# Patient Record
Sex: FEMALE | Race: WHITE | NOT HISPANIC OR LATINO | Employment: UNEMPLOYED | URBAN - METROPOLITAN AREA
[De-identification: names, ages, dates, MRNs, and addresses within clinical notes are randomized per-mention and may not be internally consistent; named-entity substitution may affect disease eponyms.]

---

## 2017-01-27 ENCOUNTER — GENERIC CONVERSION - ENCOUNTER (OUTPATIENT)
Dept: OTHER | Facility: OTHER | Age: 62
End: 2017-01-27

## 2017-02-14 ENCOUNTER — HOSPITAL ENCOUNTER (EMERGENCY)
Facility: HOSPITAL | Age: 62
Discharge: HOME | End: 2017-02-14
Attending: EMERGENCY MEDICINE
Payer: COMMERCIAL

## 2017-02-14 ENCOUNTER — APPOINTMENT (EMERGENCY)
Dept: RADIOLOGY | Facility: HOSPITAL | Age: 62
End: 2017-02-14
Payer: COMMERCIAL

## 2017-02-14 VITALS
HEIGHT: 60 IN | RESPIRATION RATE: 16 BRPM | BODY MASS INDEX: 37.3 KG/M2 | OXYGEN SATURATION: 92 % | SYSTOLIC BLOOD PRESSURE: 111 MMHG | TEMPERATURE: 98.6 F | WEIGHT: 190 LBS | HEART RATE: 88 BPM | DIASTOLIC BLOOD PRESSURE: 55 MMHG

## 2017-02-14 DIAGNOSIS — M54.9 BACK PAIN: Primary | ICD-10-CM

## 2017-02-14 DIAGNOSIS — R20.2 PARESTHESIA: ICD-10-CM

## 2017-02-14 LAB
ANION GAP SERPL CALCULATED.3IONS-SCNC: 9 MMOL/L (ref 4–13)
BASOPHILS # BLD AUTO: 0 THOUSANDS/ΜL (ref 0–0.1)
BASOPHILS NFR BLD AUTO: 0 % (ref 0–1)
BUN SERPL-MCNC: 15 MG/DL (ref 5–25)
CALCIUM SERPL-MCNC: 9 MG/DL (ref 8.3–10.1)
CHLORIDE SERPL-SCNC: 96 MMOL/L (ref 100–108)
CO2 SERPL-SCNC: 30 MMOL/L (ref 21–32)
CREAT SERPL-MCNC: 0.95 MG/DL (ref 0.6–1.3)
EOSINOPHIL # BLD AUTO: 0.3 THOUSAND/ΜL (ref 0–0.61)
EOSINOPHIL NFR BLD AUTO: 3 % (ref 0–6)
ERYTHROCYTE [DISTWIDTH] IN BLOOD BY AUTOMATED COUNT: 16 % (ref 11.6–15.1)
GFR SERPL CREATININE-BSD FRML MDRD: 59.8 ML/MIN/1.73SQ M
GLUCOSE SERPL-MCNC: 120 MG/DL (ref 65–140)
HCT VFR BLD AUTO: 44.7 % (ref 37–47)
HGB BLD-MCNC: 14.6 G/DL (ref 12–16)
LYMPHOCYTES # BLD AUTO: 2.8 THOUSANDS/ΜL (ref 0.6–4.47)
LYMPHOCYTES NFR BLD AUTO: 32 % (ref 14–44)
MCH RBC QN AUTO: 29.3 PG (ref 27–31)
MCHC RBC AUTO-ENTMCNC: 32.7 G/DL (ref 31.4–37.4)
MCV RBC AUTO: 90 FL (ref 82–98)
MONOCYTES # BLD AUTO: 0.6 THOUSAND/ΜL (ref 0.17–1.22)
MONOCYTES NFR BLD AUTO: 7 % (ref 4–12)
NEUTROPHILS # BLD AUTO: 5.2 THOUSANDS/ΜL (ref 1.85–7.62)
NEUTS SEG NFR BLD AUTO: 58 % (ref 43–75)
NRBC BLD AUTO-RTO: 0 /100 WBCS
PLATELET # BLD AUTO: 371 THOUSANDS/UL (ref 130–400)
PMV BLD AUTO: 6.9 FL (ref 8.9–12.7)
POTASSIUM SERPL-SCNC: 3.8 MMOL/L (ref 3.5–5.3)
RBC # BLD AUTO: 4.99 MILLION/UL (ref 4.2–5.4)
SODIUM SERPL-SCNC: 135 MMOL/L (ref 136–145)
WBC # BLD AUTO: 8.9 THOUSAND/UL (ref 4.8–10.8)

## 2017-02-14 PROCEDURE — 85025 COMPLETE CBC W/AUTO DIFF WBC: CPT | Performed by: EMERGENCY MEDICINE

## 2017-02-14 PROCEDURE — 36415 COLL VENOUS BLD VENIPUNCTURE: CPT | Performed by: EMERGENCY MEDICINE

## 2017-02-14 PROCEDURE — 99284 EMERGENCY DEPT VISIT MOD MDM: CPT

## 2017-02-14 PROCEDURE — 80048 BASIC METABOLIC PNL TOTAL CA: CPT | Performed by: EMERGENCY MEDICINE

## 2017-02-14 PROCEDURE — 72100 X-RAY EXAM L-S SPINE 2/3 VWS: CPT

## 2017-02-14 RX ORDER — GABAPENTIN 300 MG/1
300 CAPSULE ORAL 3 TIMES DAILY
COMMUNITY
End: 2018-05-07 | Stop reason: ALTCHOICE

## 2017-02-14 RX ORDER — VENLAFAXINE 75 MG/1
75 TABLET ORAL 2 TIMES DAILY
COMMUNITY
End: 2018-06-19 | Stop reason: ALTCHOICE

## 2017-02-14 RX ORDER — VALSARTAN 160 MG/1
180 TABLET ORAL DAILY
COMMUNITY
End: 2018-04-20 | Stop reason: CLARIF

## 2017-02-14 RX ORDER — TRAZODONE HYDROCHLORIDE 150 MG/1
150 TABLET ORAL
COMMUNITY
End: 2018-06-19 | Stop reason: SDUPTHER

## 2017-02-14 RX ORDER — ALBUTEROL SULFATE 2.5 MG/3ML
2.5 SOLUTION RESPIRATORY (INHALATION) EVERY 6 HOURS PRN
COMMUNITY
End: 2019-07-18

## 2017-02-14 RX ORDER — METHYLPREDNISOLONE 4 MG/1
TABLET ORAL
Qty: 21 TABLET | Refills: 0 | Status: SHIPPED | OUTPATIENT
Start: 2017-02-14 | End: 2018-05-07 | Stop reason: ALTCHOICE

## 2017-02-21 ENCOUNTER — ALLSCRIPTS OFFICE VISIT (OUTPATIENT)
Dept: OTHER | Facility: OTHER | Age: 62
End: 2017-02-21

## 2017-02-21 DIAGNOSIS — M48.061 SPINAL STENOSIS OF LUMBAR REGION: ICD-10-CM

## 2017-03-02 ENCOUNTER — GENERIC CONVERSION - ENCOUNTER (OUTPATIENT)
Dept: OTHER | Facility: OTHER | Age: 62
End: 2017-03-02

## 2017-03-02 ENCOUNTER — APPOINTMENT (OUTPATIENT)
Dept: PHYSICAL THERAPY | Facility: CLINIC | Age: 62
End: 2017-03-02
Payer: COMMERCIAL

## 2017-03-02 DIAGNOSIS — M48.061 SPINAL STENOSIS OF LUMBAR REGION: ICD-10-CM

## 2017-03-02 PROCEDURE — 97161 PT EVAL LOW COMPLEX 20 MIN: CPT

## 2017-03-02 PROCEDURE — 97110 THERAPEUTIC EXERCISES: CPT

## 2017-03-07 ENCOUNTER — APPOINTMENT (OUTPATIENT)
Dept: PHYSICAL THERAPY | Facility: CLINIC | Age: 62
End: 2017-03-07
Payer: COMMERCIAL

## 2017-03-07 PROCEDURE — 97110 THERAPEUTIC EXERCISES: CPT

## 2017-03-09 ENCOUNTER — APPOINTMENT (OUTPATIENT)
Dept: PHYSICAL THERAPY | Facility: CLINIC | Age: 62
End: 2017-03-09
Payer: COMMERCIAL

## 2017-03-14 ENCOUNTER — APPOINTMENT (OUTPATIENT)
Dept: PHYSICAL THERAPY | Facility: CLINIC | Age: 62
End: 2017-03-14
Payer: COMMERCIAL

## 2017-03-16 ENCOUNTER — APPOINTMENT (OUTPATIENT)
Dept: PHYSICAL THERAPY | Facility: CLINIC | Age: 62
End: 2017-03-16
Payer: COMMERCIAL

## 2017-03-21 ENCOUNTER — APPOINTMENT (OUTPATIENT)
Dept: PHYSICAL THERAPY | Facility: CLINIC | Age: 62
End: 2017-03-21
Payer: COMMERCIAL

## 2017-03-21 PROCEDURE — 97110 THERAPEUTIC EXERCISES: CPT

## 2017-03-27 ENCOUNTER — APPOINTMENT (OUTPATIENT)
Dept: PHYSICAL THERAPY | Facility: CLINIC | Age: 62
End: 2017-03-27
Payer: COMMERCIAL

## 2017-03-27 PROCEDURE — 97112 NEUROMUSCULAR REEDUCATION: CPT

## 2017-03-29 ENCOUNTER — ALLSCRIPTS OFFICE VISIT (OUTPATIENT)
Dept: OTHER | Facility: OTHER | Age: 62
End: 2017-03-29

## 2017-03-30 ENCOUNTER — APPOINTMENT (OUTPATIENT)
Dept: PHYSICAL THERAPY | Facility: CLINIC | Age: 62
End: 2017-03-30
Payer: COMMERCIAL

## 2017-04-03 ENCOUNTER — TRANSCRIBE ORDERS (OUTPATIENT)
Dept: ADMINISTRATIVE | Facility: HOSPITAL | Age: 62
End: 2017-04-03

## 2017-04-03 DIAGNOSIS — M54.40 ACUTE RIGHT-SIDED LOW BACK PAIN WITH SCIATICA, SCIATICA LATERALITY UNSPECIFIED: ICD-10-CM

## 2017-04-03 DIAGNOSIS — M54.16 LUMBAR RADICULOPATHY: Primary | ICD-10-CM

## 2017-04-03 DIAGNOSIS — G89.4 CHRONIC PAIN SYNDROME: ICD-10-CM

## 2017-04-10 ENCOUNTER — HOSPITAL ENCOUNTER (OUTPATIENT)
Dept: RADIOLOGY | Facility: HOSPITAL | Age: 62
Discharge: HOME | End: 2017-04-10
Attending: ANESTHESIOLOGY
Payer: COMMERCIAL

## 2017-04-10 DIAGNOSIS — G89.4 CHRONIC PAIN SYNDROME: ICD-10-CM

## 2017-04-10 DIAGNOSIS — M54.40 ACUTE RIGHT-SIDED LOW BACK PAIN WITH SCIATICA, SCIATICA LATERALITY UNSPECIFIED: ICD-10-CM

## 2017-04-10 DIAGNOSIS — M54.16 LUMBAR RADICULOPATHY: ICD-10-CM

## 2017-04-10 PROCEDURE — 72148 MRI LUMBAR SPINE W/O DYE: CPT

## 2017-04-18 ENCOUNTER — GENERIC CONVERSION - ENCOUNTER (OUTPATIENT)
Dept: OTHER | Facility: OTHER | Age: 62
End: 2017-04-18

## 2017-04-24 ENCOUNTER — ALLSCRIPTS OFFICE VISIT (OUTPATIENT)
Dept: OTHER | Facility: OTHER | Age: 62
End: 2017-04-24

## 2017-04-24 DIAGNOSIS — M54.2 CERVICALGIA: ICD-10-CM

## 2017-04-24 DIAGNOSIS — G89.4 CHRONIC PAIN SYNDROME: ICD-10-CM

## 2017-04-24 DIAGNOSIS — M47.26 OTHER SPONDYLOSIS WITH RADICULOPATHY, LUMBAR REGION: ICD-10-CM

## 2017-04-24 DIAGNOSIS — M54.50 LOW BACK PAIN: ICD-10-CM

## 2017-04-24 DIAGNOSIS — M48.061 SPINAL STENOSIS OF LUMBAR REGION: ICD-10-CM

## 2017-04-24 DIAGNOSIS — M54.12 RADICULOPATHY OF CERVICAL REGION: ICD-10-CM

## 2017-05-22 ENCOUNTER — ALLSCRIPTS OFFICE VISIT (OUTPATIENT)
Dept: OTHER | Facility: OTHER | Age: 62
End: 2017-05-22

## 2017-05-24 ENCOUNTER — HOSPITAL ENCOUNTER (OUTPATIENT)
Facility: AMBULARY SURGERY CENTER | Age: 62
Setting detail: OUTPATIENT SURGERY
Discharge: HOME | End: 2017-05-24
Attending: ANESTHESIOLOGY | Admitting: ANESTHESIOLOGY
Payer: COMMERCIAL

## 2017-05-24 ENCOUNTER — APPOINTMENT (OUTPATIENT)
Dept: RADIOLOGY | Facility: HOSPITAL | Age: 62
End: 2017-05-24
Payer: COMMERCIAL

## 2017-05-24 VITALS
SYSTOLIC BLOOD PRESSURE: 155 MMHG | HEART RATE: 88 BPM | DIASTOLIC BLOOD PRESSURE: 75 MMHG | RESPIRATION RATE: 18 BRPM | OXYGEN SATURATION: 98 % | TEMPERATURE: 98 F

## 2017-05-24 PROBLEM — M51.17 INTERVERTEBRAL DISC DISORDER WITH RADICULOPATHY OF LUMBOSACRAL REGION: Status: ACTIVE | Noted: 2017-05-24

## 2017-05-24 PROBLEM — M54.50 LOW BACK PAIN: Status: ACTIVE | Noted: 2017-05-24

## 2017-05-24 PROBLEM — G89.4 CHRONIC PAIN SYNDROME: Status: ACTIVE | Noted: 2017-05-24

## 2017-05-24 PROCEDURE — 72020 X-RAY EXAM OF SPINE 1 VIEW: CPT

## 2017-05-24 RX ORDER — METHYLPREDNISOLONE ACETATE 80 MG/ML
INJECTION, SUSPENSION INTRA-ARTICULAR; INTRALESIONAL; INTRAMUSCULAR; SOFT TISSUE AS NEEDED
Status: DISCONTINUED | OUTPATIENT
Start: 2017-05-24 | End: 2017-05-24 | Stop reason: HOSPADM

## 2017-05-24 RX ORDER — BUPIVACAINE HYDROCHLORIDE 2.5 MG/ML
INJECTION, SOLUTION EPIDURAL; INFILTRATION; INTRACAUDAL AS NEEDED
Status: DISCONTINUED | OUTPATIENT
Start: 2017-05-24 | End: 2017-05-24 | Stop reason: HOSPADM

## 2017-05-25 ENCOUNTER — GENERIC CONVERSION - ENCOUNTER (OUTPATIENT)
Dept: OTHER | Facility: OTHER | Age: 62
End: 2017-05-25

## 2017-06-07 ENCOUNTER — GENERIC CONVERSION - ENCOUNTER (OUTPATIENT)
Dept: OTHER | Facility: OTHER | Age: 62
End: 2017-06-07

## 2017-06-20 ENCOUNTER — GENERIC CONVERSION - ENCOUNTER (OUTPATIENT)
Dept: OTHER | Facility: OTHER | Age: 62
End: 2017-06-20

## 2017-07-10 ENCOUNTER — ALLSCRIPTS OFFICE VISIT (OUTPATIENT)
Dept: OTHER | Facility: OTHER | Age: 62
End: 2017-07-10

## 2017-07-14 ENCOUNTER — GENERIC CONVERSION - ENCOUNTER (OUTPATIENT)
Dept: OTHER | Facility: OTHER | Age: 62
End: 2017-07-14

## 2018-01-10 NOTE — RESULT NOTES
Verified Results  CT ABDOMEN W [address] [employer] [id number] 09:43AM Mayra Ferguson    Order Number: [id number]   Performing Comments: RUQ ultrasound done March 2016 showed questional hypoechoic lesion in left lobe of liver or more likely area of fatty sparing, recommended evaluate with CT of abdomen   - Patient Instructions: To schedule this appointment, please    contact Central Scheduling at [phone]  Test Name Result Flag Reference   CT ABDOMEN W WO CONTRAST (Report)     CT - ABDOMEN WITH AND WITHOUT CONTRAST  INDICATION: 79-year-old female with questionable lesion in left hepatic lobe  The patient has a smoking history  COMPARISON: Right upper quadrant ultrasound 3/18/2016  TECHNIQUE: CT examination of the abdomen was performed both prior to and after the administration of intravenous contrast  This examination, like all CT scans performed in the [facility], was performed utilizing techniques to minimize    radiation dose exposure, including the use of iterative reconstruction and automated exposure control  Axial, sagittal, and coronal reformatted images were submitted for interpretation  100 ml of Omnipaque 350 was injected intravenously  Enteric contrast was administered  FINDINGS:     ABDOMEN     LOWER CHEST: Scattered subsegmental atelectasis  5 mm noncalcified nodule at the left lung base  According to guidelines by the Fleischner society (Radiology 2013; 364:387-677) , for this patient with a smoking history, initial follow-up CT in 6-12    months followed by 18-24 months if no change  At the nodule is stable, no further surveillance required  LIVER/BILIARY TREE: Unremarkable  GALLBLADDER: No calcified gallstones  No pericholecystic inflammatory change  SPLEEN: Unremarkable  PANCREAS: Tiny lipoma in the uncinate process  Otherwise, unremarkable  ADRENAL GLANDS: Unremarkable  KIDNEYS/URETERS: One or more simple renal cyst(s) is noted  Otherwise unremarkable kidneys  No hydronephrosis  VISUALIZED STOMACH AND BOWEL: Unremarkable  VISUALIZED ABDOMINAL CAVITY: No pathologically enlarged periportal, mesenteric or upper retroperitoneal lymph nodes  No ascites or free intraperitoneal air  No fluid collection  VISUALIZED BODY WALL: No hernia or mass  VISUALIZED ABDOMINAL VESSELS: Calcified plaque throughout the normal caliber aorta  No aneurysm  OSSEOUS STRUCTURES: No acute fracture or destructive osseous lesion  IMPRESSION:       1  No discrete liver lesions  The sonographic finding likely represented focal fatty sparing  2  5 mm noncalcified left basilar pulmonary nodule  Follow-up in 6-12 months recommended         ##sigslh##sigslh       Workstation performed: [id number]     Signed by:   Bakari Mello MD   5/3/16

## 2018-01-11 NOTE — RESULT NOTES
Verified Results  (1) BASIC METABOLIC PROFILE [id number] 12:00AM Katia Lopez     Test Name Result Flag Reference   Glucose, Serum 126 mg/dL H 65-99   BUN 13 mg/dL  8-27   Creatinine, Serum 0 76 mg/dL  0 57-1 00   eGFR If NonAfricn Am 86 mL/min/1 73  >59   eGFR If Africn Am 99 mL/min/1 73  >59   BUN/Creatinine Ratio 17  11-26   Sodium, Serum 139 mmol/L  134-144   Potassium, Serum 4 5 mmol/L  3 5-5 2   Chloride, Serum 98 mmol/L     Carbon Dioxide, Total 24 mmol/L  18-29   Calcium, Serum 10 1 mg/dL  8 7-10 3

## 2018-01-12 NOTE — RESULT NOTES
Verified Results  * MAMMO SCREENING BILATERAL W CAD 21Apr2016 09:19AM John Hanks   TW Order Number: [id number]     Test Name Result Flag Reference   MAMMO SCREENING BILATERAL W CAD (Report)     Patient History:   Patient is postmenopausal    Family history of breast cancer in 2 maternal aunts at age 27 and   breast cancer in maternal aunt at age 21  Patient's BMI is 40 6  Reason for exam: screening (asymptomatic)  Mammo Screening Bilateral W CAD: April 21, 2016 - Check In #:    [de-identified]   Bilateral CC and MLO view(s) were taken  Technologist: MARK Edmondson   There are scattered fibroglandular densities  A basline digital    mammogram is performed  No dominant soft tissue mass,    architectural distortion or suspicious calcifications are noted  The skin and nipple structures are within normal limits  Benign   appearing calcifications are noted  No mammographic evidence of malignancy  ASSESSMENT: BiRad:2 - Benign     Recommendation:   Routine screening mammogram of both breasts in 1 year  Analyzed by CAD     8-10% of cancers will be missed on mammography  Management of a    palpable abnormality must be based on clinical grounds  Patients   will be notified of their results via letter from our facility  Accredited by [employer] of Radiology and FDA  Transcription Location: [facility] 98: [id number]     Risk Value(s):   Tyrer-Cuzick 10 Year: 2 193%, Tyrer-Cuzick Lifetime: 5 476%,    Myriad Table: 5 6%, BRIAN 5 Year: 1 1%, NCI Lifetime: 5 8%, MRS    : Based on personal and/or family history,    consideration of hereditary risk assessment may be warranted     Signed by:   Asif Reardon MD   4/21/16

## 2018-01-12 NOTE — MISCELLANEOUS
Message  Called patient today and discussed with her the results of mammogram patient was happy when she was informed was benign, informed her that she's we will need to follow-up annually with mammogram  Also discussed her lipid profile results and the need to increase her Lipitor dose  She says she is following up next week with Dr Radha Spivey         Signatures   Electronically signed by : TORO Galloway ; May  5 2016 12:05PM EST                       (Author)

## 2018-01-12 NOTE — MISCELLANEOUS
Message   Recorded as Task   Date: 07/11/2017 01:22 PM, Created By: Junito Hammond   Task Name: Miscellaneous   Assigned To: [address]   Regarding Patient: Sole Taylor, Status: Active   Comment:    Junito Hammond - 11 Jul 2017 1:22 PM     TASK CREATED  phone call from patient requesting an order for Dr Terrel Leventhal  patient called their office to schedule an appointment and they are requesting an order  if any questions can reach patient at [phone]  patient will call back with the fax number  Laurie Calderoni - 11 Jul 2017 1:32 PM     TASK EDITED  Do not see a referral in the computer  Please advise  Jayden Booth - 11 Jul 2017 1:34 PM     TASK EDITED  [address] Call Center- Patient called back &  fax# [phone]   Pritesh [facility] - 11 Jul 2017 1:46 PM     TASK EDITED  Please see below  [address] - 11 Jul 2017 2:01 PM     TASK REPLIED TO: Previously Assigned To [address]  Referral needs to come from PCP due to her having Medicaid ([address])     I just provided her with the number to call their office  Mariella Calderon - 11 Jul 2017 2:37 PM     TASK EDITED  S/W pt  Advised pt of the same  Pt stated she needs an order from [facility] as per Dr Mitchell so Dr Mitchell can write a referral   Please advise  [address] - 11 Jul 2017 2:44 PM     TASK REPLIED TO: Previously Assigned To [facility] placed in [address] - 11 Jul 2017 2:59 PM     TASK EDITED  LMOM on home/cell # for pt to C/B, C/B # provided  Macy Mesa - 13 Jul 2017 4:05 PM     TASK EDITED  S/w pt, advised of above  pt verbalized understanding and requested order be mailed to home address - confirmed per allscripts  ***No order for Dr Sunshine Gimenez or [facility] Surgeon in Allscripts     [address] - 13 Jul 2017 4:07 PM     TASK REPLIED TO: Previously Assigned To [facility] listed as "Orthopedic Surgery Referral - Other" under Order section from 7/11   [facility] - 13 Jul 2017 4:08 PM TASK EDITED   Isabella Sawyer - 14 Jul 2017 8:48 AM     TASK EDITED  Printed order and sent to pt via mail  Active Problems    1  Abdominal pain, RUQ (789 01) (R10 11)   2  Abnormal ultrasound of liver (793 3) (R93 2)   3  Bilateral low back pain with right-sided sciatica (724 3) (M54 41)   4  BMI 40 0-44 9, adult (V85 41) (Z68 41)   5  Breast cancer screening (V76 10) (Z12 39)   6  Cervical radiculopathy (723 4) (M54 12)   7  Chronic low back pain (724 2,338 29) (M54 5,G89 29)   8  Chronic pain syndrome (338 4) (G89 4)   9  COPD, mild (496) (J44 9)   10  Depression (311) (F32 9)   11  Encounter for screening colonoscopy (V76 51) (Z12 11)   12  Encounter for smoking cessation counseling (V65 42,305 1) (Z71 6,Z72 0)   13  Hypercholesteremia (272 0) (E78 00)   14  Hypertension (401 9) (I10)   15  Intervertebral disc disorder with radiculopathy of lumbar region (724 4) (M51 16)   16  Left-sided low back pain with left-sided sciatica (724 3) (M54 42)   17  Liver disease (573 9) (K76 9)   18  Lumbar spinal stenosis (724 02) (M48 06)   19  Lumbosacral spinal stenosis (724 02) (M48 07)   20  Lung nodule (793 11) (R91 1)   21  Mass of lung (786 6) (R91 8)   22  Morbid obesity (278 01) (E66 01)   23  Neck pain (723 1) (M54 2)   24  Osteoarthritis of spine with radiculopathy, lumbar region (721 3) (M47 26)   25  Right-sided low back pain with right-sided sciatica (724 3) (M54 41)   26  Smoking (305 1) (F17 200)    Current Meds   1  Albuterol AERS; Therapy: (Recorded:99Ctb7558) to Recorded   2  Atorvastatin Calcium 10 MG Oral Tablet; take 1 tablet by mouth once daily; Therapy: 57Tgu0672 to (Evaluate:48Ctr1385)  Requested for: 76Yyp1973; Last   Rx:70Dkb8894 Ordered   3  Baclofen 10 MG Oral Tablet; TAKE 1/2 - 1 TAB PO TID PRN MUSCLE SPASMS; Therapy: 24Apr2017 to (Evaluate:34Kxu2415)  Requested for: 20Jun2017; Last   Rx:20Jun2017 Ordered   4  Gabapentin 600 MG Oral Tablet; TAKE 1 TABLET 3 TIMES DAILY;    Therapy: 94OXV3472 to (Evaluate:24Jun2017)  Requested for: 64FVB4227; Last   Rx:45Nje8557 Ordered   5  TraZODone HCl TABS; Therapy: (Recorded:73Crr8708) to Recorded   6  Valsartan-Hydrochlorothiazide 160-25 MG Oral Tablet (Diovan HCT); take 1 tablet by   mouth once daily; Therapy: 81Raz7956 to (Evaluate:02Nov2017)  Requested for: 13FZO5292; Last   Rx:68Jld3984 Ordered   7  Venlafaxine HCl - 75 MG Oral Tablet; Therapy: (Recorded:39Cfc9389) to Recorded    Allergies    1  Asparaginase and Derivs   2  Aspirin TABS   3  Ibuprofen 200 TABS   4  NSAIDs   5  Robaxin TABS    6  No Known Environmental Allergies   7   No Known Food Allergies    Signatures   Electronically signed by : Rosy Barfield RN; Jul 14 2017  8:48AM EST                       (Author)

## 2018-01-12 NOTE — MISCELLANEOUS
Message   Recorded as Task   Date: 06/20/2017 11:57 AM, Created By: Julissa Monte   Task Name: Miscellaneous   Assigned To: [facility] [place] Clinical,Team   Regarding Patient: Sunshine Miles, Status: In Progress   Jeremiah Killian - 20 Jun 2017 11:57 AM     TASK CREATED  Pt was feeling really good but moved baker's rake, etc  and now would like to see if she can get a muscle relaxant  Please call [phone]  Mariella Calderon - 20 Jun 2017 1:01 PM     TASK EDITED  S/W pt  Pt stated she moved a metal bakers rack and has b/l middle to lower back pain  Pain is 4-5/10  Pt is taking Gabapentin 600mg, takes 1 tablet 3x/day and tylenol 500mg, takes 1 tablet q 6hrs prn-which does not really help  Pt stated she got rid of her muscle relaxer baclofen and that helped her before  Pt wondering if [facility] can prescribed a few pills of baclofen? Pharmacy on file  Please advise  Malika Zuleta - 20 Jun 2017 1:10 PM     TASK REPLIED TO: Previously Assigned To [address] to pharmacy  Ameena Brar - 20 Jun 2017 1:18 PM     TASK IN PROGRESS   Ameena Brar - 20 Jun 2017 1:20 PM     TASK EDITED  Lmom for pt to return call  Ameena Brar - 20 Jun 2017 4:20 PM     TASK EDITED  Pt aware  Active Problems    1  Abdominal pain, RUQ (789 01) (R10 11)   2  Abnormal ultrasound of liver (793 3) (R93 2)   3  Bilateral low back pain with right-sided sciatica (724 3) (M54 41)   4  BMI 40 0-44 9, adult (V85 41) (Z68 41)   5  Breast cancer screening (V76 10) (Z12 39)   6  Cervical radiculopathy (723 4) (M54 12)   7  Chronic low back pain (724 2,338 29) (M54 5,G89 29)   8  Chronic pain syndrome (338 4) (G89 4)   9  COPD, mild (496) (J44 9)   10  Depression (311) (F32 9)   11  Encounter for screening colonoscopy (V76 51) (Z12 11)   12  Encounter for smoking cessation counseling (V65 42,305 1) (Z71 6,Z72 0)   13  Hypercholesteremia (272 0) (E78 00)   14  Hypertension (401 9) (I10)   15   Left-sided low back pain with left-sided sciatica (724 3) (M54 42)   16  Liver disease (573 9) (K76 9)   17  Lumbar spinal stenosis (724 02) (M48 06)   18  Lumbosacral spinal stenosis (724 02) (M48 07)   19  Lung nodule (793 11) (R91 1)   20  Mass of lung (786 6) (R91 8)   21  Morbid obesity (278 01) (E66 01)   22  Neck pain (723 1) (M54 2)   23  Osteoarthritis of spine with radiculopathy, lumbar region (721 3) (M47 26)   24  Right-sided low back pain with right-sided sciatica (724 3) (M54 41)   25  Smoking (305 1) (F17 200)    Current Meds   1  Albuterol AERS; Therapy: (Recorded:35Bpw5250) to Recorded   2  Atorvastatin Calcium 10 MG Oral Tablet; take 1 tablet by mouth once daily; Therapy: 37Txi7393 to (Evaluate:38Zem9129)  Requested for: 01Uvd6833; Last   Rx:82Zla4970 Ordered   3  Baclofen 10 MG Oral Tablet; TAKE 1/2 - 1 TAB PO TID PRN MUSCLE SPASMS; Therapy: 30Nie9140 to (Evaluate:45Voq1963)  Requested for: 20Jun2017; Last   Rx:15Zti5990 Ordered   4  Gabapentin 600 MG Oral Tablet; TAKE 1 TABLET 3 TIMES DAILY; Therapy: 45HCK9818 to (Evaluate:03Plc8464)  Requested for: 61OKF4433; Last   Rx:90Bjq6813 Ordered   5  TraZODone HCl TABS; Therapy: (Recorded:45Adi7354) to Recorded   6  Valsartan-Hydrochlorothiazide 160-25 MG Oral Tablet (Diovan HCT); take 1 tablet by   mouth once daily; Therapy: 13Mct8509 to (Evaluate:33Prf4598)  Requested for: 06Hxu0748; Last   Rx:40Ahc2071 Ordered   7  Venlafaxine HCl - 75 MG Oral Tablet; Therapy: (Recorded:12Vid9453) to Recorded    Allergies    1  Asparaginase and Derivs   2  Aspirin TABS   3  Ibuprofen 200 TABS   4  NSAIDs   5  Robaxin TABS    6  No Known Environmental Allergies   7   No Known Food Allergies    Signatures   Electronically signed by : Bj Mg RN; Jun 20 2017  4:20PM EST                       (Author)

## 2018-01-12 NOTE — MISCELLANEOUS
Signatures   Electronically signed by : TORO Maddox ; May 22 2017  7:26AM EST                       (Author)

## 2018-01-13 VITALS
OXYGEN SATURATION: 97 % | TEMPERATURE: 98 F | BODY MASS INDEX: 35.51 KG/M2 | HEIGHT: 62 IN | HEART RATE: 91 BPM | DIASTOLIC BLOOD PRESSURE: 75 MMHG | SYSTOLIC BLOOD PRESSURE: 120 MMHG | WEIGHT: 193 LBS

## 2018-01-13 NOTE — MISCELLANEOUS
Message   Recorded as Task   Date: 04/11/2017 03:39 PM, Created By: [address]   Task Name: Care Coordination   Assigned To: [facility] surgery sched,Team   Regarding Patient: Michelle Valverde, Status: In Progress   Comment:    [place] - 11 Apr 2017 3:39 PM     TASK CREATED  LMOM to review MRI L-spine results with patient  Please task this back to me if and when she calls back  Ameena Brar - 11 Apr 2017 3:43 PM     TASK EDITED   Priscilla Agrawal - 12 Apr 2017 8:08 AM     TASK EDITED  Nurse unavailable to take call  Please call back at [phone]   [facility] - 12 Apr 2017 8:10 AM     TASK EDITED   Priscilla Agrawal - 12 Apr 2017 1:34 PM     TASK EDITED  PT called back  Please call [phone]  Maral Echevarria - 13 Apr 2017 11:31 AM     TASK EDITED  Just received a TC from the pt  calling you back  She stated she will be home all day  Deepika Mendez - 13 Apr 2017 3:53 PM     TASK EDITED  Pt called again requesting a callback  Pt can be reached at [phone]  [address] - 13 Apr 2017 4:59 PM     TASK REPLIED TO: Previously Assigned To [address] to patient and discussed MRI results  Please schedule:    Procedure:  R L4 and L5 TFESI  DX:  M47 26, M54 5  CPT:  82740 and 29951   Isabella Sawyer - 14 Apr 2017 7:43 AM     TASK REASSIGNED: Previously Assigned To [address]   Fitz Simmons - 14 Apr 2017 8:08 AM     TASK REASSIGNED: Previously Assigned To [facility] surgery sched,Team   Fitz Simmons - 14 Apr 2017 10:21 AM     TASK IN PROGRESS   Fitz Simmons - 14 Apr 2017 10:21 AM     TASK REASSIGNED: Previously Assigned To [facility] for pt to cb to schedule R L4 & L5 TFESI at [place] [address] w/ Fitz Elias - 18 Apr 2017 11:26 AM     TASK REASSIGNED: Previously Assigned To [facility] surgery sched,Team  Spoke to pt, scheduled R L4 & L5 TFESI at [place] [address] with Dr Manjit Perez on 5/24/17   (Pt insurance requires 15 business days to approve authorization and that was the soonest that the pt was able to come)  Went over pre procedure insructions, NPO 1 hr prior, needs , if sick, on abx, or develops infection need to call to rs, wear loose comf clothing  Pt verbalized understanding  Active Problems    1  Abdominal pain, RUQ (789 01) (R10 11)   2  Abnormal ultrasound of liver (793 3) (R93 2)   3  Bilateral low back pain with right-sided sciatica (724 3) (M54 41)   4  BMI 40 0-44 9, adult (V85 41) (Z68 41)   5  Breast cancer screening (V76 10) (Z12 39)   6  Chronic low back pain (724 2,338 29) (M54 5,G89 29)   7  Chronic pain syndrome (338 4) (G89 4)   8  COPD, mild (496) (J44 9)   9  Depression (311) (F32 9)   10  Encounter for screening colonoscopy (V76 51) (Z12 11)   11  Encounter for smoking cessation counseling (V65 42,305 1) (Z71 6,Z72 0)   12  Hypercholesteremia (272 0) (E78 00)   13  Hypertension (401 9) (I10)   14  Left-sided low back pain with left-sided sciatica (724 3) (M54 42)   15  Liver disease (573 9) (K76 9)   16  Lumbar radiculopathy (724 4) (M54 16)   17  Lumbar spinal stenosis (724 02) (M48 06)   18  Lumbosacral spinal stenosis (724 02) (M48 07)   19  Lung nodule (793 11) (R91 1)   20  Mass of lung (786 6) (R91 8)   21  Morbid obesity (278 01) (E66 01)   22  Neck pain (723 1) (M54 2)   23  Right-sided low back pain with right-sided sciatica (724 3) (M54 41)   24  Smoking (305 1) (F17 200)    Current Meds   1  Albuterol AERS; Therapy: (Recorded:14Ljb3984) to Recorded   2  Atorvastatin Calcium 10 MG Oral Tablet; take 1 tablet by mouth once daily; Therapy: 45Nea6292 to (Evaluate:40Igy1699)  Requested for: 52Nqk4113; Last   Rx:42Yry2705 Ordered   3  Gabapentin 600 MG Oral Tablet; TAKE 1 TABLET 3 TIMES DAILY; Therapy: 10JYE5291 to (Evaluate:28Apr2017)  Requested for: 78HTV5584; Last   Rx:29Mar2017 Ordered   4  TraZODone HCl TABS; Therapy: (Recorded:67Ehe7407) to Recorded   5   Valsartan-Hydrochlorothiazide 160-25 MG Oral Tablet (Diovan HCT); take 1 tablet by   mouth once daily; Therapy: 47Lct3556 to (Evaluate:20Jun2017)  Requested for: 26Yuq0064; Last   Rx:20Feb2017 Ordered   6  Venlafaxine HCl - 75 MG Oral Tablet; Therapy: (Recorded:30Jul2015) to Recorded    Allergies    1  Asparaginase and Derivs   2  Aspirin TABS   3  Ibuprofen 200 TABS   4  NSAIDs   5  Robaxin TABS    6  No Known Environmental Allergies   7   No Known Food Allergies    Signatures   Electronically signed by : Hilda Berman, ; Apr 18 2017 11:27AM EST                       (Author)

## 2018-01-13 NOTE — MISCELLANEOUS
Provider Comments  Provider Comments:   called to resched l/m lr      Signatures   Electronically signed by : Adiel Carrillo DO; Jan 30 2017  9:06AM EST                       (Author)

## 2018-01-13 NOTE — MISCELLANEOUS
Message  Message Free Text Note Form: Called patient, no answer, left message stating to call office in regards to CT scan of abdomen  Explained I am not in the office so may be harder to get a hold of me but will get back to her ASAP  Signatures   Electronically signed by : Анна White DO; May  3 2016  6:40PM EST                       (Author)    Electronically signed by :  TE Dos Santos ; May  7 2016  7:55PM EST                       (Author)

## 2018-01-14 VITALS
HEART RATE: 93 BPM | DIASTOLIC BLOOD PRESSURE: 75 MMHG | TEMPERATURE: 98.2 F | SYSTOLIC BLOOD PRESSURE: 154 MMHG | WEIGHT: 194 LBS | HEIGHT: 62 IN | OXYGEN SATURATION: 98 % | BODY MASS INDEX: 35.7 KG/M2

## 2018-01-14 VITALS
HEIGHT: 60 IN | HEART RATE: 78 BPM | WEIGHT: 192.38 LBS | SYSTOLIC BLOOD PRESSURE: 134 MMHG | BODY MASS INDEX: 37.77 KG/M2 | DIASTOLIC BLOOD PRESSURE: 70 MMHG

## 2018-01-14 VITALS
BODY MASS INDEX: 35.33 KG/M2 | HEIGHT: 62 IN | SYSTOLIC BLOOD PRESSURE: 130 MMHG | DIASTOLIC BLOOD PRESSURE: 74 MMHG | HEART RATE: 86 BPM | TEMPERATURE: 97.9 F | WEIGHT: 192 LBS

## 2018-01-16 NOTE — RESULT NOTES
Verified Results  US RIGHT UPPER QUADRANT [id number] 12:35PM Ladonna Medrano Order Number: [id number]     Test Name Result Flag Reference   US RIGHT UPPER QUADRANT (Report)     RIGHT UPPER QUADRANT ULTRASOUND     INDICATION: Right upper quadrant pain  COMPARISON: None  TECHNIQUE:  Real-time ultrasound of the right upper quadrant was performed with a curvilinear transducer with both volumetric sweeps and still imaging techniques  FINDINGS:     PANCREAS: Visualized portions of the pancreas are within normal limits  AORTA AND IVC: Visualized portions are normal for patient age  LIVER:   Size: Mildly enlarged  The liver measures 18 3 cm in the midclavicular line  Contour: Surface contour is smooth  Parenchyma: Fatty change in the liver with areas of fatty sparing  Questionable ill-defined hypoechoic area in the left lobe measuring 1 0 x 0 8 x 1 6 cm  The main portal vein is patent and hepatopetal       BILIARY:   The gallbladder is normal in caliber  No wall thickening or pericholecystic fluid  No stones or sludge identified  No sonographic Timmons's sign  No intrahepatic biliary dilatation  CBD measures 4 mm  No choledocholithiasis  KIDNEY:    Right kidney measures 9 8 x 4 3 cm  The renal cortex measures 1 2 cm  Within normal limits  ASCITES:  None  IMPRESSION:       1  Fatty change in the liver with areas of fatty sparing as described  2  Questionable hypoechoic lesion in the left lobe of the liver or more likely an area of fatty sparing  This can be completely evaluated with a CT scan of the abdomen         Workstation performed: [id number]     Signed by:   Jamal Rollins MD   3/18/16     (1) LIPID PANEL, FASTING [id number] 12:00AM Joann Bolanos     Test Name Result Flag Reference   Cholesterol, Total 299 mg/dL H 100-199   Triglycerides 199 mg/dL H 0-149   HDL Cholesterol 41 mg/dL  >39   According to ATP-III Guidelines, HDL-C >59 mg/dL is considered a  negative risk factor for CHD  VLDL Cholesterol Aaron 40 mg/dL  5-40   LDL Cholesterol Calc 218 mg/dL H 0-99   T  Chol/HDL Ratio 7 3 ratio units H 0 0-4 4   T  Chol/HDL Ratio                                                             Men  Women                                               1/2 Avg  Risk  3 4    3 3                                                   Avg Risk  5 0    4 4                                                2X Avg  Risk  9 6    7 1                                                3X Avg  Risk 23 4   11 0     (1) HEMOGLOBIN A1C [id number] 12:00AM [facility]     Test Name Result Flag Reference   Hemoglobin A1c 6 6 % H 4 8-5 6   Pre-diabetes: 5 7 - 6 4           Diabetes: >6 4           Glycemic control for adults with diabetes: <7 0

## 2018-01-17 NOTE — MISCELLANEOUS
Message  Message Free Text Note Form: After multiple attempts, spoke with patient and informed of results regarding RUQ US  It showed fatty liver change and hypoechoic lesion, which radiology recommended CT of abdomen for complete evaluation  Planned on discussing blood work and results at last appointment however patient canceled appointment  Patient states RUQ pain has disappeared  Advised her to  slip for CT of abdomen at   Will discuss results after done and will discuss blood work at that time  She states she will make an appointment within 1-2 weeks  Plan    1  CT ABDOMEN W WO CONTRAST; Status:Need Information - Financial Authorization; Requested for:19Apr2016;     Signatures   Electronically signed by : Sabrina Benitez DO; Apr 19 2016  3:47PM EST                       (Author)    Electronically signed by :  TE Lemus ; Apr 20 2016  1:48PM EST                       (Author)

## 2018-01-18 NOTE — MISCELLANEOUS
Message   Recorded as Task   Date: 06/20/2017 03:50 PM, Created By: Cristi King   Task Name: Call Back   Assigned To: [facility] [place] [facility],Team   Regarding Patient: Celestino Rowe, Status: In Progress   Comment:    Tammi Schwab L - 20 Jun 2017 3:50 PM     TASK CREATED  [facility] Call Center- patient called inquiring about having disability forms filled out  Would like to know if Dr Martínez López can fill them out  Patient stated she can drop them off at the office and pick when forms are completed  Please call patient [phone] (cell)   Ameena Brar - 20 Jun 2017 3:54 PM     TASK EDITED   [facility] [employer] - 20 Jun 2017 4:04 PM     TASK REPLIED TO: Previously Assigned To [employer]  I will have the patient go to her PCP regarding her disability forms  Ameena Brar - 20 Jun 2017 4:17 PM     TASK IN PROGRESS   Ameena Brar - 20 Jun 2017 4:20 PM     TASK EDITED  Pt aware  Active Problems    1  Abdominal pain, RUQ (789 01) (R10 11)   2  Abnormal ultrasound of liver (793 3) (R93 2)   3  Bilateral low back pain with right-sided sciatica (724 3) (M54 41)   4  BMI 40 0-44 9, adult (V85 41) (Z68 41)   5  Breast cancer screening (V76 10) (Z12 39)   6  Cervical radiculopathy (723 4) (M54 12)   7  Chronic low back pain (724 2,338 29) (M54 5,G89 29)   8  Chronic pain syndrome (338 4) (G89 4)   9  COPD, mild (496) (J44 9)   10  Depression (311) (F32 9)   11  Encounter for screening colonoscopy (V76 51) (Z12 11)   12  Encounter for smoking cessation counseling (V65 42,305 1) (Z71 6,Z72 0)   13  Hypercholesteremia (272 0) (E78 00)   14  Hypertension (401 9) (I10)   15  Left-sided low back pain with left-sided sciatica (724 3) (M54 42)   16  Liver disease (573 9) (K76 9)   17  Lumbar spinal stenosis (724 02) (M48 06)   18  Lumbosacral spinal stenosis (724 02) (M48 07)   19  Lung nodule (793 11) (R91 1)   20  Mass of lung (786 6) (R91 8)   21  Morbid obesity (278 01) (E66 01)   22   Neck pain (723 1) (M54 2) 23  Osteoarthritis of spine with radiculopathy, lumbar region (721 3) (M47 26)   24  Right-sided low back pain with right-sided sciatica (724 3) (M54 41)   25  Smoking (305 1) (F17 200)    Current Meds   1  Albuterol AERS; Therapy: (Recorded:90Mdd4300) to Recorded   2  Atorvastatin Calcium 10 MG Oral Tablet; take 1 tablet by mouth once daily; Therapy: 27Ddx2569 to (Evaluate:78Aaz9742)  Requested for: 90Iyu5199; Last   Rx:81Rfi2955 Ordered   3  Baclofen 10 MG Oral Tablet; TAKE 1/2 - 1 TAB PO TID PRN MUSCLE SPASMS; Therapy: 95Rvt1536 to (Evaluate:12Nrx1477)  Requested for: 20Jun2017; Last   Rx:20Jun2017 Ordered   4  Gabapentin 600 MG Oral Tablet; TAKE 1 TABLET 3 TIMES DAILY; Therapy: 39MVS9452 to (Evaluate:24Jun2017)  Requested for: 54UPJ3136; Last   Rx:51Dru8736 Ordered   5  TraZODone HCl TABS; Therapy: (Recorded:07Lbm1770) to Recorded   6  Valsartan-Hydrochlorothiazide 160-25 MG Oral Tablet (Diovan HCT); take 1 tablet by   mouth once daily; Therapy: 18Cfb5191 to (Evaluate:20Jun2017)  Requested for: 41Nsq6355; Last   Rx:84Euv8733 Ordered   7  Venlafaxine HCl - 75 MG Oral Tablet; Therapy: (Recorded:03Tgy8325) to Recorded    Allergies    1  Asparaginase and Derivs   2  Aspirin TABS   3  Ibuprofen 200 TABS   4  NSAIDs   5  Robaxin TABS    6  No Known Environmental Allergies   7   No Known Food Allergies    Signatures   Electronically signed by : Noelle Hernandez RN; Jun 20 2017  4:20PM EST                       (Author)

## 2018-01-18 NOTE — MISCELLANEOUS
Message  Message Free Text Note Form: Spoke with patient and discussed CT of abdomen results  Explained shadowing found on US, appeared on CT scan as fatting sparing lesion of the liver  I told patient they did incidentally find a 5mm nodule in left lung base and it was recommended to get CT scan of chest in 6-12 months  She stated she has a pulmonologist in [place] as she has a history of right sided lung nodules and has a appointment with him tomorrow  I advised she bring CT scan report to pulmonologist and notify about lung finding        Signatures   Electronically signed by : Gisele Bhagat DO; May  5 2016 11:51AM EST                       (Author)    Electronically signed by : TE Jeffries ; May  8 2016  8:13AM EST                       (Author)

## 2018-01-18 NOTE — RESULT NOTES
Message   Recorded as Task   Date: 05/25/2017 11:35 AM, Created By: Nadia Moreno   Task Name: Call Back   Assigned To: [facility]   Regarding Patient: Michelle Valverde, Status: In Progress   Comment:    Nadia Moreno - 25 May 2017 11:35 AM     TASK CREATED  [facility] Call Center- patient called requesting to get a refill of her Gabapentin 600 mg sent to her pharmacy [facility]) please give patient a c/b [phone]   Maral Echevarria - 25 May 2017 11:51 AM     TASK EDITED  Attempted to call the pt  and left a detailed mom to clarify the dose, pt  to Cb  Maral Echevarria - 25 May 2017 11:51 AM     TASK IN PROGRESS   Yamilex Priest - 25 May 2017 2:52 PM     TASK REPLIED TO: Previously Assigned To Maral Echevarria  recieved an email pt called service stating she was returning a missed call from the office cb# [phone]   Maral Echevarria - 25 May 2017 3:23 PM     TASK EDITED  S/w the pt  and she takes the Gabapentin 600mg TID , last filled in 4/17  AS to advise  Thanks   [facility] [employer] [facility] - 25 May 2017 3:34 PM     TASK REPLIED TO: Previously Assigned To [address]  [place]  Maral Echevarria - 25 May 2017 3:35 PM     TASK EDITED  Pt  aware          Signatures   Electronically signed by : Manjit Perez, ; May 25 2017  3:36PM EST                       (Author)

## 2018-01-18 NOTE — MISCELLANEOUS
Message   Recorded as Task   Date: 06/01/2017 10:47 AM, Created By: Doreen Lay   Task Name: Follow Up   Assigned To: [facility],Team   Regarding Patient: Maggie Meza, Status: In Progress   CommentWilson Narvaez - 01 Jun 2017 10:47 AM     TASK CREATED  Pt  is S/P RT L4 & L5 TFESI on 5/24 by Dr Valentín Brush F/U  Doreen Lay - 01 Jun 2017 1:23 PM     TASK EDITED  1st attempt to call, elli rapp  Christiano Hobson LMOM to c/b and leave % of reliefed rcv'ed with   Doreen Lay - 01 Jun 2017 1:23 PM     TASK EDITED   Jese Bedolla - 07 Jun 2017 9:04 AM     TASK EDITED  2nd attempt  L/m to return call  S/p R L4 & L5 TFESI done 5/24/17 by Disha Peraza - 07 Jun 2017 11:21 AM     TASK EDITED  Patient report she received 70% relief from her procedure  her pain level today is a 3  S/p R L4 & L5 TFESI done 5/24/17   Sanford De La Vega - 07 Jun 2017 11:39 AM     TASK REPLIED TO: Previously Assigned To [place]  Thanks  Active Problems    1  Abdominal pain, RUQ (789 01) (R10 11)   2  Abnormal ultrasound of liver (793 3) (R93 2)   3  Bilateral low back pain with right-sided sciatica (724 3) (M54 41)   4  BMI 40 0-44 9, adult (V85 41) (Z68 41)   5  Breast cancer screening (V76 10) (Z12 39)   6  Cervical radiculopathy (723 4) (M54 12)   7  Chronic low back pain (724 2,338 29) (M54 5,G89 29)   8  Chronic pain syndrome (338 4) (G89 4)   9  COPD, mild (496) (J44 9)   10  Depression (311) (F32 9)   11  Encounter for screening colonoscopy (V76 51) (Z12 11)   12  Encounter for smoking cessation counseling (V65 42,305 1) (Z71 6,Z72 0)   13  Hypercholesteremia (272 0) (E78 00)   14  Hypertension (401 9) (I10)   15  Left-sided low back pain with left-sided sciatica (724 3) (M54 42)   16  Liver disease (573 9) (K76 9)   17  Lumbar spinal stenosis (724 02) (M48 06)   18  Lumbosacral spinal stenosis (724 02) (M48 07)   19  Lung nodule (793 11) (R91 1)   20  Mass of lung (786 6) (R91 8)   21   Morbid obesity (278 01) (E66 01)   22  Neck pain (723 1) (M54 2)   23  Osteoarthritis of spine with radiculopathy, lumbar region (721 3) (M47 26)   24  Right-sided low back pain with right-sided sciatica (724 3) (M54 41)   25  Smoking (305 1) (F17 200)    Current Meds   1  Albuterol AERS; Therapy: (Recorded:27Mtb6610) to Recorded   2  Atorvastatin Calcium 10 MG Oral Tablet; take 1 tablet by mouth once daily; Therapy: 58Qss0303 to (Evaluate:80Jlb9412)  Requested for: 15Osj2504; Last   Rx:54Zfi6135 Ordered   3  Baclofen 10 MG Oral Tablet; TAKE 1/2 - 1 TAB PO TID PRN MUSCLE SPASMS; Therapy: 93Qwy2978 to (Evaluate:89Jeb2057)  Requested for: 80Any6581; Last   Rx:28Sxi4524 Ordered   4  Gabapentin 600 MG Oral Tablet; TAKE 1 TABLET 3 TIMES DAILY; Therapy: 56JBR4952 to (Evaluate:24Jun2017)  Requested for: 23PBP9112; Last   Rx:35Qrq7382 Ordered   5  TraZODone HCl TABS; Therapy: (Recorded:73Tit3700) to Recorded   6  Valsartan-Hydrochlorothiazide 160-25 MG Oral Tablet; take 1 tablet by mouth once   daily; Therapy: 78Yce2627 to (Evaluate:20Jun2017)  Requested for: 83Gdp5403; Last   Rx:27Qss2512 Ordered   7  Venlafaxine HCl - 75 MG Oral Tablet; Therapy: (Recorded:97Vey4810) to Recorded    Allergies    1  Asparaginase and Derivs   2  Aspirin TABS   3  Ibuprofen 200 TABS   4  NSAIDs   5  Robaxin TABS    6  No Known Environmental Allergies   7   No Known Food Allergies    Signatures   Electronically signed by : TORO Painting ; Jun 7 2017  1:48PM EST                       (Author)

## 2018-04-04 RX ORDER — VALSARTAN 160 MG/1
180 TABLET ORAL DAILY
Refills: 0 | OUTPATIENT
Start: 2018-04-04

## 2018-04-20 DIAGNOSIS — I10 ESSENTIAL HYPERTENSION: Primary | ICD-10-CM

## 2018-04-20 RX ORDER — VALSARTAN AND HYDROCHLOROTHIAZIDE 160; 25 MG/1; MG/1
1 TABLET ORAL DAILY
COMMUNITY
Start: 2017-02-20 | End: 2018-04-20 | Stop reason: SDUPTHER

## 2018-05-01 RX ORDER — VALSARTAN AND HYDROCHLOROTHIAZIDE 160; 25 MG/1; MG/1
1 TABLET ORAL DAILY
Qty: 30 TABLET | Refills: 5 | Status: SHIPPED | OUTPATIENT
Start: 2018-05-01 | End: 2018-05-07 | Stop reason: SDUPTHER

## 2018-05-07 ENCOUNTER — OFFICE VISIT (OUTPATIENT)
Dept: FAMILY MEDICINE CLINIC | Facility: CLINIC | Age: 63
End: 2018-05-07
Payer: COMMERCIAL

## 2018-05-07 VITALS
TEMPERATURE: 98.6 F | OXYGEN SATURATION: 95 % | WEIGHT: 183 LBS | BODY MASS INDEX: 35.93 KG/M2 | DIASTOLIC BLOOD PRESSURE: 72 MMHG | SYSTOLIC BLOOD PRESSURE: 154 MMHG | HEIGHT: 60 IN | HEART RATE: 92 BPM

## 2018-05-07 DIAGNOSIS — R91.1 PULMONARY NODULE: Primary | ICD-10-CM

## 2018-05-07 DIAGNOSIS — F17.200 SMOKER: ICD-10-CM

## 2018-05-07 DIAGNOSIS — I10 ESSENTIAL HYPERTENSION: ICD-10-CM

## 2018-05-07 PROCEDURE — 99213 OFFICE O/P EST LOW 20 MIN: CPT | Performed by: FAMILY MEDICINE

## 2018-05-07 RX ORDER — VALSARTAN AND HYDROCHLOROTHIAZIDE 160; 25 MG/1; MG/1
1 TABLET ORAL DAILY
Qty: 30 TABLET | Refills: 3 | Status: SHIPPED | OUTPATIENT
Start: 2018-05-07 | End: 2018-11-26 | Stop reason: SDUPTHER

## 2018-05-07 NOTE — PROGRESS NOTES
Assessment/Plan:    Smoker  Counselled on smoking cessation  Pt declined at this time  Educated pt on risks and complications of smoking and presence of pulmonary nodules  Essential hypertension  Counselled pt on adhering to a low sodium, heart healty diet and incorporating exercise as part of her daily routine  /74 today  Renewed prescription for Diovan 160-25mg  Patient to RTC in one week for complete physical where blood work will be done at that time  Patient also had a history of hyperlipidemia for which blood work will be ordered also at her physical  [place] to sign a release of medical records from her pulmonologist    Pulmonary nodule  Oxygen saturation 95% on room air  Patient in no obvious respiratory distress  Patient currently follows with pulmonology in [facility] where she states imaging has been done recently  Patient is to have a repeat CT of the chest for monitoring of her pulmonary nodule  We'll defer giving patient a requisition for CT today as patient is a sign release of records from her pulmonologist to determine whether repeat imaging is necessary at this time or has already been done per pulmonology  Counselled on smoking cessation    D/W Dr Ashley Graham     Subjective:      Patient ID: Alan Martin is a 58 y o  female  HPI  This is a 72-year-old female smoker with a history of COPD, HTN, HLD, pulmonary nodule and chronic pain syndrome who presents for follow-up of her blood pressure  Patient has not been seen for the past 2 years as she states she has been under the care of many specialists for her pain as well as COPD  She presents to [facility] today for follow-up of her blood pressure  Blood pressure is 154/72 today and patient currently takes Diovan 160-25 milligrams daily which she states she is compliant with  She denies any headaches, change in vision, chest pains or shortness of breath  She has not had any blood work done for over the past 2 years    The patient states her ride is leaving in 5 minutes as result of which she needs to "cut' this visit short  Explained to patient that she has a history of a pulmonary nodule which was noted on abdominal CT scan 2 years ago as result of which she needs to follow-up for  Patient states she currently follows with a pulmonologist in [facility] and this is being addressed  Review of Systems   Constitutional: Negative for chills and fever  HENT: Negative for congestion, ear pain, rhinorrhea and sore throat  Eyes: Negative for discharge  Respiratory: Positive for cough (Chronic)  Negative for chest tightness, shortness of breath, wheezing and stridor  Cardiovascular: Negative for chest pain, palpitations and leg swelling  Gastrointestinal: Negative for abdominal pain, constipation, diarrhea, nausea and vomiting  Genitourinary: Negative for dysuria  Skin: Negative for color change, pallor, rash and wound  Neurological: Negative for dizziness  Objective:      /72   Pulse 92   Temp 98 6 °F (37 °C) (Tympanic)   Ht 5' (1 524 m)   Wt 83 kg (183 lb)   SpO2 95%   BMI 35 74 kg/m²          Physical Exam   Constitutional: She is oriented to person, place, and time  She appears well-developed and well-nourished  No distress  HENT:   Head: Normocephalic and atraumatic  Nose: Nose normal    Mouth/Throat: Oropharynx is clear and moist  No oropharyngeal exudate  Eyes: Conjunctivae are normal  Right eye exhibits no discharge  Left eye exhibits no discharge  No scleral icterus  Neck: Normal range of motion  Neck supple  No JVD present  Cardiovascular: Normal rate, regular rhythm, normal heart sounds and intact distal pulses  Exam reveals no gallop and no friction rub  No murmur heard  Pulmonary/Chest: Effort normal  No stridor  No respiratory distress  She has wheezes  She has no rales  She exhibits no tenderness  Abdominal: Soft  Bowel sounds are normal  She exhibits no distension and no mass   There is no tenderness  Musculoskeletal: Normal range of motion  She exhibits no edema, tenderness or deformity  Neurological: She is alert and oriented to person, place, and time  No cranial nerve deficit  Skin: Skin is warm  No rash noted  She is not diaphoretic  No erythema  No pallor  Psychiatric: She has a normal mood and affect

## 2018-05-07 NOTE — ASSESSMENT & PLAN NOTE
Counselled on smoking cessation  Pt declined at this time  Educated pt on risks and complications of smoking and presence of pulmonary nodules

## 2018-05-07 NOTE — ASSESSMENT & PLAN NOTE
Counselled pt on adhering to a low sodium, heart healty diet and incorporating exercise as part of her daily routine  /74 today  Renewed prescription for Diovan 160-25mg  Patient to RTC in one week for complete physical where blood work will be done at that time  Patient also had a history of hyperlipidemia for which blood work will be ordered also at her physical  [place] to sign a release of medical records from her pulmonologist

## 2018-05-07 NOTE — ASSESSMENT & PLAN NOTE
Oxygen saturation 95% on room air  Patient in no obvious respiratory distress  Patient currently follows with pulmonology in [facility] where she states imaging has been done recently  Patient is to have a repeat CT of the chest for monitoring of her pulmonary nodule  We'll defer giving patient a requisition for CT today as patient is a sign release of records from her pulmonologist to determine whether repeat imaging is necessary at this time or has already been done per pulmonology    Counselled on smoking cessation

## 2018-06-19 ENCOUNTER — TELEPHONE (OUTPATIENT)
Dept: FAMILY MEDICINE CLINIC | Facility: CLINIC | Age: 63
End: 2018-06-19

## 2018-06-19 ENCOUNTER — OFFICE VISIT (OUTPATIENT)
Dept: FAMILY MEDICINE CLINIC | Facility: CLINIC | Age: 63
End: 2018-06-19
Payer: COMMERCIAL

## 2018-06-19 VITALS
WEIGHT: 175.56 LBS | SYSTOLIC BLOOD PRESSURE: 160 MMHG | BODY MASS INDEX: 34.47 KG/M2 | TEMPERATURE: 97.1 F | RESPIRATION RATE: 18 BRPM | OXYGEN SATURATION: 98 % | HEART RATE: 89 BPM | HEIGHT: 60 IN | DIASTOLIC BLOOD PRESSURE: 70 MMHG

## 2018-06-19 DIAGNOSIS — Z12.11 SCREENING FOR COLON CANCER: ICD-10-CM

## 2018-06-19 DIAGNOSIS — I10 ESSENTIAL HYPERTENSION: ICD-10-CM

## 2018-06-19 DIAGNOSIS — M54.5 CHRONIC LOW BACK PAIN, UNSPECIFIED BACK PAIN LATERALITY, WITH SCIATICA PRESENCE UNSPECIFIED: ICD-10-CM

## 2018-06-19 DIAGNOSIS — G89.29 CHRONIC LOW BACK PAIN, UNSPECIFIED BACK PAIN LATERALITY, WITH SCIATICA PRESENCE UNSPECIFIED: ICD-10-CM

## 2018-06-19 DIAGNOSIS — E66.09 CLASS 1 OBESITY DUE TO EXCESS CALORIES WITH SERIOUS COMORBIDITY AND BODY MASS INDEX (BMI) OF 34.0 TO 34.9 IN ADULT: ICD-10-CM

## 2018-06-19 DIAGNOSIS — F33.42 RECURRENT MAJOR DEPRESSIVE DISORDER, IN FULL REMISSION (HCC): Primary | ICD-10-CM

## 2018-06-19 DIAGNOSIS — R53.82 CHRONIC FATIGUE: ICD-10-CM

## 2018-06-19 DIAGNOSIS — E78.00 HYPERCHOLESTEREMIA: ICD-10-CM

## 2018-06-19 DIAGNOSIS — J44.9 COPD, MILD (HCC): ICD-10-CM

## 2018-06-19 DIAGNOSIS — Z12.39 SCREENING FOR BREAST CANCER: ICD-10-CM

## 2018-06-19 PROBLEM — M54.12 CERVICAL RADICULOPATHY: Status: ACTIVE | Noted: 2017-04-24

## 2018-06-19 PROBLEM — K76.9 LIVER DISEASE: Status: ACTIVE | Noted: 2017-02-21

## 2018-06-19 PROBLEM — F32.A DEPRESSION: Status: ACTIVE | Noted: 2017-02-21

## 2018-06-19 PROBLEM — M48.061 LUMBAR SPINAL STENOSIS: Status: ACTIVE | Noted: 2017-02-21

## 2018-06-19 PROCEDURE — 99214 OFFICE O/P EST MOD 30 MIN: CPT | Performed by: FAMILY MEDICINE

## 2018-06-19 PROCEDURE — 4004F PT TOBACCO SCREEN RCVD TLK: CPT | Performed by: FAMILY MEDICINE

## 2018-06-19 RX ORDER — TRAZODONE HYDROCHLORIDE 150 MG/1
150 TABLET ORAL
Qty: 90 TABLET | Refills: 0 | Status: SHIPPED | OUTPATIENT
Start: 2018-06-19 | End: 2018-09-21 | Stop reason: SDUPTHER

## 2018-06-19 RX ORDER — ALBUTEROL SULFATE 90 UG/1
2 AEROSOL, METERED RESPIRATORY (INHALATION) EVERY 6 HOURS PRN
Qty: 18 G | Refills: 0
Start: 2018-06-19 | End: 2019-07-18

## 2018-06-19 RX ORDER — VENLAFAXINE HYDROCHLORIDE 150 MG/1
150 CAPSULE, EXTENDED RELEASE ORAL DAILY
Qty: 90 CAPSULE | Refills: 0 | Status: SHIPPED | OUTPATIENT
Start: 2018-06-19 | End: 2018-09-21 | Stop reason: SDUPTHER

## 2018-06-19 NOTE — PROGRESS NOTES
Assessment/Plan:     Diagnoses and all orders for this visit:    Rec[facility]  -     venlafaxine (EFFEXOR-XR) 150 mg 24 hr capsule; Take 1 capsule (150 mg total) by mouth daily  -     traZODone (DESYREL) 150 mg tablet; Take 1 tablet (150 mg total) by mouth daily at bedtime    COPD, mild ([facility] )  -     Ambulatory referral to Pulmonology; Future  -     umeclidinium bromide (INCRUSE ELLIPTA) 62 5 mcg/inh AEPB inhaler; Inhale 1 puff daily  -     albuterol (VENTOLIN HFA) 90 mcg/act inhaler; Inhale 2 puffs every 6 (six) hours as needed for wheezing    Essential hypertension  -     CBC and differential  -     Comprehensive metabolic panel    Hypercholesteremia  -     Lipid Panel with Direct LDL reflex    Chronic fatigue  -     TSH, 3rd generation with Free T4 reflex    BMI 34 0-34 9,adult    Class 1 obesity due to excess calories with serious comorbidity and body mass index (BMI) of 34 0 to 34 9 in adult    Screening for breast cancer  -     Mammo screening bilateral w cad; Future    Screening for colon cancer  -     Ambulatory referral to Gastroenterology; Future    Chronic low back pain, unspecified back pain laterality, with sciatica presence unspecified        Unexpected Weight Loss, COPD, Hx of lung nodules: [place] mentioned some unexpected weight loss without any reports of change to lifestyle  Since last year, she has lost approximately 20lbs  She has a significant smoking history and lung nodules in the past, her pulmonologist is aware  We will start by checking thyroid function  If normal, will need to rule out malignancy as a cause  Her last mammo appears to be in 2016  Will also give referral for colonoscopy screening    Depression and Anxiety: Refilled Trazadone and Effexor  Appears to be well controlled on that regimen without SI/HI  Counseled on going straight to ER if she has those thoughts  HTN: Elevated at today's visit with reading of 160/70   She believes it's related to her chronic back pain for which she needs surgery but is hesitant to get  Rarely takes pain medications  Discussion had today to consider following through with back surgery, which will likely improve her blood pressure as well  She wants to continue on current regimen for the time being  Subjective:      Patient ID: Terry Villeda is a 58 y o  female  HPI     Garcia Handley is a 58year old female that comes to the office requesting pulmonology referral for COPD, blood work and follow up on chronic conditions  COPD: Follows with pulmonologist in [place], [place]  Needs referral today  Takes Incruse in AM daily and Albuterol PRN  Continues to smoke and has extensive smoking history  Also has history of lung nodules, followed by pulmonologist  Reports some unexpected weight loss  Denies fever, chills, hemoptysis, worsening SOB  Does continue to smoke  HTN: Compliant with anti-hypertensive therapy  Tries to follow a strict low-salt diet  Denies symptoms of vision changes, chest pain, palpitation, lightheadedness  Hypercholesterolemia: Reports trying to make better dietary choices  Not currently on medications  Difficult for her to exercise due to chronic pain issues  Was recommended to get back surgery, but has been hesitant because of fear of surgical complications  Depression & Anxiety: Used to follow with mental health provider  Requesting if we can refill her Trazadone and Effexor going forward  She is well controlled on the medications  Not currently depressed or overly anxious  No suicidal or homicidal ideation  The following portions of the patient's history were reviewed and updated as appropriate: allergies, current medications, past family history, past medical history, past social history, past surgical history and problem list     Review of Systems   Constitutional: Positive for fatigue and unexpected weight change  Negative for chills and fever     HENT: Negative for congestion, rhinorrhea and sore throat  Eyes: Negative for visual disturbance  Respiratory: Negative for cough, shortness of breath and wheezing  Cardiovascular: Negative for chest pain and palpitations  Gastrointestinal: Negative for abdominal pain, constipation, diarrhea, nausea and vomiting  Genitourinary: Negative for dysuria  Musculoskeletal: Positive for arthralgias (Chronic) and back pain (Chronic)  Negative for myalgias  Skin: Negative for rash  Neurological: Negative for weakness, numbness and headaches  Psychiatric/Behavioral: Negative for confusion, dysphoric mood and suicidal ideas  Objective:      /70 (BP Location: Left arm, Patient Position: Sitting)   Pulse 89   Temp (!) 97 1 °F (36 2 °C) (Tympanic)   Resp 18   Ht 5' (1 524 m)   Wt 79 6 kg (175 lb 9 oz)   SpO2 98%   BMI 34 29 kg/m²          Physical Exam   Constitutional: She is oriented to person, place, and time  She appears well-developed and well-nourished  No distress  HENT:   Head: Normocephalic and atraumatic  Right Ear: External ear normal    Left Ear: External ear normal    Eyes: Conjunctivae and EOM are normal  Pupils are equal, round, and reactive to light  Right eye exhibits no discharge  Left eye exhibits no discharge  Neck: Neck supple  Cardiovascular: Normal rate, regular rhythm and normal heart sounds  No murmur heard  Pulmonary/Chest: Effort normal and breath sounds normal  No respiratory distress  She has no wheezes  Abdominal: Soft  Bowel sounds are normal  There is no tenderness  Musculoskeletal: Normal range of motion  She exhibits no edema or tenderness  Neurological: She is alert and oriented to person, place, and time  Skin: Skin is warm and dry  She is not diaphoretic  Psychiatric: She has a normal mood and affect

## 2018-06-26 LAB
ALBUMIN SERPL-MCNC: 4.3 G/DL (ref 3.6–4.8)
ALBUMIN/GLOB SERPL: 1.4 {RATIO} (ref 1.2–2.2)
ALP SERPL-CCNC: 119 IU/L (ref 39–117)
ALT SERPL-CCNC: 12 IU/L (ref 0–32)
AST SERPL-CCNC: 12 IU/L (ref 0–40)
BASOPHILS # BLD AUTO: 0 X10E3/UL (ref 0–0.2)
BASOPHILS NFR BLD AUTO: 0 %
BILIRUB SERPL-MCNC: 0.4 MG/DL (ref 0–1.2)
BUN SERPL-MCNC: 13 MG/DL (ref 8–27)
BUN/CREAT SERPL: 19 (ref 12–28)
CALCIUM SERPL-MCNC: 9.9 MG/DL (ref 8.7–10.3)
CHLORIDE SERPL-SCNC: 97 MMOL/L (ref 96–106)
CHOLEST SERPL-MCNC: 267 MG/DL (ref 100–199)
CO2 SERPL-SCNC: 25 MMOL/L (ref 20–29)
CREAT SERPL-MCNC: 0.69 MG/DL (ref 0.57–1)
EOSINOPHIL # BLD AUTO: 0.3 X10E3/UL (ref 0–0.4)
EOSINOPHIL NFR BLD AUTO: 3 %
ERYTHROCYTE [DISTWIDTH] IN BLOOD BY AUTOMATED COUNT: 16 % (ref 12.3–15.4)
GLOBULIN SER-MCNC: 3 G/DL (ref 1.5–4.5)
GLUCOSE SERPL-MCNC: 105 MG/DL (ref 65–99)
HCT VFR BLD AUTO: 44.6 % (ref 34–46.6)
HDLC SERPL-MCNC: 46 MG/DL
HGB BLD-MCNC: 15.1 G/DL (ref 11.1–15.9)
IMM GRANULOCYTES # BLD: 0.1 X10E3/UL (ref 0–0.1)
IMM GRANULOCYTES NFR BLD: 1 %
LDLC SERPL CALC-MCNC: 168 MG/DL (ref 0–99)
LDLC/HDLC SERPL: 3.7 RATIO (ref 0–3.2)
LYMPHOCYTES # BLD AUTO: 4 X10E3/UL (ref 0.7–3.1)
LYMPHOCYTES NFR BLD AUTO: 37 %
MCH RBC QN AUTO: 29 PG (ref 26.6–33)
MCHC RBC AUTO-ENTMCNC: 33.9 G/DL (ref 31.5–35.7)
MCV RBC AUTO: 86 FL (ref 79–97)
MONOCYTES # BLD AUTO: 0.6 X10E3/UL (ref 0.1–0.9)
MONOCYTES NFR BLD AUTO: 6 %
NEUTROPHILS # BLD AUTO: 5.9 X10E3/UL (ref 1.4–7)
NEUTROPHILS NFR BLD AUTO: 53 %
PLATELET # BLD AUTO: 360 X10E3/UL (ref 150–379)
POTASSIUM SERPL-SCNC: 4.5 MMOL/L (ref 3.5–5.2)
PROT SERPL-MCNC: 7.3 G/DL (ref 6–8.5)
RBC # BLD AUTO: 5.2 X10E6/UL (ref 3.77–5.28)
SL AMB EGFR AFRICAN AMERICAN: 108 ML/MIN/1.73
SL AMB EGFR NON AFRICAN AMERICAN: 94 ML/MIN/1.73
SL AMB VLDL CHOLESTEROL CALC: 53 MG/DL (ref 5–40)
SODIUM SERPL-SCNC: 140 MMOL/L (ref 134–144)
TRIGL SERPL-MCNC: 267 MG/DL (ref 0–149)
TSH SERPL DL<=0.005 MIU/L-ACNC: 2 UIU/ML (ref 0.45–4.5)
WBC # BLD AUTO: 10.9 X10E3/UL (ref 3.4–10.8)

## 2018-07-03 ENCOUNTER — OFFICE VISIT (OUTPATIENT)
Dept: FAMILY MEDICINE CLINIC | Facility: CLINIC | Age: 63
End: 2018-07-03
Payer: COMMERCIAL

## 2018-07-03 VITALS
TEMPERATURE: 98.3 F | WEIGHT: 174 LBS | SYSTOLIC BLOOD PRESSURE: 110 MMHG | OXYGEN SATURATION: 97 % | DIASTOLIC BLOOD PRESSURE: 58 MMHG | HEIGHT: 60 IN | BODY MASS INDEX: 34.16 KG/M2 | HEART RATE: 104 BPM

## 2018-07-03 DIAGNOSIS — Z71.6 ENCOUNTER FOR SMOKING CESSATION COUNSELING: ICD-10-CM

## 2018-07-03 DIAGNOSIS — Z87.898 HISTORY OF SEIZURES: ICD-10-CM

## 2018-07-03 DIAGNOSIS — E78.5 HYPERLIPIDEMIA, UNSPECIFIED HYPERLIPIDEMIA TYPE: Primary | ICD-10-CM

## 2018-07-03 DIAGNOSIS — M54.16 RADICULOPATHY OF LUMBAR REGION: ICD-10-CM

## 2018-07-03 PROCEDURE — 3008F BODY MASS INDEX DOCD: CPT | Performed by: FAMILY MEDICINE

## 2018-07-03 PROCEDURE — 99214 OFFICE O/P EST MOD 30 MIN: CPT | Performed by: FAMILY MEDICINE

## 2018-07-03 RX ORDER — LIDOCAINE 50 MG/G
1 PATCH TOPICAL DAILY
Qty: 30 PATCH | Refills: 3 | Status: SHIPPED | OUTPATIENT
Start: 2018-07-03 | End: 2019-07-18

## 2018-07-03 RX ORDER — ATORVASTATIN CALCIUM 10 MG/1
10 TABLET, FILM COATED ORAL DAILY
Qty: 30 TABLET | Refills: 3 | Status: SHIPPED | OUTPATIENT
Start: 2018-07-03 | End: 2019-07-18 | Stop reason: ALTCHOICE

## 2018-07-03 NOTE — PROGRESS NOTES
Assessment/Plan:  59 y/o F with abnormal lipid panel  We discussed the results  She is currently doing lifestyle modifications but mentions that she would like to use medication to get "a head start"  She mentions using lipitor in the past due to there history of hyperlipidemia and would like to use it again  We discussed her lower back pain and she mentions she would like to use lidocaine patches, she mentions they worked in the past and that she has tried OTC ointments and patches and failed  We also discussed surgery, she will proceed with the surgery soon  We discussed smoking cessation due to her oncoming surgery and the risk it poses on surgery and recovery time, she wants to do the nicotine patches again  She mentions trying them in the past and says the "#3" patches worked (7mg)  We discussed her history of seizures  She mentions having grand mal seizures and was controlled with depakote  However, she stopped taking it 2 years ago and in that time had one episode  She will be referred to neurology service for follow up  She mentions concern for her unexplained weight loss  We discussed it and she will get her colonoscopy and mammography done  She denied hemoptysis, but mentioned a mild SOB and cough  Plan:   - Continue lifestyle modifications  - Prescribe lipitor 10mg   - Prescribe lidocaine patches   - Prescribe nicotine patches  - Refer to neurology  - Order to repeat lipid panel before next visit  - F/U in 3 months                Diagnoses and all orders for this visit:    Hyperlipidemia, unspecified hyperlipidemia type  -     atorvastatin (LIPITOR) 10 mg tablet; Take 1 tablet (10 mg total) by mouth daily for 30 days  -     Lipid panel;  Future  -     Lipid panel    Encounter for smoking cessation counseling  -     nicotine (NICODERM CQ) 7 mg/24hr TD 24 hr patch; Place 1 patch on the skin every 24 hours for 30 days    Radiculopathy of lumbar region  -     lidocaine (LIDODERM) 5 %; Place 1 patch on the skin daily for 30 days Remove & Discard patch within 12 hours or as directed by MD    History of seizures  -     Ambulatory referral to Neurology; Future          Subjective:      Patient ID: Carlos Lua is a 58 y o  female  57 y/o patient came here for f/u of recent lab results of high cholesterol levels  Patient has started lifestyle modifications but want to add medication  She mentions having a history of hyperlipidemia which was controlled with lipitor but 2 years ago decided to stop taking it  Patient also wanted to address low back pain due to radiculopathy of lumbosacral region  She mentions trying OTC patches and ointments and having no relief  She mentions using lidocaine patches before and they provided relief, she would like to continue again, she doesn't want to take any pain medication  She is also concerned about unexplained weight loss  Hyperlipidemia   This is a recurrent problem  This is a new diagnosis  The problem is uncontrolled  Recent lipid tests were reviewed and are high (Total cholesterol 267, Triglycerides 267, , HDL 46)  Associated symptoms include shortness of breath  She is currently on no antihyperlipidemic treatment  Risk factors for coronary artery disease include dyslipidemia and hypertension  Back Pain   This is a chronic problem  The current episode started more than 1 year ago  The problem occurs daily  The problem has been gradually worsening since onset  The pain is present in the lumbar spine  The pain radiates to the right thigh and left thigh  Associated symptoms include numbness  Treatments tried: OTC ointment and patches  The treatment provided no relief  The following portions of the patient's history were reviewed and updated as appropriate: allergies, past family history, past social history and past surgical history  Review of Systems   Respiratory: Positive for cough and shortness of breath           Mild SOB recently and cough Musculoskeletal: Positive for back pain  Neurological: Positive for seizures and numbness  Mentions having an episode, did not loose consciousness  Numbness in thighs due to lumbosacral radiculopathy  Objective:      /58   Pulse 104   Temp 98 3 °F (36 8 °C) (Tympanic)   Ht 5' (1 524 m)   Wt 78 9 kg (174 lb)   SpO2 97%   BMI 33 98 kg/m²          Physical Exam   Constitutional: She is oriented to person, place, and time  She appears well-developed and well-nourished  HENT:   Head: Atraumatic  Eyes: Conjunctivae and EOM are normal  Pupils are equal, round, and reactive to light  Neck: Normal range of motion  Cardiovascular: Normal rate, regular rhythm and normal heart sounds  Pulmonary/Chest: Breath sounds normal    Neurological: She is alert and oriented to person, place, and time  She has normal strength  Normal strength in lower extremities

## 2018-09-21 DIAGNOSIS — F33.42 RECURRENT MAJOR DEPRESSIVE DISORDER, IN FULL REMISSION (HCC): ICD-10-CM

## 2018-09-21 RX ORDER — VENLAFAXINE HYDROCHLORIDE 150 MG/1
150 CAPSULE, EXTENDED RELEASE ORAL DAILY
Qty: 30 CAPSULE | Refills: 0 | Status: SHIPPED | OUTPATIENT
Start: 2018-09-21 | End: 2018-11-26 | Stop reason: SDUPTHER

## 2018-09-21 RX ORDER — TRAZODONE HYDROCHLORIDE 150 MG/1
150 TABLET ORAL
Qty: 30 TABLET | Refills: 0 | Status: SHIPPED | OUTPATIENT
Start: 2018-09-21 | End: 2018-11-26 | Stop reason: SDUPTHER

## 2018-11-26 DIAGNOSIS — I10 ESSENTIAL HYPERTENSION: ICD-10-CM

## 2018-11-26 DIAGNOSIS — F33.42 RECURRENT MAJOR DEPRESSIVE DISORDER, IN FULL REMISSION (HCC): ICD-10-CM

## 2018-11-26 RX ORDER — VALSARTAN AND HYDROCHLOROTHIAZIDE 160; 25 MG/1; MG/1
1 TABLET ORAL DAILY
Qty: 30 TABLET | Refills: 5 | Status: SHIPPED | OUTPATIENT
Start: 2018-11-26 | End: 2019-01-05 | Stop reason: SDUPTHER

## 2018-11-26 RX ORDER — TRAZODONE HYDROCHLORIDE 150 MG/1
150 TABLET ORAL
Qty: 30 TABLET | Refills: 5 | Status: SHIPPED | OUTPATIENT
Start: 2018-11-26 | End: 2019-07-18 | Stop reason: SDUPTHER

## 2018-11-26 RX ORDER — VENLAFAXINE HYDROCHLORIDE 150 MG/1
150 CAPSULE, EXTENDED RELEASE ORAL DAILY
Qty: 30 CAPSULE | Refills: 5 | Status: SHIPPED | OUTPATIENT
Start: 2018-11-26 | End: 2019-07-18 | Stop reason: SDUPTHER

## 2019-01-04 ENCOUNTER — OFFICE VISIT (OUTPATIENT)
Dept: FAMILY MEDICINE CLINIC | Facility: CLINIC | Age: 64
End: 2019-01-04
Payer: COMMERCIAL

## 2019-01-04 VITALS
BODY MASS INDEX: 34.16 KG/M2 | OXYGEN SATURATION: 96 % | DIASTOLIC BLOOD PRESSURE: 82 MMHG | HEART RATE: 95 BPM | SYSTOLIC BLOOD PRESSURE: 174 MMHG | WEIGHT: 174 LBS | TEMPERATURE: 98.6 F | RESPIRATION RATE: 18 BRPM | HEIGHT: 60 IN

## 2019-01-04 DIAGNOSIS — E78.5 HYPERLIPIDEMIA, UNSPECIFIED HYPERLIPIDEMIA TYPE: ICD-10-CM

## 2019-01-04 DIAGNOSIS — Z12.39 SCREENING FOR BREAST CANCER: ICD-10-CM

## 2019-01-04 DIAGNOSIS — J06.9 VIRAL UPPER RESPIRATORY TRACT INFECTION: Primary | ICD-10-CM

## 2019-01-04 DIAGNOSIS — Z23 ENCOUNTER FOR IMMUNIZATION: ICD-10-CM

## 2019-01-04 DIAGNOSIS — I10 ESSENTIAL HYPERTENSION: ICD-10-CM

## 2019-01-04 PROCEDURE — 90686 IIV4 VACC NO PRSV 0.5 ML IM: CPT | Performed by: FAMILY MEDICINE

## 2019-01-04 PROCEDURE — 90471 IMMUNIZATION ADMIN: CPT | Performed by: FAMILY MEDICINE

## 2019-01-04 PROCEDURE — 99213 OFFICE O/P EST LOW 20 MIN: CPT | Performed by: FAMILY MEDICINE

## 2019-01-04 PROCEDURE — 4004F PT TOBACCO SCREEN RCVD TLK: CPT | Performed by: FAMILY MEDICINE

## 2019-01-04 PROCEDURE — 3008F BODY MASS INDEX DOCD: CPT | Performed by: FAMILY MEDICINE

## 2019-01-04 RX ORDER — FLUTICASONE PROPIONATE 50 MCG
1 SPRAY, SUSPENSION (ML) NASAL DAILY
Qty: 1 G | Refills: 0 | Status: SHIPPED | OUTPATIENT
Start: 2019-01-04 | End: 2019-07-18 | Stop reason: ALTCHOICE

## 2019-01-04 RX ORDER — BENZONATATE 100 MG/1
100 CAPSULE ORAL 3 TIMES DAILY PRN
Qty: 20 CAPSULE | Refills: 0 | Status: SHIPPED | OUTPATIENT
Start: 2019-01-04 | End: 2019-07-18 | Stop reason: ALTCHOICE

## 2019-01-04 NOTE — PROGRESS NOTES
[facility] 61 y o  female  MRN [id number]    Assessment/Plan    Diagnoses and all orders for this visit:    Viral upper respiratory tract infection  Advised supportive care  Patient stay well hydrated  Can use Tylenol or Motrin for any fever pain  Use humidifier while sleeping  Patient call clinic or present for re-evaluation if there is any worsening of her symptoms  -     fluticasone (FLONASE) 50 mcg/act nasal spray; 1 spray into each nostril daily  -     benzonatate (TESSALON PERLES) 100 mg capsule; Take 1 capsule (100 mg total) by mouth 3 (three) times a day as needed for cough    Essential hypertension  Patient's blood pressure not at goal today  Patient is not taking medication in 3 days  Patient states when she takes medication her blood pressure is within acceptable limits  Advised patient once restarting medication to check blood pressure daily  Patient to call clinic if she has elevated blood pressures  Patient takes valsartan-hydrochlorothiazide 160-25  Due to valsartan shortage due to FDA recall will order losartan-hydrochlorothiazide  Patient call clinic if she has any side effects    -     losartan-hydrochlorothiazide (HYZAAR) 100-25 MG per tablet; Take 1 tablet by mouth daily    Hyperlipidemia, unspecified hyperlipidemia type  -     Lipid panel; Future  -     Lipid panel    Encounter for immunization  -     SYRINGE/SINGLE-DOSE VIAL: influenza vaccine, [id number], quadrivalent, 0 5 mL, preservative-free (AFLURIA, FLUARIX, FLULAVAL, FLUZONE)    Screening for breast cancer  -     Mammo screening bilateral w 3d & cad; Future      Patient return to clinic as needed for any worsening of condition  Patient follow-up at least every 3 months for chronic conditions  Patient was given opportunity to ask any questions she may have during this office visit  All questions were answered    Advised patient that if she has any questions after this visit she is more than welcome to call [facility] for further clarification  Justice Almaguer MD    Subjective     [facility] 61 y o  female, presents to clinic with 5 days nonproductive cough and rhinorrhea, congestion  Patient denies sore throat, lymphadenopathy, fever, chills, diaphoresis, nausea, vomiting  Patient presents for refill of blood pressure medication  Patient's blood pressure is elevated today  Patient states she ran out of her medication 3 days ago and blood pressure is elevated  She states blood pressure is well controlled when on medication      Past Medical History:   Diagnosis Date    [facility] [address]     Radiculopathy of lumbar region     last assessed 17    Seizures ([facility] [address] )        Past Surgical History:   Procedure Laterality Date     SECTION      EPIDURAL BLOCK INJECTION Right 2017    Procedure: BLOCK / INJECTION EPIDURAL STEROID TRANSFORAMINAL   L4-5;  Surgeon: Amilcar Ramsey MD;  Location: [facility] MAIN OR;  Service:     TONSILLECTOMY         Family History   Problem Relation Age of Onset    Diabetes Mother     Hyperlipidemia Mother     Cancer Father     Prostate cancer Father        History   Alcohol Use No     Comment: Per allscripts: Social       History   Drug Use No       History   Smoking Status    Current Some Day Smoker   Smokeless Tobacco    Current User     Comment: Per allscripts: Current everyday smoker       Social History     Allergies   Allergen Reactions    Aspirin Anaphylaxis    Asparaginase Derivatives     Ibuprofen     Nsaids     Robaxin [Methocarbamol]     Toradol [Ketorolac Tromethamine]        The following portions of the patient's history were reviewed and updated as appropriate: allergies, current medications, past family history, past medical history, past social history, past surgical history and problem list       Current Outpatient Prescriptions:     albuterol (2 5 mg/3 mL) 0 083 % nebulizer solution, Take 2 5 mg by nebulization every 6 (six) hours as needed for wheezing, Disp: , Rfl:     albuterol (VENTOLIN HFA) 90 mcg/act inhaler, Inhale 2 puffs every 6 (six) hours as needed for wheezing, Disp: 18 g, Rfl: 0    losartan-hydrochlorothiazide (HYZAAR) 100-25 MG per tablet, Take 1 tablet by mouth daily, Disp: 90 tablet, Rfl: 0    traZODone (DESYREL) 150 mg tablet, Take 1 tablet (150 mg total) by mouth daily at bedtime, Disp: 30 tablet, Rfl: 5    umeclidinium bromide (INCRUSE ELLIPTA) 62 5 mcg/inh AEPB inhaler, Inhale 1 puff daily, Disp: , Rfl: 0    venlafaxine (EFFEXOR-XR) 150 mg 24 hr capsule, Take 1 capsule (150 mg total) by mouth daily, Disp: 30 capsule, Rfl: 5    atorvastatin (LIPITOR) 10 mg tablet, Take 1 tablet (10 mg total) by mouth daily for 30 days, Disp: 30 tablet, Rfl: 3    benzonatate (TESSALON PERLES) 100 mg capsule, Take 1 capsule (100 mg total) by mouth 3 (three) times a day as needed for cough, Disp: 20 capsule, Rfl: 0    fluticasone (FLONASE) 50 mcg/act nasal spray, 1 spray into each nostril daily, Disp: 1 g, Rfl: 0    lidocaine (LIDODERM) 5 %, Place 1 patch on the skin daily for 30 days Remove & Discard patch within 12 hours or as directed by MD, Disp: 30 patch, Rfl: 3    nicotine (NICODERM CQ) 7 mg/24hr TD 24 hr patch, Place 1 patch on the skin every 24 hours for 30 days, Disp: 30 patch, Rfl: 0    ROS  Review of Systems   Constitutional: Negative for chills, diaphoresis, fatigue and fever  HENT: Positive for congestion and rhinorrhea  Respiratory: Positive for cough  Negative for shortness of breath and wheezing  Cardiovascular: Negative  Objective    Physical exam    Blood pressure (!) 174/82, pulse 95, temperature 98 6 °F (37 °C), temperature source Tympanic, resp  rate 18, height 5' (1 524 m), weight 78 9 kg (174 lb), SpO2 96 %  Physical Exam   Constitutional: She is oriented to person, place, and time  She appears well-developed and well-nourished  No distress     HENT: Head: Normocephalic and atraumatic  Right Ear: External ear normal    Left Ear: External ear normal    Nose: Mucosal edema and rhinorrhea present  Mouth/Throat: Oropharynx is clear and moist  No oropharyngeal exudate  Post nasal drip  Congestion   Eyes: Pupils are equal, round, and reactive to light  Conjunctivae and EOM are normal  No scleral icterus  Neck: Normal range of motion  No tracheal deviation present  Cardiovascular: Normal rate, regular rhythm and normal heart sounds  Pulmonary/Chest: Effort normal  No stridor  No respiratory distress  She has no wheezes  Neurological: She is alert and oriented to person, place, and time  Skin: She is not diaphoretic

## 2019-01-05 RX ORDER — LOSARTAN POTASSIUM AND HYDROCHLOROTHIAZIDE 25; 100 MG/1; MG/1
1 TABLET ORAL DAILY
Qty: 90 TABLET | Refills: 0 | Status: SHIPPED | OUTPATIENT
Start: 2019-01-05 | End: 2019-07-18 | Stop reason: RX

## 2019-07-18 ENCOUNTER — OFFICE VISIT (OUTPATIENT)
Dept: FAMILY MEDICINE CLINIC | Facility: CLINIC | Age: 64
End: 2019-07-18
Payer: COMMERCIAL

## 2019-07-18 VITALS
OXYGEN SATURATION: 96 % | DIASTOLIC BLOOD PRESSURE: 90 MMHG | HEART RATE: 123 BPM | TEMPERATURE: 99 F | BODY MASS INDEX: 34.75 KG/M2 | SYSTOLIC BLOOD PRESSURE: 176 MMHG | WEIGHT: 177 LBS | HEIGHT: 60 IN

## 2019-07-18 DIAGNOSIS — M54.12 CERVICAL RADICULOPATHY: ICD-10-CM

## 2019-07-18 DIAGNOSIS — F33.42 RECURRENT MAJOR DEPRESSIVE DISORDER, IN FULL REMISSION (HCC): ICD-10-CM

## 2019-07-18 DIAGNOSIS — J44.9 COPD, MILD (HCC): ICD-10-CM

## 2019-07-18 DIAGNOSIS — K76.9 LIVER DISEASE: ICD-10-CM

## 2019-07-18 DIAGNOSIS — E78.00 HYPERCHOLESTEREMIA: ICD-10-CM

## 2019-07-18 DIAGNOSIS — G89.4 CHRONIC PAIN DISORDER: ICD-10-CM

## 2019-07-18 DIAGNOSIS — M51.17 INTERVERTEBRAL DISC DISORDER WITH RADICULOPATHY OF LUMBOSACRAL REGION: ICD-10-CM

## 2019-07-18 DIAGNOSIS — I10 ESSENTIAL HYPERTENSION: Primary | ICD-10-CM

## 2019-07-18 DIAGNOSIS — Z00.00 ANNUAL PHYSICAL EXAM: ICD-10-CM

## 2019-07-18 DIAGNOSIS — F17.200 SMOKER: ICD-10-CM

## 2019-07-18 PROCEDURE — 99214 OFFICE O/P EST MOD 30 MIN: CPT | Performed by: FAMILY MEDICINE

## 2019-07-18 PROCEDURE — 3008F BODY MASS INDEX DOCD: CPT | Performed by: FAMILY MEDICINE

## 2019-07-18 RX ORDER — VALSARTAN AND HYDROCHLOROTHIAZIDE 320; 25 MG/1; MG/1
1 TABLET, FILM COATED ORAL DAILY
Qty: 30 TABLET | Refills: 1 | Status: SHIPPED | OUTPATIENT
Start: 2019-07-18 | End: 2019-09-13 | Stop reason: SDUPTHER

## 2019-07-18 RX ORDER — ALBUTEROL SULFATE 90 UG/1
2 AEROSOL, METERED RESPIRATORY (INHALATION) EVERY 6 HOURS PRN
Qty: 18 G | Refills: 0
Start: 2019-07-18 | End: 2020-06-08 | Stop reason: SDUPTHER

## 2019-07-18 RX ORDER — TRAZODONE HYDROCHLORIDE 150 MG/1
150 TABLET ORAL
Qty: 30 TABLET | Refills: 0 | Status: SHIPPED | OUTPATIENT
Start: 2019-07-18 | End: 2019-09-13 | Stop reason: SDUPTHER

## 2019-07-18 RX ORDER — VENLAFAXINE HYDROCHLORIDE 150 MG/1
150 CAPSULE, EXTENDED RELEASE ORAL DAILY
Qty: 30 CAPSULE | Refills: 5 | Status: SHIPPED | OUTPATIENT
Start: 2019-07-18 | End: 2020-01-20 | Stop reason: SDUPTHER

## 2019-07-18 NOTE — PROGRESS NOTES
ADULT ANNUAL PHYSICAL  [facility]    NAME: Pattie Valdez  AGE: 61 y o  SEX: female  : 1955     DATE: 2019     Assessment and Plan:       Problem List Items Addressed This Visit        Digestive    Liver disease       Patient reports that she has a history of   Liver disease  She has recently been taking Tylenol for back and neck pain and notes subsequent right upper quadrant pain      ---Will obtain a CMP to check liver enzymes  Relevant Orders    Comprehensive metabolic panel       Respiratory    COPD, mild (HCC)       Continue using albuterol as needed         Relevant Medications    albuterol (VENTOLIN HFA) 90 mcg/act inhaler       Cardiovascular and Mediastinum    Essential hypertension - Primary      Patient's blood pressure elevated today in office (systolic 665)  She does not check her blood pressure at home regularly  She notes that she can feel when her blood pressure is elevated and that this happens quite frequently  --- For this reason I have increased her Diovan-HCT  from 160-25  to 320-25     ---Patient to return in 1 month for  blood pressure recheck  Relevant Medications    valsartan-hydrochlorothiazide (DIOVAN-HCT) 320-25 MG per tablet       Nervous and Auditory    Cervical radiculopathy       Continue trazodone            Musculoskeletal and Integument    Intervertebral disc disorder with radiculopathy of lumbosacral region       Other    Chronic pain disorder    Smoker    Depression    Relevant Medications    venlafaxine (EFFEXOR-XR) 150 mg 24 hr capsule    traZODone (DESYREL) 150 mg tablet    Hypercholesteremia      Patient's last lipid profile was elevated but this was over  1 year ago  Patient notes that she has a healthy diet  We will hold off on medication for now and repeat a lipid profile  Patient understands that medication may be indicated if cholesterol is high           Relevant Orders    Lipid Panel with Direct LDL reflex      Other Visit Diagnoses     Annual physical exam        Relevant Orders    Mammo diagnostic bilateral w cad    CT colonoscopy diagnostic w contrast                Counseling:  · Tobacco Cessation Counseling: Tobacco cessation counseling and education was provided  The patient is sincerely urged to quit consumption of tobacco  She is not ready to quit tobacco  The numerous health risks of tobacco consumption were discussed  Patient was counceled on topic but has decided to not take further steps to quit at this time       No follow-ups on file  History of Present Illness:     Adult Annual Physical   Patient here for a comprehensive physical exam  The patient reports problems - patient notes pain in her neck and back secondary to cervical radiculopathy and intervertebral disc disorder   Diet and Physical Activity  · Diet/Nutrition: well balanced diet  · Exercise: patient does chair exercises and walks most places         · Sleep: not discusses  · Hearing: normal - none   · Vision: wears glasses  · Dental: no addressed this visit  Review of Systems:     Review of Systems   Constitutional: Negative for activity change, appetite change and fever  HENT: Negative for ear pain, rhinorrhea, sore throat and voice change  Respiratory: Positive for cough  Negative for chest tightness, shortness of breath and wheezing  Cardiovascular: Negative for chest pain  Gastrointestinal: Negative for abdominal pain, diarrhea and nausea  Musculoskeletal: Positive for back pain and neck stiffness  Neurological: Positive for light-headedness (secondary to high BP) and numbness  Negative for syncope        Past Medical History:     Past Medical History:   Diagnosis Date    [facility]chronic obstructive [facility] disease) ([facility] [address]     Radiculopathy of lumbar region     last assessed 03/29/17    Seizures [facility])       Past Surgical History:     Past Surgical History:   Procedure Laterality Date     SECTION      EPIDURAL BLOCK INJECTION Right 2017    Procedure: BLOCK / INJECTION EPIDURAL STEROID TRANSFORAMINAL   L4-5;  Surgeon: Angelica Campbell MD;  Location: [place] [address] MAIN OR;  Service:     TONSILLECTOMY        Social History:     Social History     Socioeconomic History    Marital status:      Spouse name: Not on file    Number of children: Not on file    Years of education: Not on file    Highest education level: Not on file   Occupational History    Not on file   Social Needs    Financial resource strain: Not on file    Food insecurity:     Worry: Not on file     Inability: Not on file    Transportation needs:     Medical: Not on file     Non-medical: Not on file   Tobacco Use    Smoking status: Current Some Day Smoker    Smokeless tobacco: Current User    Tobacco comment: Per allscripts: Current everyday smoker   Substance and Sexual Activity    Alcohol use: No     Comment: Per allscripts: Social    Drug use: No    Sexual activity: Not on file   Lifestyle    Physical activity:     Days per week: Not on file     Minutes per session: Not on file    Stress: Not on file   Relationships    Social connections:     Talks on phone: Not on file     Gets together: Not on file     Attends Rastafarian service: Not on file     Active member of club or organization: Not on file     Attends meetings of clubs or organizations: Not on file     Relationship status: Not on file    Intimate partner violence:     Fear of current or ex partner: Not on file     Emotionally abused: Not on file     Physically abused: Not on file     Forced sexual activity: Not on file   Other Topics Concern    Not on file   Social History Narrative    Not on file      Family History:     Family History   Problem Relation Age of Onset    Diabetes Mother     Hyperlipidemia Mother     Cancer Father     Prostate cancer Father       Current Medications:     Current Outpatient Medications   Medication Sig Dispense Refill    albuterol (VENTOLIN HFA) 90 mcg/act inhaler Inhale 2 puffs every 6 (six) hours as needed for wheezing 18 g 0    traZODone (DESYREL) 150 mg tablet Take 1 tablet (150 mg total) by mouth daily at bedtime 30 tablet 0    valsartan-hydrochlorothiazide (DIOVAN-HCT) 320-25 MG per tablet Take 1 tablet by mouth daily 30 tablet 1    venlafaxine (EFFEXOR-XR) 150 mg 24 hr capsule Take 1 capsule (150 mg total) by mouth daily 30 capsule 5    umeclidinium bromide (INCRUSE ELLIPTA) 62 5 mcg/inh AEPB inhaler Inhale 1 puff daily  0     No current facility-administered medications for this visit  Allergies: Allergies   Allergen Reactions    Aspirin Anaphylaxis    Asparaginase Derivatives     Ibuprofen     Nsaids     Robaxin [Methocarbamol]     Toradol [Ketorolac Tromethamine]       Physical Exam:     BP (!) 176/90   Pulse (!) 123   Temp 99 °F (37 2 °C)   Ht 5' (1 524 m)   Wt 80 3 kg (177 lb)   SpO2 96%   BMI 34 57 kg/m²     Physical Exam   Constitutional: She is oriented to person, place, and time  She appears well-developed and well-nourished  HENT:   Head: Normocephalic and atraumatic  Cardiovascular: Normal rate, regular rhythm and normal heart sounds  Pulmonary/Chest: Effort normal and breath sounds normal    Abdominal: Soft  There is tenderness (RUQ)  Neurological: She is alert and oriented to person, place, and time  A sensory deficit (2nd and 3rd digits of both hands) is present  Skin: Skin is warm and dry  Psychiatric: She has a normal mood and affect   Her behavior is normal  Judgment and thought content normal        Sofy Dior DO  [address]

## 2019-07-18 NOTE — ASSESSMENT & PLAN NOTE
Patient's last lipid profile was elevated but this was over  1 year ago  Patient notes that she has a healthy diet  We will hold off on medication for now and repeat a lipid profile  Patient understands that medication may be indicated if cholesterol is high

## 2019-07-18 NOTE — ASSESSMENT & PLAN NOTE
Patient reports that she has a history of   Liver disease  She has recently been taking Tylenol for back and neck pain and notes subsequent right upper quadrant pain      ---Will obtain a CMP to check liver enzymes

## 2019-08-19 DIAGNOSIS — F33.42 RECURRENT MAJOR DEPRESSIVE DISORDER, IN FULL REMISSION (HCC): ICD-10-CM

## 2019-08-22 RX ORDER — TRAZODONE HYDROCHLORIDE 150 MG/1
150 TABLET ORAL
Qty: 90 TABLET | Refills: 1 | OUTPATIENT
Start: 2019-08-22

## 2019-09-13 ENCOUNTER — TELEPHONE (OUTPATIENT)
Dept: PEDIATRICS CLINIC | Facility: CLINIC | Age: 64
End: 2019-09-13

## 2019-09-13 DIAGNOSIS — I10 ESSENTIAL HYPERTENSION: ICD-10-CM

## 2019-09-13 DIAGNOSIS — F33.42 RECURRENT MAJOR DEPRESSIVE DISORDER, IN FULL REMISSION (HCC): ICD-10-CM

## 2019-09-13 RX ORDER — TRAZODONE HYDROCHLORIDE 150 MG/1
150 TABLET ORAL
Qty: 30 TABLET | Refills: 1 | Status: SHIPPED | OUTPATIENT
Start: 2019-09-13 | End: 2019-11-19 | Stop reason: SDUPTHER

## 2019-09-13 RX ORDER — VALSARTAN AND HYDROCHLOROTHIAZIDE 320; 25 MG/1; MG/1
1 TABLET, FILM COATED ORAL DAILY
Qty: 30 TABLET | Refills: 1 | Status: SHIPPED | OUTPATIENT
Start: 2019-09-13 | End: 2019-11-19 | Stop reason: SDUPTHER

## 2019-09-13 NOTE — TELEPHONE ENCOUNTER
I left a message for the patient telling her that I will refill her medication for another month but that she needs to come back for a blood pressure check soon as her medication was increased last visit

## 2019-11-19 DIAGNOSIS — F33.42 RECURRENT MAJOR DEPRESSIVE DISORDER, IN FULL REMISSION (HCC): ICD-10-CM

## 2019-11-19 DIAGNOSIS — I10 ESSENTIAL HYPERTENSION: ICD-10-CM

## 2019-11-19 RX ORDER — TRAZODONE HYDROCHLORIDE 150 MG/1
150 TABLET ORAL
Qty: 30 TABLET | Refills: 1 | Status: SHIPPED | OUTPATIENT
Start: 2019-11-19 | End: 2020-01-20 | Stop reason: SDUPTHER

## 2019-11-19 RX ORDER — VALSARTAN AND HYDROCHLOROTHIAZIDE 320; 25 MG/1; MG/1
1 TABLET, FILM COATED ORAL DAILY
Qty: 30 TABLET | Refills: 1 | Status: SHIPPED | OUTPATIENT
Start: 2019-11-19 | End: 2020-01-20 | Stop reason: SDUPTHER

## 2020-01-20 DIAGNOSIS — I10 ESSENTIAL HYPERTENSION: ICD-10-CM

## 2020-01-20 DIAGNOSIS — F33.42 RECURRENT MAJOR DEPRESSIVE DISORDER, IN FULL REMISSION (HCC): ICD-10-CM

## 2020-01-20 RX ORDER — VENLAFAXINE HYDROCHLORIDE 150 MG/1
150 CAPSULE, EXTENDED RELEASE ORAL DAILY
Qty: 30 CAPSULE | Refills: 0 | Status: SHIPPED | OUTPATIENT
Start: 2020-01-20 | End: 2020-02-28 | Stop reason: SDUPTHER

## 2020-01-20 RX ORDER — TRAZODONE HYDROCHLORIDE 150 MG/1
150 TABLET ORAL
Qty: 30 TABLET | Refills: 0 | Status: SHIPPED | OUTPATIENT
Start: 2020-01-20 | End: 2020-02-28 | Stop reason: SDUPTHER

## 2020-01-20 RX ORDER — VALSARTAN AND HYDROCHLOROTHIAZIDE 320; 25 MG/1; MG/1
1 TABLET, FILM COATED ORAL DAILY
Qty: 30 TABLET | Refills: 0 | Status: SHIPPED | OUTPATIENT
Start: 2020-01-20 | End: 2020-02-28 | Stop reason: SDUPTHER

## 2020-01-20 NOTE — PROGRESS NOTES
Refilled patient's medication for 1 more month  Patient needs to make visit with us before receiving any more refills on her trazadone

## 2020-02-20 DIAGNOSIS — I10 ESSENTIAL HYPERTENSION: ICD-10-CM

## 2020-02-20 DIAGNOSIS — F33.42 RECURRENT MAJOR DEPRESSIVE DISORDER, IN FULL REMISSION (HCC): ICD-10-CM

## 2020-02-20 RX ORDER — VALSARTAN AND HYDROCHLOROTHIAZIDE 320; 25 MG/1; MG/1
1 TABLET, FILM COATED ORAL DAILY
Qty: 30 TABLET | Refills: 3 | OUTPATIENT
Start: 2020-02-20

## 2020-02-20 RX ORDER — TRAZODONE HYDROCHLORIDE 150 MG/1
150 TABLET ORAL
Qty: 30 TABLET | Refills: 3 | OUTPATIENT
Start: 2020-02-20

## 2020-02-20 RX ORDER — VENLAFAXINE HYDROCHLORIDE 150 MG/1
150 CAPSULE, EXTENDED RELEASE ORAL DAILY
Qty: 30 CAPSULE | Refills: 3 | OUTPATIENT
Start: 2020-02-20

## 2020-02-28 DIAGNOSIS — F33.42 RECURRENT MAJOR DEPRESSIVE DISORDER, IN FULL REMISSION (HCC): ICD-10-CM

## 2020-02-28 DIAGNOSIS — I10 ESSENTIAL HYPERTENSION: ICD-10-CM

## 2020-02-28 RX ORDER — VENLAFAXINE HYDROCHLORIDE 150 MG/1
150 CAPSULE, EXTENDED RELEASE ORAL DAILY
Qty: 30 CAPSULE | Refills: 0 | Status: SHIPPED | OUTPATIENT
Start: 2020-02-28 | End: 2020-02-28 | Stop reason: SDUPTHER

## 2020-02-28 RX ORDER — VALSARTAN AND HYDROCHLOROTHIAZIDE 320; 25 MG/1; MG/1
1 TABLET, FILM COATED ORAL DAILY
Qty: 30 TABLET | Refills: 0 | Status: SHIPPED | OUTPATIENT
Start: 2020-02-28 | End: 2020-03-03 | Stop reason: SDUPTHER

## 2020-02-28 RX ORDER — TRAZODONE HYDROCHLORIDE 150 MG/1
150 TABLET ORAL
Qty: 30 TABLET | Refills: 0 | Status: SHIPPED | OUTPATIENT
Start: 2020-02-28 | End: 2020-03-03 | Stop reason: SDUPTHER

## 2020-02-28 RX ORDER — VENLAFAXINE HYDROCHLORIDE 150 MG/1
150 CAPSULE, EXTENDED RELEASE ORAL DAILY
Qty: 30 CAPSULE | Refills: 0 | Status: SHIPPED | OUTPATIENT
Start: 2020-02-28 | End: 2020-03-03 | Stop reason: SDUPTHER

## 2020-02-28 RX ORDER — TRAZODONE HYDROCHLORIDE 150 MG/1
150 TABLET ORAL
Qty: 30 TABLET | Refills: 0 | Status: SHIPPED | OUTPATIENT
Start: 2020-02-28 | End: 2020-02-28 | Stop reason: SDUPTHER

## 2020-02-28 RX ORDER — VALSARTAN AND HYDROCHLOROTHIAZIDE 320; 25 MG/1; MG/1
1 TABLET, FILM COATED ORAL DAILY
Qty: 30 TABLET | Refills: 0 | Status: SHIPPED | OUTPATIENT
Start: 2020-02-28 | End: 2020-02-28 | Stop reason: SDUPTHER

## 2020-02-28 NOTE — TELEPHONE ENCOUNTER
Patient did have appointment in the office today, unfortunately [facility] was not called to get the patient here  I did reschedule the appointment for 3/2/2020 but patient has been out of meds for 5 days as of today and needs refills

## 2020-03-03 ENCOUNTER — OFFICE VISIT (OUTPATIENT)
Dept: FAMILY MEDICINE CLINIC | Facility: CLINIC | Age: 65
End: 2020-03-03
Payer: COMMERCIAL

## 2020-03-03 VITALS
TEMPERATURE: 98.6 F | HEART RATE: 112 BPM | BODY MASS INDEX: 37.13 KG/M2 | SYSTOLIC BLOOD PRESSURE: 140 MMHG | WEIGHT: 189.13 LBS | OXYGEN SATURATION: 97 % | DIASTOLIC BLOOD PRESSURE: 78 MMHG | HEIGHT: 60 IN | RESPIRATION RATE: 20 BRPM

## 2020-03-03 DIAGNOSIS — I10 ESSENTIAL HYPERTENSION: ICD-10-CM

## 2020-03-03 DIAGNOSIS — R91.1 LUNG NODULE < 6CM ON CT: ICD-10-CM

## 2020-03-03 DIAGNOSIS — Z71.6 ENCOUNTER FOR SMOKING CESSATION COUNSELING: ICD-10-CM

## 2020-03-03 DIAGNOSIS — G95.19 NEUROGENIC CLAUDICATION (HCC): ICD-10-CM

## 2020-03-03 DIAGNOSIS — E66.01 MORBID OBESITY (HCC): ICD-10-CM

## 2020-03-03 DIAGNOSIS — Z86.69 HISTORY OF ABSENCE SEIZURES: ICD-10-CM

## 2020-03-03 DIAGNOSIS — E11.9 TYPE 2 DIABETES MELLITUS WITHOUT COMPLICATION, WITHOUT LONG-TERM CURRENT USE OF INSULIN (HCC): ICD-10-CM

## 2020-03-03 DIAGNOSIS — Z74.8 ASSISTANCE NEEDED WITH TRANSPORTATION: Primary | ICD-10-CM

## 2020-03-03 DIAGNOSIS — F33.42 RECURRENT MAJOR DEPRESSIVE DISORDER, IN FULL REMISSION (HCC): ICD-10-CM

## 2020-03-03 LAB — SL AMB POCT HEMOGLOBIN AIC: 6 (ref ?–6.5)

## 2020-03-03 PROCEDURE — 4010F ACE/ARB THERAPY RXD/TAKEN: CPT | Performed by: FAMILY MEDICINE

## 2020-03-03 PROCEDURE — 3044F HG A1C LEVEL LT 7.0%: CPT | Performed by: FAMILY MEDICINE

## 2020-03-03 PROCEDURE — 99213 OFFICE O/P EST LOW 20 MIN: CPT | Performed by: FAMILY MEDICINE

## 2020-03-03 PROCEDURE — 36416 COLLJ CAPILLARY BLOOD SPEC: CPT | Performed by: FAMILY MEDICINE

## 2020-03-03 PROCEDURE — 3078F DIAST BP <80 MM HG: CPT | Performed by: FAMILY MEDICINE

## 2020-03-03 PROCEDURE — 3008F BODY MASS INDEX DOCD: CPT | Performed by: FAMILY MEDICINE

## 2020-03-03 PROCEDURE — 4004F PT TOBACCO SCREEN RCVD TLK: CPT | Performed by: FAMILY MEDICINE

## 2020-03-03 PROCEDURE — 83036 HEMOGLOBIN GLYCOSYLATED A1C: CPT | Performed by: FAMILY MEDICINE

## 2020-03-03 PROCEDURE — 3077F SYST BP >= 140 MM HG: CPT | Performed by: FAMILY MEDICINE

## 2020-03-03 RX ORDER — ATORVASTATIN CALCIUM 40 MG/1
40 TABLET, FILM COATED ORAL DAILY
Qty: 90 TABLET | Refills: 1 | Status: SHIPPED | OUTPATIENT
Start: 2020-03-03 | End: 2021-02-12 | Stop reason: HOSPADM

## 2020-03-03 RX ORDER — VENLAFAXINE HYDROCHLORIDE 150 MG/1
150 CAPSULE, EXTENDED RELEASE ORAL DAILY
Qty: 30 CAPSULE | Refills: 3 | Status: SHIPPED | OUTPATIENT
Start: 2020-03-03 | End: 2020-06-29 | Stop reason: SDUPTHER

## 2020-03-03 RX ORDER — NICOTINE 21 MG/24HR
1 PATCH, TRANSDERMAL 24 HOURS TRANSDERMAL EVERY 24 HOURS
Qty: 28 PATCH | Refills: 0 | Status: SHIPPED | OUTPATIENT
Start: 2020-03-03 | End: 2021-09-16 | Stop reason: SDUPTHER

## 2020-03-03 RX ORDER — VALSARTAN AND HYDROCHLOROTHIAZIDE 320; 25 MG/1; MG/1
1 TABLET, FILM COATED ORAL DAILY
Qty: 30 TABLET | Refills: 0 | Status: SHIPPED | OUTPATIENT
Start: 2020-03-03 | End: 2020-03-03 | Stop reason: SDUPTHER

## 2020-03-03 RX ORDER — VENLAFAXINE HYDROCHLORIDE 150 MG/1
150 CAPSULE, EXTENDED RELEASE ORAL DAILY
Qty: 30 CAPSULE | Refills: 0 | Status: SHIPPED | OUTPATIENT
Start: 2020-03-03 | End: 2020-03-03 | Stop reason: SDUPTHER

## 2020-03-03 RX ORDER — TRAZODONE HYDROCHLORIDE 150 MG/1
150 TABLET ORAL
Qty: 30 TABLET | Refills: 0 | Status: SHIPPED | OUTPATIENT
Start: 2020-03-03 | End: 2020-03-03 | Stop reason: SDUPTHER

## 2020-03-03 RX ORDER — VALSARTAN AND HYDROCHLOROTHIAZIDE 320; 25 MG/1; MG/1
1 TABLET, FILM COATED ORAL DAILY
Qty: 30 TABLET | Refills: 3 | Status: SHIPPED | OUTPATIENT
Start: 2020-03-03 | End: 2020-06-29 | Stop reason: SDUPTHER

## 2020-03-03 RX ORDER — TRAZODONE HYDROCHLORIDE 150 MG/1
150 TABLET ORAL
Qty: 30 TABLET | Refills: 3 | Status: SHIPPED | OUTPATIENT
Start: 2020-03-03 | End: 2020-06-08 | Stop reason: SDUPTHER

## 2020-03-03 NOTE — ASSESSMENT & PLAN NOTE
Lab Results   Component Value Date    HGBA1C 6 0 03/03/2020   · not currently on medical mgmt  · Diet and lifestyle measures currently  · Hgb A1c prior to one in office 6 6  The 10-year ASCVD risk score (Damián Roger et al , 2013) is: 18 6%    Values used to calculate the score:      Age: 59 years      Sex: Female      Is Non- : No      Diabetic: No      Tobacco smoker: Yes      Systolic Blood Pressure: 889 mmHg      Is BP treated: Yes      HDL Cholesterol: 46 mg/dL  ·     Total Cholesterol: 267 mg/dL  · Started on Statin,common side effects discussed

## 2020-03-03 NOTE — ASSESSMENT & PLAN NOTE
Smoking cessation counseling provided  Almost one-third of all cancers are caused by smoking and there is an especially high incidence of smoking leading to lung, head and neck, cervical, bladder, kidney, and pancreatic cancers  Persistent tobacco use is consistent with poorer outcomes including complications of treatment, progressive disease, increased comorbidity, and second primaries     Smoking cessation is important in prevention of cancer, but is even more vital for survivors to improve survival    · Nicotine patch prescribed with plan to wean

## 2020-03-03 NOTE — PROGRESS NOTES
Assessment/Plan:     Problem List Items Addressed This Visit        Endocrine    Type 2 diabetes mellitus without complication, without long-term current use of insulin ([facility] [address] )       Lab Results   Component Value Date    HGBA1C 6 0 03/03/2020 ·   not currently on medical mgmt  · Diet and lifestyle measures currently  · Hgb A1c prior to one in office 6 6  The 10-year ASCVD risk score (Fredy Love et al , 2013) is: 18 6%    Values used to calculate the score:      Age: 59 years      Sex: Female      Is Non- : No      Diabetic: No      Tobacco smoker: Yes      Systolic Blood Pressure: 828 mmHg      Is BP treated: Yes      HDL Cholesterol: 46 mg/dL  ·     Total Cholesterol: 267 mg/dL  · Started on Statin,common side effects discussed         Relevant Medications    atorvastatin (LIPITOR) 40 mg tablet       Cardiovascular and Mediastinum    Essential hypertension    Relevant Medications    valsartan-hydrochlorothiazide (DIOVAN-HCT) 320-25 MG per tablet       Other    Depression    Relevant Medications    nicotine (NICODERM CQ) 14 mg/24hr TD 24 hr patch    traZODone (DESYREL) 150 mg tablet    venlafaxine (EFFEXOR-XR) 150 mg 24 hr capsule    Morbid obesity (HCC)    Relevant Orders    POCT hemoglobin A1c (Completed)    History of absence seizures    Relevant Orders    Ambulatory referral to Neurology    Encounter for smoking cessation counseling     Smoking cessation counseling provided  Almost one-third of all cancers are caused by smoking and there is an especially high incidence of smoking leading to lung, head and neck, cervical, bladder, kidney, and pancreatic cancers  Persistent tobacco use is consistent with poorer outcomes including complications of treatment, progressive disease, increased comorbidity, and second primaries     Smoking cessation is important in prevention of cancer, but is even more vital for survivors to improve survival    · Nicotine patch prescribed with plan to wean Relevant Medications    nicotine (NICODERM CQ) 14 mg/24hr TD 24 hr patch      Other Visit Diagnoses     Assistance needed with transportation    -  Primary    Relevant Orders    Ambulatory referral to social work care management program    Lung nodule < 6cm on CT        Relevant Orders    CT lung nodule follow-up    Neurogenic claudication                Subjective:      Patient ID: Isabel Daniels is a 59 y o  female  HPI    The following portions of the patient's history were reviewed and updated as appropriate: current medications, past medical history, past social history, past surgical history and problem list     72-year-old female presents for medication refill  Patient with past medical history of COPD, cervical radiculopathy, lumbar region radiculopathy status post epidural block injection, chronic pain disorder, neurogenic claudication, seizures, hypertension, current smoker 9-10 cigarettes per day, depression, hyperlipidemia  Patient has history of absence seizures, previously followed with Neurologist who passed away, has not followed up in about 3 years  3/18/16 RUQ US revealed "fatty change in the liver with areas of fatty sparing and questionable hypoechoic lesion in the left lobe of the liver or more likely an area of fatty sparing  This can be completely evaluated with a CT scan of the abdomen"  CT abd 5/3/16 revealed " no discrete liver lesions  The sonographic finding likely represented focal fatty sparing  5 mm noncalcified left basilar pulmonary nodule  Follow-up in 6-12 months recommended"  Follows with Pulmonology in [place], [place]       Past Medical History:   Diagnosis Date    COPD (chronic obstructive pulmonary disease) ([facility] [address] )     Radiculopathy of lumbar region     last assessed 17    Seizures [facility])      Past Surgical History:   Procedure Laterality Date     SECTION      EPIDURAL BLOCK INJECTION Right 2017    Procedure: BLOCK / INJECTION EPIDURAL STEROID TRANSFORAMINAL   L4-5;  Surgeon: Kasey Ramirez MD;  Location: [place] [address] MAIN OR;  Service:     TONSILLECTOMY         Current Outpatient Medications:     albuterol (VENTOLIN HFA) 90 mcg/act inhaler, Inhale 2 puffs every 6 (six) hours as needed for wheezing, Disp: 18 g, Rfl: 0    traZODone (DESYREL) 150 mg tablet, Take 1 tablet (150 mg total) by mouth daily at bedtime, Disp: 30 tablet, Rfl: 3    valsartan-hydrochlorothiazide (DIOVAN-HCT) 320-25 MG per tablet, Take 1 tablet by mouth daily, Disp: 30 tablet, Rfl: 3    venlafaxine (EFFEXOR-XR) 150 mg 24 hr capsule, Take 1 capsule (150 mg total) by mouth daily, Disp: 30 capsule, Rfl: 3    atorvastatin (LIPITOR) 40 mg tablet, Take 1 tablet (40 mg total) by mouth daily, Disp: 90 tablet, Rfl: 1    nicotine (NICODERM CQ) 14 mg/24hr TD 24 hr patch, Place 1 patch on the skin every 24 hours, Disp: 28 patch, Rfl: 0    umeclidinium bromide (INCRUSE ELLIPTA) 62 5 mcg/inh AEPB inhaler, Inhale 1 puff daily (Patient not taking: Reported on 3/3/2020), Disp: , Rfl: 0      Review of Systems      As noted in HPI    Objective:      /78   Pulse (!) 112   Temp 98 6 °F (37 °C) (Tympanic)   Resp 20   Ht 5' (1 524 m)   Wt 85 8 kg (189 lb 2 oz)   SpO2 97%   BMI 36 94 kg/m²        Physical Exam   Constitutional: She is oriented to person, place, and time  She appears well-developed  No distress  HENT:   Head: Normocephalic and atraumatic  Eyes: Conjunctivae and EOM are normal    Cardiovascular: Normal heart sounds  Pulmonary/Chest: Effort normal  No respiratory distress  She has no wheezes  Abdominal: Soft  Neurological: She is alert and oriented to person, place, and time  Skin: Skin is warm and dry  She is not diaphoretic     Psychiatric: Thought content normal

## 2020-03-04 ENCOUNTER — PATIENT OUTREACH (OUTPATIENT)
Dept: FAMILY MEDICINE CLINIC | Facility: CLINIC | Age: 65
End: 2020-03-04

## 2020-03-04 NOTE — PROGRESS NOTES
OP CM called pt in regards to consult for transportation  Pt states she does not like [employer] and had bad experiences with them  Explained that this is the transportation covered by her insurance  I explained we do use [employer] but that is for emergency situations  Pt states she resides alone and her son is in [place] but plans to move back here  Pt is on housing  Pt states her daughter is in [address] [facility]  Pt states she only sees her once a month to deliver her meds  Pt is aware she can call [employer] and see if they can take her to appts  Or try [employer] again and if she has any issues with them again she needs to report it  Pt given OP CM contact info and advised to call with any other concerns

## 2020-04-03 ENCOUNTER — TELEMEDICINE (OUTPATIENT)
Dept: FAMILY MEDICINE CLINIC | Facility: CLINIC | Age: 65
End: 2020-04-03
Payer: COMMERCIAL

## 2020-04-03 DIAGNOSIS — Z71.6 ENCOUNTER FOR SMOKING CESSATION COUNSELING: Primary | ICD-10-CM

## 2020-04-03 DIAGNOSIS — J44.9 COPD, MILD (HCC): ICD-10-CM

## 2020-04-03 PROCEDURE — 99213 OFFICE O/P EST LOW 20 MIN: CPT | Performed by: FAMILY MEDICINE

## 2020-04-22 DIAGNOSIS — F33.42 RECURRENT MAJOR DEPRESSIVE DISORDER, IN FULL REMISSION (HCC): ICD-10-CM

## 2020-04-23 RX ORDER — TRAZODONE HYDROCHLORIDE 150 MG/1
150 TABLET ORAL
Qty: 30 TABLET | Refills: 3 | OUTPATIENT
Start: 2020-04-23

## 2020-06-08 DIAGNOSIS — F33.42 RECURRENT MAJOR DEPRESSIVE DISORDER, IN FULL REMISSION (HCC): ICD-10-CM

## 2020-06-08 DIAGNOSIS — J44.9 COPD, MILD (HCC): ICD-10-CM

## 2020-06-08 RX ORDER — ALBUTEROL SULFATE 90 UG/1
2 AEROSOL, METERED RESPIRATORY (INHALATION) EVERY 6 HOURS PRN
COMMUNITY
End: 2021-02-08

## 2020-06-08 RX ORDER — TRAZODONE HYDROCHLORIDE 150 MG/1
150 TABLET ORAL
Qty: 30 TABLET | Refills: 3 | Status: SHIPPED | OUTPATIENT
Start: 2020-06-08 | End: 2020-06-29 | Stop reason: SDUPTHER

## 2020-06-08 RX ORDER — ALBUTEROL SULFATE 90 UG/1
2 AEROSOL, METERED RESPIRATORY (INHALATION) EVERY 6 HOURS PRN
Qty: 18 G | Refills: 0
Start: 2020-06-08 | End: 2020-10-27 | Stop reason: SDUPTHER

## 2020-06-27 DIAGNOSIS — I10 ESSENTIAL HYPERTENSION: ICD-10-CM

## 2020-06-27 DIAGNOSIS — F33.42 RECURRENT MAJOR DEPRESSIVE DISORDER, IN FULL REMISSION (HCC): ICD-10-CM

## 2020-06-29 ENCOUNTER — TELEPHONE (OUTPATIENT)
Dept: FAMILY MEDICINE CLINIC | Facility: CLINIC | Age: 65
End: 2020-06-29

## 2020-06-29 DIAGNOSIS — I10 ESSENTIAL HYPERTENSION: ICD-10-CM

## 2020-06-29 DIAGNOSIS — F33.42 RECURRENT MAJOR DEPRESSIVE DISORDER, IN FULL REMISSION (HCC): Primary | ICD-10-CM

## 2020-06-29 RX ORDER — VENLAFAXINE HYDROCHLORIDE 150 MG/1
CAPSULE, EXTENDED RELEASE ORAL
Qty: 30 CAPSULE | Refills: 3 | OUTPATIENT
Start: 2020-06-29

## 2020-06-29 RX ORDER — VALSARTAN AND HYDROCHLOROTHIAZIDE 320; 25 MG/1; MG/1
TABLET, FILM COATED ORAL
Qty: 30 TABLET | Refills: 3 | OUTPATIENT
Start: 2020-06-29

## 2020-06-29 RX ORDER — VENLAFAXINE HYDROCHLORIDE 150 MG/1
150 CAPSULE, EXTENDED RELEASE ORAL DAILY
Qty: 30 CAPSULE | Refills: 0 | Status: SHIPPED | OUTPATIENT
Start: 2020-06-29 | End: 2021-02-08

## 2020-06-29 RX ORDER — VALSARTAN AND HYDROCHLOROTHIAZIDE 320; 25 MG/1; MG/1
1 TABLET, FILM COATED ORAL DAILY
Qty: 30 TABLET | Refills: 0 | Status: SHIPPED | OUTPATIENT
Start: 2020-06-29 | End: 2020-09-02 | Stop reason: SDUPTHER

## 2020-06-29 RX ORDER — VALSARTAN AND HYDROCHLOROTHIAZIDE 320; 25 MG/1; MG/1
1 TABLET, FILM COATED ORAL DAILY
Qty: 30 TABLET | Refills: 3 | Status: SHIPPED | OUTPATIENT
Start: 2020-06-29 | End: 2020-09-30 | Stop reason: SDUPTHER

## 2020-06-29 RX ORDER — VENLAFAXINE HYDROCHLORIDE 75 MG/1
75 CAPSULE, EXTENDED RELEASE ORAL
Qty: 90 CAPSULE | Refills: 3 | Status: SHIPPED | OUTPATIENT
Start: 2020-06-29 | End: 2020-12-28

## 2020-07-20 ENCOUNTER — TELEPHONE (OUTPATIENT)
Dept: FAMILY MEDICINE CLINIC | Facility: CLINIC | Age: 65
End: 2020-07-20

## 2020-07-20 DIAGNOSIS — F33.42 RECURRENT MAJOR DEPRESSIVE DISORDER, IN FULL REMISSION (HCC): Primary | ICD-10-CM

## 2020-07-20 DIAGNOSIS — F32.A DEPRESSION, UNSPECIFIED DEPRESSION TYPE: ICD-10-CM

## 2020-07-20 DIAGNOSIS — G47.00 INSOMNIA, UNSPECIFIED TYPE: ICD-10-CM

## 2020-07-20 RX ORDER — TRAZODONE HYDROCHLORIDE 50 MG/1
150 TABLET ORAL
Qty: 30 TABLET | Refills: 0 | Status: SHIPPED | OUTPATIENT
Start: 2020-07-20 | End: 2021-02-12 | Stop reason: HOSPADM

## 2020-07-20 NOTE — TELEPHONE ENCOUNTER
Called patient, patient doing well with Effexor wean, currently at 75 mg p o  Q d  trazodone 50 mg p o  Q h s  Refilled  Patient to follow up with virtual visit to further discuss Effexor wean/ultimate discontinuing  Patient offers that she was on Depakote in the past for about 3 years, to 68 months for slow wean she states

## 2020-07-20 NOTE — TELEPHONE ENCOUNTER
Patient is on 75mg daily effexor - she is weaning and is doing okay  However, she tried to get refill of trazodone and the pharmacist said refills were cancelled  She was told this would be continued and she cant sleep without it  Needs refill

## 2020-07-30 DIAGNOSIS — G47.00 INSOMNIA, UNSPECIFIED TYPE: Primary | ICD-10-CM

## 2020-07-30 DIAGNOSIS — F33.42 RECURRENT MAJOR DEPRESSIVE DISORDER, IN FULL REMISSION (HCC): ICD-10-CM

## 2020-07-30 NOTE — TELEPHONE ENCOUNTER
Patient reports taking 150 mg a night  But we only sent #30 trazodone 50mg,  Can you send a new a script for 150mg tabs #30?

## 2020-08-04 RX ORDER — TRAZODONE HYDROCHLORIDE 150 MG/1
150 TABLET ORAL
Qty: 30 TABLET | Refills: 2 | Status: SHIPPED | OUTPATIENT
Start: 2020-08-04 | End: 2020-10-27

## 2020-08-31 DIAGNOSIS — I10 ESSENTIAL HYPERTENSION: ICD-10-CM

## 2020-09-02 DIAGNOSIS — I10 ESSENTIAL HYPERTENSION: ICD-10-CM

## 2020-09-02 RX ORDER — VALSARTAN AND HYDROCHLOROTHIAZIDE 320; 25 MG/1; MG/1
1 TABLET, FILM COATED ORAL DAILY
Qty: 30 TABLET | Refills: 0 | Status: SHIPPED | OUTPATIENT
Start: 2020-09-02 | End: 2020-10-02 | Stop reason: SDUPTHER

## 2020-09-02 RX ORDER — VALSARTAN AND HYDROCHLOROTHIAZIDE 320; 25 MG/1; MG/1
TABLET, FILM COATED ORAL
Qty: 30 TABLET | Refills: 0 | OUTPATIENT
Start: 2020-09-02

## 2020-09-02 NOTE — TELEPHONE ENCOUNTER
Patient states that she doesn;t know anyone who has a car to bring her here  She is asking for a refill while she tries to make arrangements   She had a bad experience with [employer] transportation services in the past

## 2020-09-30 DIAGNOSIS — I10 ESSENTIAL HYPERTENSION: ICD-10-CM

## 2020-10-02 DIAGNOSIS — I10 ESSENTIAL HYPERTENSION: ICD-10-CM

## 2020-10-02 RX ORDER — VALSARTAN AND HYDROCHLOROTHIAZIDE 320; 25 MG/1; MG/1
1 TABLET, FILM COATED ORAL DAILY
Qty: 90 TABLET | Refills: 1 | Status: SHIPPED | OUTPATIENT
Start: 2020-10-02 | End: 2021-02-12 | Stop reason: HOSPADM

## 2020-10-02 RX ORDER — VALSARTAN AND HYDROCHLOROTHIAZIDE 320; 25 MG/1; MG/1
1 TABLET, FILM COATED ORAL DAILY
Qty: 90 TABLET | Refills: 1 | Status: SHIPPED | OUTPATIENT
Start: 2020-10-02 | End: 2020-10-02 | Stop reason: SDUPTHER

## 2020-10-27 DIAGNOSIS — G47.00 INSOMNIA, UNSPECIFIED TYPE: ICD-10-CM

## 2020-10-27 DIAGNOSIS — F33.42 RECURRENT MAJOR DEPRESSIVE DISORDER, IN FULL REMISSION (HCC): ICD-10-CM

## 2020-10-27 DIAGNOSIS — J44.9 COPD, MILD (HCC): ICD-10-CM

## 2020-10-27 RX ORDER — TRAZODONE HYDROCHLORIDE 150 MG/1
TABLET ORAL
Qty: 30 TABLET | Refills: 2 | Status: SHIPPED | OUTPATIENT
Start: 2020-10-27 | End: 2021-01-19

## 2020-10-27 RX ORDER — ALBUTEROL SULFATE 90 UG/1
2 AEROSOL, METERED RESPIRATORY (INHALATION) EVERY 6 HOURS PRN
Qty: 18 G | Refills: 0
Start: 2020-10-27 | End: 2021-03-09 | Stop reason: SDUPTHER

## 2020-12-26 DIAGNOSIS — F33.42 RECURRENT MAJOR DEPRESSIVE DISORDER, IN FULL REMISSION (HCC): ICD-10-CM

## 2020-12-28 RX ORDER — VENLAFAXINE HYDROCHLORIDE 75 MG/1
CAPSULE, EXTENDED RELEASE ORAL
Qty: 90 CAPSULE | Refills: 3 | Status: SHIPPED | OUTPATIENT
Start: 2020-12-28 | End: 2021-09-16 | Stop reason: ALTCHOICE

## 2021-01-19 DIAGNOSIS — G47.00 INSOMNIA, UNSPECIFIED TYPE: ICD-10-CM

## 2021-01-19 DIAGNOSIS — F33.42 RECURRENT MAJOR DEPRESSIVE DISORDER, IN FULL REMISSION (HCC): ICD-10-CM

## 2021-01-19 RX ORDER — TRAZODONE HYDROCHLORIDE 150 MG/1
TABLET ORAL
Qty: 30 TABLET | Refills: 2 | Status: SHIPPED | OUTPATIENT
Start: 2021-01-19 | End: 2021-04-22

## 2021-02-08 ENCOUNTER — APPOINTMENT (EMERGENCY)
Dept: RADIOLOGY | Facility: HOSPITAL | Age: 66
DRG: 121 | End: 2021-02-08
Payer: COMMERCIAL

## 2021-02-08 ENCOUNTER — HOSPITAL ENCOUNTER (INPATIENT)
Facility: HOSPITAL | Age: 66
LOS: 3 days | Discharge: LEFT BEFORE DISCHARGE | DRG: 121 | End: 2021-02-12
Attending: EMERGENCY MEDICINE | Admitting: FAMILY MEDICINE
Payer: COMMERCIAL

## 2021-02-08 DIAGNOSIS — R77.8 ELEVATED TROPONIN: Primary | ICD-10-CM

## 2021-02-08 DIAGNOSIS — F33.42 RECURRENT MAJOR DEPRESSIVE DISORDER, IN FULL REMISSION (HCC): ICD-10-CM

## 2021-02-08 DIAGNOSIS — R56.9 SEIZURE (HCC): ICD-10-CM

## 2021-02-08 DIAGNOSIS — R73.9 HYPERGLYCEMIA: ICD-10-CM

## 2021-02-08 DIAGNOSIS — G47.00 INSOMNIA, UNSPECIFIED TYPE: ICD-10-CM

## 2021-02-08 DIAGNOSIS — E87.6 HYPOKALEMIA: ICD-10-CM

## 2021-02-08 DIAGNOSIS — I70.0 ATHEROSCLEROSIS OF AORTA (HCC): ICD-10-CM

## 2021-02-08 DIAGNOSIS — E83.42 HYPOMAGNESEMIA: ICD-10-CM

## 2021-02-08 DIAGNOSIS — M48.061 SPINAL STENOSIS OF LUMBAR REGION, UNSPECIFIED WHETHER NEUROGENIC CLAUDICATION PRESENT: ICD-10-CM

## 2021-02-08 DIAGNOSIS — R31.9 HEMATURIA: ICD-10-CM

## 2021-02-08 DIAGNOSIS — E78.00 HYPERCHOLESTEREMIA: ICD-10-CM

## 2021-02-08 DIAGNOSIS — R10.9 ACUTE RIGHT FLANK PAIN: ICD-10-CM

## 2021-02-08 DIAGNOSIS — M54.9 SEVERE BACK PAIN: ICD-10-CM

## 2021-02-08 DIAGNOSIS — I21.4 NSTEMI (NON-ST ELEVATED MYOCARDIAL INFARCTION) (HCC): ICD-10-CM

## 2021-02-08 DIAGNOSIS — I10 ESSENTIAL HYPERTENSION: ICD-10-CM

## 2021-02-08 PROBLEM — R79.89 ELEVATED TROPONIN: Status: ACTIVE | Noted: 2021-02-08

## 2021-02-08 LAB
ALBUMIN SERPL BCP-MCNC: 4 G/DL (ref 3.5–5)
ALP SERPL-CCNC: 120 U/L (ref 46–116)
ALT SERPL W P-5'-P-CCNC: 22 U/L (ref 12–78)
ANION GAP SERPL CALCULATED.3IONS-SCNC: 17 MMOL/L (ref 4–13)
AST SERPL W P-5'-P-CCNC: 16 U/L (ref 5–45)
ATRIAL RATE: 82 BPM
ATRIAL RATE: 84 BPM
ATRIAL RATE: 93 BPM
BACTERIA UR QL AUTO: NORMAL /HPF
BASOPHILS # BLD AUTO: 0.03 THOUSANDS/ΜL (ref 0–0.1)
BASOPHILS NFR BLD AUTO: 0 % (ref 0–1)
BILIRUB SERPL-MCNC: 0.6 MG/DL (ref 0.2–1)
BILIRUB UR QL STRIP: NEGATIVE
BUN SERPL-MCNC: 10 MG/DL (ref 5–25)
CALCIUM SERPL-MCNC: 9.6 MG/DL (ref 8.3–10.1)
CHLORIDE SERPL-SCNC: 92 MMOL/L (ref 100–108)
CLARITY UR: CLEAR
CO2 SERPL-SCNC: 24 MMOL/L (ref 21–32)
COLOR UR: YELLOW
CREAT SERPL-MCNC: 1.11 MG/DL (ref 0.6–1.3)
EOSINOPHIL # BLD AUTO: 0.01 THOUSAND/ΜL (ref 0–0.61)
EOSINOPHIL NFR BLD AUTO: 0 % (ref 0–6)
ERYTHROCYTE [DISTWIDTH] IN BLOOD BY AUTOMATED COUNT: 13.3 % (ref 11.6–15.1)
GFR SERPL CREATININE-BSD FRML MDRD: 52 ML/MIN/1.73SQ M
GLUCOSE SERPL-MCNC: 180 MG/DL (ref 65–140)
GLUCOSE UR STRIP-MCNC: ABNORMAL MG/DL
HCT VFR BLD AUTO: 48.9 % (ref 34.8–46.1)
HGB BLD-MCNC: 16.8 G/DL (ref 11.5–15.4)
HGB UR QL STRIP.AUTO: ABNORMAL
IMM GRANULOCYTES # BLD AUTO: 0.04 THOUSAND/UL (ref 0–0.2)
IMM GRANULOCYTES NFR BLD AUTO: 0 % (ref 0–2)
KETONES UR STRIP-MCNC: ABNORMAL MG/DL
LACTATE SERPL-SCNC: 2 MMOL/L (ref 0.5–2)
LACTATE SERPL-SCNC: 3.6 MMOL/L (ref 0.5–2)
LEUKOCYTE ESTERASE UR QL STRIP: NEGATIVE
LIPASE SERPL-CCNC: 60 U/L (ref 73–393)
LYMPHOCYTES # BLD AUTO: 1.16 THOUSANDS/ΜL (ref 0.6–4.47)
LYMPHOCYTES NFR BLD AUTO: 11 % (ref 14–44)
MAGNESIUM SERPL-MCNC: 1.5 MG/DL (ref 1.6–2.6)
MCH RBC QN AUTO: 30.1 PG (ref 26.8–34.3)
MCHC RBC AUTO-ENTMCNC: 34.4 G/DL (ref 31.4–37.4)
MCV RBC AUTO: 88 FL (ref 82–98)
MONOCYTES # BLD AUTO: 0.43 THOUSAND/ΜL (ref 0.17–1.22)
MONOCYTES NFR BLD AUTO: 4 % (ref 4–12)
NEUTROPHILS # BLD AUTO: 8.88 THOUSANDS/ΜL (ref 1.85–7.62)
NEUTS SEG NFR BLD AUTO: 85 % (ref 43–75)
NITRITE UR QL STRIP: NEGATIVE
NON-SQ EPI CELLS URNS QL MICRO: NORMAL /HPF
NRBC BLD AUTO-RTO: 0 /100 WBCS
NT-PROBNP SERPL-MCNC: 2640 PG/ML
P AXIS: -19 DEGREES
P AXIS: 64 DEGREES
P AXIS: 75 DEGREES
PH UR STRIP.AUTO: 7 [PH]
PLATELET # BLD AUTO: 407 THOUSANDS/UL (ref 149–390)
PMV BLD AUTO: 9.4 FL (ref 8.9–12.7)
POTASSIUM SERPL-SCNC: 3.3 MMOL/L (ref 3.5–5.3)
PR INTERVAL: 126 MS
PR INTERVAL: 150 MS
PR INTERVAL: 160 MS
PROT SERPL-MCNC: 9 G/DL (ref 6.4–8.2)
PROT UR STRIP-MCNC: >=300 MG/DL
QRS AXIS: 46 DEGREES
QRS AXIS: 53 DEGREES
QRS AXIS: 56 DEGREES
QRSD INTERVAL: 68 MS
QRSD INTERVAL: 74 MS
QRSD INTERVAL: 86 MS
QT INTERVAL: 382 MS
QT INTERVAL: 392 MS
QT INTERVAL: 454 MS
QTC INTERVAL: 457 MS
QTC INTERVAL: 474 MS
QTC INTERVAL: 536 MS
RBC # BLD AUTO: 5.58 MILLION/UL (ref 3.81–5.12)
RBC #/AREA URNS AUTO: NORMAL /HPF
SODIUM SERPL-SCNC: 133 MMOL/L (ref 136–145)
SP GR UR STRIP.AUTO: 1.01 (ref 1–1.03)
T WAVE AXIS: 36 DEGREES
T WAVE AXIS: 45 DEGREES
T WAVE AXIS: 48 DEGREES
TROPONIN I SERPL-MCNC: 0.3 NG/ML
TROPONIN I SERPL-MCNC: 0.5 NG/ML
TROPONIN I SERPL-MCNC: 0.51 NG/ML
UROBILINOGEN UR QL STRIP.AUTO: 0.2 E.U./DL
VENTRICULAR RATE: 82 BPM
VENTRICULAR RATE: 84 BPM
VENTRICULAR RATE: 93 BPM
WBC # BLD AUTO: 10.55 THOUSAND/UL (ref 4.31–10.16)
WBC #/AREA URNS AUTO: NORMAL /HPF

## 2021-02-08 PROCEDURE — 83690 ASSAY OF LIPASE: CPT | Performed by: PHYSICIAN ASSISTANT

## 2021-02-08 PROCEDURE — 93005 ELECTROCARDIOGRAM TRACING: CPT

## 2021-02-08 PROCEDURE — 96375 TX/PRO/DX INJ NEW DRUG ADDON: CPT

## 2021-02-08 PROCEDURE — 71275 CT ANGIOGRAPHY CHEST: CPT

## 2021-02-08 PROCEDURE — 87040 BLOOD CULTURE FOR BACTERIA: CPT | Performed by: PHYSICIAN ASSISTANT

## 2021-02-08 PROCEDURE — 74176 CT ABD & PELVIS W/O CONTRAST: CPT

## 2021-02-08 PROCEDURE — 99285 EMERGENCY DEPT VISIT HI MDM: CPT | Performed by: EMERGENCY MEDICINE

## 2021-02-08 PROCEDURE — G1004 CDSM NDSC: HCPCS

## 2021-02-08 PROCEDURE — 83735 ASSAY OF MAGNESIUM: CPT | Performed by: PHYSICIAN ASSISTANT

## 2021-02-08 PROCEDURE — 96361 HYDRATE IV INFUSION ADD-ON: CPT

## 2021-02-08 PROCEDURE — 84484 ASSAY OF TROPONIN QUANT: CPT | Performed by: PHYSICIAN ASSISTANT

## 2021-02-08 PROCEDURE — 84484 ASSAY OF TROPONIN QUANT: CPT | Performed by: NURSE PRACTITIONER

## 2021-02-08 PROCEDURE — 83880 ASSAY OF NATRIURETIC PEPTIDE: CPT | Performed by: PHYSICIAN ASSISTANT

## 2021-02-08 PROCEDURE — 74174 CTA ABD&PLVS W/CONTRAST: CPT

## 2021-02-08 PROCEDURE — 80053 COMPREHEN METABOLIC PANEL: CPT | Performed by: PHYSICIAN ASSISTANT

## 2021-02-08 PROCEDURE — 83605 ASSAY OF LACTIC ACID: CPT | Performed by: PHYSICIAN ASSISTANT

## 2021-02-08 PROCEDURE — 99205 OFFICE O/P NEW HI 60 MIN: CPT | Performed by: NURSE PRACTITIONER

## 2021-02-08 PROCEDURE — 93010 ELECTROCARDIOGRAM REPORT: CPT | Performed by: INTERNAL MEDICINE

## 2021-02-08 PROCEDURE — 99220 PR INITIAL OBSERVATION CARE/DAY 70 MINUTES: CPT | Performed by: INTERNAL MEDICINE

## 2021-02-08 PROCEDURE — 81001 URINALYSIS AUTO W/SCOPE: CPT | Performed by: PHYSICIAN ASSISTANT

## 2021-02-08 PROCEDURE — 87086 URINE CULTURE/COLONY COUNT: CPT | Performed by: NURSE PRACTITIONER

## 2021-02-08 PROCEDURE — 36415 COLL VENOUS BLD VENIPUNCTURE: CPT | Performed by: PHYSICIAN ASSISTANT

## 2021-02-08 PROCEDURE — 99285 EMERGENCY DEPT VISIT HI MDM: CPT

## 2021-02-08 PROCEDURE — 96365 THER/PROPH/DIAG IV INF INIT: CPT

## 2021-02-08 PROCEDURE — 85025 COMPLETE CBC W/AUTO DIFF WBC: CPT | Performed by: PHYSICIAN ASSISTANT

## 2021-02-08 RX ORDER — POLYETHYLENE GLYCOL 3350 17 G/17G
17 POWDER, FOR SOLUTION ORAL DAILY PRN
Status: DISCONTINUED | OUTPATIENT
Start: 2021-02-08 | End: 2021-02-12 | Stop reason: HOSPADM

## 2021-02-08 RX ORDER — CALCIUM CARBONATE 200(500)MG
1000 TABLET,CHEWABLE ORAL DAILY PRN
Status: DISCONTINUED | OUTPATIENT
Start: 2021-02-08 | End: 2021-02-12 | Stop reason: HOSPADM

## 2021-02-08 RX ORDER — ONDANSETRON 2 MG/ML
4 INJECTION INTRAMUSCULAR; INTRAVENOUS EVERY 6 HOURS PRN
Status: DISCONTINUED | OUTPATIENT
Start: 2021-02-08 | End: 2021-02-09

## 2021-02-08 RX ORDER — MORPHINE SULFATE 4 MG/ML
4 INJECTION, SOLUTION INTRAMUSCULAR; INTRAVENOUS ONCE
Status: COMPLETED | OUTPATIENT
Start: 2021-02-08 | End: 2021-02-08

## 2021-02-08 RX ORDER — ACETAMINOPHEN 325 MG/1
650 TABLET ORAL EVERY 8 HOURS SCHEDULED
Status: DISCONTINUED | OUTPATIENT
Start: 2021-02-08 | End: 2021-02-12 | Stop reason: HOSPADM

## 2021-02-08 RX ORDER — SODIUM CHLORIDE 9 MG/ML
50 INJECTION, SOLUTION INTRAVENOUS CONTINUOUS
Status: DISCONTINUED | OUTPATIENT
Start: 2021-02-08 | End: 2021-02-09

## 2021-02-08 RX ORDER — TRAZODONE HYDROCHLORIDE 50 MG/1
150 TABLET ORAL
Status: DISCONTINUED | OUTPATIENT
Start: 2021-02-08 | End: 2021-02-09

## 2021-02-08 RX ORDER — MAGNESIUM SULFATE HEPTAHYDRATE 40 MG/ML
2 INJECTION, SOLUTION INTRAVENOUS ONCE
Status: COMPLETED | OUTPATIENT
Start: 2021-02-08 | End: 2021-02-08

## 2021-02-08 RX ORDER — VENLAFAXINE HYDROCHLORIDE 75 MG/1
75 CAPSULE, EXTENDED RELEASE ORAL DAILY
Status: DISCONTINUED | OUTPATIENT
Start: 2021-02-09 | End: 2021-02-09

## 2021-02-08 RX ORDER — ATORVASTATIN CALCIUM 40 MG/1
40 TABLET, FILM COATED ORAL
Status: DISCONTINUED | OUTPATIENT
Start: 2021-02-08 | End: 2021-02-09

## 2021-02-08 RX ORDER — HYDROMORPHONE HCL/PF 1 MG/ML
0.5 SYRINGE (ML) INJECTION ONCE
Status: COMPLETED | OUTPATIENT
Start: 2021-02-08 | End: 2021-02-08

## 2021-02-08 RX ORDER — POTASSIUM CHLORIDE 20 MEQ/1
40 TABLET, EXTENDED RELEASE ORAL EVERY 4 HOURS
Status: COMPLETED | OUTPATIENT
Start: 2021-02-08 | End: 2021-02-08

## 2021-02-08 RX ORDER — ONDANSETRON 2 MG/ML
4 INJECTION INTRAMUSCULAR; INTRAVENOUS ONCE
Status: COMPLETED | OUTPATIENT
Start: 2021-02-08 | End: 2021-02-08

## 2021-02-08 RX ORDER — NICOTINE 21 MG/24HR
1 PATCH, TRANSDERMAL 24 HOURS TRANSDERMAL EVERY 24 HOURS
Status: DISCONTINUED | OUTPATIENT
Start: 2021-02-08 | End: 2021-02-12 | Stop reason: HOSPADM

## 2021-02-08 RX ORDER — LIDOCAINE 50 MG/G
1 PATCH TOPICAL DAILY
Status: DISCONTINUED | OUTPATIENT
Start: 2021-02-08 | End: 2021-02-09

## 2021-02-08 RX ORDER — ACETAMINOPHEN 325 MG/1
650 TABLET ORAL EVERY 6 HOURS PRN
Status: DISCONTINUED | OUTPATIENT
Start: 2021-02-08 | End: 2021-02-09

## 2021-02-08 RX ADMIN — PIPERACILLIN AND TAZOBACTAM 3.38 G: 3; .375 INJECTION, POWDER, LYOPHILIZED, FOR SOLUTION INTRAVENOUS at 11:13

## 2021-02-08 RX ADMIN — MAGNESIUM SULFATE HEPTAHYDRATE 2 G: 40 INJECTION, SOLUTION INTRAVENOUS at 14:12

## 2021-02-08 RX ADMIN — HYDROMORPHONE HYDROCHLORIDE 0.5 MG: 1 INJECTION, SOLUTION INTRAMUSCULAR; INTRAVENOUS; SUBCUTANEOUS at 10:36

## 2021-02-08 RX ADMIN — SODIUM CHLORIDE 50 ML/HR: 0.9 INJECTION, SOLUTION INTRAVENOUS at 18:32

## 2021-02-08 RX ADMIN — ACETAMINOPHEN 650 MG: 325 TABLET, FILM COATED ORAL at 22:14

## 2021-02-08 RX ADMIN — LIDOCAINE 5% 1 PATCH: 700 PATCH TOPICAL at 18:40

## 2021-02-08 RX ADMIN — TRAZODONE HYDROCHLORIDE 150 MG: 50 TABLET ORAL at 22:14

## 2021-02-08 RX ADMIN — MORPHINE SULFATE 4 MG: 4 INJECTION INTRAVENOUS at 09:49

## 2021-02-08 RX ADMIN — IOHEXOL 100 ML: 350 INJECTION, SOLUTION INTRAVENOUS at 10:53

## 2021-02-08 RX ADMIN — ONDANSETRON 4 MG: 2 INJECTION INTRAMUSCULAR; INTRAVENOUS at 09:47

## 2021-02-08 RX ADMIN — POTASSIUM CHLORIDE 40 MEQ: 1500 TABLET, EXTENDED RELEASE ORAL at 14:03

## 2021-02-08 RX ADMIN — ATORVASTATIN CALCIUM 40 MG: 40 TABLET, FILM COATED ORAL at 17:30

## 2021-02-08 RX ADMIN — NICOTINE 1 PATCH: 14 PATCH, EXTENDED RELEASE TRANSDERMAL at 18:30

## 2021-02-08 RX ADMIN — SODIUM CHLORIDE 1000 ML: 0.9 INJECTION, SOLUTION INTRAVENOUS at 09:46

## 2021-02-08 RX ADMIN — ACETAMINOPHEN 650 MG: 325 TABLET, FILM COATED ORAL at 18:31

## 2021-02-08 RX ADMIN — ENOXAPARIN SODIUM 90 MG: 100 INJECTION SUBCUTANEOUS at 14:09

## 2021-02-08 RX ADMIN — POTASSIUM CHLORIDE 40 MEQ: 1500 TABLET, EXTENDED RELEASE ORAL at 17:45

## 2021-02-08 NOTE — ED NOTES
Pt repeat EKG completed as pt was moving frequently with first EKG  Obdulia Gaytan at bedside to update pt on POC         Pattie Espino RN  02/08/21 [id number]

## 2021-02-08 NOTE — ASSESSMENT & PLAN NOTE
Patient noted to be hyponatremic on arrival at 133  This could be secondary to poor oral intake and episodes of vomiting this morning    · Given 1 Liter NS Bolus in the ED  · Will start NS at 50 mL/hr   · Monitor Na in am

## 2021-02-08 NOTE — ASSESSMENT & PLAN NOTE
Patient noted to be hypokalemic on arrival at 3 3  Patient had episodes of vomiting at home which could be contributing  · Give 40 mEq K-Dur x 2 doses  · Monitor lytes in am

## 2021-02-08 NOTE — ASSESSMENT & PLAN NOTE
Patient has a history of COPD and is a current smoker  She has smoked since age 29 and has been reducing her intake recently and now smoked 1/2 PPD    Patient is currently 98% on room air and denies SOB  · Encourage smoking cessation   · Substitute Incruse for Spiriva while hospitalized  · Monitor

## 2021-02-08 NOTE — ASSESSMENT & PLAN NOTE
· Patient reports right flank pain began one day PTA and it improved but did not resolve with Tylenol  · She was then woken from sleep at approximately 12 am this morning with 10/10 right flank pain that radiated to her right abdomen and with associated N/V, SOB, diaphoresis, vertigo and incontinence of urine  She also reports a brief episode of chest pain during this time  · All other symptoms have resolved except right flank pain which is now 5/10 after receiving Dilaudid 0 5 mg and Morphine 4 mg IV in the ED  · CT renal stone study:  No stones, hydronephrosis, appendicitis or other acute intra-abdominal pathology  · CTA dissection protocol of chest/abdomen/pelvis:  "Moderately atherosclerotic thoracic aorta with ulcerated plaque  Atherosclerotic abdominal aorta was 60% stenosis in the infrarenal segment, chronic right common iliac artery occlusion, and 80% left common iliac artery stenosis  4 mm right lower lobe pulmonary nodule  Diverticulosis "  · Initial troponin 0 3 and 0 5, Pro-BNP 2640, EKG in ED showed Sinus rhythm  · Patient states she has a prior cardiac workup done by a Cardiologist in [facility] and that she never heard back from the office about results  She then moved and did not return to that Cardiologist as a result  She states she does not currently follow with a Cardiologist outpatient     · See plan below for elevated troponin level  · PT/OT eval  · Lidoderm patch with scheduled Tylenol  · Consider need for muscle relaxers  · Ensure electrolytes are optimized

## 2021-02-08 NOTE — ASSESSMENT & PLAN NOTE
Patient met SIRS criteria on arrival as evidenced by tachycardia and tachypnea  Patient is afebrile  Lactic initially elevated at 3 6, has since cleared with 1 Liter NS Bolus  No obvious source of infection  CTA and CT abdomen/pelvis negative  Low suspicion for UTI  UA was positive for blood, ketones and protein  Negative for leukocytes and nitrites    · Monitor off antibiotics   · Follow up blood cultures  · Monitor temperature, HR and RR  · Check CBC in the morning

## 2021-02-08 NOTE — ASSESSMENT & PLAN NOTE
Patient has a history of seizures with her most recent one about 3 5 years ago  She states she used to take Depakote for prevention but was taken off it a couple years ago  Patient not able to tell me why she was taken off it and not placed on a different anticonvulsant medication    · Monitor

## 2021-02-08 NOTE — H&P
H&P- [facility] 1955, 72 y o  female MRN: [id number]    Unit/Bed#: [address] [place] [address] Encounter: [id number]    Primary Care Provider: Kenisha Aj DO   Date and time admitted to hospital: 2/8/2021  9:17 AM        * Acute right flank pain  Assessment & Plan  · Patient reports right flank pain began one day PTA and it improved but did not resolve with Tylenol  · She was then woken from sleep at approximately 12 am this morning with 10/10 right flank pain that radiated to her right abdomen and with associated N/V, SOB, diaphoresis, vertigo and incontinence of urine  She also reports a brief episode of chest pain during this time  · All other symptoms have resolved except right flank pain which is now 5/10 after receiving Dilaudid 0 5 mg and Morphine 4 mg IV in the ED  · CT renal stone study:  No stones, hydronephrosis, appendicitis or other acute intra-abdominal pathology  · CTA dissection protocol of chest/abdomen/pelvis:  "Moderately atherosclerotic thoracic aorta with ulcerated plaque  Atherosclerotic abdominal aorta was 60% stenosis in the infrarenal segment, chronic right common iliac artery occlusion, and 80% left common iliac artery stenosis  4 mm right lower lobe pulmonary nodule  Diverticulosis "  · Initial troponin 0 3 and 0 5, Pro-BNP 2640, EKG in ED showed Sinus rhythm  · Patient states she has a prior cardiac workup done by a Cardiologist in [place] and that she never heard back from the office about results  She then moved and did not return to that Cardiologist as a result  She states she does not currently follow with a Cardiologist outpatient     · See plan below for elevated troponin level  · PT/OT eval  · Lidoderm patch with scheduled Tylenol  · Consider need for muscle relaxers  · Ensure electrolytes are optimized    Elevated troponin  Assessment & Plan  Initial troponin 0 3 -> 0 5 in the setting of hypertension, hypokalemia, hypomagnesemia  Family history of coronary artery disease  EKG without ischemic changes  Given morphine and Dilaudid in the  Patient's chest pain has now resolved  · Trend troponins  · Lovenox 1 milligram/kilogram SQ x1  · Hold off on aspirin as patient has allergy  · Start Lipitor 40 mg daily  · Monitor on telemetry  · Consult Cardiology, appreciate input  · Planning for nuclear stress test and echocardiogram tomorrow    SIRS (systemic inflammatory response syndrome) ([facility] [address] )  Assessment & Plan  Patient met SIRS criteria on arrival as evidenced by tachycardia and tachypnea  Patient is afebrile  Lactic initially elevated at 3 6, has since cleared with 1 Liter NS Bolus  No obvious source of infection  CTA and CT abdomen/pelvis negative  Low suspicion for UTI  UA was positive for blood, ketones and protein  Negative for leukocytes and nitrites  · Monitor off antibiotics   · Follow up blood cultures  · Monitor temperature, HR and RR  · Check CBC in the morning    Hyponatremia  Assessment & Plan  Patient noted to be hyponatremic on arrival at 133  This could be secondary to poor oral intake and episodes of vomiting this morning  · Given 1 Liter NS Bolus in the ED  · Will start NS at 50 mL/hr   · Monitor Na in am     Essential hypertension  Assessment & Plan  Blood pressure 166/100 on admission    After receiving pain medication, blood pressure decreased to 100/58  · Cardiology recommends holding valsartan and hydrochlorothiazide  · Monitor    Hypomagnesemia  Assessment & Plan  Mag level 1 5 on admission  · Replete with Mag sulfate 2 g IV x1 and monitor in the morning    Hypokalemia  Assessment & Plan  Patient noted to be hypokalemic on arrival at 3 3  Patient had episodes of vomiting at home which could be contributing  · Give 40 mEq K-Dur x 2 doses  · Monitor lytes in am     Seizures [facility])  Assessment & Plan  Patient has a history of seizures with her most recent one about 3 5 years ago  She states she used to take Depakote for prevention but was taken off it a couple years ago  Patient not able to tell me why she was taken off it and not placed on a different anticonvulsant medication  · Monitor    COPD (chronic obstructive pulmonary disease) [facility])  Assessment & Plan  Patient has a history of COPD and is a current smoker  She has smoked since age 29 and has been reducing her intake recently and now smoked 1/2 PPD  Patient is currently 98% on room air and denies SOB  · Encourage smoking cessation   · Substitute Incruse for Spiriva while hospitalized  · Monitor    VTE Prophylaxis: Enoxaparin (Lovenox)  / sequential compression device   Code Status: Full Code  POLST: POLST is not applicable to this patient  Discussion with family: Patient declined    Anticipated Length of Stay:  Patient will be admitted on an Observation basis with an anticipated length of stay of  > 2 midnights  Justification for Hospital Stay: Elevated troponin, work up for possible NSTEMI    Total Time for Visit, including Counseling / Coordination of Care: 45 minutes  Greater than 50% of this total time spent on direct patient counseling and coordination of care  Chief Complaint:   10/10 right flank pain that radiated to her right abdomen    History of Present Illness:    Mitchel Olivas is a 72 y o  female with PMH COPD, seizures and spinal stenosis who presents with a one day history of right flank pain that started the day PTA while she was out shopping with her son  Patient states the pain was improved by Tylenol but did not completely resolve  She was then woken from sleep at approximately midnight with 10/10 right flank pain that was radiating to her right abdomen  Patient states at this time she also experienced shortness of breath, diaphoresis, nausea and vomiting, vertigo, and incontinence of urine  She reports a brief episode of chest pain during this time which resolved quickly   Patient states the 10/10 flank pain remained unchanged so she called her son to bring her to the hospital at around 7 am  Her right flank pain is now 5/10 and she denies current CP, SOB, diaphoresis, N/V, vertigo and any further episodes of incontinence  Review of Systems:    Review of Systems   Constitutional: Positive for diaphoresis  Negative for appetite change, chills and fever  HENT: Negative  Eyes: Negative  Respiratory: Positive for shortness of breath  Negative for cough, chest tightness and wheezing  Cardiovascular: Positive for chest pain (has now resolved)  Negative for palpitations and leg swelling  Gastrointestinal: Positive for abdominal pain (radiating from R flank to right abdomen), nausea and vomiting  Negative for abdominal distention, blood in stool and diarrhea  Endocrine: Negative  Genitourinary: Positive for flank pain (right)  Negative for decreased urine volume, frequency, hematuria and urgency  Episode of incontinence this morning   Skin: Negative  Neurological: Positive for dizziness (vertigo)  Negative for seizures (Hx of, last one 3 5 years ago), syncope and weakness  Psychiatric/Behavioral: Negative  Past Medical and Surgical History:     Past Medical History:   Diagnosis Date    COPD [facility]chronic obstructive pulmonary disease) ([facility] [address] )     Radiculopathy of lumbar region     last assessed 17    Seizures ([facility] [address] )        Past Surgical History:   Procedure Laterality Date     SECTION      EPIDURAL BLOCK INJECTION Right 2017    Procedure: BLOCK / INJECTION EPIDURAL STEROID TRANSFORAMINAL   L4-5;  Surgeon: Violeta Gibson MD;  Location: [facility] MAIN OR;  Service:    Fredy Cross TONSILLECTOMY         Meds/Allergies:    Prior to Admission medications    Medication Sig Start Date End Date Taking?  Authorizing Provider   albuterol (PROVENTIL HFA,VENTOLIN HFA) 90 mcg/act inhaler Inhale 2 puffs every 6 (six) hours as needed    Historical Provider, MD   albuterol (Ventolin HFA) 90 mcg/act inhaler Inhale 2 puffs every 6 (six) hours as needed for wheezing 10/27/20 Arlin Mahajan, DO   atorvastatin (LIPITOR) 40 mg tablet Take 1 tablet (40 mg total) by mouth daily 3/3/20   Zeus Wan DO   nicotine (NICODERM CQ) 14 mg/24hr TD 24 hr patch Place 1 patch on the skin every 24 hours 3/3/20   Zeus Wan, DO   traZODone (DESYREL) 150 mg tablet take 1 tablet by mouth at bedtime 1/19/21   Zeus Wan DO   traZODone (DESYREL) 50 mg tablet Take 3 tablets (150 mg total) by mouth daily at bedtime 7/20/20   Zeus Wan DO   umeclidinium bromide (INCRUSE ELLIPTA) 62 5 mcg/inh AEPB inhaler Inhale 1 puff daily  Patient not taking: Reported on 3/3/2020 6/19/18   Edyta Aguilera MD   valsartan-hydrochlorothiazide (DIOVAN-HCT) 320-25 MG per tablet Take 1 tablet by mouth daily 10/2/20   Arlin Mahajan, DO   venlafaxine (EFFEXOR-XR) 150 mg 24 hr capsule Take 1 capsule (150 mg total) by mouth daily 6/29/20   Sosa Andre DO   venlafaxine (EFFEXOR-XR) 75 mg 24 hr capsule take 1 capsule by mouth once daily WITH BREAKFAST ALTERNATE WITH 2 CAPS A DAY FOR 1 WEEK THEN FOLLOW UP WITH MD 12/28/20   Nicholas Matson MD     I have reviewed home medications with patient personally  Allergies:    Allergies   Allergen Reactions    Aspirin Anaphylaxis    Asparaginase Derivatives     Ibuprofen     Nsaids     Robaxin [Methocarbamol]     Toradol [Ketorolac Tromethamine]        Social History:     Marital Status:    Patient Pre-hospital Living Situation: Home alone  Patient Pre-hospital Level of Mobility: Independent  Patient Pre-hospital Diet Restrictions: None  Substance Use History:   Social History     Substance and Sexual Activity   Alcohol Use No    Comment: Per allscripts: Social     Social History     Tobacco Use   Smoking Status Current Some Day Smoker   Smokeless Tobacco Current User   Tobacco Comment    Per allscripts: Current everyday smoker     Social History     Substance and Sexual Activity   Drug Use No       Family History:    Family History   Problem Relation Age of Onset  Diabetes Mother     Hyperlipidemia Mother     Cancer Father     Prostate cancer Father        Physical Exam:     Vitals:   Blood Pressure: 100/58 (02/08/21 1500)  Pulse: 83 (02/08/21 1500)  Temperature: 98 1 °F (36 7 °C) (02/08/21 1500)  Temp Source: Oral (02/08/21 1500)  Respirations: 17 (02/08/21 1500)  SpO2: 96 % (02/08/21 1500)    Physical Exam  Constitutional:       General: She is not in acute distress  Appearance: Normal appearance  She is not ill-appearing, toxic-appearing or diaphoretic  Comments: Pleasant and cooperative female resting in bed on room air   HENT:      Head: Normocephalic  Neck:      Musculoskeletal: Normal range of motion  Cardiovascular:      Rate and Rhythm: Normal rate and regular rhythm  Pulses: Normal pulses  Heart sounds: Normal heart sounds  No murmur  Pulmonary:      Effort: Pulmonary effort is normal  No respiratory distress  Breath sounds: Normal breath sounds  Abdominal:      General: Bowel sounds are normal  There is no distension  Palpations: Abdomen is soft  Tenderness: There is no abdominal tenderness  There is right CVA tenderness  There is no guarding  Musculoskeletal: Normal range of motion  Right lower leg: No edema  Left lower leg: No edema  Skin:     General: Skin is warm and dry  Capillary Refill: Capillary refill takes less than 2 seconds  Neurological:      General: No focal deficit present  Mental Status: She is alert and oriented to person, place, and time  Psychiatric:         Attention and Perception: Attention normal          Mood and Affect: Mood is anxious  Behavior: Behavior normal          Thought Content: Thought content normal          Judgment: Judgment normal            Additional Data:     Lab Results: I have personally reviewed pertinent reports        Results from last 7 days   Lab Units 02/08/21  0945   WBC Thousand/uL 10 55*   HEMOGLOBIN g/dL 16 8*   HEMATOCRIT % 48 9*   PLATELETS Thousands/uL 407*   NEUTROS PCT % 85*   LYMPHS PCT % 11*   MONOS PCT % 4   EOS PCT % 0     Results from last 7 days   Lab Units 02/08/21  0945   SODIUM mmol/L 133*   POTASSIUM mmol/L 3 3*   CHLORIDE mmol/L 92*   CO2 mmol/L 24   BUN mg/dL 10   CREATININE mg/dL 1 11   ANION GAP mmol/L 17*   CALCIUM mg/dL 9 6   ALBUMIN g/dL 4 0   TOTAL BILIRUBIN mg/dL 0 60   ALK PHOS U/L 120*   ALT U/L 22   AST U/L 16   GLUCOSE RANDOM mg/dL 180*                 Results from last 7 days   Lab Units 02/08/21  1150 02/08/21  0945   LACTIC ACID mmol/L 2 0 3 6*       Imaging: I have personally reviewed pertinent reports  CTA dissection protocol chest/abdomen/pelvis   Final Result by Carlos Johnston MD (02/08 1204)      Moderately atherosclerotic thoracic aorta with irregular, ulcerated plaque  No aneurysm or dissection  Diffusely atherosclerotic abdominal aorta with a 60% stenosis in the infrarenal segment, chronic right common iliac artery occlusion, an 80% left common iliac artery stenosis  4 mm right lower lobe pulmonary nodule  This was not present on prior studies, but the area was not imaged  Based on current Fleischner Society 2017 Guidelines on incidental pulmonary nodule, because the patient is considered high risk for lung cancer,    12 month follow-up non-contrast chest CT is recommended  Diverticulosis  Workstation performed: [id number]         CT renal stone study abdomen pelvis wo contrast   Final Result by Yancy Prado MD (02/08 1035)      No stones, hydronephrosis, appendicitis or other acute intra-abdominal pathology  Workstation performed: [id number]             EKG, Pathology, and Other Studies Reviewed on Admission:   · EKG: Sinus rhythm, rate 82    Allscripts / Epic Records Reviewed: Yes     ** Please Note: This note has been constructed using a voice recognition system   **

## 2021-02-08 NOTE — CONSULTS
Consultation - Cardiology   [facility] 72 y o  female MRN: [id number]  Unit/Bed#: [address] [place] [address]- Encounter: [id number]    Assessment/Plan     Assessment:  1  Acute right flank pain associated with nausea and vomiting  2  Hypertension  3  Abnormal troponins in the setting of hypertension, hypokalemia hypo magnesium and family history of coronary artery disease  4  Hypokalemia  5  Hypo magnesium   6  COPD   7  Tobacco abuse  8  Family history of coronary artery disease        Plan:  Patient has been admitted to the hospitalist service  1  Will schedule patient for 2D echocardiogram to evaluate cardiac function, structure and wall motion in the a m  2  Will schedule patient for Lexiscan nuclear stress test to evaluate for ischemia    3  Continue monitor telemetry    4  Trend troponins and check lipid panel  Will start Lipitor 40 mg once a day  Aspirin has been held as she has documented allergy with anaphylaxis as reaction  5  Patient became profoundly hypotensive after receiving pain medicine in the emergency room, would hold her valsartan with hydrochlorothiazide at this time and continue to monitor  6  Will readjust patient's medications as needed  History of Present Illness   Physician Requesting Consult: Jose L Vang MD  Reason for Consult / Principal Problem:  Back pain over right kidney, nausea, vomiting and abnormal troponins        HPI: [facility] is a 72y o  year old female who presented to the emergency room with a sudden onset of right lower back pain flank pain in which she points to her kidney  She states that it started yesterday and did have some radiation to the right abdominal area  She states it gradually worsened and it was not relieved by Tylenol  She states she has never experienced pain like this before  She states as the pain worsened she had bouts of nausea and vomiting  Patient did get relief after receiving Dilaudid in the emergency room      Patient has a history of hypertension, COPD, tobacco abuse, and chronic back pain for which she has had of subdural injections  Family history is for coronary artery disease and also diabetes  Testing in the emergency room initial troponin was 0 3, 2nd troponin was 0 5  NT BNP was 2640  Patient denies any history of heart failure  She states over 7 years ago she did have cardiac testing with a cardiologist in 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 with reconstitution at the right proximal external iliac and internal iliacs  Left common femoral artery has 80% short segmental stenosis but is continuous  The celiac access was noted to be patent  There was no note of any coronary calcifications  Inpatient consult to Cardiology  Consult performed by: NITHIN Valencia  Consult ordered by: NITHIN Thornton          Review of Systems   Constitutional: Negative  Negative for activity change, appetite change, fatigue and fever  HENT: Negative  Negative for congestion, facial swelling, postnasal drip, sneezing and trouble swallowing  Eyes: Negative  Negative for photophobia and visual disturbance  Respiratory: Negative  Negative for chest tightness and shortness of breath  Cardiovascular: Negative  Negative for chest pain, palpitations and leg swelling  Gastrointestinal: Positive for nausea and vomiting  Negative for diarrhea  Endocrine: Negative  Negative for polydipsia, polyphagia and polyuria  Genitourinary: Positive for difficulty urinating and flank pain  Musculoskeletal: Negative for arthralgias, myalgias and neck stiffness  Skin: Negative  Neurological: Negative  Negative for dizziness, syncope, speech difficulty, weakness and light-headedness  Hematological: Negative  Psychiatric/Behavioral: Negative  Historical Information   Past Medical History:   Diagnosis Date    COPD (chronic obstructive pulmonary disease) ([facility] [address] )     Radiculopathy of lumbar region     last assessed 17    Seizures (HCC)      Past Surgical History:   Procedure Laterality Date     SECTION      EPIDURAL BLOCK INJECTION Right 2017    Procedure: BLOCK / INJECTION EPIDURAL STEROID TRANSFORAMINAL   L4-5;  Surgeon: Cecil Esteban MD;  Location: [place] [address] MAIN OR;  Service:     TONSILLECTOMY       Social History     Substance and Sexual Activity   Alcohol Use No    Comment: Per allscripts: Social     Social History     Substance and Sexual Activity   Drug Use No     E-Cigarette/Vaping     E-Cigarette/Vaping Substances     Social History     Tobacco Use   Smoking Status Current Some Day Smoker   Smokeless Tobacco Current User   Tobacco Comment    Per allscripts: Current everyday smoker     Family History:   Family History   Problem Relation Age of Onset    Diabetes Mother     Hyperlipidemia Mother     Cancer Father     Prostate cancer Father        Meds/Allergies   all current active meds have been reviewed, current meds:   Current Facility-Administered Medications   Medication Dose Route Frequency    acetaminophen (TYLENOL) tablet 650 mg  650 mg Oral Q6H PRN    magnesium sulfate 2 g/50 mL IVPB (premix) 2 g  2 g Intravenous Once    potassium chloride (K-DUR,KLOR-CON) CR tablet 40 mEq  40 mEq Oral Q4H    and PTA meds:   Prior to Admission Medications   Prescriptions Last Dose Informant Patient Reported? Taking? albuterol (Ventolin HFA) 90 mcg/act inhaler More than a month at Unknown time  No No   Sig: Inhale 2 puffs every 6 (six) hours as needed for wheezing   atorvastatin (LIPITOR) 40 mg tablet Not Taking at Unknown time  No No   Sig: Take 1 tablet (40 mg total) by mouth daily   Patient not taking: Reported on 2/8/2021   nicotine (NICODERM CQ) 14 mg/24hr TD 24 hr patch Not Taking at Unknown time  No No   Sig: Place 1 patch on the skin every 24 hours   Patient not taking: Reported on 2/8/2021   traZODone (DESYREL) 150 mg tablet Past Week at Unknown time  No Yes   Sig: take 1 tablet by mouth at bedtime   traZODone (DESYREL) 50 mg tablet   No No   Sig: Take 3 tablets (150 mg total) by mouth daily at bedtime   umeclidinium bromide (INCRUSE ELLIPTA) 62 5 mcg/inh AEPB inhaler Not Taking at Unknown time  No No   Sig: Inhale 1 puff daily   Patient not taking: Reported on 3/3/2020   valsartan-hydrochlorothiazide (DIOVAN-HCT) 320-25 MG per tablet Past Week at Unknown time  No Yes   Sig: Take 1 tablet by mouth daily   venlafaxine (EFFEXOR-XR) 75 mg 24 hr capsule 2/7/2021 at Unknown time  No Yes   Sig: take 1 capsule by mouth once daily WITH BREAKFAST ALTERNATE WITH 2 CAPS A DAY FOR 1 WEEK THEN FOLLOW UP WITH MD      Facility-Administered Medications: None     Allergies   Allergen Reactions    Aspirin Anaphylaxis    Asparaginase Derivatives     Ibuprofen     Nsaids     Robaxin [Methocarbamol]     Toradol [Ketorolac Tromethamine]        Objective   Vitals: Blood pressure 100/58, pulse 83, temperature 98 1 °F (36 7 °C), temperature source Oral, resp  rate 17, SpO2 96 %    Orthostatic Blood Pressures      Most Recent Value   Blood Pressure  100/58 filed at 02/08/2021 1500   Patient Position - Orthostatic VS  Sitting filed at 02/08/2021 1500            Intake/Output Summary (Last 24 hours) at 2/8/2021 1646  Last data filed at 2/8/2021 1152  Gross per 24 hour   Intake 100 ml   Output --   Net 100 ml       Invasive Devices Peripheral Intravenous Line            Peripheral IV 02/08/21 Left Antecubital less than 1 day                Physical Exam  Vitals signs and nursing note reviewed  Constitutional:       General: She is not in acute distress  Appearance: Normal appearance  She is well-developed  She is obese  HENT:      Head: Normocephalic and atraumatic  Right Ear: External ear normal       Left Ear: External ear normal       Nose: Nose normal    Eyes:      General: No scleral icterus  Conjunctiva/sclera: Conjunctivae normal    Neck:      Musculoskeletal: Neck supple  Cardiovascular:      Rate and Rhythm: Normal rate and regular rhythm  Pulses: Normal pulses  Heart sounds: Normal heart sounds  No murmur  Pulmonary:      Effort: Pulmonary effort is normal  No respiratory distress  Breath sounds: Normal breath sounds  Abdominal:      General: Bowel sounds are normal  There is no distension  Palpations: Abdomen is soft  Tenderness: There is no abdominal tenderness  Genitourinary:     Comments: Still with intermittent right-sided flank pain  Musculoskeletal:      Right lower leg: No edema  Left lower leg: No edema  Skin:     General: Skin is warm and dry  Capillary Refill: Capillary refill takes less than 2 seconds  Neurological:      General: No focal deficit present  Mental Status: She is alert and oriented to person, place, and time  Mental status is at baseline  Psychiatric:         Mood and Affect: Mood normal          Behavior: Behavior normal          Thought Content: Thought content normal          Judgment: Judgment normal          Lab Results:   I have personally reviewed pertinent lab results      CBC with diff:   Results from last 7 days   Lab Units 02/08/21  0945   WBC Thousand/uL 10 55*   RBC Million/uL 5 58*   HEMOGLOBIN g/dL 16 8*   HEMATOCRIT % 48 9*   MCV fL 88   MCH pg 30 1   MCHC g/dL 34 4   RDW % 13 3   MPV fL 9 4   PLATELETS Thousands/uL 407* CMP:   Results from last 7 days   Lab Units 02/08/21  0945   SODIUM mmol/L 133*   POTASSIUM mmol/L 3 3*   CHLORIDE mmol/L 92*   CO2 mmol/L 24   BUN mg/dL 10   CREATININE mg/dL 1 11   CALCIUM mg/dL 9 6   AST U/L 16   ALT U/L 22   ALK PHOS U/L 120*   EGFR ml/min/1 73sq m 52     Troponin:   0   Lab Value Date/Time    TROPONINI 0 50 (H) 02/08/2021 1302    TROPONINI 0 30 (H) 02/08/2021 0945     BNP:   Results from last 7 days   Lab Units 02/08/21  0945   POTASSIUM mmol/L 3 3*   CHLORIDE mmol/L 92*   CO2 mmol/L 24   BUN mg/dL 10   CREATININE mg/dL 1 11   CALCIUM mg/dL 9 6   EGFR ml/min/1 73sq m 52     Magnesium:   Results from last 7 days   Lab Units 02/08/21  0945   MAGNESIUM mg/dL 1 5*     Imaging: I have personally reviewed pertinent reports      EKG:  Admission 12 lead EKG demonstrated sinus rhythm with nonspecific ST segment changes  VTE Prophylaxis: Sequential compression device Bonny Fernandez)     Code Status: No Order  Advance Directive and Living Will:      Power of :    POLST:      Gail Parsons, [address] [facility]   Cardiology

## 2021-02-08 NOTE — ASSESSMENT & PLAN NOTE
Initial troponin 0 3 -> 0 5 in the setting of hypertension, hypokalemia, hypomagnesemia  Family history of coronary artery disease  EKG without ischemic changes  Given morphine and Dilaudid in the  Patient's chest pain has now resolved  · Trend troponins  · Lovenox 1 milligram/kilogram SQ x1  · Hold off on aspirin as patient has allergy  · Start Lipitor 40 mg daily  · Monitor on telemetry  · Consult Cardiology, appreciate input  · Planning for nuclear stress test and echocardiogram tomorrow

## 2021-02-08 NOTE — ASSESSMENT & PLAN NOTE
Blood pressure 166/100 on admission    After receiving pain medication, blood pressure decreased to 100/58  · Cardiology recommends holding valsartan and hydrochlorothiazide  · Monitor

## 2021-02-08 NOTE — ED NOTES
Second IV and attempt at second set of BC unsuccessful  Will reattempt upon pt return from CT scan for above        Willian Pimentel, RN  02/08/21 0111

## 2021-02-08 NOTE — ED PROVIDER NOTES
History  Chief Complaint   Patient presents with    Back Pain     patient c/o right lower back pain starting yesterday with vomiting  no known injury  73 y/o female, h/o COPD/Seizures, presenting today with sudden onset of right lower back pain that occasionally radiates in the right portion of the abdomen that started yesterday and gradually worsened now accompanied with nausea and vomiting  No longer has abdominal pain  Patient does not have any history of renal stones  States that she does occasionally have abdominal pain with a fatty meal before in the past   History of multiple C-sections otherwise no abdominal surgical history  Otherwise feeling well without any other complaints however cannot find a comfortable position  Denies fevers, cough, congestion, shortness of breath, diarrhea, constipation, changes in urination, chest pain, calf pain or swelling  Prior to Admission Medications   Prescriptions Last Dose Informant Patient Reported? Taking?    albuterol (Ventolin HFA) 90 mcg/act inhaler More than a month at Unknown time  No No   Sig: Inhale 2 puffs every 6 (six) hours as needed for wheezing   atorvastatin (LIPITOR) 40 mg tablet Not Taking at Unknown time  No No   Sig: Take 1 tablet (40 mg total) by mouth daily   Patient not taking: Reported on 2/8/2021   nicotine (NICODERM CQ) 14 mg/24hr TD 24 hr patch Not Taking at Unknown time  No No   Sig: Place 1 patch on the skin every 24 hours   Patient not taking: Reported on 2/8/2021   traZODone (DESYREL) 150 mg tablet Past Week at Unknown time  No Yes   Sig: take 1 tablet by mouth at bedtime   traZODone (DESYREL) 50 mg tablet   No No   Sig: Take 3 tablets (150 mg total) by mouth daily at bedtime   umeclidinium bromide (INCRUSE ELLIPTA) 62 5 mcg/inh AEPB inhaler Not Taking at Unknown time  No No   Sig: Inhale 1 puff daily   Patient not taking: Reported on 3/3/2020   valsartan-hydrochlorothiazide (DIOVAN-HCT) 320-25 MG per tablet 2/7/2021 at Unknown time  No Yes   Sig: Take 1 tablet by mouth daily   venlafaxine (EFFEXOR-XR) 75 mg 24 hr capsule 2021 at Unknown time  No Yes   Sig: take 1 capsule by mouth once daily WITH BREAKFAST ALTERNATE WITH 2 CAPS A DAY FOR 1 WEEK THEN FOLLOW UP WITH MD      Facility-Administered Medications: None       Past Medical History:   Diagnosis Date    [facility]chronic obstructive pulmonary disease) ([facility])     Radiculopathy of lumbar region     last assessed 17    Seizures ([address] )        Past Surgical History:   Procedure Laterality Date     SECTION      EPIDURAL BLOCK INJECTION Right 2017    Procedure: BLOCK / INJECTION EPIDURAL STEROID TRANSFORAMINAL   L4-5;  Surgeon: Jay Garcia MD;  [facility] MA[facility];  Service:     TONSILLECTOMY         Family History   Problem Relation Age of Onset    Diabetes Mother     Hyperlipidemia Mother     Cancer Father     Prostate cancer Father      I have reviewed and agree with the history as documented  E-Cigarette/Vaping    E-Cigarette Use Former User      E-Cigarette/Vaping Substances    Nicotine Yes     THC No     CBD No     Flavoring No     Other No     Unknown No      Social History     Tobacco Use    Smoking status: Current Some Day Smoker     Packs/day: 0 25    Smokeless tobacco: Current User    Tobacco comment: Per allscripts: Current everyday smoker   Substance Use Topics    Alcohol use: Not Currently     Frequency: Never     Drinks per session: Patient refused     Binge frequency: Never     Comment: Per allscripts: Social    Drug use: Not Currently     Types: Marijuana       Review of Systems   Constitutional: Negative  HENT: Negative  Eyes: Negative  Respiratory: Negative  Cardiovascular: Negative  Gastrointestinal: Positive for abdominal pain, nausea and vomiting  Negative for abdominal distention, anal bleeding, blood in stool, constipation, diarrhea and rectal pain  Genitourinary: Positive for flank pain  Negative for decreased urine volume, difficulty urinating, dyspareunia, dysuria, enuresis, frequency, genital sores, hematuria, menstrual problem, pelvic pain, urgency, vaginal bleeding, vaginal discharge and vaginal pain  Skin: Negative  Neurological: Negative  All other systems reviewed and are negative  Physical Exam  Physical Exam  Vitals signs and nursing note reviewed  Constitutional:       General: She is in acute distress  Appearance: She is well-developed  She is obese  She is not diaphoretic  Comments: Patient appears very uncomfortable, cannot find a comfortable position  HENT:      Head: Normocephalic and atraumatic  Right Ear: External ear normal       Left Ear: External ear normal       Nose: Nose normal       Mouth/Throat:      Pharynx: No oropharyngeal exudate  Eyes:      General: No scleral icterus  Right eye: No discharge  Left eye: No discharge  Conjunctiva/sclera: Conjunctivae normal       Pupils: Pupils are equal, round, and reactive to light  Neck:      Musculoskeletal: Normal range of motion and neck supple  Cardiovascular:      Rate and Rhythm: Regular rhythm  Tachycardia present  Pulses: Normal pulses  Heart sounds: Normal heart sounds  No murmur  No friction rub  No gallop  Pulmonary:      Effort: Pulmonary effort is normal  No respiratory distress  Breath sounds: Normal breath sounds  No stridor  No wheezing, rhonchi or rales  Comments: S PO2 is 98% indicating adequate oxygenation, patient has clear breath sounds  She is speaking full sentences  Chest:      Chest wall: No tenderness  Abdominal:      General: Bowel sounds are normal  There is no distension  Palpations: Abdomen is soft  There is no mass  Tenderness: There is abdominal tenderness  There is right CVA tenderness  There is no left CVA tenderness, guarding or rebound  Negative signs include Rovsing's sign and psoas sign        Hernia: No hernia is present  Comments: No pulsations   Musculoskeletal:        Arms:    Lymphadenopathy:      Cervical: No cervical adenopathy  Skin:     General: Skin is warm and dry  Capillary Refill: Capillary refill takes less than 2 seconds  Coloration: Skin is not pale  Findings: No erythema or rash  Neurological:      General: No focal deficit present  Mental Status: She is alert and oriented to person, place, and time  Mental status is at baseline           Vital Signs  ED Triage Vitals [02/08/21 0921]   Temperature Pulse Respirations Blood Pressure SpO2   (!) 96 8 °F (36 °C) (!) 121 (!) 26 (!) 166/104 98 %      Temp Source Heart Rate Source Patient Position - Orthostatic VS BP Location FiO2 (%)   Tympanic Monitor Lying Right arm --      Pain Score       Worst Possible Pain           Vitals:    02/11/21 2027 02/11/21 2229 02/12/21 0312 02/12/21 0615   BP: 97/50 98/51 131/63 126/71   Pulse: 86 81 84 90   Patient Position - Orthostatic VS: Lying  Sitting          Visual Acuity      ED Medications  Medications   sodium chloride 0 9 % bolus 1,000 mL (0 mL Intravenous Stopped 2/8/21 1046)   ondansetron (ZOFRAN) injection 4 mg (4 mg Intravenous Given 2/8/21 0947)   morphine (PF) 4 mg/mL injection 4 mg (4 mg Intravenous Given 2/8/21 0949)   HYDROmorphone (DILAUDID) injection 0 5 mg (0 5 mg Intravenous Given 2/8/21 1036)   piperacillin-tazobactam (ZOSYN) IVPB 3 375 g (0 g Intravenous Stopped 2/8/21 1152)   iohexol (OMNIPAQUE) 350 MG/ML injection (SINGLE-DOSE) 100 mL (100 mL Intravenous Given 2/8/21 1053)   enoxaparin (LOVENOX) subcutaneous injection 90 mg (90 mg Subcutaneous Given 2/8/21 1409)   potassium chloride (K-DUR,KLOR-CON) CR tablet 40 mEq (40 mEq Oral Given 2/8/21 1745)   magnesium sulfate 2 g/50 mL IVPB (premix) 2 g (2 g Intravenous New Bag 2/8/21 1412)   baclofen tablet 20 mg (20 mg Oral Given 2/9/21 0640)   magnesium oxide (MAG-OX) tablet 800 mg (800 mg Oral Given 2/9/21 4233) LORazepam (ATIVAN) injection 2 mg (2 mg Intravenous Given 2/9/21 1551)   levETIRAcetam (KEPPRA) 1,000 mg in sodium chloride 0 9 % 100 mL IVPB (0 mg Intravenous Stopped 2/9/21 1800)   sodium chloride 0 9 % bolus 1,000 mL (1,000 mL Intravenous New Bag 2/9/21 1625)   potassium chloride oral solution 40 mEq (40 mEq Oral Given 2/9/21 2038)   magnesium sulfate 2 g/50 mL IVPB (premix) 2 g (2 g Intravenous New Bag 2/10/21 0119)     Followed by   magnesium sulfate 2 g/50 mL IVPB (premix) 2 g (2 g Intravenous New Bag 2/10/21 0324)   magnesium sulfate 2 g/50 mL IVPB (premix) 2 g (2 g Intravenous New Bag 2/10/21 1009)   sodium chloride 0 9 % bolus 1,000 mL (1,000 mL Intravenous New Bag 2/11/21 1033)   regadenoson (LEXISCAN) injection 0 4 mg (0 4 mg Intravenous Given 2/11/21 1221)   potassium chloride (K-DUR,KLOR-CON) CR tablet 40 mEq (40 mEq Oral Given 2/12/21 0008)       Diagnostic Studies  Results Reviewed     Procedure Component Value Units Date/Time    Blood culture #2 [[id number]] Collected: 02/08/21 1106    Lab Status: Preliminary result Specimen: Blood from Hand, Left Updated: 02/12/21 1403     Blood Culture No Growth After 4 Days  Blood culture #1 [[id number]] Collected: 02/08/21 0945    Lab Status: Preliminary result Specimen: Blood from Arm, Left Updated: 02/12/21 1403     Blood Culture No Growth After 4 Days      Urine culture [[id number]] Collected: 02/08/21 1133    Lab Status: Final result Specimen: Urine Updated: 02/09/21 1853     Urine Culture No Growth <1000 cfu/mL    Troponin I [[id number]]  (Abnormal) Collected: 02/08/21 1625    Lab Status: Final result Specimen: Blood from Arm, Left Updated: 02/08/21 2119     Troponin I 0 51 ng/mL     Troponin I [[id number]]  (Abnormal) Collected: 02/08/21 1302    Lab Status: Final result Specimen: Blood from Arm, Right Updated: 02/08/21 1327     Troponin I 0 50 ng/mL     Lactic acid 2 Hours [[id number]]  (Normal) Collected: 02/08/21 1150    Lab Status: Final result Specimen: Blood from Arm, Left Updated: 02/08/21 1219     LACTIC ACID 2 0 mmol/L     Narrative:      Result may be elevated if tourniquet was used during collection      Urine Microscopic [[id number]]  (Normal) Collected: 02/08/21 1133    Lab Status: Final result Specimen: Urine, Clean Catch Updated: 02/08/21 1156     RBC, UA 1-2 /hpf      WBC, UA 1-2 /hpf      Epithelial Cells Occasional /hpf      Bacteria, UA Occasional /hpf     UA w Reflex to Microscopic w Reflex to Culture [[id number]]  (Abnormal) Collected: 02/08/21 1133    Lab Status: Final result Specimen: Urine, Clean Catch Updated: 02/08/21 1140     Color, UA Yellow     Clarity, UA Clear     Specific Gravity, UA 1 015     pH, UA 7 0     Leukocytes, UA Negative     Nitrite, UA Negative     Protein, UA >=300 mg/dl      Glucose,  (1/10%) mg/dl      Ketones, UA 15 (1+) mg/dl      Urobilinogen, UA 0 2 E U /dl      Bilirubin, UA Negative     Blood, UA Small    NT-BNP PRO [[id number]]  (Abnormal) Collected: 02/08/21 0945    Lab Status: Final result Specimen: Blood from Arm, Left Updated: 02/08/21 1126     NT-proBNP 2,640 pg/mL     Magnesium [[id number]]  (Abnormal) Collected: 02/08/21 0945    Lab Status: Final result Specimen: Blood from Arm, Left Updated: 02/08/21 1125     Magnesium 1 5 mg/dL     Comprehensive metabolic panel [[id number]]  (Abnormal) Collected: 02/08/21 0945    Lab Status: Final result Specimen: Blood from Arm, Left Updated: 02/08/21 1028     Sodium 133 mmol/L      Potassium 3 3 mmol/L      Chloride 92 mmol/L      CO2 24 mmol/L      ANION GAP 17 mmol/L      BUN 10 mg/dL      Creatinine 1 11 mg/dL      Glucose 180 mg/dL      Calcium 9 6 mg/dL      AST 16 U/L      ALT 22 U/L      Alkaline Phosphatase 120 U/L      Total Protein 9 0 g/dL      Albumin 4 0 g/dL      Total Bilirubin 0 60 mg/dL      eGFR 52 ml/min/1 73sq m     Narrative:      [place] guidelines for Chronic Kidney Disease (CKD):     Stage 1 with normal or high GFR (GFR > 90 mL/min/1 73 square meters)    Stage 2 Mild CKD (GFR = 60-89 mL/min/1 73 square meters)    Stage 3A Moderate CKD (GFR = 45-59 mL/min/1 73 square meters)    Stage 3B Moderate CKD (GFR = 30-44 mL/min/1 73 square meters)    Stage 4 Severe CKD (GFR = 15-29 mL/min/1 73 square meters)    Stage 5 End Stage CKD (GFR <15 mL/min/1 73 square meters)  Note: GFR calculation is accurate only with a steady state creatinine    Lipase [[id number]]  (Abnormal) Collected: 02/08/21 0945    Lab Status: Final result Specimen: Blood from Arm, Left Updated: 02/08/21 1028     Lipase 60 u/L     Lactic acid [[id number]]  (Abnormal) Collected: 02/08/21 0945    Lab Status: Final result Specimen: Blood from Arm, Left Updated: 02/08/21 1023     LACTIC ACID 3 6 mmol/L     Narrative:      Result may be elevated if tourniquet was used during collection      Troponin I [[id number]]  (Abnormal) Collected: 02/08/21 0945    Lab Status: Final result Specimen: Blood from Arm, Left Updated: 02/08/21 1019     Troponin I 0 30 ng/mL     CBC and differential [[id number]]  (Abnormal) Collected: 02/08/21 0945    Lab Status: Final result Specimen: Blood from Arm, Left Updated: 02/08/21 0959     WBC 10 55 Thousand/uL      RBC 5 58 Million/uL      Hemoglobin 16 8 g/dL      Hematocrit 48 9 %      MCV 88 fL      MCH 30 1 pg      MCHC 34 4 g/dL      RDW 13 3 %      MPV 9 4 fL      Platelets 937 Thousands/uL      nRBC 0 /100 WBCs      Neutrophils Relative 85 %      Immat GRANS % 0 %      Lymphocytes Relative 11 %      Monocytes Relative 4 %      Eosinophils Relative 0 %      Basophils Relative 0 %      Neutrophils Absolute 8 88 Thousands/µL      Immature Grans Absolute 0 04 Thousand/uL      Lymphocytes Absolute 1 16 Thousands/µL      Monocytes Absolute 0 43 Thousand/µL      Eosinophils Absolute 0 01 Thousand/µL      Basophils Absolute 0 03 Thousands/µL                  CTA dissection protocol chest/abdomen/pelvis   Final Result by Aidan Coronado MD (02/08 1204) Moderately atherosclerotic thoracic aorta with irregular, ulcerated plaque  No aneurysm or dissection  Diffusely atherosclerotic abdominal aorta with a 60% stenosis in the infrarenal segment, chronic right common iliac artery occlusion, an 80% left common iliac artery stenosis  4 mm right lower lobe pulmonary nodule  This was not present on prior studies, but the area was not imaged  Based on current Fleischner Society 2017 Guidelines on incidental pulmonary nodule, because the patient is considered high risk for lung cancer,    12 month follow-up non-contrast chest CT is recommended  Diverticulosis  Workstation performed: [id number]         CT renal stone study abdomen pelvis wo contrast   Final Result by Monica Tirado MD (02/08 1035)      No stones, hydronephrosis, appendicitis or other acute intra-abdominal pathology  Workstation performed: [id number]                    Procedures  ECG 12 Lead Documentation Only    Date/Time: 2/8/2021 11:23 AM  Performed by: Samra Langston PA-C  Authorized by: Samra Langston PA-C     Indications / Diagnosis:  Back pain   ECG reviewed by me, the ED Provider: yes    Patient location:  ED  Interpretation:     Interpretation: normal    Quality:     Tracing quality:  Limited by artifact  Rate:     ECG rate:  82    ECG rate assessment: normal    Rhythm:     Rhythm: sinus rhythm    Ectopy:     Ectopy: none    QRS:     QRS axis:  Normal    QRS intervals:  Normal  Conduction:     Conduction: normal    ST segments:     ST segments:  Normal  T waves:     T waves: normal               ED Course                         Initial Sepsis Screening     Row Name 02/08/21 1049                Is the patient's history suggestive of a new or worsening infection?   (!) Yes (Proceed)  -BC        Suspected source of infection  urinary tract infection;acute abdominal infection  -BC        Are two or more of the following signs & symptoms of infection both present and new to the patient? (!) Yes (Proceed)  -BC        Indicate SIRS criteria  Tachycardia > 90 bpm;Tachypnea > 20 resp per min  -BC        If the answer is yes to both questions, suspicion of sepsis is present  --        If severe sepsis is present AND tissue hypoperfusion perists in the hour after fluid resuscitation or lactate > 4, the patient meets criteria for SEPTIC SHOCK  --        Are any of the following organ dysfunction criteria present within 6 hours of suspected infection and SIRS criteria that are NOT considered to be chronic conditions? (!) Yes  -BC        Organ dysfunction  Lactate > 2 0 mmol/L  -BC        Date of presentation of severe sepsis  02/08/21  -BC        Time of presentation of severe sepsis  1050  -BC        Tissue hypoperfusion persists in the hour after crystalloid fluid administration, evidenced, by either:  --        Was hypotension present within one hour of the conclusion of crystalloid fluid administration?  --        Date of presentation of septic shock  --        Time of presentation of septic shock  --          User Key  (r) = Recorded By, (t) = Taken By, (c) = Cosigned By    [facility] [address] Name Provider Type    BRIAN Dee PARamonC Physician Assistant           Default Flowsheet Data (last 720 hours)      Sepsis Reassess     Row Name 02/08/21 1051                   Repeat Volume Status and Tissue Perfusion Assessment Performed    Repeat Volume Status and Tissue Perfusion Assessment Performed  Yes  -BC           Volume Status and Tissue Perfusion Post Fluid Resuscitation * Must Document All *    Vital Signs Reviewed (HR, RR, BP, T)  Yes  -BC        Shock Index Reviewed  Yes  -BC        Arterial Oxygen Saturation Reviewed (POx, SaO2 or SpO2)  --        Cardio  Normal S1/S2; Regular rate and rhythm  -BC        Pulmonary  Normal effort  -BC        Capillary Refill  Brisk  -BC        Peripheral Pulses  Radial  -BC        Peripheral Pulse  +2  -BC Skin  Warm  -BC        Urine output assessed  None  -BC           *OR*   Intensive Monitoring- Must Document One of the Following Four *:    Vital Signs Reviewed  --        * Central Venous Pressure (CVP or RAP)  --        * Central Venous Oxygen (SVO2, ScvO2 or Oxygen saturation via central catheter)  --        * Bedside Cardiovascular US in IVC diameter and % collapse  --        * Passive Leg Raise OR Crystalloid Challenge  --          User Key  (r) = Recorded By, (t) = Taken By, (c) = Cosigned By    Initials Name Provider Type    BRIAN Zhao PA-C Physician Assistant                      MDM  Number of Diagnoses or Management Options  Atherosclerosis of aorta [facility]):   Elevated troponin:   Hematuria:   Hyperglycemia:   Hypokalemia:   Hypomagnesemia:   Severe back pain:   Diagnosis management comments: Patient presenting like renal colic given nausea and vomiting as well as distribution of pain and hematuria however negative CT renal study  Given severity of pain and normal appearing CT renal study and elevated troponin, CT dissection study performed  Discussed results with the patient  Will admit for an NSTEMI and observation  Patient's pain is now more controlled and comfortable appearing  Patient verbalizes understanding and agrees with the above assessment plan  Sepsis alert was initiated however sirs most likely secondary due to pain, there is no source of infection at this point time          Amount and/or Complexity of Data Reviewed  Clinical lab tests: ordered and reviewed  Tests in the radiology section of CPT®: ordered and reviewed  Review and summarize past medical records: yes  Discuss the patient with other providers: yes (Dr Zac Xiao)  Independent visualization of images, tracings, or specimens: yes        Disposition  Final diagnoses:   Elevated troponin   Severe back pain   Hyperglycemia   Hypokalemia   Hypomagnesemia   Atherosclerosis of aorta (HCC)   Hematuria     Time reflects when diagnosis was documented in both MDM as applicable and the Disposition within this note     Time User Action Codes Description Comment    2/8/2021 12:13 PM Bernarda Dials Add [R77 8] Elevated troponin     2/8/2021 12:13 PM Bernarda Dials Add [M54 9] Severe back pain     2/8/2021 12:13 PM Bernarda Dials Add [R73 9] Hyperglycemia     2/8/2021 12:13 PM Pratik Vargsa [address] [E87 6] Hypokalemia     2/8/2021 12:13 PM Bernarda Dials Add [E83 42] Hypomagnesemia     2/8/2021 12:13 PM Bernarda Dials Add [I70 0] Atherosclerosis of aorta ([facility] )     2/8/2021 12:15 PM Bernarda Dials Add [R31 9] Hematuria     2/8/2021 12:33 PM [place] Add [I21 4] NSTEMI (non-ST elevated myocardial infarction) ([facility] )     2/9/2021  3:56 PM Jyotsna Crape Add [R56 9] Seizure ([facility] )     2/10/2021  4:58 PM Luzmaria CENTENO Add [R10 9] Acute right flank pain     2/12/2021  6:59 AM Candelaria Karine Add [G47 00] Insomnia, unspecified type     2/12/2021  6:59 AM Candelaria Karine Add [F33 42] Recurrent major depressive disorder, in full remission ([facility] )     2/12/2021  6:59 AM Candelaria Karine Modify [F33 42] Recurrent major depressive disorder, in full remission ([facility] )     2/12/2021  7:14 AM Candelaria Karine Modify [F33 42] Recurrent major depressive disorder, in full remission ([facility] )     2/12/2021  7:14 AM Elijah Matias Essential hypertension with hypertensive urgency     2/12/2021  7:14 AM Candelaria Karine Add Sherry Petersen Spinal stenosis of lumbar region, unspecified whether neurogenic claudication present     2/12/2021  7:14 AM Candelaria Karine Add [E78 00] Hypercholesteremia     2/12/2021  7:15 AM Candelaria Karine Modify [F33 42] Recurrent major depressive disorder, in full remission [facility])       ED Disposition     ED Disposition Condition Date/Time Comment    Admit Stable Mon Feb 8, 2021 12:13 PM Case was discussed with Dr Sabrina Le and the patient's admission status was agreed to be Admission Status: observation status to the service of Dr Aria Alcantar           Follow-up Information     Follow up With Specialties Details Why [address]  Family Medicine Follow up  [address]  [phone]            Discharge Medication List as of 2/12/2021  9:02 AM      START taking these medications    Details   baclofen 10 mg tablet Take 1 tablet (10 mg total) by mouth 3 (three) times a day, Starting Fri 2/12/2021, Normal      carvedilol (COREG) 12 5 mg tablet Take 1 tablet (12 5 mg total) by mouth 2 (two) times a day with meals, Starting Fri 2/12/2021, Normal      divalproex sodium (DEPAKOTE) 250 mg EC tablet Take 1 tablet (250 mg total) by mouth every 12 (twelve) hours Take 1 tablet twice daily for 12 days, then take 2 tablets twice daily thereafter, Starting Fri 2/12/2021, Normal      gabapentin (NEURONTIN) 100 mg capsule Take 1 capsule (100 mg total) by mouth 3 (three) times a day, Starting Fri 2/12/2021, Normal      lidocaine (LIDODERM) 5 % Apply 1 patch topically daily Remove & Discard patch within 12 hours or as directed by MD, Starting Fri 2/12/2021, No Print      losartan (COZAAR) 50 mg tablet Take 1 tablet (50 mg total) by mouth daily, Starting Fri 2/12/2021, Normal         CONTINUE these medications which have CHANGED    Details   atorvastatin (LIPITOR) 80 mg tablet Take 1 tablet (80 mg total) by mouth daily with dinner, Starting Fri 2/12/2021, Normal         CONTINUE these medications which have NOT CHANGED    Details   traZODone (DESYREL) 150 mg tablet take 1 tablet by mouth at bedtime, Normal      venlafaxine (EFFEXOR-XR) 75 mg 24 hr capsule take 1 capsule by mouth once daily WITH BREAKFAST ALTERNATE WITH 2 CAPS A DAY FOR 1 WEEK THEN FOLLOW UP WITH MD, Normal      albuterol (Ventolin HFA) 90 mcg/act inhaler Inhale 2 puffs every 6 (six) hours as needed for wheezing, Starting Tue 10/27/2020, No Print      nicotine (NICODERM CQ) 14 mg/24hr TD 24 hr patch Place 1 patch on the skin every 24 hours, Starting Tue 3/3/2020, Normal      umeclidinium bromide (INCRUSE ELLIPTA) 62 5 mcg/inh AEPB inhaler Inhale 1 puff daily, Starting Tue 6/19/2018, No Print         STOP taking these medications       valsartan-hydrochlorothiazide (DIOVAN-HCT) 320-25 MG per tablet Comments:   Reason for Stopping:             No discharge procedures on file      PDMP Review     None          ED Provider  Electronically Signed by           Samra Langston PA-C  02/08/21 [address] [place], [place] [id number]  02/08/21 914 [facility], [address], DO  02/13/21 1030

## 2021-02-08 NOTE — ED NOTES
Pt reports pain that started yesterday to right lower back  Pt reports waking up this morning with severe pain to right lower back and lateral side  Pt noted restless in bed  Pt dry heaving at times         Monika Lim RN  02/08/21 025

## 2021-02-09 ENCOUNTER — APPOINTMENT (OUTPATIENT)
Dept: NON INVASIVE DIAGNOSTICS | Facility: HOSPITAL | Age: 66
DRG: 121 | End: 2021-02-09
Payer: COMMERCIAL

## 2021-02-09 PROBLEM — E83.39 HYPOPHOSPHATASIA: Status: ACTIVE | Noted: 2021-02-09

## 2021-02-09 LAB
ALBUMIN SERPL BCP-MCNC: 3.5 G/DL (ref 3.5–5)
ALP SERPL-CCNC: 94 U/L (ref 46–116)
ALT SERPL W P-5'-P-CCNC: 16 U/L (ref 12–78)
ANION GAP SERPL CALCULATED.3IONS-SCNC: 10 MMOL/L (ref 4–13)
ANION GAP SERPL CALCULATED.3IONS-SCNC: 13 MMOL/L (ref 4–13)
AST SERPL W P-5'-P-CCNC: 18 U/L (ref 5–45)
BACTERIA UR CULT: NORMAL
BASOPHILS # BLD AUTO: 0.03 THOUSANDS/ΜL (ref 0–0.1)
BASOPHILS NFR BLD AUTO: 0 % (ref 0–1)
BILIRUB SERPL-MCNC: 0.6 MG/DL (ref 0.2–1)
BUN SERPL-MCNC: 10 MG/DL (ref 5–25)
BUN SERPL-MCNC: 11 MG/DL (ref 5–25)
CALCIUM SERPL-MCNC: 8.8 MG/DL (ref 8.3–10.1)
CALCIUM SERPL-MCNC: 9.1 MG/DL (ref 8.3–10.1)
CHLORIDE SERPL-SCNC: 102 MMOL/L (ref 100–108)
CHLORIDE SERPL-SCNC: 95 MMOL/L (ref 100–108)
CHOLEST SERPL-MCNC: 254 MG/DL (ref 50–200)
CO2 SERPL-SCNC: 22 MMOL/L (ref 21–32)
CO2 SERPL-SCNC: 25 MMOL/L (ref 21–32)
CREAT SERPL-MCNC: 0.81 MG/DL (ref 0.6–1.3)
CREAT SERPL-MCNC: 0.84 MG/DL (ref 0.6–1.3)
EOSINOPHIL # BLD AUTO: 0.19 THOUSAND/ΜL (ref 0–0.61)
EOSINOPHIL NFR BLD AUTO: 2 % (ref 0–6)
ERYTHROCYTE [DISTWIDTH] IN BLOOD BY AUTOMATED COUNT: 14.1 % (ref 11.6–15.1)
EST. AVERAGE GLUCOSE BLD GHB EST-MCNC: 111 MG/DL
GFR SERPL CREATININE-BSD FRML MDRD: 73 ML/MIN/1.73SQ M
GFR SERPL CREATININE-BSD FRML MDRD: 76 ML/MIN/1.73SQ M
GLUCOSE SERPL-MCNC: 118 MG/DL (ref 65–140)
GLUCOSE SERPL-MCNC: 136 MG/DL (ref 65–140)
HBA1C MFR BLD: 5.5 %
HCT VFR BLD AUTO: 45.1 % (ref 34.8–46.1)
HDLC SERPL-MCNC: 36 MG/DL
HGB BLD-MCNC: 14.5 G/DL (ref 11.5–15.4)
IMM GRANULOCYTES # BLD AUTO: 0.06 THOUSAND/UL (ref 0–0.2)
IMM GRANULOCYTES NFR BLD AUTO: 1 % (ref 0–2)
LDLC SERPL CALC-MCNC: 167 MG/DL (ref 0–100)
LYMPHOCYTES # BLD AUTO: 3.31 THOUSANDS/ΜL (ref 0.6–4.47)
LYMPHOCYTES NFR BLD AUTO: 31 % (ref 14–44)
MAGNESIUM SERPL-MCNC: 1.7 MG/DL (ref 1.6–2.6)
MAGNESIUM SERPL-MCNC: 1.9 MG/DL (ref 1.6–2.6)
MCH RBC QN AUTO: 29.9 PG (ref 26.8–34.3)
MCHC RBC AUTO-ENTMCNC: 32.2 G/DL (ref 31.4–37.4)
MCV RBC AUTO: 93 FL (ref 82–98)
MONOCYTES # BLD AUTO: 0.84 THOUSAND/ΜL (ref 0.17–1.22)
MONOCYTES NFR BLD AUTO: 8 % (ref 4–12)
NEUTROPHILS # BLD AUTO: 6.36 THOUSANDS/ΜL (ref 1.85–7.62)
NEUTS SEG NFR BLD AUTO: 58 % (ref 43–75)
NONHDLC SERPL-MCNC: 218 MG/DL
NRBC BLD AUTO-RTO: 0 /100 WBCS
PHOSPHATE SERPL-MCNC: 1.4 MG/DL (ref 2.3–4.1)
PHOSPHATE SERPL-MCNC: 3 MG/DL (ref 2.3–4.1)
PLATELET # BLD AUTO: 363 THOUSANDS/UL (ref 149–390)
PMV BLD AUTO: 9.9 FL (ref 8.9–12.7)
POTASSIUM SERPL-SCNC: 3.5 MMOL/L (ref 3.5–5.3)
POTASSIUM SERPL-SCNC: 3.5 MMOL/L (ref 3.5–5.3)
PROT SERPL-MCNC: 7.6 G/DL (ref 6.4–8.2)
RBC # BLD AUTO: 4.85 MILLION/UL (ref 3.81–5.12)
SODIUM SERPL-SCNC: 133 MMOL/L (ref 136–145)
SODIUM SERPL-SCNC: 134 MMOL/L (ref 136–145)
TRIGL SERPL-MCNC: 254 MG/DL
WBC # BLD AUTO: 10.79 THOUSAND/UL (ref 4.31–10.16)

## 2021-02-09 PROCEDURE — 84100 ASSAY OF PHOSPHORUS: CPT | Performed by: STUDENT IN AN ORGANIZED HEALTH CARE EDUCATION/TRAINING PROGRAM

## 2021-02-09 PROCEDURE — 99232 SBSQ HOSP IP/OBS MODERATE 35: CPT | Performed by: NURSE PRACTITIONER

## 2021-02-09 PROCEDURE — 93306 TTE W/DOPPLER COMPLETE: CPT

## 2021-02-09 PROCEDURE — 83735 ASSAY OF MAGNESIUM: CPT | Performed by: STUDENT IN AN ORGANIZED HEALTH CARE EDUCATION/TRAINING PROGRAM

## 2021-02-09 PROCEDURE — 99232 SBSQ HOSP IP/OBS MODERATE 35: CPT | Performed by: PHYSICIAN ASSISTANT

## 2021-02-09 PROCEDURE — 93308 TTE F-UP OR LMTD: CPT | Performed by: INTERNAL MEDICINE

## 2021-02-09 PROCEDURE — 99232 SBSQ HOSP IP/OBS MODERATE 35: CPT | Performed by: FAMILY MEDICINE

## 2021-02-09 PROCEDURE — 93325 DOPPLER ECHO COLOR FLOW MAPG: CPT | Performed by: INTERNAL MEDICINE

## 2021-02-09 PROCEDURE — 85025 COMPLETE CBC W/AUTO DIFF WBC: CPT | Performed by: STUDENT IN AN ORGANIZED HEALTH CARE EDUCATION/TRAINING PROGRAM

## 2021-02-09 PROCEDURE — 80053 COMPREHEN METABOLIC PANEL: CPT | Performed by: STUDENT IN AN ORGANIZED HEALTH CARE EDUCATION/TRAINING PROGRAM

## 2021-02-09 PROCEDURE — 3044F HG A1C LEVEL LT 7.0%: CPT | Performed by: FAMILY MEDICINE

## 2021-02-09 PROCEDURE — 93321 DOPPLER ECHO F-UP/LMTD STD: CPT | Performed by: INTERNAL MEDICINE

## 2021-02-09 PROCEDURE — 83036 HEMOGLOBIN GLYCOSYLATED A1C: CPT | Performed by: NURSE PRACTITIONER

## 2021-02-09 PROCEDURE — 80048 BASIC METABOLIC PNL TOTAL CA: CPT | Performed by: STUDENT IN AN ORGANIZED HEALTH CARE EDUCATION/TRAINING PROGRAM

## 2021-02-09 PROCEDURE — 80061 LIPID PANEL: CPT | Performed by: NURSE PRACTITIONER

## 2021-02-09 RX ORDER — LORAZEPAM 2 MG/ML
2 INJECTION INTRAMUSCULAR ONCE
Status: COMPLETED | OUTPATIENT
Start: 2021-02-09 | End: 2021-02-09

## 2021-02-09 RX ORDER — ONDANSETRON 2 MG/ML
4 INJECTION INTRAMUSCULAR; INTRAVENOUS EVERY 4 HOURS PRN
Status: DISCONTINUED | OUTPATIENT
Start: 2021-02-09 | End: 2021-02-09

## 2021-02-09 RX ORDER — PROMETHAZINE HYDROCHLORIDE 25 MG/ML
25 INJECTION, SOLUTION INTRAMUSCULAR; INTRAVENOUS EVERY 6 HOURS PRN
Status: DISCONTINUED | OUTPATIENT
Start: 2021-02-09 | End: 2021-02-09

## 2021-02-09 RX ORDER — LIDOCAINE 50 MG/G
1 PATCH TOPICAL EVERY 12 HOURS PRN
Status: DISCONTINUED | OUTPATIENT
Start: 2021-02-09 | End: 2021-02-12 | Stop reason: HOSPADM

## 2021-02-09 RX ORDER — MAGNESIUM SULFATE HEPTAHYDRATE 40 MG/ML
4 INJECTION, SOLUTION INTRAVENOUS ONCE
Status: DISCONTINUED | OUTPATIENT
Start: 2021-02-09 | End: 2021-02-09 | Stop reason: RX

## 2021-02-09 RX ORDER — TRAZODONE HYDROCHLORIDE 50 MG/1
75 TABLET ORAL
Status: DISCONTINUED | OUTPATIENT
Start: 2021-02-09 | End: 2021-02-09

## 2021-02-09 RX ORDER — PROMETHAZINE HYDROCHLORIDE 25 MG/ML
25 INJECTION, SOLUTION INTRAMUSCULAR; INTRAVENOUS EVERY 4 HOURS PRN
Status: DISCONTINUED | OUTPATIENT
Start: 2021-02-09 | End: 2021-02-12 | Stop reason: HOSPADM

## 2021-02-09 RX ORDER — MAGNESIUM SULFATE HEPTAHYDRATE 40 MG/ML
2 INJECTION, SOLUTION INTRAVENOUS ONCE
Status: DISCONTINUED | OUTPATIENT
Start: 2021-02-09 | End: 2021-02-09

## 2021-02-09 RX ORDER — LEVETIRACETAM 500 MG/1
500 TABLET ORAL EVERY 12 HOURS SCHEDULED
Status: DISCONTINUED | OUTPATIENT
Start: 2021-02-10 | End: 2021-02-10

## 2021-02-09 RX ORDER — HYDRALAZINE HYDROCHLORIDE 20 MG/ML
10 INJECTION INTRAMUSCULAR; INTRAVENOUS 3 TIMES DAILY
Status: DISCONTINUED | OUTPATIENT
Start: 2021-02-09 | End: 2021-02-10

## 2021-02-09 RX ORDER — POTASSIUM CHLORIDE 20 MEQ/1
40 TABLET, EXTENDED RELEASE ORAL EVERY 4 HOURS
Status: DISCONTINUED | OUTPATIENT
Start: 2021-02-09 | End: 2021-02-09

## 2021-02-09 RX ORDER — LOSARTAN POTASSIUM 50 MG/1
100 TABLET ORAL DAILY
Status: DISCONTINUED | OUTPATIENT
Start: 2021-02-09 | End: 2021-02-11

## 2021-02-09 RX ORDER — CARVEDILOL 6.25 MG/1
6.25 TABLET ORAL 2 TIMES DAILY WITH MEALS
Status: DISCONTINUED | OUTPATIENT
Start: 2021-02-09 | End: 2021-02-10

## 2021-02-09 RX ORDER — ACETAMINOPHEN 325 MG/1
650 TABLET ORAL EVERY 6 HOURS PRN
Status: DISCONTINUED | OUTPATIENT
Start: 2021-02-09 | End: 2021-02-09

## 2021-02-09 RX ORDER — HYDROCHLOROTHIAZIDE 25 MG/1
25 TABLET ORAL DAILY
Status: DISCONTINUED | OUTPATIENT
Start: 2021-02-09 | End: 2021-02-10

## 2021-02-09 RX ORDER — TRAMADOL HYDROCHLORIDE 50 MG/1
50 TABLET ORAL EVERY 6 HOURS PRN
Status: DISCONTINUED | OUTPATIENT
Start: 2021-02-09 | End: 2021-02-09

## 2021-02-09 RX ORDER — LORAZEPAM 2 MG/ML
INJECTION INTRAMUSCULAR
Status: COMPLETED
Start: 2021-02-09 | End: 2021-02-09

## 2021-02-09 RX ORDER — MAGNESIUM SULFATE HEPTAHYDRATE 40 MG/ML
2 INJECTION, SOLUTION INTRAVENOUS ONCE
Status: COMPLETED | OUTPATIENT
Start: 2021-02-09 | End: 2021-02-10

## 2021-02-09 RX ORDER — HYDRALAZINE HYDROCHLORIDE 20 MG/ML
5 INJECTION INTRAMUSCULAR; INTRAVENOUS EVERY 6 HOURS PRN
Status: DISCONTINUED | OUTPATIENT
Start: 2021-02-09 | End: 2021-02-09

## 2021-02-09 RX ORDER — MAGNESIUM SULFATE HEPTAHYDRATE 40 MG/ML
2 INJECTION, SOLUTION INTRAVENOUS ONCE
Status: COMPLETED | OUTPATIENT
Start: 2021-02-10 | End: 2021-02-10

## 2021-02-09 RX ORDER — POTASSIUM CHLORIDE 20MEQ/15ML
40 LIQUID (ML) ORAL ONCE
Status: COMPLETED | OUTPATIENT
Start: 2021-02-09 | End: 2021-02-09

## 2021-02-09 RX ORDER — ATORVASTATIN CALCIUM 80 MG/1
80 TABLET, FILM COATED ORAL
Status: DISCONTINUED | OUTPATIENT
Start: 2021-02-09 | End: 2021-02-12 | Stop reason: HOSPADM

## 2021-02-09 RX ORDER — BACLOFEN 10 MG/1
20 TABLET ORAL ONCE
Status: COMPLETED | OUTPATIENT
Start: 2021-02-09 | End: 2021-02-09

## 2021-02-09 RX ADMIN — VENLAFAXINE HYDROCHLORIDE 75 MG: 75 CAPSULE, EXTENDED RELEASE ORAL at 08:28

## 2021-02-09 RX ADMIN — POTASSIUM CHLORIDE 40 MEQ: 1500 TABLET, EXTENDED RELEASE ORAL at 14:02

## 2021-02-09 RX ADMIN — ACETAMINOPHEN 650 MG: 325 TABLET, FILM COATED ORAL at 14:02

## 2021-02-09 RX ADMIN — SODIUM PHOSPHATE, MONOBASIC, MONOHYDRATE 12 MMOL: 276; 142 INJECTION, SOLUTION INTRAVENOUS at 18:11

## 2021-02-09 RX ADMIN — ONDANSETRON 4 MG: 2 INJECTION INTRAMUSCULAR; INTRAVENOUS at 04:59

## 2021-02-09 RX ADMIN — ATORVASTATIN CALCIUM 80 MG: 80 TABLET ORAL at 15:37

## 2021-02-09 RX ADMIN — BACLOFEN 20 MG: 10 TABLET ORAL at 06:40

## 2021-02-09 RX ADMIN — CARVEDILOL 6.25 MG: 6.25 TABLET, FILM COATED ORAL at 15:37

## 2021-02-09 RX ADMIN — PROMETHAZINE HYDROCHLORIDE 25 MG: 25 INJECTION INTRAMUSCULAR; INTRAVENOUS at 06:00

## 2021-02-09 RX ADMIN — HYDRALAZINE HYDROCHLORIDE 10 MG: 20 INJECTION INTRAMUSCULAR; INTRAVENOUS at 20:37

## 2021-02-09 RX ADMIN — ANTACID TABLETS 1000 MG: 500 TABLET, CHEWABLE ORAL at 06:00

## 2021-02-09 RX ADMIN — ACETAMINOPHEN 650 MG: 325 TABLET, FILM COATED ORAL at 21:14

## 2021-02-09 RX ADMIN — MAGNESIUM OXIDE TAB 400 MG (241.3 MG ELEMENTAL MG) 800 MG: 400 (241.3 MG) TAB at 15:37

## 2021-02-09 RX ADMIN — SODIUM CHLORIDE 1000 ML: 0.9 INJECTION, SOLUTION INTRAVENOUS at 16:25

## 2021-02-09 RX ADMIN — NICOTINE 1 PATCH: 14 PATCH, EXTENDED RELEASE TRANSDERMAL at 18:11

## 2021-02-09 RX ADMIN — LIDOCAINE 5% 1 PATCH: 700 PATCH TOPICAL at 08:28

## 2021-02-09 RX ADMIN — HYDROCHLOROTHIAZIDE 25 MG: 25 TABLET ORAL at 09:07

## 2021-02-09 RX ADMIN — LEVETIRACETAM 1000 MG: 100 INJECTION, SOLUTION INTRAVENOUS at 17:28

## 2021-02-09 RX ADMIN — TRAMADOL HYDROCHLORIDE 50 MG: 50 TABLET, FILM COATED ORAL at 14:05

## 2021-02-09 RX ADMIN — ONDANSETRON 4 MG: 2 INJECTION INTRAMUSCULAR; INTRAVENOUS at 08:28

## 2021-02-09 RX ADMIN — TIOTROPIUM BROMIDE 18 MCG: 18 CAPSULE ORAL; RESPIRATORY (INHALATION) at 08:28

## 2021-02-09 RX ADMIN — PROMETHAZINE HYDROCHLORIDE 25 MG: 25 INJECTION INTRAMUSCULAR; INTRAVENOUS at 14:05

## 2021-02-09 RX ADMIN — LORAZEPAM 2 MG: 2 INJECTION INTRAMUSCULAR at 15:51

## 2021-02-09 RX ADMIN — POTASSIUM CHLORIDE 40 MEQ: 20 SOLUTION ORAL at 20:38

## 2021-02-09 RX ADMIN — LOSARTAN POTASSIUM 100 MG: 50 TABLET, FILM COATED ORAL at 09:07

## 2021-02-09 RX ADMIN — LORAZEPAM 2 MG: 2 INJECTION INTRAMUSCULAR; INTRAVENOUS at 15:51

## 2021-02-09 NOTE — PLAN OF CARE
Problem: Potential for Falls  Goal: Patient will remain free of falls  Description: INTERVENTIONS:  - Assess patient frequently for physical needs  -  Identify cognitive and physical deficits and behaviors that affect risk of falls  -  [place] fall precautions as indicated by assessment   - Educate patient/family on patient safety including physical limitations  - Instruct patient to call for assistance with activity based on assessment  - Modify environment to reduce risk of injury  - Consider OT/PT consult to assist with strengthening/mobility  Outcome: Progressing     Problem: SAFETY ADULT  Goal: Patient will remain free of falls  Description: INTERVENTIONS:  - Assess patient frequently for physical needs  -  Identify cognitive and physical deficits and behaviors that affect risk of falls    -  [place] fall precautions as indicated by assessment   - Educate patient/family on patient safety including physical limitations  - Instruct patient to call for assistance with activity based on assessment  - Modify environment to reduce risk of injury  - Consider OT/PT consult to assist with strengthening/mobility  Outcome: Progressing  Goal: Maintain or return to baseline ADL function  Description: INTERVENTIONS:  -  Assess patient's ability to carry out ADLs; assess patient's baseline for ADL function and identify physical deficits which impact ability to perform ADLs (bathing, care of mouth/teeth, toileting, grooming, dressing, etc )  - Assess/evaluate cause of self-care deficits   - Assess range of motion  - Assess patient's mobility; develop plan if impaired  - Assess patient's need for assistive devices and provide as appropriate  - Encourage maximum independence but intervene and supervise when necessary  - Involve family in performance of ADLs  - Assess for home care needs following discharge   - Consider OT consult to assist with ADL evaluation and planning for discharge  - Provide patient education as appropriate  Outcome: Progressing  Goal: Maintain or return mobility status to optimal level  Description: INTERVENTIONS:  - Assess patient's baseline mobility status (ambulation, transfers, stairs, etc )    - Identify cognitive and physical deficits and behaviors that affect mobility  - Identify mobility aids required to assist with transfers and/or ambulation (gait belt, sit-to-stand, lift, walker, cane, etc )  - [place] fall precautions as indicated by assessment  - Record patient progress and toleration of activity level on Mobility SBAR; progress patient to next Phase/Stage  - Instruct patient to call for assistance with activity based on assessment  - Consider rehabilitation consult to assist with strengthening/weightbearing, etc   Outcome: Progressing     Problem: CARDIOVASCULAR - ADULT  Goal: Maintains optimal cardiac output and hemodynamic stability  Description: INTERVENTIONS:  - Monitor I/O, vital signs and rhythm  - Monitor for S/S and trends of decreased cardiac output  - Administer and titrate ordered vasoactive medications to optimize hemodynamic stability  - Assess quality of pulses, skin color and temperature  - Assess for signs of decreased coronary artery perfusion  - Instruct patient to report change in severity of symptoms  Outcome: Progressing  Goal: Absence of cardiac dysrhythmias or at baseline rhythm  Description: INTERVENTIONS:  - Continuous cardiac monitoring, vital signs, obtain 12 lead EKG if ordered  - Administer antiarrhythmic and heart rate control medications as ordered  - Monitor electrolytes and administer replacement therapy as ordered  Outcome: Progressing

## 2021-02-09 NOTE — ASSESSMENT & PLAN NOTE
Assessment  · Patient reports right flank pain began one day PTA and it improved but did not resolve with Tylenol  · She was then woken from sleep at approximately 12 am morning of admission with 10/10 right flank pain that radiated to her right abdomen and with associated N/V, SOB, diaphoresis, vertigo and incontinence of urine  Felt 2-3 weeks prior to admission but otherwise no recent injury  · Unclear etiology  Renal versus MSK  · Patient has a known history of spinal stenosis with new disc bulge identified on CT during this admission  ·  Received morphine Dilaudid in the ED resulting in profound hypotension  ·  Received 1 dose of tramadol and suffered a seizure in the setting of malignant hypertension as well  Multiple allergies to NSAIDs  · CT renal stone study:  No stones, hydronephrosis, appendicitis or other acute intra-abdominal pathology  · CTA dissection protocol of chest/abdomen/pelvis did not show any acute pathology the kidneys with patent renal arteries in no apparent infarct her hydronephrosis    Discussed this with the radiologist     Plan  · PT/OT eval  · Lidoderm, Tylenol, baclofen, heat packs on board with little relief  · Avoid opiates and NSAIDs  · Consider Nephro consult evaluate for other renal disease

## 2021-02-09 NOTE — PROGRESS NOTES
BP rechecked and is 81/42, heart rate 115, NSR on monitor  Patient continues to be lethargic  Dr Chicho Kim notified of blood pressure, awaiting order

## 2021-02-09 NOTE — ASSESSMENT & PLAN NOTE
Blood pressure 166/100 on admission with headache, blurry vision nausea and vomiting  no signs of acute end-organ damage identified on labs  Her creatinine is within normal limits  After receiving IV Dilaudid and morphine, blood pressure decreased to 100/58 in the ED  blood pressures have been elevated throughout her stay as high as 712 systolic have also been quite labile dropping to 80 systolic following administration of Ativan for seizure  Her home meds have been restarted and she is now on Losartan 100 and HCTZ 25, Coreg 6 25 b i d  And hydralazine 25 t i d some improvement in blood pressure  Hypertensive urgency Likely contributing factor to seizure that occurred on 02/09  Unclear etiology of urgency possible in setting of pain or possibly secondary to undiscovered renal pathology      Plan  · Continue to monitor BP  · Attempt to achieve better pain control  · Continue with current regimen outlined above  · Consult nephro for assistance with BP control

## 2021-02-09 NOTE — UTILIZATION REVIEW
Initial Clinical Review  Observation 2/8/21 @ 1557, converted to inpatient admission 2/9/21 @ 94 31 11 for continued care & tx for chest  & flank pain  Per MD 2/9:  BP remains elevated  ARB & HCTZ resumed, Coreg added bid  Unable to have Lexiscan stress test due to inability to lay flat  K repletion given  Persistent intermittent R flank pain, using Ultram, nausea using Phenergan  Admission: Date/Time/Statement:   Admission Orders (From admission, onward)     Ordered        02/09/21 1359  Inpatient Admission  Once                Orders Placed This Encounter   Procedures   Inpatient Admission    Standing Status:   Standing    Number of Occurrences:   1    Order Specific Question:   Level of Care    Answer:   Med Surg [16]    Order Specific Question:   Estimated length of stay    Answer:   More than 2 Midnights    Order Specific Question:   Certification    Answer:   I certify that inpatient services are medically necessary for this patient for a duration of greater than two midnights  See H&P and MD Progress Notes for additional information about the patient's course of treatment  ED Arrival Information     Expected Arrival Acuity Means of Arrival Escorted By Service Admission Type    - 2/8/2021 09:12 Urgent Wheelchair Family Member General Medicine Urgent    Arrival Complaint    back pain        Chief Complaint   Patient presents with    Back Pain     patient c/o right lower back pain starting yesterday with vomiting  no known injury  Assessment/Plan:   73 y/o female, h/o COPD/Seizures, presenting today with sudden onset of right lower back pain that occasionally radiates in the right portion of the abdomen that started yesterday and gradually worsened now accompanied with nausea and vomiting  No longer has abdominal pain  Patient does not have any history of renal stones    States that she does occasionally have abdominal pain with a fatty meal before in the past   History of multiple C-sections otherwise no abdominal surgical history  Otherwise feeling well without any other complaints however cannot find a comfortable position  Denies fevers, cough, congestion, shortness of breath, diarrhea, constipation, changes in urination, chest pain, calf pain or swelling      ED Triage Vitals [02/08/21 0921]   Temperature Pulse Respirations Blood Pressure SpO2   (!) 96 8 °F (36 °C) (!) 121 (!) 26 (!) 166/104 98 %      Temp Source Heart Rate Source Patient Position - Orthostatic VS BP Location FiO2 (%)   Tympanic Monitor Lying Right arm --      Pain Score       Worst Possible Pain          Wt Readings from Last 1 Encounters:   02/09/21 79 4 kg (175 lb 0 7 oz)     Additional Vital Signs:   Date/Time  Temp  Pulse  Resp  BP  MAP (mmHg)  SpO2  O2 Device  Patient Position - Orthostatic VS   02/09/21 0330  --  --  --  176/94  Abnormal   --  --  --  Sitting   02/09/21 0300  99 °F (37 2 °C)  84  16  207/89  Abnormal   128  96 %  None (Room air)  Lying   02/08/21 2300  98 9 °F (37 2 °C)  74  16  118/64  82  94 %  None (Room air)  Lying   02/08/21 2100  98 6 °F (37 °C)  74  16  127/78  96  96 %  None (Room air)  Lying   02/08/21 1500  98 1 °F (36 7 °C)  83  17  100/58  73  96 %  None (Room air)  Sitting   02/08/21 1300  98 1 °F (36 7 °C)  91  18  110/55  --  98 %  None (Room air)  Lying   02/08/21 1045  --  86  24Abnormal   175/82Abnormal   118  98 %  None (Room air)  Lying     Pertinent Labs/Diagnostic Test Results:   Results from last 7 days   Lab Units 02/09/21  0657 02/08/21  0945   WBC Thousand/uL 10 79* 10 55*   HEMOGLOBIN g/dL 14 5 16 8*   HEMATOCRIT % 45 1 48 9*   PLATELETS Thousands/uL 363 407*   NEUTROS ABS Thousands/µL 6 36 8 88*     Results from last 7 days   Lab Units 02/09/21  0657 02/08/21  0945   SODIUM mmol/L 134* 133*   POTASSIUM mmol/L 3 5 3 3*   CHLORIDE mmol/L 102 92*   CO2 mmol/L 22 24   ANION GAP mmol/L 10 17*   BUN mg/dL 11 10   CREATININE mg/dL 0 84 1 11   EGFR ml/min/1 73sq m 73 52   CALCIUM mg/dL 8 8 9 6   MAGNESIUM mg/dL 1 9 1 5*   PHOSPHORUS mg/dL 1 4*  --      Results from last 7 days   Lab Units 02/09/21  0657 02/08/21  0945   AST U/L 18 16   ALT U/L 16 22   ALK PHOS U/L 94 120*   TOTAL PROTEIN g/dL 7 6 9 0*   ALBUMIN g/dL 3 5 4 0   TOTAL BILIRUBIN mg/dL 0 60 0 60     Results from last 7 days   Lab Units 02/09/21  0657 02/08/21  0945   GLUCOSE RANDOM mg/dL 136 180*     Results from last 7 days   Lab Units 02/08/21  1625 02/08/21  1302 02/08/21  0945   TROPONIN I ng/mL 0 51* 0 50* 0 30*     Results from last 7 days   Lab Units 02/08/21  1150 02/08/21  0945   LACTIC ACID mmol/L 2 0 3 6*     Results from last 7 days   Lab Units 02/08/21  0945   NT-PRO BNP pg/mL 2,640*     Results from last 7 days   Lab Units 02/08/21  0945   LIPASE u/L 60*     Results from last 7 days   Lab Units 02/08/21  1133   CLARITY UA  Clear   COLOR UA  Yellow   SPEC GRAV UA  1 015   PH UA  7 0   GLUCOSE UA mg/dl 100 (1/10%)*   KETONES UA mg/dl 15 (1+)*   BLOOD UA  Small*   PROTEIN UA mg/dl >=300*   NITRITE UA  Negative   BILIRUBIN UA  Negative   UROBILINOGEN UA E U /dl 0 2   LEUKOCYTES UA  Negative   WBC UA /hpf 1-2   RBC UA /hpf 1-2   BACTERIA UA /hpf Occasional   EPITHELIAL CELLS WET PREP /hpf Occasional     Results from last 7 days   Lab Units 02/08/21  1106 02/08/21  0945   BLOOD CULTURE  No Growth at 24 hrs  No Growth at 24 hrs  2/8  CT renal stone study abdomen pelvis wo contrast  No stones, hydronephrosis, appendicitis or other acute intra-abdominal pathology  CTA dissection protocol chest/abdomen/pelvis  Moderately atherosclerotic thoracic aorta with irregular, ulcerated plaque  No aneurysm or dissection  Diffusely atherosclerotic abdominal aorta with a 60% stenosis in the infrarenal segment, chronic right common iliac artery occlusion, an 80% left common iliac artery stenosis  4 mm right lower lobe pulmonary nodule     Diverticulosis    Ekg=  Normal sinus rhythm    ED Treatment:   Medication Administration from 02/08/2021 0912 to 02/08/2021 1445       Date/Time Order Dose Route Action     02/08/2021 0946 sodium chloride 0 9 % bolus 1,000 mL 1,000 mL Intravenous New Bag     02/08/2021 0947 ondansetron (ZOFRAN) injection 4 mg 4 mg Intravenous Given     02/08/2021 0949 morphine (PF) 4 mg/mL injection 4 mg 4 mg Intravenous Given     02/08/2021 1036 HYDROmorphone (DILAUDID) injection 0 5 mg 0 5 mg Intravenous Given     02/08/2021 1113 piperacillin-tazobactam (ZOSYN) IVPB 3 375 g 3 375 g Intravenous New Bag     02/08/2021 1053 iohexol (OMNIPAQUE) 350 MG/ML injection (SINGLE-DOSE) 100 mL 100 mL Intravenous Given     02/08/2021 1409 enoxaparin (LOVENOX) subcutaneous injection 90 mg 90 mg Subcutaneous Given     02/08/2021 1403 potassium chloride (K-DUR,KLOR-CON) CR tablet 40 mEq 40 mEq Oral Given     02/08/2021 1412 magnesium sulfate 2 g/50 mL IVPB (premix) 2 g 2 g Intravenous New Bag        Past Medical History:   Diagnosis Date    [facility]chronic obstructive pulmonary disease) ([facility])     Radiculopathy of lumbar region     last assessed 03/29/17    [facility]      Present on Admission:   Essential hypertension    Admitting Diagnosis: Atherosclerosis of aorta ([facility]) [I70 0]  Hypokalemia [E87 6]  Hypomagnesemia [E83 42]  Back pain [M54 9]  Hyperglycemia [R73 9]  Elevated troponin [R77 8]  Hematuria [R31 9]  NSTEMI (non-ST elevated myocardial infarction) ([facility] ) [I21 4]  Severe back pain [M54 9]  Age/Sex: 72 y o  female  Admission Orders:  Cont pulse ox  Consult cardio  Telemetry  PT eval & tx  Aqua K pad    Scheduled Medications:    acetaminophen  650 mg Oral Q8H [facility]   atorvastatin  80 mg Oral Daily With Dinner   calcium carbonate  1,000 mg Oral Daily PRN   carvedilol  6 25 mg Oral BID With Meals   losartan  100 mg Oral Daily   hydrochlorothiazide  25 mg Oral Daily   lidocaine  1 patch Topical Daily   nicotine  1 patch Transdermal Q24H   polyethylene glycol  17 g Oral Daily PRN   potassium chloride  40 mEq Oral Q4H   promethazine 25 mg Intravenous Q4H PRN-used x1   tiotropium  18 mcg Inhalation Daily   traMADol  50 mg Oral Q6H PRN-used x1   traZODone  75 mg Oral HS   venlafaxine  75 mg Oral Daily   ondansetron (ZOFRAN) injection 4 mg   Dose: 4 mgFreq: Every 6 hours PRN x 2 doses  promethazine (PHENERGAN) injection 25 mg   Dose: 25 mg Freq: Q6H prn- used x1    Continuous IV Infusions:  sodium chloride, 50 mL/hr, Intravenous, Continuous    Network Utilization Review Department  ATTENTION: Please call with any questions or concerns to [phone] and carefully listen to the prompts so that you are directed to the right person  All voicemails are confidential   Marlyn Erickson all requests for admission clinical reviews, approved or denied determinations and any other requests to dedicated fax number below belonging to the campus where the patient is receiving treatment   List of dedicated fax numbers for the Facilities:  [address] DENIALS (Administrative/Medical Necessity) [phone]   [address] (Maternity/NICU/Pediatrics) [phone]   [address] [place] [address] Dr María Elena Mcnulty [phone] (  Sean Jacob "Renu" 103) [address] [place] [address] Damaris Marley [address] [place] [address] [phone]

## 2021-02-09 NOTE — RAPID RESPONSE
Progress Note - Rapid Response   [facility] 72 y o  female MRN: [id number]    Time Called ( Time): 15:45  Date Called: 21  Level of Care: MS  Room#: 325-2  Arrival Time ( Time): 15:46  Event End Time ( Time): 16:05  Primary reason for call: Acute change in mental status  Interventions:  Airway/Breathing:  O2 Mask/Nasal Nasal canula 2L  Circulation: N/A       Assessment:   1  Grand mal seizure    Plan:   · Per staff seizure lasted approximately 3 minutes  Ativan 2mg IV administered  Shortly afterwards patients symptoms resolved  Post ictal after event A&Ox3 with no focal deficits  Known seizure disorder  Neurology consulted  Frequent neuro checks with seizure precautions  Monitor respiratory status  HPI/Chief Complaint (Background/Situation):   [facility] is a 72y o  year old female who presents with grand mal seizure  She has a known history of epilepsy, however, does not take seizure medication as an outpatient  She was admitted yesterday with complaints of acute right flank pain and elevated troponin levels  Rapid response was called when the nurse noticed rhythmic seizure activity       Historical Information   Past Medical History:   Diagnosis Date    COPD [facility]chronic obstructive pulmonary disease) ([facility] )     Radiculopathy of lumbar region     last assessed 17    Seizures ([facility] )      Past Surgical History:   Procedure Laterality Date     SECTION      EPIDURAL BLOCK INJECTION Right 2017    Procedure: BLOCK / INJECTION EPIDURAL STEROID TRANSFORAMINAL   L4-5;  Surgeon: Stefania Bonner MD;  Location: [place] [address] MAIN OR;  Service:     TONSILLECTOMY       Social History   Social History     Substance and Sexual Activity   Alcohol Use Not Currently    Frequency: Never    Drinks per session: Patient refused    Binge frequency: Never    Comment: Per allscripts: Social     Social History     Substance and Sexual Activity   Drug Use Not Currently    Types: Marijuana     Social History     Tobacco Use   Smoking Status Current Some Day Smoker    Packs/day: 0 25   Smokeless Tobacco Current User   Tobacco Comment    Per allscripts: Current everyday smoker       Meds/Allergies   Current Facility-Administered Medications   Medication Dose Route Frequency Provider Last Rate    acetaminophen  650 mg Oral Q8H [address] NITHIN Barahona      atorvastatin  80 mg Oral Daily With Dinner Cara Garner, NITHIN      calcium carbonate  1,000 mg Oral Daily PRN NITHIN Barahona      carvedilol  6 25 mg Oral BID With Meals Cara Garner, NITHIN      losartan  100 mg Oral Daily Alyssa Peralta DO      And    hydrochlorothiazide  25 mg Oral Daily Alyssa Mckeon DO      [START ON 2/10/2021] levETIRAcetam  500 mg Oral Q12H JOVITA Reyna Schulte DO      lidocaine  1 patch Topical Daily NITHIN Barahona      nicotine  1 patch Transdermal Q24H NITHIN Barahona      polyethylene glycol  17 g Oral Daily PRN NITHIN Barahona      potassium chloride  40 mEq Oral Q4H NITHIN Lyons      promethazine  25 mg Intravenous Q4H PRN Reyna Schulte DO      sodium chloride  1,000 mL Intravenous Once Reyna Schulte, DO 1,000 mL (02/09/21 1625)    sodium phosphate (21 mmol) infusion 250 mL  12 mmol Intravenous Once Teto Antoni DO      tiotropium  18 mcg Inhalation Daily NITHIN Barahona              Allergies   Allergen Reactions    Aspirin Anaphylaxis    Asparaginase Derivatives     Ibuprofen     Nsaids     Robaxin [Methocarbamol]     Toradol [Ketorolac Tromethamine]        ROS: Patient denies chest pain or SOB  Generalized weakness  Vitals: 97 3F-/74-97% 2LNC    Physical Exam:  Gen: Following event patient is lethargic  HEENT:PERRLA, EOM intact  Neck:negative JVD  Chest:clear bilaterally, no wheezing or stridor  Cor:RRR no murmur  Abd:soft, NT, ND  Ext: no LE edema  Neuro: following seizure patient is lethargic  A&Ox3  Generalized weakness and unable to fully assess for any type of focal deficits  Skin:warm, dry      Intake/Output Summary (Last 24 hours) at 2/9/2021 1805  Last data filed at 2/9/2021 0530  Gross per 24 hour   Intake 594 17 ml   Output 20 ml   Net 574 17 ml       Respiratory    Lab Data (Last 4 hours)    None         O2/Vent Data (Last 4 hours)    None              Invasive Devices     Peripheral Intravenous Line            Peripheral IV 02/08/21 Left Antecubital 1 day                DIAGNOSTIC DATA:    Lab: I have personally reviewed pertinent lab results  CBC:   Results from last 7 days   Lab Units 02/09/21  0657   WBC Thousand/uL 10 79*   HEMOGLOBIN g/dL 14 5   HEMATOCRIT % 45 1   PLATELETS Thousands/uL 363     CMP:   Results from last 7 days   Lab Units 02/09/21  0657 02/08/21  0945   POTASSIUM mmol/L 3 5 3 3*   CHLORIDE mmol/L 102 92*   CO2 mmol/L 22 24   BUN mg/dL 11 10   CREATININE mg/dL 0 84 1 11   CALCIUM mg/dL 8 8 9 6   ALK PHOS U/L 94 120*   ALT U/L 16 22   AST U/L 18 16     PT/INR:   No results found for: PT, INR,   Magnesium: No components found for: MAG,   Phosphorous:   Lab Results   Component Value Date    PHOS 1 4 (L) 02/09/2021       Microbiology:  Lab Results   Component Value Date    BLOODCX No Growth at 24 hrs  02/08/2021    BLOODCX No Growth at 24 hrs  02/08/2021         OUTCOME:   Stayed in room   Family member contacted: Hospitalist updated family  Code Status: Level 1 - Full Code  Critical Care Time: Total Critical Care time spent 20 minutes excluding procedures, teaching and family updates

## 2021-02-09 NOTE — PROGRESS NOTES
NSS 1 liter bolus ordered to administer over two hours by Dr Julio C Waller and was initiated, will continue to monitor patient

## 2021-02-09 NOTE — ASSESSMENT & PLAN NOTE
In context of hypertensive urgency and concern for serotonin syndrome, reduce dose of trazodone to 75mg from 150mg daily  Continue venlafaxine 75mg daily

## 2021-02-09 NOTE — CASE MANAGEMENT
CM unable to s/w patient bedside, provider in room completing EEG in same room at this time  Per chart review, patient presents with right lower back pain with vomiting  Cardio following, patient has completed ECHO at this time with stress test to be completed  CM will continue to follow and assess for any DCP needs

## 2021-02-09 NOTE — PLAN OF CARE
Problem: Potential for Falls  Goal: Patient will remain free of falls  Description: INTERVENTIONS:  - Assess patient frequently for physical needs  -  Identify cognitive and physical deficits and behaviors that affect risk of falls  -  [place] fall precautions as indicated by assessment   - Educate patient/family on patient safety including physical limitations  - Instruct patient to call for assistance with activity based on assessment  - Modify environment to reduce risk of injury  - Consider OT/PT consult to assist with strengthening/mobility  Outcome: Progressing     Problem: SAFETY ADULT  Goal: Patient will remain free of falls  Description: INTERVENTIONS:  - Assess patient frequently for physical needs  -  Identify cognitive and physical deficits and behaviors that affect risk of falls    -  [place] fall precautions as indicated by assessment   - Educate patient/family on patient safety including physical limitations  - Instruct patient to call for assistance with activity based on assessment  - Modify environment to reduce risk of injury  - Consider OT/PT consult to assist with strengthening/mobility  Outcome: Progressing  Goal: Maintain or return to baseline ADL function  Description: INTERVENTIONS:  -  Assess patient's ability to carry out ADLs; assess patient's baseline for ADL function and identify physical deficits which impact ability to perform ADLs (bathing, care of mouth/teeth, toileting, grooming, dressing, etc )  - Assess/evaluate cause of self-care deficits   - Assess range of motion  - Assess patient's mobility; develop plan if impaired  - Assess patient's need for assistive devices and provide as appropriate  - Encourage maximum independence but intervene and supervise when necessary  - Involve family in performance of ADLs  - Assess for home care needs following discharge   - Consider OT consult to assist with ADL evaluation and planning for discharge  - Provide patient education as appropriate  Outcome: Progressing  Goal: Maintain or return mobility status to optimal level  Description: INTERVENTIONS:  - Assess patient's baseline mobility status (ambulation, transfers, stairs, etc )    - Identify cognitive and physical deficits and behaviors that affect mobility  - Identify mobility aids required to assist with transfers and/or ambulation (gait belt, sit-to-stand, lift, walker, cane, etc )  - [place] fall precautions as indicated by assessment  - Record patient progress and toleration of activity level on Mobility SBAR; progress patient to next Phase/Stage  - Instruct patient to call for assistance with activity based on assessment  - Consider rehabilitation consult to assist with strengthening/weightbearing, etc   Outcome: Progressing     Problem: CARDIOVASCULAR - ADULT  Goal: Maintains optimal cardiac output and hemodynamic stability  Description: INTERVENTIONS:  - Monitor I/O, vital signs and rhythm  - Monitor for S/S and trends of decreased cardiac output  - Administer and titrate ordered vasoactive medications to optimize hemodynamic stability  - Assess quality of pulses, skin color and temperature  - Assess for signs of decreased coronary artery perfusion  - Instruct patient to report change in severity of symptoms  Outcome: Progressing  Goal: Absence of cardiac dysrhythmias or at baseline rhythm  Description: INTERVENTIONS:  - Continuous cardiac monitoring, vital signs, obtain 12 lead EKG if ordered  - Administer antiarrhythmic and heart rate control medications as ordered  - Monitor electrolytes and administer replacement therapy as ordered  Outcome: Progressing

## 2021-02-09 NOTE — ASSESSMENT & PLAN NOTE
Patient noted to be hypokalemic  Patient as had episodes of vomiting which could be contributing  · Monitor replete p r n

## 2021-02-09 NOTE — PROGRESS NOTES
[facility] 26 Progress Note - Leon Garrett 72 y o  female MRN: [id number]    Unit/Bed#: [address] [place], [place] [id number] Encounter: [id number]      Assessment/Plan:  * Elevated troponin  Assessment & Plan  Initial troponin 0 3 -> 0 5 in the setting of hypertension, hypokalemia, hypomagnesemia  Family history of coronary artery disease  EKG without ischemic changes  Given morphine and Dilaudid in the  Patient's chest pain has now resolved  · Trend troponins  · Lovenox 1 milligram/kilogram SQ x1  · Hold off on aspirin as patient has allergy  · Start Lipitor 40 mg daily  · Monitor on telemetry  · Cardiology following, appreciate recs  · Plan for nuclear stress test when more stable and echocardiogram       Acute right flank pain  Assessment & Plan  · Patient reports right flank pain began one day PTA and it improved but did not resolve with Tylenol  · She was then woken from sleep at approximately 12 am this morning with 10/10 right flank pain that radiated to her right abdomen and with associated N/V, SOB, diaphoresis, vertigo and incontinence of urine  She also reports a brief episode of chest pain during this time  · All other symptoms have resolved except right flank pain which is now 5/10 after receiving Dilaudid 0 5 mg and Morphine 4 mg IV in the ED  · CT renal stone study:  No stones, hydronephrosis, appendicitis or other acute intra-abdominal pathology  · CTA dissection protocol of chest/abdomen/pelvis:  "Moderately atherosclerotic thoracic aorta with ulcerated plaque  Atherosclerotic abdominal aorta was 60% stenosis in the infrarenal segment, chronic right common iliac artery occlusion, and 80% left common iliac artery stenosis  4 mm right lower lobe pulmonary nodule  Diverticulosis "  · Initial troponin 0 3 and 0 5, Pro-BNP 2640, EKG in ED showed Sinus rhythm  · Patient states she has a prior cardiac workup done by a Cardiologist in [facility] and that she never heard back from the office about results   She then moved and did not return to that Cardiologist as a result  She states she does not currently follow with a Cardiologist outpatient  · See plan below for elevated troponin level  · PT/OT eval  · Lidoderm patch with scheduled Tylenol  · Consider need for muscle relaxers  · Tramadol 50mg q6h initiated  · Ensure electrolytes are optimized    Essential hypertension with hypertensive urgency  Assessment & Plan  Blood pressure 166/100 on admission  After receiving IV Dilaudid and morphine, blood pressure decreased to 100/58  · valsartan hydrochlorothiazide-> replaced by Losartan 100 and HCTZ 25  · Cardiology added Coreg 6 25 BID  · Advance first dose, as not yet given  · Continue to monitor blood pressure  · Attempt to achieve better pain control      Hypophosphatemia  Assessment & Plan  1 4-> replete    Hypomagnesemia  Assessment & Plan  · 1 9-> replete    SIRS (systemic inflammatory response syndrome) ([facility] [address] )  Assessment & Plan  Patient met SIRS criteria on arrival as evidenced by tachycardia and tachypnea  Patient is afebrile  Lactic initially elevated at 3 6, has since cleared with 1 Liter NS Bolus  No obvious source of infection  CTA and CT abdomen/pelvis negative  Low suspicion for UTI  UA was positive for blood, ketones and protein  Negative for leukocytes and nitrites  · Monitor off antibiotics   · Follow up blood cultures  · Monitor temperature, HR and RR  · Check CBC in the morning    Hypokalemia  Assessment & Plan  Patient noted to be hypokalemic  Patient had episodes of vomiting at home which could be contributing  · Repleted today by cardiology  · Monitor lytes in am     Hyponatremia  Assessment & Plan  Patient noted to be hyponatremic on arrival at 13, improved to 134 today  This could be secondary to poor oral intake and episodes of vomiting this morning    · Given 1 Liter NS Bolus in the ED  · Will start NS at 50 mL/hr   · Monitor Na in am    Seizures [facility])  Assessment & Plan  Patient has a history of seizures with her most recent one about 3 5 years ago  She states she used to take Depakote for prevention but was taken off it a couple years ago  Patient not able to tell me why she was taken off it and not placed on a different anticonvulsant medication  · Monitor       COPD (chronic obstructive pulmonary disease) [facility])  Assessment & Plan  Patient has a history of COPD and is a current smoker  She has smoked since age 29 and has been reducing her intake recently and now smoked 1/2 PPD  Patient is currently 98% on room air and denies SOB  · Encourage smoking cessation   · Substitute Incruse for Spiriva while hospitalized  · Monitor      Depression  Assessment & Plan  In context of hypertensive urgency and concern for serotonin syndrome, reduce dose of trazodone to 75mg from 150mg daily  Continue venlafaxine 75mg daily  Subjective:   He was seen examined at bedside  No acute events overnight  Patient is on a extreme pain secondary to right flank and lumbar pain  Unclear etiology of this pain  She states currently Tylenol/lidocaine patch is not enough for treating her pain  She is also nauseous and has been vomiting overnight secondary to the pain  At that her stress test was canceled for today secondary to her pain and nausea and vomiting  Objective:     Vitals: Blood pressure (!) 193/87, pulse (!) 108, temperature (!) 97 3 °F (36 3 °C), temperature source Temporal, resp  rate 20, height 5' (1 524 m), weight 79 4 kg (175 lb 0 7 oz), SpO2 95 %  ,Body mass index is 34 19 kg/m²    Wt Readings from Last 3 Encounters:   02/09/21 79 4 kg (175 lb 0 7 oz)   03/03/20 85 8 kg (189 lb 2 oz)   07/18/19 80 3 kg (177 lb)       Intake/Output Summary (Last 24 hours) at 2/9/2021 1543  Last data filed at 2/9/2021 0530  Gross per 24 hour   Intake 594 17 ml   Output 20 ml   Net 574 17 ml       Physical Exam: General appearance: alert and oriented, in no acute distress  Lungs: clear to auscultation bilaterally  Heart: regular rate and rhythm, S1, S2 normal, no murmur, click, rub or gallop  Abdomen: soft, non-tender; bowel sounds normal; no masses,  no organomegaly  Extremities: CVA tenderness, tenderness to palpation right lumbar area  Pain with movement  Pulses: 2+ and symmetric     Recent Results (from the past 24 hour(s))   Troponin I    Collection Time: 02/08/21  4:25 PM   Result Value Ref Range    Troponin I 0 51 (H) <=0 04 ng/mL   Lipid panel    Collection Time: 02/09/21  6:57 AM   Result Value Ref Range    Cholesterol 254 (H) 50 - 200 mg/dL    Triglycerides 254 (H) <=150 mg/dL    HDL, Direct 36 (L) >=40 mg/dL    LDL Calculated 167 (H) 0 - 100 mg/dL    Non-HDL-Chol (CHOL-HDL) 218 mg/dl   Hemoglobin A1C w/ EAG Estimation    Collection Time: 02/09/21  6:57 AM   Result Value Ref Range    Hemoglobin A1C 5 5 Normal 3 8-5 6%; PreDiabetic 5 7-6 4%;  Diabetic >=6 5%; Glycemic control for adults with diabetes <7 0% %     mg/dl   CBC and differential    Collection Time: 02/09/21  6:57 AM   Result Value Ref Range    WBC 10 79 (H) 4 31 - 10 16 Thousand/uL    RBC 4 85 3 81 - 5 12 Million/uL    Hemoglobin 14 5 11 5 - 15 4 g/dL    Hematocrit 45 1 34 8 - 46 1 %    MCV 93 82 - 98 fL    MCH 29 9 26 8 - 34 3 pg    MCHC 32 2 31 4 - 37 4 g/dL    RDW 14 1 11 6 - 15 1 %    MPV 9 9 8 9 - 12 7 fL    Platelets 659 839 - 075 Thousands/uL    nRBC 0 /100 WBCs    Neutrophils Relative 58 43 - 75 %    Immat GRANS % 1 0 - 2 %    Lymphocytes Relative 31 14 - 44 %    Monocytes Relative 8 4 - 12 %    Eosinophils Relative 2 0 - 6 %    Basophils Relative 0 0 - 1 %    Neutrophils Absolute 6 36 1 85 - 7 62 Thousands/µL    Immature Grans Absolute 0 06 0 00 - 0 20 Thousand/uL    Lymphocytes Absolute 3 31 0 60 - 4 47 Thousands/µL    Monocytes Absolute 0 84 0 17 - 1 22 Thousand/µL    Eosinophils Absolute 0 19 0 00 - 0 61 Thousand/µL    Basophils Absolute 0 03 0 00 - 0 10 Thousands/µL   Comprehensive metabolic panel    Collection Time: 02/09/21  6:57 AM Result Value Ref Range    Sodium 134 (L) 136 - 145 mmol/L    Potassium 3 5 3 5 - 5 3 mmol/L    Chloride 102 100 - 108 mmol/L    CO2 22 21 - 32 mmol/L    ANION GAP 10 4 - 13 mmol/L    BUN 11 5 - 25 mg/dL    Creatinine 0 84 0 60 - 1 30 mg/dL    Glucose 136 65 - 140 mg/dL    Calcium 8 8 8 3 - 10 1 mg/dL    AST 18 5 - 45 U/L    ALT 16 12 - 78 U/L    Alkaline Phosphatase 94 46 - 116 U/L    Total Protein 7 6 6 4 - 8 2 g/dL    Albumin 3 5 3 5 - 5 0 g/dL    Total Bilirubin 0 60 0 20 - 1 00 mg/dL    eGFR 73 ml/min/1 73sq m   Magnesium    Collection Time: 02/09/21  6:57 AM   Result Value Ref Range    Magnesium 1 9 1 6 - 2 6 mg/dL   Phosphorus    Collection Time: 02/09/21  6:57 AM   Result Value Ref Range    Phosphorus 1 4 (L) 2 3 - 4 1 mg/dL       Current Facility-Administered Medications   Medication Dose Route Frequency Provider Last Rate Last Admin    acetaminophen (TYLENOL) tablet 650 mg  650 mg Oral [facility] NITHIN Davis   650 mg at 02/09/21 1402    atorvastatin (LIPITOR) tablet 80 mg  80 mg Oral Daily With Dinner NITHIN Espinal   80 mg at 02/09/21 1537    calcium carbonate (TUMS) chewable tablet 1,000 mg  1,000 mg Oral Daily PRN NITHIN Davis   1,000 mg at 02/09/21 0600    carvedilol (COREG) tablet 6 25 mg  6 25 mg Oral BID With Meals NITHIN Espinal   6 25 mg at 02/09/21 1537    losartan (COZAAR) tablet 100 mg  100 mg Oral Daily Brad Dakin Kokotek, DO   100 mg at 02/09/21 3443    And    hydrochlorothiazide (HYDRODIURIL) tablet 25 mg  25 mg Oral Daily Brad Dakin Kokotek, DO   25 mg at 02/09/21 0907    lidocaine (LIDODERM) 5 % patch 1 patch  1 patch Topical Daily NITHIN Davis   1 patch at 02/09/21 0828    nicotine (NICODERM CQ) 14 mg/24hr TD 24 hr patch 1 patch  1 patch Transdermal Q24H NITHIN Davis   1 patch at 02/08/21 1830    polyethylene glycol (MIRALAX) packet 17 g  17 g Oral Daily PRN NITHIN Davis        potassium chloride (K-DUR,KLOR-CON) CR tablet 40 mEq  40 mEq Oral Q4H LUIS BruceNP   40 mEq at 02/09/21 1402    promethazine (PHENERGAN) injection 25 mg  25 mg Intravenous Q4H PRN Myrna Luna, DO   25 mg at 02/09/21 1405    sodium phosphate 12 mmol in dextrose 5 % 250 mL Infusion  12 mmol Intravenous Once Morgan Spillers, DO        tiotropium [facility]) capsule for inhaler 18 mcg  18 mcg Inhalation Daily Theresa Heimlich, CRNP   18 mcg at 02/09/21 6910    traMADol (ULTRAM) tablet 50 mg  50 mg Oral Q6H PRN Myrna Luna, DO   50 mg at 02/09/21 1405    traZODone (DESYREL) tablet 75 mg  75 mg Oral HS Myrna Luna DO        venlafaxine (EFFEXOR-XR) 24 hr capsule 75 mg  75 mg Oral Daily Theresa Heimlich, CRNP   75 mg at 02/09/21 9898       Invasive Devices     Peripheral Intravenous Line            Peripheral IV 02/08/21 Left Antecubital 1 day                Lab, Imaging and other studies: I have personally reviewed pertinent reports      VTE Pharmacologic Prophylaxis: Sequential compression device (Venodyne)   VTE Mechanical Prophylaxis: sequential compression device    Myrna Luna DO

## 2021-02-09 NOTE — PHYSICAL THERAPY NOTE
02/09/21 1430   PT Last Visit   PT Visit Date 02/09/21   Note Type   Note type Evaluation   Cancel Reasons Medical status;attempted to see pt 3x today;first 2 times pt unable to participate due to pain, the 3rd time pt sleeping     Licensure   [place] License Number  [id number] [place] [address] [id number]

## 2021-02-09 NOTE — ASSESSMENT & PLAN NOTE
Patient noted to be hyponatremic on arrival at 13, improved to 134 today  This could be secondary to poor oral intake and episodes of vomiting this morning    · Given 1 Liter NS Bolus in the ED  · Will start NS at 50 mL/hr   · Monitor Na in am

## 2021-02-09 NOTE — ASSESSMENT & PLAN NOTE
Patient with remote history of seizure disorder and had followed with [facility]   Had been on Depakote but was stopped approximately 3-4 years ago as she was told by her neurologist at the time that she no longer needed it  Last seizure was approximately 3-4 years ago  She states she had both absent seizures and tonic-clonic seizures  She did suffer seizure on 02/09 thought to be secondary to accelerated hypertension with encephalopathy with contribution from possibly tramadol lowering seizure threshold and electrolyte abnormality    She has been loaded on Keppra 1000 mg   · Keppra 500 mg po bid  · Seizure precautions  · Neurochecks  · Neurology consult

## 2021-02-09 NOTE — PROGRESS NOTES
Progress Note - Cardiology   [facility]     [facility] [place] 72 y o  female MRN: [id number]  : 1955  Unit/Bed#: [address] [place] [address] Encounter: [id number]    Assessment and Plan:   1  Acute right flank pain associated with nausea and vomiting:  Managed per the primary team    2  Hypertension:  Blood pressures remain elevated  Her ARB and hydrochlorothiazide were resumed today  Will add Coreg 6 25 mg b i d  And continue monitor  3  Abnormal troponins in the setting of hypertension, hypokalemia hypo magnesium and family history of coronary artery disease:  Peak troponin was 0 5       -  Patient is scheduled for Lexiscan nuclear stress test when able to tolerate an lie flat    -  start Coreg 6 25 mg b i d     -  aspirin held secondary to documented allergy for anaphylaxis    -  will pay patient on maximum statin therapy due to poorly controlled lipids  -  echocardiogram pending    4  Dyslipidemia:  Total cholesterol 254, triglycerides are 254, HDL is 36 and LDL is 167  Will increase Lipitor to 80 mg daily  Recheck lipids in 6-8 weeks if triglycerides remain elevated may need to add fish will or fenofibrate  5  Hypokalemia:  K today is 3 5  Will replace with 40 mEq and continue to monitor    6  COPD:  Respiratory status stable  Managed per primary team    7  Tobacco abuse:  Encouraged smoking cessation    8  Family history of coronary artery disease    Subjective / Objective:   Patient still with complaint of right flank pain, was unable to do her Lexiscan nuclear stress test today as she was unable to lie flat due to pain  Blood pressures remain elevated her ARB and hydrochlorothiazide were resumed  Will start Coreg at 6 25 mg b i d  And may need to consider Aldactone  CTA did not demonstrate any renal artery stenosis  Vitals: Blood pressure (!) 193/80, pulse 104, temperature (!) 97 3 °F (36 3 °C), temperature source Temporal, resp   rate (!) 24, height 5' (1 524 m), weight 79 4 kg (175 lb 0 7 oz), SpO2 94 %  Vitals:    02/08/21 1300 02/09/21 0600   Weight: 79 4 kg (175 lb) 79 4 kg (175 lb 0 7 oz)     Body mass index is 34 19 kg/m²  BP Readings from Last 3 Encounters:   02/09/21 (!) 193/80   03/03/20 140/78   07/18/19 (!) 176/90     Orthostatic Blood Pressures      Most Recent Value   Blood Pressure  (!) 193/80 filed at 02/09/2021 1038   Patient Position - Orthostatic VS  Lying filed at 02/09/2021 1038        I/O       02/07 0701 - 02/08 0700 02/08 0701 - 02/09 0700 02/09 0701 - 02/10 0700    P  O   120     I V  (mL/kg)  474 2 (6)     IV Piggyback  100     Total Intake(mL/kg)  694 2 (8 7)     Urine (mL/kg/hr)  0     Emesis/NG output  20     Total Output  20     Net  +674 2            Unmeasured Urine Occurrence  1 x 1 x    Unmeasured Emesis Occurrence  1 x         Invasive Devices     Peripheral Intravenous Line            Peripheral IV 02/08/21 Left Antecubital 1 day                  Intake/Output Summary (Last 24 hours) at 2/9/2021 1312  Last data filed at 2/9/2021 0530  Gross per 24 hour   Intake 594 17 ml   Output 20 ml   Net 574 17 ml         Physical Exam:   Physical Exam  Vitals signs and nursing note reviewed  Constitutional:       General: She is not in acute distress  Appearance: Normal appearance  She is well-developed  She is obese  HENT:      Head: Normocephalic  Right Ear: External ear normal       Left Ear: External ear normal       Nose: Nose normal    Eyes:      General: No scleral icterus  Right eye: No discharge  Left eye: No discharge  Pupils: Pupils are equal, round, and reactive to light  Neck:      Musculoskeletal: Normal range of motion and neck supple  Thyroid: No thyromegaly  Cardiovascular:      Rate and Rhythm: Normal rate and regular rhythm  Pulses: Normal pulses  Heart sounds: Normal heart sounds  Pulmonary:      Effort: Pulmonary effort is normal  No respiratory distress        Breath sounds: Normal breath sounds  No wheezing, rhonchi or rales  Abdominal:      General: Bowel sounds are normal  There is no distension  Palpations: Abdomen is soft  Musculoskeletal:      Right lower leg: No edema  Left lower leg: No edema  Comments: Still with complaint of intermittent right-sided flank pain   Skin:     General: Skin is warm and dry  Capillary Refill: Capillary refill takes less than 2 seconds  Neurological:      General: No focal deficit present  Mental Status: She is alert and oriented to person, place, and time  Mental status is at baseline  Psychiatric:         Mood and Affect: Mood normal          Behavior: Behavior normal          Thought Content:  Thought content normal          Judgment: Judgment normal                    Medications/ Allergies:     Current Facility-Administered Medications   Medication Dose Route Frequency Provider Last Rate    acetaminophen  650 mg Oral Q6H PRN NITHIN Mcgee      acetaminophen  650 mg Oral [facility] Jimena Mcgee [address]      atorvastatin  40 mg Oral Daily With Dinner NITHIN Mcgee      calcium carbonate  1,000 mg Oral Daily PRN NITHIN Mcgee      carvedilol  6 25 mg Oral BID With Meals NITHIN Colon      losartan  100 mg Oral Daily Brian William DO      And    hydrochlorothiazide  25 mg Oral Daily Rosas Mckeon,       lidocaine  1 patch Topical Daily NITHIN Mcgee      nicotine  1 patch Transdermal Q24H NITHIN Mcgee      ondansetron  4 mg Intravenous Q4H PRN Rosas Sloan [place], DO      polyethylene glycol  17 g Oral Daily PRN NITHIN Mcgee      promethazine  25 mg Intravenous Q6H PRN Zion Dominique, DO      sodium chloride  50 mL/hr Intravenous Continuous NITHIN Mcgee 50 mL/hr (02/08/21 1832)    tiotropium  18 mcg Inhalation Daily NITHIN Mcgee      traZODone  150 mg Oral HS NITHIN Mcgee      venlafaxine 75 mg Oral Daily NITHIN Goldberg       acetaminophen, 650 mg, Q6H PRN  calcium carbonate, 1,000 mg, Daily PRN  ondansetron, 4 mg, Q4H PRN  polyethylene glycol, 17 g, Daily PRN  promethazine, 25 mg, Q6H PRN      Allergies   Allergen Reactions    Aspirin Anaphylaxis    Asparaginase Derivatives     Ibuprofen     Nsaids     Robaxin [Methocarbamol]     Toradol [Ketorolac Tromethamine]        VTE Pharmacologic Prophylaxis:   Sequential compression device (Venodyne)     Labs:   Troponins:  Results from last 7 days   Lab Units 02/08/21  1625 02/08/21  1302 02/08/21  0945   TROPONIN I ng/mL 0 51* 0 50* 0 30*     CBC with diff:  Results from last 7 days   Lab Units 02/09/21  0657 02/08/21  0945   WBC Thousand/uL 10 79* 10 55*   HEMOGLOBIN g/dL 14 5 16 8*   HEMATOCRIT % 45 1 48 9*   MCV fL 93 88   PLATELETS Thousands/uL 363 407*   MCH pg 29 9 30 1   MCHC g/dL 32 2 34 4   RDW % 14 1 13 3   MPV fL 9 9 9 4   NRBC AUTO /100 WBCs 0 0       CMP:  Results from last 7 days   Lab Units 02/09/21  0657 02/08/21  0945   SODIUM mmol/L 134* 133*   POTASSIUM mmol/L 3 5 3 3*   CHLORIDE mmol/L 102 92*   CO2 mmol/L 22 24   ANION GAP mmol/L 10 17*   BUN mg/dL 11 10   CREATININE mg/dL 0 84 1 11   CALCIUM mg/dL 8 8 9 6   AST U/L 18 16   ALT U/L 16 22   ALK PHOS U/L 94 120*   TOTAL PROTEIN g/dL 7 6 9 0*   ALBUMIN g/dL 3 5 4 0   TOTAL BILIRUBIN mg/dL 0 60 0 60   EGFR ml/min/1 73sq m 73 52     Magnesium:  Results from last 7 days   Lab Units 02/09/21  0657 02/08/21  0945   MAGNESIUM mg/dL 1 9 1 5*     Lipid Profile:  Results from last 7 days   Lab Units 02/09/21  0657   CHOLESTEROL mg/dL 254*   TRIGLYCERIDES mg/dL 254*   HDL mg/dL 36*   LDL CALC mg/dL 167*     Hgb A1c:  Results from last 7 days   Lab Units 02/09/21  0657   HEMOGLOBIN A1C % 5 5     NT-proBNP:   Recent Labs     02/08/21  0945   NTBNP 2,526*        Imaging & Testing   I have personally reviewed pertinent reports      Cta Dissection Protocol Chest/abdomen/pelvis    Result Date: 2/8/2021  Narrative: CTA - CHEST, ABDOMEN AND PELVIS - WITHOUT AND WITH IV CONTRAST INDICATION:   severe back pain, elevated troponin  COMPARISON:  May 3, 2016 TECHNIQUE: CT examination of the chest, abdomen and pelvis was performed both prior to and after the administration of intravenous contrast   The noncontrast portion of this examination was performed utilizing low radiation dose technique  Thin section angiographic arterial phase post contrast technique was used in order to evaluate for aortic dissection  3D reformatted images and volume rendering were performed on an independent workstation  Additionally, axial, sagittal, and coronal 2D reformatted images were created from the source data and submitted for interpretation  Radiation dose length product (DLP) for this visit:  1256 55 mGy-cm   This examination, like all CT scans performed in the [facility], was performed utilizing techniques to minimize radiation dose exposure, including the use of iterative reconstruction and automated exposure control  IV Contrast:  100 mL of iohexol (OMNIPAQUE) Enteric Contrast:  Enteric contrast was not administered  FINDINGS: AORTA:  There is no aortic dissection or intramural hematoma  There is no aortic aneurysm  The aortic arch and thoracic aorta are moderately atherosclerotic with calcified and noncalcified, ulcerated plaque  There is classic branching anatomy of the aortic arch without stenoses in the great vessels  The abdominal aorta is diffusely atherosclerotic due to calcified and noncalcified plaque with a 60% infrarenal aortic stenosis  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, present on the CT from 2016, reconstitution of the proximal right external iliac and internal iliac arteries  The left common iliac artery has a 80% short segment stenosis, but is continuous  The celiac artery, superior mesenteric artery, and inferior mesenteric artery are patent    The right renal artery and 2 left renal arteries are patent  CHEST LUNGS:  4 mm right lower lobe pulmonary nodule (series 4/image 30 )  Ill-defined left lower lobe (4/39) nodule measuring 7 mm likely present on the study from 2016  PLEURA:  Unremarkable  HEART/PULMONARY ARTERIAL TREE:  Unremarkable for patient's age  MEDIASTINUM AND JOIE:  Unremarkable  CHEST WALL AND LOWER NECK:   Unremarkable  ABDOMEN LIVER/BILIARY TREE:  Unremarkable  GALLBLADDER:  No calcified gallstones  No pericholecystic inflammatory change  SPLEEN:  Unremarkable  PANCREAS:  Unremarkable  ADRENAL GLANDS:  Unremarkable  KIDNEYS/URETERS:  One or more simple renal cyst(s) is noted  Otherwise unremarkable kidneys  No hydronephrosis  STOMACH AND BOWEL:  There is colonic diverticulosis without evidence of acute diverticulitis  APPENDIX:  No findings to suggest appendicitis  ABDOMINOPELVIC CAVITY:  No ascites or free intraperitoneal air  No lymphadenopathy  PELVIS REPRODUCTIVE ORGANS:  Uterus appears unremarkable  There is no evidence of adnexal mass  URINARY BLADDER:  Unremarkable  ABDOMINAL WALL/INGUINAL REGIONS:  Unremarkable  OSSEOUS STRUCTURES:  There are age appropriate degenerative changes  No acute fracture or destructive osseous lesion  Impression: Moderately atherosclerotic thoracic aorta with irregular, ulcerated plaque  No aneurysm or dissection  Diffusely atherosclerotic abdominal aorta with a 60% stenosis in the infrarenal segment, chronic right common iliac artery occlusion, an 80% left common iliac artery stenosis  4 mm right lower lobe pulmonary nodule  This was not present on prior studies, but the area was not imaged  Based on current Fleischner Society 2017 Guidelines on incidental pulmonary nodule, because the patient is considered high risk for lung cancer, 12 month follow-up non-contrast chest CT is recommended  Diverticulosis   Workstation performed: [id number]     Ct Renal Stone Study Abdomen Pelvis Wo Contrast    Result Date: 2/8/2021  Narrative: CT ABDOMEN AND PELVIS WITHOUT IV CONTRAST - LOW DOSE RENAL STONE INDICATION:   right lumbar pain radiating to RLQ  COMPARISON:  CT abdomen dated 5/3/2016 and ultrasound dated 5/18/2016  TECHNIQUE:  Low dose thin section CT examination of the abdomen and pelvis was performed without intravenous or oral contrast according to a protocol specifically designed to evaluate for urinary tract calculus  Axial, sagittal, and coronal 2D reformatted images were created from the source data and submitted for interpretation  Evaluation for pathology in the abdomen and pelvis that is unrelated to urinary tract calculi is limited  Radiation dose length product (DLP) for this visit:  315 52 mGy-cm   This examination, like all CT scans performed in the [facility], was performed utilizing techniques to minimize radiation dose exposure, including the use of iterative  reconstruction and automated exposure control  FINDINGS: RIGHT KIDNEY AND URETER: No urinary tract calculi  No hydronephrosis or hydroureter  LEFT KIDNEY AND URETER: No urinary tract calculi  No hydronephrosis or hydroureter  Left renal cyst again seen  URINARY BLADDER: Unremarkable  No significant abnormality in the visualized lung bases  Limited low radiation dose noncontrast CT evaluation demonstrates no clinically significant abnormality of liver, spleen, pancreas, or adrenal glands  No calcified gallstones or gallbladder wall thickening noted  No ascites or bulky lymphadenopathy on this limited noncontrast study  Colonic diverticula are noted, without evidence to suggest acute diverticulitis  Visualized bowel appears otherwise unremarkable  The appendix is well seen and there is no evidence of acute appendicitis  Atherosclerotic changes are again seen in the aorta  No aneurysm  Uterus and adnexa are unremarkable  Degenerative spondylosis is similar to previous lumbar spine MRI dated 4/10/2017    Again this is most pronounced at L4-5 disc bulge and severe facet arthropathy causing mild canal stenosis and bilateral foraminal stenosis, right worse than left  Impression: No stones, hydronephrosis, appendicitis or other acute intra-abdominal pathology  Workstation performed: [id number]        EKG / Monitor: Personally reviewed  Sinus rhythm, sinus tachycardia    Cardiac testing:   Echo pending  Stress test rescheduled due to patient's flank pain          Deneen [place]        "This note has been constructed using a voice recognition system  Therefore there may be syntax, spelling, and/or grammatical errors   Please call if you have any questions  "

## 2021-02-09 NOTE — ASSESSMENT & PLAN NOTE
Initial troponin 0 3 -> 0 5 in the setting of hypertension, hypokalemia, hypomagnesemia  Family history of coronary artery disease  EKG without ischemic changes  Given morphine and Dilaudid in the  Patient's chest pain has now resolved  · Trend troponins  · Lovenox 1 milligram/kilogram SQ x1  · Hold off on aspirin as patient has allergy  · Start Lipitor 40 mg daily  · Monitor on telemetry  · Consult Cardiology:Echo and stress test, monitor on telemetry, trend troponin & check lipid panel, start Lipitor 40mg qd, aspirin held due to documented allergy with anaphylaxis as reaction  Pt became hypotensive after receiving pain medicine in emergency room, would hold Valsartan & HCTZ and continue to monitor  t  · Planning for nuclear stress test and echocardiogram today

## 2021-02-09 NOTE — ASSESSMENT & PLAN NOTE
Initial troponin 0 3 -> 0 5 in the setting of hypertension, hypokalemia, hypomagnesemia  Family history of coronary artery disease  EKG without ischemic changes  Given morphine and Dilaudid in the  Patient's chest pain has now resolved  · Trend troponins  · S/p Lovenox 1 milligram/kilogram SQ x1  · Hold off on aspirin as patient has allergy  · Continue Lipitor 40 mg daily  · Continue beta-blocker  · Monitor on telemetry  · echo with EF of 55-60%  · Plan for nuclear stress test when more stable   · Cardiology managing

## 2021-02-10 LAB
ALBUMIN SERPL BCP-MCNC: 3.6 G/DL (ref 3.5–5)
ALP SERPL-CCNC: 112 U/L (ref 46–116)
ALT SERPL W P-5'-P-CCNC: 18 U/L (ref 12–78)
ANION GAP SERPL CALCULATED.3IONS-SCNC: 12 MMOL/L (ref 4–13)
AST SERPL W P-5'-P-CCNC: 27 U/L (ref 5–45)
BASOPHILS # BLD AUTO: 0.07 THOUSANDS/ΜL (ref 0–0.1)
BASOPHILS NFR BLD AUTO: 0 % (ref 0–1)
BILIRUB SERPL-MCNC: 0.6 MG/DL (ref 0.2–1)
BUN SERPL-MCNC: 10 MG/DL (ref 5–25)
CALCIUM SERPL-MCNC: 9.1 MG/DL (ref 8.3–10.1)
CHLORIDE SERPL-SCNC: 95 MMOL/L (ref 100–108)
CO2 SERPL-SCNC: 24 MMOL/L (ref 21–32)
CREAT SERPL-MCNC: 0.88 MG/DL (ref 0.6–1.3)
EOSINOPHIL # BLD AUTO: 0.15 THOUSAND/ΜL (ref 0–0.61)
EOSINOPHIL NFR BLD AUTO: 1 % (ref 0–6)
ERYTHROCYTE [DISTWIDTH] IN BLOOD BY AUTOMATED COUNT: 13.4 % (ref 11.6–15.1)
GFR SERPL CREATININE-BSD FRML MDRD: 69 ML/MIN/1.73SQ M
GLUCOSE SERPL-MCNC: 138 MG/DL (ref 65–140)
HCT VFR BLD AUTO: 51.5 % (ref 34.8–46.1)
HGB BLD-MCNC: 17.5 G/DL (ref 11.5–15.4)
IMM GRANULOCYTES # BLD AUTO: 0.08 THOUSAND/UL (ref 0–0.2)
IMM GRANULOCYTES NFR BLD AUTO: 1 % (ref 0–2)
LACTATE SERPL-SCNC: 1.2 MMOL/L (ref 0.5–2)
LDH SERPL-CCNC: 356 U/L (ref 81–234)
LYMPHOCYTES # BLD AUTO: 3.62 THOUSANDS/ΜL (ref 0.6–4.47)
LYMPHOCYTES NFR BLD AUTO: 21 % (ref 14–44)
MAGNESIUM SERPL-MCNC: 1.8 MG/DL (ref 1.6–2.6)
MCH RBC QN AUTO: 30.1 PG (ref 26.8–34.3)
MCHC RBC AUTO-ENTMCNC: 34 G/DL (ref 31.4–37.4)
MCV RBC AUTO: 89 FL (ref 82–98)
MONOCYTES # BLD AUTO: 1.29 THOUSAND/ΜL (ref 0.17–1.22)
MONOCYTES NFR BLD AUTO: 7 % (ref 4–12)
NEUTROPHILS # BLD AUTO: 12.26 THOUSANDS/ΜL (ref 1.85–7.62)
NEUTS SEG NFR BLD AUTO: 70 % (ref 43–75)
NRBC BLD AUTO-RTO: 0 /100 WBCS
PHOSPHATE SERPL-MCNC: 3.2 MG/DL (ref 2.3–4.1)
PLATELET # BLD AUTO: 421 THOUSANDS/UL (ref 149–390)
PMV BLD AUTO: 9.3 FL (ref 8.9–12.7)
POTASSIUM SERPL-SCNC: 3.8 MMOL/L (ref 3.5–5.3)
PROT SERPL-MCNC: 8.3 G/DL (ref 6.4–8.2)
RBC # BLD AUTO: 5.82 MILLION/UL (ref 3.81–5.12)
SODIUM SERPL-SCNC: 131 MMOL/L (ref 136–145)
WBC # BLD AUTO: 17.47 THOUSAND/UL (ref 4.31–10.16)

## 2021-02-10 PROCEDURE — 85025 COMPLETE CBC W/AUTO DIFF WBC: CPT | Performed by: STUDENT IN AN ORGANIZED HEALTH CARE EDUCATION/TRAINING PROGRAM

## 2021-02-10 PROCEDURE — 99232 SBSQ HOSP IP/OBS MODERATE 35: CPT | Performed by: FAMILY MEDICINE

## 2021-02-10 PROCEDURE — 97110 THERAPEUTIC EXERCISES: CPT

## 2021-02-10 PROCEDURE — 97163 PT EVAL HIGH COMPLEX 45 MIN: CPT

## 2021-02-10 PROCEDURE — 84244 ASSAY OF RENIN: CPT | Performed by: STUDENT IN AN ORGANIZED HEALTH CARE EDUCATION/TRAINING PROGRAM

## 2021-02-10 PROCEDURE — 80053 COMPREHEN METABOLIC PANEL: CPT | Performed by: STUDENT IN AN ORGANIZED HEALTH CARE EDUCATION/TRAINING PROGRAM

## 2021-02-10 PROCEDURE — 87040 BLOOD CULTURE FOR BACTERIA: CPT | Performed by: STUDENT IN AN ORGANIZED HEALTH CARE EDUCATION/TRAINING PROGRAM

## 2021-02-10 PROCEDURE — 84100 ASSAY OF PHOSPHORUS: CPT | Performed by: STUDENT IN AN ORGANIZED HEALTH CARE EDUCATION/TRAINING PROGRAM

## 2021-02-10 PROCEDURE — 99232 SBSQ HOSP IP/OBS MODERATE 35: CPT | Performed by: NURSE PRACTITIONER

## 2021-02-10 PROCEDURE — 82088 ASSAY OF ALDOSTERONE: CPT | Performed by: STUDENT IN AN ORGANIZED HEALTH CARE EDUCATION/TRAINING PROGRAM

## 2021-02-10 PROCEDURE — 83615 LACTATE (LD) (LDH) ENZYME: CPT | Performed by: STUDENT IN AN ORGANIZED HEALTH CARE EDUCATION/TRAINING PROGRAM

## 2021-02-10 PROCEDURE — 99223 1ST HOSP IP/OBS HIGH 75: CPT | Performed by: PSYCHIATRY & NEUROLOGY

## 2021-02-10 PROCEDURE — 83735 ASSAY OF MAGNESIUM: CPT | Performed by: STUDENT IN AN ORGANIZED HEALTH CARE EDUCATION/TRAINING PROGRAM

## 2021-02-10 PROCEDURE — 83605 ASSAY OF LACTIC ACID: CPT | Performed by: STUDENT IN AN ORGANIZED HEALTH CARE EDUCATION/TRAINING PROGRAM

## 2021-02-10 RX ORDER — AMOXICILLIN 250 MG
1 CAPSULE ORAL
Status: DISCONTINUED | OUTPATIENT
Start: 2021-02-10 | End: 2021-02-12 | Stop reason: HOSPADM

## 2021-02-10 RX ORDER — HYDRALAZINE HYDROCHLORIDE 25 MG/1
25 TABLET, FILM COATED ORAL EVERY 8 HOURS SCHEDULED
Status: DISCONTINUED | OUTPATIENT
Start: 2021-02-10 | End: 2021-02-12 | Stop reason: HOSPADM

## 2021-02-10 RX ORDER — DIVALPROEX SODIUM 250 MG/1
250 TABLET, DELAYED RELEASE ORAL EVERY 12 HOURS SCHEDULED
Status: DISCONTINUED | OUTPATIENT
Start: 2021-02-10 | End: 2021-02-12 | Stop reason: HOSPADM

## 2021-02-10 RX ORDER — MAGNESIUM SULFATE HEPTAHYDRATE 40 MG/ML
2 INJECTION, SOLUTION INTRAVENOUS ONCE
Status: COMPLETED | OUTPATIENT
Start: 2021-02-10 | End: 2021-02-10

## 2021-02-10 RX ORDER — CARVEDILOL 12.5 MG/1
12.5 TABLET ORAL 2 TIMES DAILY WITH MEALS
Status: DISCONTINUED | OUTPATIENT
Start: 2021-02-10 | End: 2021-02-12 | Stop reason: HOSPADM

## 2021-02-10 RX ADMIN — ACETAMINOPHEN 650 MG: 325 TABLET, FILM COATED ORAL at 21:25

## 2021-02-10 RX ADMIN — CARVEDILOL 12.5 MG: 12.5 TABLET, FILM COATED ORAL at 08:50

## 2021-02-10 RX ADMIN — LEVETIRACETAM 500 MG: 500 TABLET, FILM COATED ORAL at 08:50

## 2021-02-10 RX ADMIN — CARVEDILOL 12.5 MG: 12.5 TABLET, FILM COATED ORAL at 15:54

## 2021-02-10 RX ADMIN — DIVALPROEX SODIUM 250 MG: 250 TABLET, DELAYED RELEASE ORAL at 21:25

## 2021-02-10 RX ADMIN — PROMETHAZINE HYDROCHLORIDE 25 MG: 25 INJECTION INTRAMUSCULAR; INTRAVENOUS at 12:13

## 2021-02-10 RX ADMIN — LIDOCAINE 5% 1 PATCH: 700 PATCH TOPICAL at 00:21

## 2021-02-10 RX ADMIN — ATORVASTATIN CALCIUM 80 MG: 80 TABLET ORAL at 15:54

## 2021-02-10 RX ADMIN — HYDRALAZINE HYDROCHLORIDE 25 MG: 25 TABLET, FILM COATED ORAL at 08:50

## 2021-02-10 RX ADMIN — DIVALPROEX SODIUM 250 MG: 250 TABLET, DELAYED RELEASE ORAL at 13:35

## 2021-02-10 RX ADMIN — PROMETHAZINE HYDROCHLORIDE 25 MG: 25 INJECTION INTRAMUSCULAR; INTRAVENOUS at 21:18

## 2021-02-10 RX ADMIN — DOCUSATE SODIUM AND SENNOSIDES 1 TABLET: 8.6; 5 TABLET ORAL at 23:48

## 2021-02-10 RX ADMIN — MAGNESIUM SULFATE HEPTAHYDRATE 2 G: 40 INJECTION, SOLUTION INTRAVENOUS at 03:24

## 2021-02-10 RX ADMIN — PROMETHAZINE HYDROCHLORIDE 25 MG: 25 INJECTION INTRAMUSCULAR; INTRAVENOUS at 00:07

## 2021-02-10 RX ADMIN — ACETAMINOPHEN 650 MG: 325 TABLET, FILM COATED ORAL at 13:36

## 2021-02-10 RX ADMIN — HYDRALAZINE HYDROCHLORIDE 25 MG: 25 TABLET, FILM COATED ORAL at 13:35

## 2021-02-10 RX ADMIN — PROMETHAZINE HYDROCHLORIDE 25 MG: 25 INJECTION INTRAMUSCULAR; INTRAVENOUS at 16:35

## 2021-02-10 RX ADMIN — MAGNESIUM SULFATE HEPTAHYDRATE 2 G: 40 INJECTION, SOLUTION INTRAVENOUS at 01:19

## 2021-02-10 RX ADMIN — MAGNESIUM SULFATE HEPTAHYDRATE 2 G: 40 INJECTION, SOLUTION INTRAVENOUS at 10:09

## 2021-02-10 RX ADMIN — LIDOCAINE 5% 1 PATCH: 700 PATCH TOPICAL at 12:14

## 2021-02-10 RX ADMIN — NICOTINE 1 PATCH: 14 PATCH, EXTENDED RELEASE TRANSDERMAL at 16:36

## 2021-02-10 RX ADMIN — ACETAMINOPHEN 650 MG: 325 TABLET, FILM COATED ORAL at 06:56

## 2021-02-10 RX ADMIN — LOSARTAN POTASSIUM 100 MG: 50 TABLET, FILM COATED ORAL at 08:49

## 2021-02-10 RX ADMIN — TIOTROPIUM BROMIDE 18 MCG: 18 CAPSULE ORAL; RESPIRATORY (INHALATION) at 08:50

## 2021-02-10 NOTE — PROGRESS NOTES
[facility] 26 Progress Note - Leon Garrett 72 y o  female MRN: [id number]    Unit/Bed#: [address] [place] [address] Encounter: [id number]      Assessment/Plan:  * Elevated troponin  Assessment & Plan  Initial troponin 0 3 -> 0 5 in the setting of hypertension, hypokalemia, hypomagnesemia  Family history of coronary artery disease  EKG without ischemic changes  Given morphine and Dilaudid in the  Patient's chest pain has now resolved  · Trend troponins  · S/p Lovenox 1 milligram/kilogram SQ x1  · Hold off on aspirin as patient has allergy  · Continue Lipitor 40 mg daily  · Continue beta-blocker  · Monitor on telemetry  · echo with EF of 55-60%  · Plan for nuclear stress test when more stable   · Cardiology managing      Acute right flank pain  Assessment & Plan  Assessment  · Patient reports right flank pain began one day PTA and it improved but did not resolve with Tylenol  · She was then woken from sleep at approximately 12 am morning of admission with 10/10 right flank pain that radiated to her right abdomen and with associated N/V, SOB, diaphoresis, vertigo and incontinence of urine  Felt 2-3 weeks prior to admission but otherwise no recent injury  · Unclear etiology  Renal versus MSK  · Patient has a known history of spinal stenosis with new disc bulge identified on CT during this admission  ·  Received morphine Dilaudid in the ED resulting in profound hypotension  ·  Received 1 dose of tramadol and suffered a seizure in the setting of malignant hypertension as well  Multiple allergies to NSAIDs  · CT renal stone study:  No stones, hydronephrosis, appendicitis or other acute intra-abdominal pathology  · CTA dissection protocol of chest/abdomen/pelvis did not show any acute pathology the kidneys with patent renal arteries in no apparent infarct her hydronephrosis    Discussed this with the radiologist     Plan  · PT/OT eval  · Lidoderm, Tylenol, baclofen, heat packs on board with little relief  · Avoid opiates and NSAIDs  · Consider Nephro consult evaluate for other renal disease    Essential hypertension with hypertensive urgency  Assessment & Plan  Blood pressure 166/100 on admission with headache, blurry vision nausea and vomiting  no signs of acute end-organ damage identified on labs  Her creatinine is within normal limits  After receiving IV Dilaudid and morphine, blood pressure decreased to 100/58 in the ED  blood pressures have been elevated throughout her stay as high as 704 systolic have also been quite labile dropping to 80 systolic following administration of Ativan for seizure  Her home meds have been restarted and she is now on Losartan 100 and HCTZ 25, Coreg 6 25 b i d  And hydralazine 25 t i d some improvement in blood pressure  Hypertensive urgency Likely contributing factor to seizure that occurred on 02/09  Unclear etiology of urgency possible in setting of pain or possibly secondary to undiscovered renal pathology  Plan  · Continue to monitor BP  · Attempt to achieve better pain control  · Continue with current regimen outlined above  · Consult nephro for assistance with BP control      Hypophosphatemia  Assessment & Plan  Monitor and replete p r n  Hypomagnesemia  Assessment & Plan  · Monitor replete p r n  SIRS (systemic inflammatory response syndrome) ([facility] [address] )  Assessment & Plan  Patient met SIRS criteria on arrival as evidenced by tachycardia and tachypnea  Patient is afebrile  Lactic initially elevated at 3 6, has since cleared with 1 Liter NS Bolus  No obvious source of infection  CTA and CT abdomen/pelvis negative  Low suspicion for UTI  UA was positive for blood, ketones and protein  Negative for leukocytes and nitrites    · Monitor off antibiotics   · Follow up blood cultures  · Monitor temperature, HR and RR  · Check CBC in the morning    Hypokalemia  Assessment & Plan  Patient noted to be hypokalemic  Patient as had episodes of vomiting which could be contributing  · Monitor replete p r n  Hyponatremia  Assessment & Plan  Patient noted to be hyponatremic on arrival at 13, improved to 134 today  This could be secondary to poor oral intake and episodes of vomiting this morning  · Given 1 Liter NS Bolus in the ED  · Will start NS at 50 mL/hr   · Monitor Na in am    Seizures [facility])  Assessment & Plan  Patient with remote history of seizure disorder and had followed with [facility]   Had been on Depakote but was stopped approximately 3-4 years ago as she was told by her neurologist at the time that she no longer needed it  Last seizure was approximately 3-4 years ago  She states she had both absent seizures and tonic-clonic seizures  She did suffer seizure on 02/09 thought to be secondary to accelerated hypertension with encephalopathy with contribution from possibly tramadol lowering seizure threshold and electrolyte abnormality  She has been loaded on Keppra 1000 mg   · Keppra 500 mg po bid  · Seizure precautions  · Neurochecks  · Neurology consult       COPD (chronic obstructive pulmonary disease) ([facility] [address] )  Assessment & Plan  Patient has a history of COPD and is a current smoker  She has smoked since age 29 and has been reducing her intake recently and now smoked 1/2 PPD  Patient is currently 98% on room air and denies SOB  · Encourage smoking cessation   · Substitute Incruse for Spiriva while hospitalized  · Monitor      Depression  Assessment & Plan  In context of hypertensive urgency and concern for serotonin syndrome, reduce dose of trazodone to 75mg from 150mg daily  Continue venlafaxine 75mg daily  Intervertebral disc disorder with radiculopathy of lumbosacral region  Assessment & Plan  Patient and long history of lumbar radiculopathy  CT on admission demonstrated Degenerative spondylosis is similar to previous lumbar spine MRI dated 4/10/2017    Again this is most pronounced at L4-5 disc bulge and severe facet arthropathy causing mild canal stenosis and bilateral foraminal stenosis, right worse than left  She states current flank pain is different than the type of pain she has felt in the past with lumbar radiculopathy  · See assessment and plan for flank pain        24 Hour Events:  Patient suffered a tonic-clonic seizure yesterday afternoon  Seizure lasted approximately 3 minutes and occurred about 2 hours after receiving tramadol  It also occurred in the setting of accelerated hypertension, high of 218/94  The seizure lasted approximately 3 minutes and resolved with IV Ativan 2 mg  Neurology was contacted and she was loaded with a 1000 mg of Keppra  Started on Keppra 500 mg p o  B i d  Today  Thought to be multifactorial and secondary to malignant hypertension with potential hypertensive encephalopathy as it was preceded by blurry vision, headache, nausea and vomiting in addition to possible lowering of seizure threshold with administration of 50 mg p o  Tramadol for pain relief  We have held her trazodone the Effexor and ceased tramadol  Blood pressure has improved with initiation of hydralazine per cardiology recommendations  She continues with severe 10/10 right flank pain and nausea but no vomiting overnight  She does feel like her patient is still somewhat blurry  Subjective:   She was seen and examined at bedside  No acute events reported overnight  Patient continues with 10/10 right flank pain at the level of L3-L4  She has had no relief from Tylenol, lidocaine or heating pad  She states pain is different from her typical spinal stenosis pain  Patient did also reports today that she has been having blurry vision since the onset of her symptoms day of admission in addition to headache and nausea/vomiting  She denies numbness, tingling, lower extremity weakness, bowel/bladder incontinence currently  Objective:     Vitals: Blood pressure 170/85, pulse (!) 108, temperature 98 2 °F (36 8 °C), resp   rate 17, height 5' (1 524 m), weight 79 4 kg (175 lb 0 7 oz), SpO2 94 %  ,Body mass index is 34 19 kg/m²    Wt Readings from Last 3 Encounters:   02/09/21 79 4 kg (175 lb 0 7 oz)   03/03/20 85 8 kg (189 lb 2 oz)   07/18/19 80 3 kg (177 lb)       Intake/Output Summary (Last 24 hours) at 2/10/2021 0883  Last data filed at 2/10/2021 0119  Gross per 24 hour   Intake --   Output 600 ml   Net -600 ml       Physical Exam:   General appearance: alert and oriented, in no acute distress  Lungs: clear to auscultation bilaterally  Heart: Tachycardia, regular rhythm, normal S1-S2,  Abdomen:  CVA tenderness right, abdomen is soft, nontender nondistended normoactive bowel sounds  MSK: no paraspinal tenderness below L4-L5, sensation is intact bilateral lower extremities, muscle strength is grossly within normal limits, but limited secondary to pain  Extremities: extremities normal, warm and well-perfused; no cyanosis, clubbing, or edema  Pulses: 2+ and symmetric     Recent Results (from the past 24 hour(s))   Basic metabolic panel    Collection Time: 02/09/21 10:18 PM   Result Value Ref Range    Sodium 133 (L) 136 - 145 mmol/L    Potassium 3 5 3 5 - 5 3 mmol/L    Chloride 95 (L) 100 - 108 mmol/L    CO2 25 21 - 32 mmol/L    ANION GAP 13 4 - 13 mmol/L    BUN 10 5 - 25 mg/dL    Creatinine 0 81 0 60 - 1 30 mg/dL    Glucose 118 65 - 140 mg/dL    Calcium 9 1 8 3 - 10 1 mg/dL    eGFR 76 ml/min/1 73sq m   Magnesium    Collection Time: 02/09/21 10:18 PM   Result Value Ref Range    Magnesium 1 7 1 6 - 2 6 mg/dL   Phosphorus    Collection Time: 02/09/21 10:18 PM   Result Value Ref Range    Phosphorus 3 0 2 3 - 4 1 mg/dL   CBC and differential    Collection Time: 02/10/21  4:46 AM   Result Value Ref Range    WBC 17 47 (H) 4 31 - 10 16 Thousand/uL    RBC 5 82 (H) 3 81 - 5 12 Million/uL    Hemoglobin 17 5 (H) 11 5 - 15 4 g/dL    Hematocrit 51 5 (H) 34 8 - 46 1 %    MCV 89 82 - 98 fL    MCH 30 1 26 8 - 34 3 pg    MCHC 34 0 31 4 - 37 4 g/dL    RDW 13 4 11 6 - 15 1 % MPV 9 3 8 9 - 12 7 fL    Platelets 778 (H) 329 - 390 Thousands/uL    nRBC 0 /100 WBCs    Neutrophils Relative 70 43 - 75 %    Immat GRANS % 1 0 - 2 %    Lymphocytes Relative 21 14 - 44 %    Monocytes Relative 7 4 - 12 %    Eosinophils Relative 1 0 - 6 %    Basophils Relative 0 0 - 1 %    Neutrophils Absolute 12 26 (H) 1 85 - 7 62 Thousands/µL    Immature Grans Absolute 0 08 0 00 - 0 20 Thousand/uL    Lymphocytes Absolute 3 62 0 60 - 4 47 Thousands/µL    Monocytes Absolute 1 29 (H) 0 17 - 1 22 Thousand/µL    Eosinophils Absolute 0 15 0 00 - 0 61 Thousand/µL    Basophils Absolute 0 07 0 00 - 0 10 Thousands/µL   Comprehensive metabolic panel    Collection Time: 02/10/21  4:46 AM   Result Value Ref Range    Sodium 131 (L) 136 - 145 mmol/L    Potassium 3 8 3 5 - 5 3 mmol/L    Chloride 95 (L) 100 - 108 mmol/L    CO2 24 21 - 32 mmol/L    ANION GAP 12 4 - 13 mmol/L    BUN 10 5 - 25 mg/dL    Creatinine 0 88 0 60 - 1 30 mg/dL    Glucose 138 65 - 140 mg/dL    Calcium 9 1 8 3 - 10 1 mg/dL    AST 27 5 - 45 U/L    ALT 18 12 - 78 U/L    Alkaline Phosphatase 112 46 - 116 U/L    Total Protein 8 3 (H) 6 4 - 8 2 g/dL    Albumin 3 6 3 5 - 5 0 g/dL    Total Bilirubin 0 60 0 20 - 1 00 mg/dL    eGFR 69 ml/min/1 73sq m   Magnesium    Collection Time: 02/10/21  4:46 AM   Result Value Ref Range    Magnesium 1 8 1 6 - 2 6 mg/dL   Phosphorus    Collection Time: 02/10/21  4:46 AM   Result Value Ref Range    Phosphorus 3 2 2 3 - 4 1 mg/dL       Current Facility-Administered Medications   Medication Dose Route Frequency Provider Last Rate Last Admin    acetaminophen (TYLENOL) tablet 650 mg  650 mg Oral On license of [facility] NITHIN Mcgee   650 mg at 02/10/21 5451    atorvastatin (LIPITOR) tablet 80 mg  80 mg Oral Daily With NITHIN Wheeler   80 mg at 02/09/21 1537    calcium carbonate (TUMS) chewable tablet 1,000 mg  1,000 mg Oral Daily PRN NITHIN Mcgee   1,000 mg at 02/09/21 0600    carvedilol (COREG) tablet 12 5 mg 12 5 mg Oral BID With Meals NITHIN Rondon        hydrALAZINE (APRESOLINE) tablet 25 mg  25 mg Oral Q8H [address] NITHIN Rondon        levETIRAcetam (KEPPRA) tablet 500 mg  500 mg Oral Q12H [address] Floydene DO Mary        lidocaine (LIDODERM) 5 % patch 1 patch  1 patch Topical Q12H PRN Pat Messier, DO   1 patch at 02/10/21 0021    losartan (COZAAR) tablet 100 mg  100 mg Oral Daily [employer] [place], DO   100 mg at 02/09/21 5406    nicotine (NICODERM CQ) 14 mg/24hr TD 24 hr patch 1 patch  1 patch Transdermal Q24H NITHIN Dave   1 patch at 02/09/21 1811    polyethylene glycol (MIRALAX) packet 17 g  17 g Oral Daily PRN NITHIN Dave        promethazine (PHENERGAN) injection 25 mg  25 mg Intravenous Q4H PRN Micah Hernandez DO   25 mg at 02/10/21 0007    senna-docusate sodium (SENOKOT S) 8 6-50 mg per tablet 1 tablet  1 tablet Oral HS NITHIN Rondon        tiotropium [facility]) capsule for inhaler 18 mcg  18 mcg Inhalation Daily NITHIN Dave   18 mcg at 02/09/21 1931       Invasive Devices     Peripheral Intravenous Line            Peripheral IV 02/10/21 Right Wrist less than 1 day                Lab, Imaging and other studies: I have personally reviewed pertinent reports      VTE Pharmacologic Prophylaxis: Reason for no pharmacologic prophylaxis Not indicate  VTE Mechanical Prophylaxis: sequential compression device    Micah Hernandez DO

## 2021-02-10 NOTE — PLAN OF CARE
Problem: Potential for Falls  Goal: Patient will remain free of falls  Description: INTERVENTIONS:  - Assess patient frequently for physical needs  -  Identify cognitive and physical deficits and behaviors that affect risk of falls  -  [place] fall precautions as indicated by assessment   - Educate patient/family on patient safety including physical limitations  - Instruct patient to call for assistance with activity based on assessment  - Modify environment to reduce risk of injury  - Consider OT/PT consult to assist with strengthening/mobility  Outcome: Progressing     Problem: SAFETY ADULT  Goal: Patient will remain free of falls  Description: INTERVENTIONS:  - Assess patient frequently for physical needs  -  Identify cognitive and physical deficits and behaviors that affect risk of falls    -  [place] fall precautions as indicated by assessment   - Educate patient/family on patient safety including physical limitations  - Instruct patient to call for assistance with activity based on assessment  - Modify environment to reduce risk of injury  - Consider OT/PT consult to assist with strengthening/mobility  Outcome: Progressing  Goal: Maintain or return to baseline ADL function  Description: INTERVENTIONS:  -  Assess patient's ability to carry out ADLs; assess patient's baseline for ADL function and identify physical deficits which impact ability to perform ADLs (bathing, care of mouth/teeth, toileting, grooming, dressing, etc )  - Assess/evaluate cause of self-care deficits   - Assess range of motion  - Assess patient's mobility; develop plan if impaired  - Assess patient's need for assistive devices and provide as appropriate  - Encourage maximum independence but intervene and supervise when necessary  - Involve family in performance of ADLs  - Assess for home care needs following discharge   - Consider OT consult to assist with ADL evaluation and planning for discharge  - Provide patient education as appropriate  Outcome: Progressing  Goal: Maintain or return mobility status to optimal level  Description: INTERVENTIONS:  - Assess patient's baseline mobility status (ambulation, transfers, stairs, etc )    - Identify cognitive and physical deficits and behaviors that affect mobility  - Identify mobility aids required to assist with transfers and/or ambulation (gait belt, sit-to-stand, lift, walker, cane, etc )  - [place] fall precautions as indicated by assessment  - Record patient progress and toleration of activity level on Mobility SBAR; progress patient to next Phase/Stage  - Instruct patient to call for assistance with activity based on assessment  - Consider rehabilitation consult to assist with strengthening/weightbearing, etc   Outcome: Progressing     Problem: CARDIOVASCULAR - ADULT  Goal: Maintains optimal cardiac output and hemodynamic stability  Description: INTERVENTIONS:  - Monitor I/O, vital signs and rhythm  - Monitor for S/S and trends of decreased cardiac output  - Administer and titrate ordered vasoactive medications to optimize hemodynamic stability  - Assess quality of pulses, skin color and temperature  - Assess for signs of decreased coronary artery perfusion  - Instruct patient to report change in severity of symptoms  Outcome: Progressing  Goal: Absence of cardiac dysrhythmias or at baseline rhythm  Description: INTERVENTIONS:  - Continuous cardiac monitoring, vital signs, obtain 12 lead EKG if ordered  - Administer antiarrhythmic and heart rate control medications as ordered  - Monitor electrolytes and administer replacement therapy as ordered  Outcome: Progressing     Problem: Prexisting or High Potential for Compromised Skin Integrity  Goal: Skin integrity is maintained or improved  Description: INTERVENTIONS:  - Identify patients at risk for skin breakdown  - Assess and monitor skin integrity  - Assess and monitor nutrition and hydration status  - Monitor labs   - Assess for incontinence   - Turn and reposition patient  - Assist with mobility/ambulation  - Relieve pressure over bony prominences  - Avoid friction and shearing  - Provide appropriate hygiene as needed including keeping skin clean and dry  - Evaluate need for skin moisturizer/barrier cream  - Collaborate with interdisciplinary team   - Patient/family teaching  - Consider wound care consult   Outcome: Progressing

## 2021-02-10 NOTE — ASSESSMENT & PLAN NOTE
Patient and long history of lumbar radiculopathy  CT on admission demonstrated Degenerative spondylosis is similar to previous lumbar spine MRI dated 4/10/2017  Again this is most pronounced at L4-5 disc bulge and severe facet arthropathy causing mild canal stenosis and bilateral foraminal stenosis, right worse than left  She states current flank pain is different than the type of pain she has felt in the past with lumbar radiculopathy    · See assessment and plan for flank pain

## 2021-02-10 NOTE — PHYSICAL THERAPY NOTE
PT EVALUATION       02/10/21 0940   PT Last Visit   PT Visit Date 02/10/21   Note Type   Note type Evaluation   Pain Assessment   Pain Assessment Tool 0-10   Pain Score 3   Pain Location/Orientation Orientation: Right  (Flank;bilateral low back)   Home Living   Type of Home Apartment  (Full flight to enter; 1+2 steps inside the apt itself)   Home Layout One level  (2nd floor apt)   Home Equipment   (rollator)   Prior Function   Level of [place] Independent with ADLs and functional mobility   Lives With Alone   Receives Help From Family   ADL Assistance Independent   Comments Pt amb w/out AD inside;uses rollator outside for long distances   Restrictions/Precautions   Other Precautions Pain; Fall Risk;Bed Alarm; Chair Alarm;Seizure   General   Additional Pertinent History Pt adm with R flank pain, SOB, diaphoresis, nausea, vomiting, brief episode of chest pain and urine incontinence  Pt does have a history of seizures  Cognition   Overall Cognitive Status WFL   Arousal/Participation Cooperative   Orientation Level Oriented X4   Following Commands Follows all commands and directions without difficulty   RLE Assessment   RLE Assessment WFL  (3/5)   LLE Assessment   LLE Assessment WFL  (3/5)   Bed Mobility   Supine to Sit 5  Supervision   Additional items Verbal cues   Transfers   Sit to Stand 4  Minimal assistance   Additional items Assist x 1;Verbal cues   Stand to Sit 4  Minimal assistance   Additional items Verbal cues   Ambulation/Elevation   Gait pattern   (multidirectional balance loss;weak, knee buckling)   Gait Assistance 3  Moderate assist   Additional items Assist x 1;Verbal cues; Tactile cues   Assistive Device None   Distance 12 feet with change in direction  (pt reaching out to hold furniture in room with gait)   Balance   Static Sitting Fair   Static Standing Fair -   Dynamic Standing Poor   Ambulatory Poor   Activity Tolerance   Activity Tolerance Patient limited by fatigue;Patient limited by pain;Treatment limited secondary to medical complications (Comment)  (weakness;unsteady;"dizzy")   Assessment   Problem List Decreased strength;Decreased range of motion;Decreased endurance; Impaired balance;Decreased mobility; Decreased coordination; Impaired judgement;Decreased safety awareness;Pain   Assessment Patient seen for Physical Therapy evaluation  Patient admitted with Elevated troponin  Comorbidities affecting patient's physical performance include: COPD, seizures, SIRS, chronic LBP, lumbar spinal stenosis, obesity, chronic fatigue, DM2  Personal factors affecting patient at time of initial evaluation include: lives in one story house, ambulating with assistive device, stairs to enter home, inability to navigate community distances, inability to navigate level surfaces without external assistance, inability to perform dynamic tasks in community, limited home support and depression  Prior to admission, patient was independent with functional mobility with rollator, independent with functional mobility without assistive device, independent with ADLS, living alone in one story home with 13 steps to enter, ambulating household distance and ambulating community distances  Please find objective findings from Physical Therapy assessment regarding body systems outlined above with impairments and limitations including weakness, decreased ROM, impaired balance, decreased endurance, impaired coordination, gait deviations, pain, decreased activity tolerance, decreased functional mobility tolerance, decreased safety awareness, fall risk and SOB upon exertion  The Barthel Index was used as a functional outcome tool presenting with a score of 50 today indicating marked limitations of functional mobility and ADLS    Patient's clinical presentation is currently unstable/unpredictable as seen in patient's presentation of vital sign response, changing level of pain, increased fall risk, new onset of impairment of functional mobility, decreased endurance and new onset of weakness  Pt would benefit from continued Physical Therapy treatment to address deficits as defined above and maximize level of functional mobility  As demonstrated by objective findings, the assigned level of complexity for this evaluation is high  The patient's [facility] Basic Mobility Inpatient Short Form Raw Score is 14, Standardized Score is 35 55  A standardized score less than 42 9 suggests the patient may benefit from discharge to post-acute rehabilitation services  Please also refer to the recommendation of the Physical Therapist for safe discharge planning  Goals   Patient Goals "feel better"   STG Expiration Date 02/17/21   Short Term Goal #1 bed mob - I; trans - S   Short Term Goal #2 pt will amb with RW functional household distances - min A   LTG Expiration Date 02/24/21   Long Term Goal #1 trans - I; pt will amb with RW functional household distances - I   Long Term Goal #2 balance with RW - F/F+ for safe gait and mobility; strength LEs - 3+ to 4-/5   Plan   Treatment/Interventions ADL retraining;Functional transfer training;LE strengthening/ROM; Therapeutic exercise;Elevations; Endurance training;Patient/family training;Equipment eval/education; Bed mobility;Gait training; Compensatory technique education   PT Frequency 5x/wk   Recommendation   PT Discharge Recommendation Post-Acute Rehabilitation Services;OT   Additional Comments Pending pt's progress with PT and LOS, pt may be appropriate for home with skilled PT services in a few days     [facility] Basic Mobility Inpatient   Turning in Bed Without Bedrails 3   Lying on Back to Sitting on Edge of Flat Bed 3   Moving Bed to Chair 2   Standing Up From Chair 3   Walk in Room 2   Climb 3-5 Stairs 1   Basic Mobility Inpatient Raw Score 14   Basic Mobility Standardized Score 35 55   Barthel Index   Feeding 10   Bathing 0   Grooming Score 0   Dressing Score 5   Bladder Score 10   Bowels Score 10   Toilet Use Score 5   Transfers (Bed/Chair) Score 10   Mobility (Level Surface) Score 0   Stairs Score 0   Barthel Index Score 48   Licensure   [place] License Number  Magaly Wolfe [place], [place] [id number]       Time PJ:0736  Time PZF:2594  Total Time: 10 mins      S:  "I just don't feel well"  O:  Pt trans sit to stand with min A and amb with RW and mod/min A 12 feet with change in direction and multiple, brief standing rest periods;pt trans stand to sit with min A and to supine with S   A:  Pt's gait is unsafe/unsteady with and without RW and pt is at risk for falls  Pt will cont to benefit from skilled PT services to return pt to her prior level of function  P:  Cont per PT POC  DCP - post-acute rehab services      Basia Diaz   [id number]

## 2021-02-10 NOTE — CONSULTS
[address]   Neurology Initial Consult    Sejal Coker is a 72 y o  female  [address]/3 [address]-*          Information obtained from:   Chief Complaint   Patient presents with    Back Pain     patient c/o right lower back pain starting yesterday with vomiting  no known injury  Assessment/Plan:    1  Recurrent Seizure  2  HTN Emergency  3  RLQ pain  4  Elevated Troponin  5  HLD  6  HTN    -Monitor on Telemetry  -Neuro assessments  -Seizure precautions  -Increase Lipitor to 80mg daily  -Cardio for BP and troponin  -Loaded IV Keppra 1gm x1 then 500mg bid-DC today  -Start Depakote 250mg bid, increase to 500mg bid in 2-3 weeks  -PCP management of pain  -PT/OT     Pt is a 79yr old female who was admitted with RLQ pain who continues to have ongoing distress with elevated BP who developed aura which went into seizure activity  Pt received IV ativan and started on IV Keppra for this activity  Pt does have history of seizure and has been on Depakote in the past which maintained her seizure free  She was then taken off, she thinks because they thought that they were stress related  She reports she has been seizure free since taken off but had recurrent yesterday  Pt also reports history of vertigo since a child and has frequent headaches at home  She reports headaches 2-3x a week but noted that she does not recall having them while on her Depakote  Will resume her Depakote and DC her Keppra at this time  This should help maintain seizure free and assist with Headache prevention  Pt does not have significant focal deficits and has had known seizure activity, no additional studies needed at this time  Sejal Coker will need follow up in 2 months with general attending or advance practitioner  She will not require outpatient neurological testing  HPI:  Sejal Coker is a 72yo female with PMH of COPD, LBP, Seizures, headaches, vertigo, HLD and HTN    Pt was at home and noted that she had a sudden onset of severe RLQ pain as well as rt flank pain  She was brought to the ED and had complaints of severe pain with associated N/V, tachycardia, tachypnea and HTN with /104  Pt was sent for CTs and found noted RLL pulm nodule suspect for poss neoplasm  Her A/P studies were relatively inconclusive for etiology of her pain  Pt reports that she was walking from the bathroom and noted that she got a funny feeling in her toes and it worked its way up her body  She told the nurse she was going to have a seizure and then she did  Pt has no recollection after that, she received IV Ativan 2mg at that time  During this time, pt BP was 216/94  She had received Tramadol a couple of hours before this and also reported to the PCP yesterday that she had aura with blurred vision, N/V and headache  Pt reports today that she has headaches 2-3x a week and has had blurred vision for about the past 1 week  She thinks she needs new prescription and to see her eye     Pt seizure history, she developed seizure in 2006 after both her parents passed away 1 week apart from each other  She noted that she has had varying seizure presentation in the past and noted that she generally gets staring spells where she can hear people but not respond  She reports that she has had the feeling in her toes that goes up her body although can not explain what the feeling is, best described as a tingling feeling  She was on Depakote for this and was seeing Neurology, however about 3-4yrs ago she was taken off of it  She reports that they suspect it was related to the frontal lobes response to stress and that she was on it for "too long" so they discontinued it  She recalls having EEG and believes that is did not show epileptiforms, however, can not fully recall this or any other studies like MRI  She stated that when it first happened she was in [facility]      On the Depakote, pt stated that she felt pretty good, does not recall having had headaches while on the medication  She does have Chronic LBP, was on Gabapentin which did help, however, she does not like medication and felt she would get addicted so she stopped it  On exam, pt is globally weak, LE>UE  Exam is limited due to pain in her lower rt abd which she stated feels like a large bubble in her abd  She has equal sensation to UE and LE, however felt that her Rt cheek had less sensation to light touch  Reflexes intact bilaterally  Pt has full visual fields with peripheral vision intact, EOMI with CN II-XII intact  Deferred gait and romberg at this time due to increased severe pain noted, limiting motor exam as well  Pt was given Keppra at the time of her inpt seizure, however, pt reports having had good response to Depakote and has reports of frequent headache, will DC Keppra and restart her Depakote  Will start with 250mg bid and increase to 500mg bid in 2-3 weeks     Past Medical History:   Diagnosis Date    COPD [facility]chronic obstructive pulmonary disease) ([facility] [address] )     Radiculopathy of lumbar region     last assessed 17    Seizures ([facility] [address] )        Past Surgical History:   Procedure Laterality Date     SECTION      EPIDURAL BLOCK INJECTION Right 2017    Procedure: BLOCK / INJECTION EPIDURAL STEROID TRANSFORAMINAL   L4-5;  Surgeon: Pat Combs MD;  Location: [place] [address] MAIN OR;  Service:     TONSILLECTOMY         Allergies   Allergen Reactions    Aspirin Anaphylaxis    Asparaginase Derivatives     Ibuprofen     Nsaids     Robaxin [Methocarbamol]     Toradol [Ketorolac Tromethamine]          Current Facility-Administered Medications:     acetaminophen (TYLENOL) tablet 650 mg, 650 mg, Oral, Q8H [facility] [place], NITHIN Joyner, 650 mg at 02/10/21 0656    atorvastatin (LIPITOR) tablet 80 mg, 80 mg, Oral, Daily With NITHIN Vincent, 80 mg at 21 1537    calcium carbonate (TUMS) chewable tablet 1,000 mg, 1,000 mg, Oral, Daily PRN, NITHIN Kelly, 1,000 mg at 02/09/21 0600    carvedilol (COREG) tablet 12 5 mg, 12 5 mg, Oral, BID With Meals, LUIS MccoyNP, 12 5 mg at 02/10/21 0850    hydrALAZINE (APRESOLINE) tablet 25 mg, 25 mg, Oral, Q8H [address], [place] Benson CRNP, 25 mg at 02/10/21 0850    levETIRAcetam (KEPPRA) tablet 500 mg, 500 mg, Oral, Q12H [address], Cathren Martine, DO, 500 mg at 02/10/21 0850    lidocaine (LIDODERM) 5 % patch 1 patch, 1 patch, Topical, Q12H PRN, Charanjit Pardon, DO, 1 patch at 02/10/21 1214    losartan (COZAAR) tablet 100 mg, 100 mg, Oral, Daily, 100 mg at 02/10/21 0849 **AND** [DISCONTINUED] hydrochlorothiazide (HYDRODIURIL) tablet 25 mg, 25 mg, Oral, Daily, Jorge GAVIRIA Kokotek, DO, 25 mg at 02/09/21 0907    nicotine (NICODERM CQ) 14 mg/24hr TD 24 hr patch 1 patch, 1 patch, Transdermal, Q24H, NITHIN Kelly, 1 patch at 02/09/21 1811    polyethylene glycol (MIRALAX) packet 17 g, 17 g, Oral, Daily PRN, NITHIN Kelly    promethazine (PHENERGAN) injection 25 mg, 25 mg, Intravenous, Q4H PRN, Cathren Martine, DO, 25 mg at 02/10/21 1213    senna-docusate sodium (SENOKOT S) 8 6-50 mg per tablet 1 tablet, 1 tablet, Oral, HS, NITHIN Mccoy    tiotropium (SPIRIVA) capsule for inhaler 18 mcg, 18 mcg, Inhalation, Daily, NITHIN Kelly, 18 mcg at 02/10/21 0456    Social History     Socioeconomic History    Marital status:      Spouse name: Not on file    Number of children: Not on file    Years of education: Not on file    Highest education level: Not on file   Occupational History    Not on file   Social Needs    Financial resource strain: Not on file    Food insecurity     Worry: Not on file     Inability: Not on file   [employer] needs     Medical: Not on file     Non-medical: Not on file   Tobacco Use    Smoking status: Current Some Day Smoker     Packs/day: 0 25    Smokeless tobacco: Current User    Tobacco comment: Per allscripts: Current everyday smoker   Substance and Sexual Activity    Alcohol use: Not Currently     Frequency: Never     Drinks per session: Patient refused     Binge frequency: Never     Comment: Per allscripts: Social    Drug use: Not Currently     Types: Marijuana    Sexual activity: Not Currently     Partners: Male   Lifestyle    Physical activity     Days per week: Not on file     Minutes per session: Not on file    Stress: Not on file   Relationships    Social connections     Talks on phone: Not on file     Gets together: Not on file     Attends Spiritism service: Not on file     Active member of club or organization: Not on file     Attends meetings of clubs or organizations: Not on file     Relationship status: Not on file    Intimate partner violence     Fear of current or ex partner: Not on file     Emotionally abused: Not on file     Physically abused: Not on file     Forced sexual activity: Not on file   Other Topics Concern    Not on file   Social History Narrative    Not on file       Family History   Problem Relation Age of Onset    Diabetes Mother     Hyperlipidemia Mother     Cancer Father     Prostate cancer Father          Review of systems:  Please see HPI for positive symptoms  Constitutional: No fever, no chills, no weight change  Ocular: No diplopia, spots/zigzag lines  +Blurred vision bilat "(Hazy Vision)"  HEENT:  No sore throat or congestion  +Headache  COR:  No chest pain  No palpitations  Lungs:  no sob  GI:  no  nausea, no vomiting, no diarrhea, no constipation, no anorexia  :  No dysuria, frequency, or urgency  No hematuria  Musculoskeletal:  No joint pain or swelling   Skin:  No rash or itching  Psychiatric:  no anxiety, no depression  Endocrine:  No polyuria or polydipsia      Physical examination:  /67 (BP Location: Right arm)   Pulse (!) 107   Temp 97 6 °F (36 4 °C) (Oral)   Resp 22   Ht 5' (1 524 m)   Wt 79 4 kg (175 lb 0 7 oz)   SpO2 96%   BMI 34 19 kg/m²     GENERAL APPEARANCE:  The patient is alert, oriented  HEENT:  Head is normocephalic  Pupils are equal and reactive  NECK:  Supple without lymphadenopathy  HEART:  Regular rate and rhythm  LUNGS:  clear to auscultation  No crackles or wheezes are heard  ABDOMEN:  Soft, nondistended  + Lower abd and Rt sided pain    EXTREMITIES:  Without cyanosis, clubbing or edema  Mental status: The patient is alert, attentive, and oriented  Speech is clear and fluent, good repetition, comprehension, and naming  Poor recall of her seizure history  Cranial nerves:  CN II: Visual fields are full to confrontation  Fundoscopic exam is normal with sharp discs and no vascular changes  Pupils are 3 mm and briskly reactive to light  CN III, IV, VI: At primary gaze, there is no eye deviation  CN V: Facial sensation is intact tin all 3 divisions bilaterally  However she reports feels light to touch on the Rt  Corneal responses are intact  CN VII: Face is symmetric with normal eye closure and smile  CN VIII: Hearing is increased in sensitivity to rubbing fingers  CN IX, X: Palate elevates symmetrically  Phonation is normal   CN XI: Head turning and shoulder shrug are intact  CN XII: Tongue is midline with normal movements and no atrophy  Motor: There is no pronator drift of out-stretched arms  Muscle bulk and tone are globally weak, worse in LE as pt has increased pain during testing     Muscle exam  Arm Right Left Leg Right Left   Deltoid 4/5 4/5 Iliopsoas 4-/5 4-/5   Biceps 4/5 4/5 Quads 4-/5 4-/5   Triceps 4/5 4/5 Hamstrings 4-/5 4-/5   Wrist Extension 4/5 4/5 Ankle Dorsi Flexion 4-/5 4-/5   Wrist Flexion 4/5 4/5 Ankle Plantar Flexion 4-/5 4-/5        Reflexes    RJ BJ TJ KJ AJ Plantars Rosa's   Right 2+ 2+ 2+ 2+ 2+ Downgoing Not present   Left 2+ 2+ 2+ 2+ 2+ Downgoing Not present      Sensory:  Light touch, Temperature, position sense, and vibration sense are intact in fingers and toes  Coordination:  Alternating movements and fine finger movements are intact  Pt slow moving  There is + dysmetria on finger-to-nose bilaterally and unable to perform heel-knee-shin 2nd to pain and weakness  There are no abnormal or extraneous movements  Romberg deferred at this time  Gait/Stance:  Deferred at this time due to excessive pain and fall/seizure precaution    Lab Results   Component Value Date    WBC 17 47 (H) 02/10/2021    HGB 17 5 (H) 02/10/2021    HCT 51 5 (H) 02/10/2021    MCV 89 02/10/2021     (H) 02/10/2021     Lab Results   Component Value Date    HGBA1C 5 5 02/09/2021     Lab Results   Component Value Date    ALT 18 02/10/2021    AST 27 02/10/2021    ALKPHOS 112 02/10/2021     Lab Results   Component Value Date    GLUCOSE 126 (H) 03/16/2016    CALCIUM 9 1 02/10/2021     03/16/2016    K 3 8 02/10/2021    CO2 24 02/10/2021    CL 95 (L) 02/10/2021    BUN 10 02/10/2021    CREATININE 0 88 02/10/2021     Chol 254      Review of reports and notes reveal:  Independent Interpretation of images or specimens:  Cta Dissection Protocol Chest/abdomen/pelvis  Result Date: 2/8/2021  Moderately atherosclerotic thoracic aorta with irregular, ulcerated plaque  No aneurysm or dissection  Diffusely atherosclerotic abdominal aorta with a 60% stenosis in the infrarenal segment, chronic right common iliac artery occlusion, an 80% left common iliac artery stenosis  4 mm right lower lobe pulmonary nodule  This was not present on prior studies, but the area was not imaged  Based on current Fleischner Society 2017 Guidelines on incidental pulmonary nodule, because the patient is considered high risk for lung cancer, 12 month follow-up non-contrast chest CT is recommended  Diverticulosis  Workstation performed: [id number]     Ct Renal Stone Study Abdomen Pelvis Wo Contrast  Result Date: 2/8/2021  No stones, hydronephrosis, appendicitis or other acute intra-abdominal pathology  Workstation performed: [id number]     Thank you for this consult  Total time of encounter: 70 Minutes  More than 50% of time was spent in counseling and coordination of care of patient

## 2021-02-10 NOTE — PROGRESS NOTES
Progress Note - Cardiology   [facility]     [facility] [place] 72 y o  female MRN: [id number]  : 1955  Unit/Bed#: [address] [place] [address] Encounter: [id number]    Assessment and Plan:   1  Hypertension:  Blood pressures remain elevated  Hydralazine 10 mg IV was given once last evening, blood pressure slowly improving     -  will transition patient to hydralazine 25 mg p o  T i d  and continue to monitor    -  increase Coreg to 12 5 mg b i d     -  continue Cozaar 100 mg once a day, but will discontinue hydrochlorothiazide due to patient's poor oral intake and complaints of nausea      2  Abnormal troponins in the setting of hypertension, hypokalemia hypo magnesium and family history of coronary artery disease:  Peak troponin was 0 5       -  will re-attempt Radha Issa today if patient able to lie due to right flank pain  -  continue beta-blocker and statin therapy    -  aspirin held secondary to documented allergy for anaphylaxis    -  echocardiogram demonstrated normal ejection fraction without regional wall motion abnormality and grade 1 diastolic dysfunction    3  Seizure:  Rapid response was called 2021 due to grand mal seizure  Patient with history of seizure and had not been taking any medications  Consultation to Neurology has been placed  4  Acute right flank pain associated with nausea and vomiting:  Managed per the primary team     5  Dyslipidemia:  Total cholesterol 254, triglycerides are 254, HDL is 36 and LDL is 167  Will increase Lipitor to 80 mg daily  Recheck lipids in 6-8 weeks if triglycerides remain elevated may need to add fish will or fenofibrate       6  COPD:  Respiratory status stable  Managed per primary team     7  Tobacco abuse:  Encouraged smoking cessation     8  Family history of coronary artery disease    Subjective / Objective:       Vitals: Blood pressure 170/85, pulse (!) 108, temperature 98 2 °F (36 8 °C), resp   rate 17, height 5' (1 524 m), weight 79 4 kg (175 lb 0 7 oz), SpO2 94 %  Vitals:    02/08/21 1300 02/09/21 0600   Weight: 79 4 kg (175 lb) 79 4 kg (175 lb 0 7 oz)     Body mass index is 34 19 kg/m²  BP Readings from Last 3 Encounters:   02/10/21 170/85   03/03/20 140/78   07/18/19 (!) 176/90     Orthostatic Blood Pressures      Most Recent Value   Blood Pressure  170/85 filed at 02/10/2021 0300   Patient Position - Orthostatic VS  Lying filed at 02/10/2021 0300        I/O       02/08 0701 - 02/09 0700 02/09 0701 - 02/10 0700 02/10 0701 - 02/11 0700    P  O  120      I V  (mL/kg) 474 2 (6)      IV Piggyback 100      Total Intake(mL/kg) 694 2 (8 7)      Urine (mL/kg/hr) 0 600 (0 3)     Emesis/NG output 20      Total Output 20 600     Net +674 2 -600            Unmeasured Urine Occurrence 1 x 1 x     Unmeasured Emesis Occurrence 1 x          Invasive Devices     Peripheral Intravenous Line            Peripheral IV 02/10/21 Right Wrist less than 1 day                  Intake/Output Summary (Last 24 hours) at 2/10/2021 0756  Last data filed at 2/10/2021 0119  Gross per 24 hour   Intake --   Output 600 ml   Net -600 ml         Physical Exam:   Physical Exam  Vitals signs and nursing note reviewed  Constitutional:       Appearance: Normal appearance  She is well-developed  She is obese  She is ill-appearing  HENT:      Head: Normocephalic and atraumatic  Right Ear: External ear normal       Left Ear: External ear normal       Nose: Nose normal    Eyes:      General: No scleral icterus  Right eye: No discharge  Left eye: No discharge  Pupils: Pupils are equal, round, and reactive to light  Neck:      Musculoskeletal: Normal range of motion and neck supple  Thyroid: No thyromegaly  Cardiovascular:      Rate and Rhythm: Regular rhythm  Tachycardia present  Pulses: Normal pulses  Pulmonary:      Effort: Pulmonary effort is normal  No respiratory distress  Breath sounds: Normal breath sounds   No wheezing, rhonchi or rales  Abdominal:      General: Bowel sounds are normal  There is no distension  Palpations: Abdomen is soft  Musculoskeletal:      Right lower leg: No edema  Left lower leg: No edema  Comments: Still with complaint of right flank pain which at times will radiate across abdomen, patient notes no bowel movement since Sunday   Skin:     General: Skin is warm and dry  Capillary Refill: Capillary refill takes less than 2 seconds  Neurological:      General: No focal deficit present  Mental Status: She is alert and oriented to person, place, and time  Mental status is at baseline  Psychiatric:         Mood and Affect: Mood normal          Behavior: Behavior normal          Thought Content:  Thought content normal          Judgment: Judgment normal                    Medications/ Allergies:     Current Facility-Administered Medications   Medication Dose Route Frequency Provider Last Rate    acetaminophen  650 mg Oral [facility] LUIS SilvaNP      atorvastatin  80 mg Oral Daily With [address], CRNP      calcium carbonate  1,000 mg Oral Daily PRN LUIS SilvaNP      carvedilol  12 5 mg Oral BID With Meals Orvan Roots, CRNP      hydrALAZINE  25 mg Oral [facility] Orvan Roots, CRNP      levETIRAcetam  500 mg Oral Q12H [place], DO      lidocaine  1 patch Topical Q12H PRN Nicanor Blackmon, DO      losartan  100 mg Oral Daily Aaron Lexx Mckeon, DO      nicotine  1 patch Transdermal Q24H LUIS SilvaNP      polyethylene glycol  17 g Oral Daily PRN LUIS SilvaNP      promethazine  25 mg Intravenous Q4H PRN Jordan Plana, DO      senna-docusate sodium  1 tablet Oral HS Orvan Roots, CRNP      tiotropium  18 mcg Inhalation Daily LUIS SilvaNP       calcium carbonate, 1,000 mg, Daily PRN  lidocaine, 1 patch, Q12H PRN  polyethylene glycol, 17 g, Daily PRN  promethazine, 25 mg, Q4H PRN      Allergies   Allergen Reactions    Aspirin Anaphylaxis    Asparaginase Derivatives     Ibuprofen     Nsaids     Robaxin [Methocarbamol]     Toradol [Ketorolac Tromethamine]        VTE Pharmacologic Prophylaxis:   Sequential compression device (Venodyne)     Labs:   Troponins:  Results from last 7 days   Lab Units 02/08/21  1625 02/08/21  1302 02/08/21  0945   TROPONIN I ng/mL 0 51* 0 50* 0 30*       CBC with diff:  Results from last 7 days   Lab Units 02/10/21  0446 02/09/21  0657 02/08/21  0945   WBC Thousand/uL 17 47* 10 79* 10 55*   HEMOGLOBIN g/dL 17 5* 14 5 16 8*   HEMATOCRIT % 51 5* 45 1 48 9*   MCV fL 89 93 88   PLATELETS Thousands/uL 421* 363 407*   MCH pg 30 1 29 9 30 1   MCHC g/dL 34 0 32 2 34 4   RDW % 13 4 14 1 13 3   MPV fL 9 3 9 9 9 4   NRBC AUTO /100 WBCs 0 0 0       CMP:  Results from last 7 days   Lab Units 02/10/21  0446 02/09/21  2218 02/09/21  0657 02/08/21  0945   SODIUM mmol/L 131* 133* 134* 133*   POTASSIUM mmol/L 3 8 3 5 3 5 3 3*   CHLORIDE mmol/L 95* 95* 102 92*   CO2 mmol/L 24 25 22 24   ANION GAP mmol/L 12 13 10 17*   BUN mg/dL 10 10 11 10   CREATININE mg/dL 0 88 0 81 0 84 1 11   CALCIUM mg/dL 9 1 9 1 8 8 9 6   AST U/L 27  --  18 16   ALT U/L 18  --  16 22   ALK PHOS U/L 112  --  94 120*   TOTAL PROTEIN g/dL 8 3*  --  7 6 9 0*   ALBUMIN g/dL 3 6  --  3 5 4 0   TOTAL BILIRUBIN mg/dL 0 60  --  0 60 0 60   EGFR ml/min/1 73sq m 69 76 73 52       Magnesium:  Results from last 7 days   Lab Units 02/10/21  0446 02/09/21  2218 02/09/21  0657 02/08/21  0945   MAGNESIUM mg/dL 1 8 1 7 1 9 1 5*     Lipid Profile:  Results from last 7 days   Lab Units 02/09/21  0657   CHOLESTEROL mg/dL 254*   TRIGLYCERIDES mg/dL 254*   HDL mg/dL 36*   LDL CALC mg/dL 167*     Hgb A1c:  Results from last 7 days   Lab Units 02/09/21  0657   HEMOGLOBIN A1C % 5 5     NT-proBNP:   Recent Labs     02/08/21  0945   NTBNP 2,640*        Imaging & Testing   I have personally reviewed pertinent reports      Cta Dissection Protocol Chest/abdomen/pelvis    Result Date: 2/8/2021  Narrative: CTA - CHEST, ABDOMEN AND PELVIS - WITHOUT AND WITH IV CONTRAST INDICATION:   severe back pain, elevated troponin  COMPARISON:  May 3, 2016 TECHNIQUE: CT examination of the chest, abdomen and pelvis was performed both prior to and after the administration of intravenous contrast   The noncontrast portion of this examination was performed utilizing low radiation dose technique  Thin section angiographic arterial phase post contrast technique was used in order to evaluate for aortic dissection  3D reformatted images and volume rendering were performed on an independent workstation  Additionally, axial, sagittal, and coronal 2D reformatted images were created from the source data and submitted for interpretation  Radiation dose length product (DLP) for this visit:  1256 55 mGy-cm   This examination, like all CT scans performed in the [facility], was performed utilizing techniques to minimize radiation dose exposure, including the use of iterative reconstruction and automated exposure control  IV Contrast:  100 mL of iohexol (OMNIPAQUE) Enteric Contrast:  Enteric contrast was not administered  FINDINGS: AORTA:  There is no aortic dissection or intramural hematoma  There is no aortic aneurysm  The aortic arch and thoracic aorta are moderately atherosclerotic with calcified and noncalcified, ulcerated plaque  There is classic branching anatomy of the aortic arch without stenoses in the great vessels  The abdominal aorta is diffusely atherosclerotic due to calcified and noncalcified plaque with a 60% infrarenal aortic stenosis  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, present on the CT from 2016, reconstitution of the proximal right external iliac and internal iliac arteries  The left common iliac artery has a 80% short segment stenosis, but is continuous    The celiac artery, superior mesenteric artery, and inferior mesenteric artery are patent  The right renal artery and 2 left renal arteries are patent  CHEST LUNGS:  4 mm right lower lobe pulmonary nodule (series 4/image 30 )  Ill-defined left lower lobe (4/39) nodule measuring 7 mm likely present on the study from 2016  PLEURA:  Unremarkable  HEART/PULMONARY ARTERIAL TREE:  Unremarkable for patient's age  MEDIASTINUM AND JOIE:  Unremarkable  CHEST WALL AND LOWER NECK:   Unremarkable  ABDOMEN LIVER/BILIARY TREE:  Unremarkable  GALLBLADDER:  No calcified gallstones  No pericholecystic inflammatory change  SPLEEN:  Unremarkable  PANCREAS:  Unremarkable  ADRENAL GLANDS:  Unremarkable  KIDNEYS/URETERS:  One or more simple renal cyst(s) is noted  Otherwise unremarkable kidneys  No hydronephrosis  STOMACH AND BOWEL:  There is colonic diverticulosis without evidence of acute diverticulitis  APPENDIX:  No findings to suggest appendicitis  ABDOMINOPELVIC CAVITY:  No ascites or free intraperitoneal air  No lymphadenopathy  PELVIS REPRODUCTIVE ORGANS:  Uterus appears unremarkable  There is no evidence of adnexal mass  URINARY BLADDER:  Unremarkable  ABDOMINAL WALL/INGUINAL REGIONS:  Unremarkable  OSSEOUS STRUCTURES:  There are age appropriate degenerative changes  No acute fracture or destructive osseous lesion  Impression: Moderately atherosclerotic thoracic aorta with irregular, ulcerated plaque  No aneurysm or dissection  Diffusely atherosclerotic abdominal aorta with a 60% stenosis in the infrarenal segment, chronic right common iliac artery occlusion, an 80% left common iliac artery stenosis  4 mm right lower lobe pulmonary nodule  This was not present on prior studies, but the area was not imaged  Based on current Fleischner Society 2017 Guidelines on incidental pulmonary nodule, because the patient is considered high risk for lung cancer, 12 month follow-up non-contrast chest CT is recommended  Diverticulosis   Workstation performed: [id number]     Ct Renal Stone Study Abdomen Pelvis Wo Contrast    Result Date: 2/8/2021  Narrative: CT ABDOMEN AND PELVIS WITHOUT IV CONTRAST - LOW DOSE RENAL STONE INDICATION:   right lumbar pain radiating to RLQ  COMPARISON:  CT abdomen dated 5/3/2016 and ultrasound dated 5/18/2016  TECHNIQUE:  Low dose thin section CT examination of the abdomen and pelvis was performed without intravenous or oral contrast according to a protocol specifically designed to evaluate for urinary tract calculus  Axial, sagittal, and coronal 2D reformatted images were created from the source data and submitted for interpretation  Evaluation for pathology in the abdomen and pelvis that is unrelated to urinary tract calculi is limited  Radiation dose length product (DLP) for this visit:  315 52 mGy-cm   This examination, like all CT scans performed in the [facility], was performed utilizing techniques to minimize radiation dose exposure, including the use of iterative  reconstruction and automated exposure control  FINDINGS: RIGHT KIDNEY AND URETER: No urinary tract calculi  No hydronephrosis or hydroureter  LEFT KIDNEY AND URETER: No urinary tract calculi  No hydronephrosis or hydroureter  Left renal cyst again seen  URINARY BLADDER: Unremarkable  No significant abnormality in the visualized lung bases  Limited low radiation dose noncontrast CT evaluation demonstrates no clinically significant abnormality of liver, spleen, pancreas, or adrenal glands  No calcified gallstones or gallbladder wall thickening noted  No ascites or bulky lymphadenopathy on this limited noncontrast study  Colonic diverticula are noted, without evidence to suggest acute diverticulitis  Visualized bowel appears otherwise unremarkable  The appendix is well seen and there is no evidence of acute appendicitis  Atherosclerotic changes are again seen in the aorta  No aneurysm  Uterus and adnexa are unremarkable   Degenerative spondylosis is similar to previous lumbar spine MRI dated 4/10/2017  Again this is most pronounced at L4-5 disc bulge and severe facet arthropathy causing mild canal stenosis and bilateral foraminal stenosis, right worse than left  Impression: No stones, hydronephrosis, appendicitis or other acute intra-abdominal pathology  Workstation performed: [id number]        EKG / Monitor: Personally reviewed  Sinus tachycardia    Cardiac testing:   Results for orders placed during the hospital encounter of 21   Echo complete with contrast if indicated    Narrative T[address]  [address] [facility] [place] [address]  [phone]    Transthoracic Echocardiogram  Limited 2D, M-mode, Doppler, and Color Doppler    Study date:  2021    Patient: Haven Hernandez  MR number: [id number]  Account number: [de-identified]  : 1955  Age: 72 years  Gender: Female  Status: Outpatient  Location: Bedside  Height: 60 in  Weight: 174 7 lb  BP: 138/ 84 mmHg    Indications: Hypertension    Diagnoses: 401 9 - HYPERTENSION NOS    Sonographer:  AUNG Freire  Primary Physician:  Jameel Andre DO  Referring Physician:  NITHIN Valencia  Group:  [facility]  Interpreting Physician:  Raúl Calvo MD    SUMMARY    LEFT VENTRICLE:  Systolic function was normal  Ejection fraction was estimated in the range of 60 % to 65 % to be 60 %  There were no regional wall motion abnormalities  Wall thickness was mildly increased  Doppler parameters were consistent with abnormal left ventricular relaxation (grade 1 diastolic dysfunction)  HISTORY: PRIOR HISTORY: HTN, COPD, Seizures, SIRS, Lung Nodule, Depression, Obesity, Liver Disease, Chronic Pain    PROCEDURE: The procedure was performed at the bedside  This was a routine study  The transthoracic approach was used  The study included limited 2D imaging, M-mode, limited spectral Doppler, and color Doppler  The heart rate was 96 bpm, at  the start of the study   Images were not obtained from the subcostal or suprasternal notch acoustic windows  Echocardiographic views were limited due to restricted patient mobility, poor patient compliance, and poor acoustic window  availability  This was a technically difficult study  LEFT VENTRICLE: Size was normal  Systolic function was normal  Ejection fraction was estimated in the range of 60 % to 65 % to be 60 %  There were no regional wall motion abnormalities  Wall thickness was mildly increased  No evidence of  apical thrombus  DOPPLER: Doppler parameters were consistent with abnormal left ventricular relaxation (grade 1 diastolic dysfunction)  RIGHT VENTRICLE: The size was normal  Systolic function was normal with TAPSE-2 1cm Wall thickness was normal     LEFT ATRIUM: Size was normal     RIGHT ATRIUM: Size was normal     MITRAL VALVE: Valve structure was normal  There was normal leaflet separation  DOPPLER: The transmitral velocity was within the normal range  There was no evidence for stenosis  There was no significant regurgitation  AORTIC VALVE: The valve was trileaflet  Leaflets exhibited normal thickness and normal cuspal separation  DOPPLER: Transaortic velocity was within the normal range  There was no evidence for stenosis  There was no significant  regurgitation  TRICUSPID VALVE: The valve structure was normal  There was normal leaflet separation  DOPPLER: The transtricuspid velocity was within the normal range  There was no evidence for stenosis  There was no significant regurgitation  PULMONIC VALVE: Leaflets exhibited normal thickness, no calcification, and normal cuspal separation  DOPPLER: The transpulmonic velocity was within the normal range  There was no significant regurgitation  PERICARDIUM: There was no pericardial effusion  The pericardium was normal in appearance  AORTA: The root exhibited normal size  SYSTEMIC VEINS: IVC: The inferior vena cava was not well visualized      SYSTEM MEASUREMENT TABLES    2D  EF (Teich): 62 89 %  %FS: 33 2 %  Ao Diam: 2 73 cm  EDV(Teich): 50 41 ml  ESV(Teich): 18 71 ml  HR_2Ch_Q: 86 54 bpm  HR_4Ch_Q: 94 74 bpm  IVSd: 1 02 cm  LA Diam: 3 34 cm  LAAs A4C: 9 18 cm2  LAESV A-L A4C: 19 ml  LAESV MOD A4C: 17 5 ml  LALs A4C: 3 77 cm  LVCO_2Ch_Q: 4 98 L/min  LVCO_4Ch_Q: 5 03 L/min  LVCO_BiP_Q: 5 01 L/min  LVEF_2Ch_Q: 63 41 %  LVEF_4Ch_Q: 59 79 %  LVEF_BiP_Q: 62 35 %  LVIDd: 3 49 cm  LVIDs: 2 33 cm  LVLd_2Ch_Q: 7 76 cm  LVLd_4Ch_Q: 8 44 cm  LVLs_2Ch_Q: 6 63 cm  LVLs_4Ch_Q: 7 05 cm  LVPWd: 0 94 cm  LVSV_2Ch_Q: 57 55 ml  LVSV_4Ch_Q: 53 09 ml  LVSV_BiP_Q: 56 21 ml  LVVED_2Ch_Q: 90 76 ml  LVVED_4Ch_Q: 88 79 ml  LVVED_BiP_Q: 90 15 ml  LVVES_2Ch_Q: 33 21 ml  LVVES_4Ch_Q: 35 7 ml  LVVES_BiP_Q: 33 94 ml  Jana A4C: 10 22 cm2  RAEDV A-L: 20 23 ml  RAEDV MOD: 19 24 ml  RALd: 4 38 cm  RVIDd: 2 26 cm  SV (Teich): 31 7 ml    PW  MV E/A Ratio: 0 88    [facility] Accredited [facility]    Prepared and electronically signed by    Simon Perez MD  Signed 09-Feb-2021 13:21:15         NITHIN Colon        "This note has been constructed using a voice recognition system  Therefore there may be syntax, spelling, and/or grammatical errors   Please call if you have any questions  "

## 2021-02-11 ENCOUNTER — APPOINTMENT (INPATIENT)
Dept: NON INVASIVE DIAGNOSTICS | Facility: HOSPITAL | Age: 66
DRG: 121 | End: 2021-02-11
Payer: COMMERCIAL

## 2021-02-11 ENCOUNTER — APPOINTMENT (INPATIENT)
Dept: RADIOLOGY | Facility: HOSPITAL | Age: 66
DRG: 121 | End: 2021-02-11
Payer: COMMERCIAL

## 2021-02-11 LAB
ALBUMIN SERPL BCP-MCNC: 3.5 G/DL (ref 3.5–5)
ALP SERPL-CCNC: 105 U/L (ref 46–116)
ALT SERPL W P-5'-P-CCNC: 17 U/L (ref 12–78)
ANION GAP SERPL CALCULATED.3IONS-SCNC: 11 MMOL/L (ref 4–13)
ANION GAP SERPL CALCULATED.3IONS-SCNC: 12 MMOL/L (ref 4–13)
AST SERPL W P-5'-P-CCNC: 20 U/L (ref 5–45)
BASOPHILS # BLD AUTO: 0.06 THOUSANDS/ΜL (ref 0–0.1)
BASOPHILS NFR BLD AUTO: 0 % (ref 0–1)
BILIRUB SERPL-MCNC: 0.8 MG/DL (ref 0.2–1)
BUN SERPL-MCNC: 24 MG/DL (ref 5–25)
BUN SERPL-MCNC: 28 MG/DL (ref 5–25)
CALCIUM SERPL-MCNC: 8.3 MG/DL (ref 8.3–10.1)
CALCIUM SERPL-MCNC: 9.3 MG/DL (ref 8.3–10.1)
CHEST PAIN STATEMENT: NORMAL
CHLORIDE SERPL-SCNC: 92 MMOL/L (ref 100–108)
CHLORIDE SERPL-SCNC: 96 MMOL/L (ref 100–108)
CO2 SERPL-SCNC: 22 MMOL/L (ref 21–32)
CO2 SERPL-SCNC: 25 MMOL/L (ref 21–32)
CREAT SERPL-MCNC: 1.13 MG/DL (ref 0.6–1.3)
CREAT SERPL-MCNC: 1.24 MG/DL (ref 0.6–1.3)
EOSINOPHIL # BLD AUTO: 0.23 THOUSAND/ΜL (ref 0–0.61)
EOSINOPHIL NFR BLD AUTO: 2 % (ref 0–6)
ERYTHROCYTE [DISTWIDTH] IN BLOOD BY AUTOMATED COUNT: 13.9 % (ref 11.6–15.1)
GFR SERPL CREATININE-BSD FRML MDRD: 46 ML/MIN/1.73SQ M
GFR SERPL CREATININE-BSD FRML MDRD: 51 ML/MIN/1.73SQ M
GLUCOSE SERPL-MCNC: 105 MG/DL (ref 65–140)
GLUCOSE SERPL-MCNC: 97 MG/DL (ref 65–140)
HCT VFR BLD AUTO: 52.9 % (ref 34.8–46.1)
HGB BLD-MCNC: 18.3 G/DL (ref 11.5–15.4)
IMM GRANULOCYTES # BLD AUTO: 0.06 THOUSAND/UL (ref 0–0.2)
IMM GRANULOCYTES NFR BLD AUTO: 0 % (ref 0–2)
LYMPHOCYTES # BLD AUTO: 5.26 THOUSANDS/ΜL (ref 0.6–4.47)
LYMPHOCYTES NFR BLD AUTO: 37 % (ref 14–44)
MAGNESIUM SERPL-MCNC: 2.5 MG/DL (ref 1.6–2.6)
MAX DIASTOLIC BP: 93 MMHG
MAX HEART RATE: 112 BPM
MAX PREDICTED HEART RATE: 155 BPM
MAX. SYSTOLIC BP: 127 MMHG
MCH RBC QN AUTO: 30.7 PG (ref 26.8–34.3)
MCHC RBC AUTO-ENTMCNC: 34.6 G/DL (ref 31.4–37.4)
MCV RBC AUTO: 89 FL (ref 82–98)
MONOCYTES # BLD AUTO: 0.98 THOUSAND/ΜL (ref 0.17–1.22)
MONOCYTES NFR BLD AUTO: 7 % (ref 4–12)
NEUTROPHILS # BLD AUTO: 7.81 THOUSANDS/ΜL (ref 1.85–7.62)
NEUTS SEG NFR BLD AUTO: 54 % (ref 43–75)
NRBC BLD AUTO-RTO: 0 /100 WBCS
PHOSPHATE SERPL-MCNC: 3.9 MG/DL (ref 2.3–4.1)
PLATELET # BLD AUTO: 434 THOUSANDS/UL (ref 149–390)
PMV BLD AUTO: 9.6 FL (ref 8.9–12.7)
POTASSIUM SERPL-SCNC: 3.4 MMOL/L (ref 3.5–5.3)
POTASSIUM SERPL-SCNC: 3.8 MMOL/L (ref 3.5–5.3)
PROT SERPL-MCNC: 8 G/DL (ref 6.4–8.2)
PROTOCOL NAME: NORMAL
RBC # BLD AUTO: 5.97 MILLION/UL (ref 3.81–5.12)
REASON FOR TERMINATION: NORMAL
SODIUM SERPL-SCNC: 128 MMOL/L (ref 136–145)
SODIUM SERPL-SCNC: 130 MMOL/L (ref 136–145)
TARGET HR FORMULA: NORMAL
TEST INDICATION: NORMAL
TIME IN EXERCISE PHASE: NORMAL
WBC # BLD AUTO: 14.4 THOUSAND/UL (ref 4.31–10.16)

## 2021-02-11 PROCEDURE — 84100 ASSAY OF PHOSPHORUS: CPT | Performed by: STUDENT IN AN ORGANIZED HEALTH CARE EDUCATION/TRAINING PROGRAM

## 2021-02-11 PROCEDURE — 80053 COMPREHEN METABOLIC PANEL: CPT | Performed by: STUDENT IN AN ORGANIZED HEALTH CARE EDUCATION/TRAINING PROGRAM

## 2021-02-11 PROCEDURE — 78452 HT MUSCLE IMAGE SPECT MULT: CPT | Performed by: INTERNAL MEDICINE

## 2021-02-11 PROCEDURE — A9502 TC99M TETROFOSMIN: HCPCS

## 2021-02-11 PROCEDURE — 83735 ASSAY OF MAGNESIUM: CPT | Performed by: STUDENT IN AN ORGANIZED HEALTH CARE EDUCATION/TRAINING PROGRAM

## 2021-02-11 PROCEDURE — G1004 CDSM NDSC: HCPCS

## 2021-02-11 PROCEDURE — 85025 COMPLETE CBC W/AUTO DIFF WBC: CPT | Performed by: STUDENT IN AN ORGANIZED HEALTH CARE EDUCATION/TRAINING PROGRAM

## 2021-02-11 PROCEDURE — 80048 BASIC METABOLIC PNL TOTAL CA: CPT | Performed by: STUDENT IN AN ORGANIZED HEALTH CARE EDUCATION/TRAINING PROGRAM

## 2021-02-11 PROCEDURE — 93016 CV STRESS TEST SUPVJ ONLY: CPT | Performed by: INTERNAL MEDICINE

## 2021-02-11 PROCEDURE — 99232 SBSQ HOSP IP/OBS MODERATE 35: CPT | Performed by: NURSE PRACTITIONER

## 2021-02-11 PROCEDURE — 93017 CV STRESS TEST TRACING ONLY: CPT

## 2021-02-11 PROCEDURE — 78452 HT MUSCLE IMAGE SPECT MULT: CPT

## 2021-02-11 PROCEDURE — 99232 SBSQ HOSP IP/OBS MODERATE 35: CPT | Performed by: FAMILY MEDICINE

## 2021-02-11 PROCEDURE — 93018 CV STRESS TEST I&R ONLY: CPT | Performed by: INTERNAL MEDICINE

## 2021-02-11 RX ORDER — POTASSIUM CHLORIDE 20 MEQ/1
40 TABLET, EXTENDED RELEASE ORAL ONCE
Status: COMPLETED | OUTPATIENT
Start: 2021-02-11 | End: 2021-02-12

## 2021-02-11 RX ORDER — BACLOFEN 10 MG/1
10 TABLET ORAL 3 TIMES DAILY
Status: DISCONTINUED | OUTPATIENT
Start: 2021-02-11 | End: 2021-02-12 | Stop reason: HOSPADM

## 2021-02-11 RX ORDER — SODIUM CHLORIDE AND POTASSIUM CHLORIDE .9; .15 G/100ML; G/100ML
125 SOLUTION INTRAVENOUS CONTINUOUS
Status: DISCONTINUED | OUTPATIENT
Start: 2021-02-11 | End: 2021-02-12 | Stop reason: HOSPADM

## 2021-02-11 RX ORDER — GABAPENTIN 100 MG/1
100 CAPSULE ORAL 3 TIMES DAILY
Status: DISCONTINUED | OUTPATIENT
Start: 2021-02-11 | End: 2021-02-12 | Stop reason: HOSPADM

## 2021-02-11 RX ORDER — LANOLIN ALCOHOL/MO/W.PET/CERES
6 CREAM (GRAM) TOPICAL
Status: DISCONTINUED | OUTPATIENT
Start: 2021-02-11 | End: 2021-02-12 | Stop reason: HOSPADM

## 2021-02-11 RX ORDER — LOSARTAN POTASSIUM 50 MG/1
50 TABLET ORAL DAILY
Status: DISCONTINUED | OUTPATIENT
Start: 2021-02-11 | End: 2021-02-12 | Stop reason: HOSPADM

## 2021-02-11 RX ADMIN — Medication 6 MG: at 22:26

## 2021-02-11 RX ADMIN — SODIUM CHLORIDE 1000 ML: 0.9 INJECTION, SOLUTION INTRAVENOUS at 10:33

## 2021-02-11 RX ADMIN — GABAPENTIN 100 MG: 100 CAPSULE ORAL at 15:43

## 2021-02-11 RX ADMIN — ACETAMINOPHEN 650 MG: 325 TABLET, FILM COATED ORAL at 15:43

## 2021-02-11 RX ADMIN — ACETAMINOPHEN 650 MG: 325 TABLET, FILM COATED ORAL at 05:00

## 2021-02-11 RX ADMIN — NICOTINE 1 PATCH: 14 PATCH, EXTENDED RELEASE TRANSDERMAL at 20:25

## 2021-02-11 RX ADMIN — GABAPENTIN 100 MG: 100 CAPSULE ORAL at 21:42

## 2021-02-11 RX ADMIN — BACLOFEN 10 MG: 10 TABLET ORAL at 15:43

## 2021-02-11 RX ADMIN — TIOTROPIUM BROMIDE 18 MCG: 18 CAPSULE ORAL; RESPIRATORY (INHALATION) at 09:25

## 2021-02-11 RX ADMIN — SODIUM CHLORIDE AND POTASSIUM CHLORIDE 125 ML/HR: .9; .15 SOLUTION INTRAVENOUS at 15:44

## 2021-02-11 RX ADMIN — BACLOFEN 10 MG: 10 TABLET ORAL at 21:42

## 2021-02-11 RX ADMIN — DIVALPROEX SODIUM 250 MG: 250 TABLET, DELAYED RELEASE ORAL at 21:42

## 2021-02-11 RX ADMIN — DIVALPROEX SODIUM 250 MG: 250 TABLET, DELAYED RELEASE ORAL at 09:25

## 2021-02-11 RX ADMIN — ATORVASTATIN CALCIUM 80 MG: 80 TABLET ORAL at 15:43

## 2021-02-11 RX ADMIN — LIDOCAINE 5% 1 PATCH: 700 PATCH TOPICAL at 04:42

## 2021-02-11 RX ADMIN — ACETAMINOPHEN 650 MG: 325 TABLET, FILM COATED ORAL at 21:42

## 2021-02-11 RX ADMIN — PROMETHAZINE HYDROCHLORIDE 25 MG: 25 INJECTION INTRAMUSCULAR; INTRAVENOUS at 09:32

## 2021-02-11 RX ADMIN — HYDRALAZINE HYDROCHLORIDE 25 MG: 25 TABLET, FILM COATED ORAL at 15:46

## 2021-02-11 RX ADMIN — REGADENOSON 0.4 MG: 0.08 INJECTION, SOLUTION INTRAVENOUS at 12:21

## 2021-02-11 NOTE — CASE MANAGEMENT
LOS - 2 days    SW met with pt to assess needs and discuss plans  Discussed goals of making sure pt's needs are met upon discharge and that Freedom of Choice is offered  Pt lives alone in her second floor apartment  Per pt she was independent with ADLs and mobility PTA  Doesn't drive, her children and neighbors assist her with that  Pt reported having no DME  Per PT evaluation pt has rollator for outdoor use  No HHC/STR history  No MH or D&A issues reported  Pt's PCP is Dr Yadira Castellanos MD at [facility]  Per pt she has prescription coverage and has no difficulty getting her medication as prescribed  Preferred pharmacy is [facility]  Pt does not have POA/advanced directives  Offered information on and assistance with completing advanced directive  Pt declined at this time  Per pt her son, Beatrice Sykes, would be her healthcare representative if needed  Discussed discharge plans and needs with pt  Discussed recommendation of STR placement  Pt said she doesn't feel rehab is necessary, stating she was just really tired yesterday  Offered home care/home therapy as an option as well  Pt is planning to consider options and talk with care team tomorrow to decide on plan  Rehab facility list given to pt to review  Offered ongoing support and assistance to pt  SW will continue to follow to monitor progress and assist with planning as needed

## 2021-02-11 NOTE — PROGRESS NOTES
Roland Hurtado [facility] 26 Progress Note - Berna Henley 72 y o  female MRN: [id number]    Unit/Bed#: [address] [place], [place] [id number] Encounter: [id number]      Assessment/Plan:  1  Type 2 MI - troponin elevated on admission, peaked at 0 5  EKG with nonspecific ST changes  In the setting of malignant hypertension, hypomag, hypokalemia, hypophos  Fam  history significant for CAD  Echo with EF of 55-60%, type 1 DD  Rule out NSTEMI  - continue aspirin, statin, beta-blocker  - patient to go for nuclear stress test today  - blood pressure management as outlined below  - monitor chest pain  - monitor on telemetry  - Cardiology following, appreciate recommendations    2  Recurrent Seizure - Had been seizure free x3-4 years  Stopped taking depakote at that time due to weight gain  Suffered tonic-clonic seizsure 2/9  Initially loaded with keppra, now on depakote  Neurology following  - Frequent neuro checks  - seizure precautions  - continue Depakote  - appreciate neurology recommendations    3  Hypertension with HTN Emergency - present on arrival   Systolic blood pressure as high as 237 with sequelae of headache, blurring vision, elevated troponin from possibly contributing to seizure activity  However BP has been quite labile during this admission and this am 90/60 which is likely largely attributable to dehydration as evidence by hemoconcentration CBC and creatinine bump  - Continue hydralazine, Coreg, losartan  - monitor BP    4  Low back pain - The location of this pain has fluctuated during stay, initially examined more like flank pain however today exam pain is located at right paraspinal at the L4-L5 level and at interstion of quadratus lumborum  She is hypertonic here and also has positive straight leg and slump tests which correlate with the degenerative changes seen on by MRI and most recent CT scan on admission  She does also have a right disc bulge at this level    She denies perianal anesthesia, bowel or bladder incontinence or lower extremity weakness  She is working with PT  Given kidneys and renal arteries appeared to be normal on CTA and renal function is relatively stable I doubt that this is renal etiology and will treat as musculoskeletal pain  Unfortunately patient is allergic to NSAIDs and has reacted poorly to narcotics given in the emergency room and the tramadol given on the floor  so our options for pain control are limited  - Continue Tylenol round the clock  - Continue heating pad, lidocaine patch  - Trial baclofen 10 mg po tid  - Trial gabapentin 100 mg po t i d   - Trial OMT    5  HLD - Poorly controlled on lipid panel this admission  She needs better control moving forward  - Continue lipitor 80 mg po daily    6  XOCHITL - Cr up to 1 24 from 0 88  2/2 dehydration  Patient has been nauseous/vomiting however with malignant HTN we have been unable to adequately hydrate her  Will give a bolus and then start continuous IVF  - NS w/ KCl 125 cc/hr  - Titrate fluids appropriately, monitor BP/BMP/fluid status    6  Tobacco dependence  - Nicotine 14 mg/day      Subjective:   Patient was seen and examined at bedside  No acute events overnight  Back pain appears less severe and she is able to stand for us today for a full exam  Her vomiting has stopped although she does appear dehydrated  Going for stess test today  Objective:     Vitals: Blood pressure 90/60, pulse 89, temperature 97 6 °F (36 4 °C), resp  rate 20, height 5' (1 524 m), weight 79 4 kg (175 lb 0 7 oz), SpO2 97 %  ,Body mass index is 34 19 kg/m²    Wt Readings from Last 3 Encounters:   02/09/21 79 4 kg (175 lb 0 7 oz)   03/03/20 85 8 kg (189 lb 2 oz)   07/18/19 80 3 kg (177 lb)     No intake or output data in the 24 hours ending 02/11/21 1426    Physical Exam: General appearance: alert and oriented, in no acute distress  Lungs: clear to auscultation bilaterally  Heart: regular rate and rhythm, S1, S2 normal, no murmur, click, rub or gallop  Extremities: extremities normal, warm and well-perfused; no cyanosis, clubbing, or edema  Pulses: 2+ and symmetric   MSK:  Lumbar:  ROM limited flextion, RotR, hypertonic paraspinals L4-L5, quad lumborum    Positive straight leg/slump test R, MST 5/5, SILT L2-S1 b/l, reflexes 2+    Recent Results (from the past 24 hour(s))   CBC and differential    Collection Time: 02/11/21  4:54 AM   Result Value Ref Range    WBC 14 40 (H) 4 31 - 10 16 Thousand/uL    RBC 5 97 (H) 3 81 - 5 12 Million/uL    Hemoglobin 18 3 (H) 11 5 - 15 4 g/dL    Hematocrit 52 9 (H) 34 8 - 46 1 %    MCV 89 82 - 98 fL    MCH 30 7 26 8 - 34 3 pg    MCHC 34 6 31 4 - 37 4 g/dL    RDW 13 9 11 6 - 15 1 %    MPV 9 6 8 9 - 12 7 fL    Platelets 598 (H) 418 - 390 Thousands/uL    nRBC 0 /100 WBCs    Neutrophils Relative 54 43 - 75 %    Immat GRANS % 0 0 - 2 %    Lymphocytes Relative 37 14 - 44 %    Monocytes Relative 7 4 - 12 %    Eosinophils Relative 2 0 - 6 %    Basophils Relative 0 0 - 1 %    Neutrophils Absolute 7 81 (H) 1 85 - 7 62 Thousands/µL    Immature Grans Absolute 0 06 0 00 - 0 20 Thousand/uL    Lymphocytes Absolute 5 26 (H) 0 60 - 4 47 Thousands/µL    Monocytes Absolute 0 98 0 17 - 1 22 Thousand/µL    Eosinophils Absolute 0 23 0 00 - 0 61 Thousand/µL    Basophils Absolute 0 06 0 00 - 0 10 Thousands/µL   Comprehensive metabolic panel    Collection Time: 02/11/21  4:54 AM   Result Value Ref Range    Sodium 128 (L) 136 - 145 mmol/L    Potassium 3 8 3 5 - 5 3 mmol/L    Chloride 92 (L) 100 - 108 mmol/L    CO2 25 21 - 32 mmol/L    ANION GAP 11 4 - 13 mmol/L    BUN 24 5 - 25 mg/dL    Creatinine 1 24 0 60 - 1 30 mg/dL    Glucose 105 65 - 140 mg/dL    Calcium 9 3 8 3 - 10 1 mg/dL    AST 20 5 - 45 U/L    ALT 17 12 - 78 U/L    Alkaline Phosphatase 105 46 - 116 U/L    Total Protein 8 0 6 4 - 8 2 g/dL    Albumin 3 5 3 5 - 5 0 g/dL    Total Bilirubin 0 80 0 20 - 1 00 mg/dL    eGFR 46 ml/min/1 73sq m   Magnesium    Collection Time: 02/11/21  4:54 AM Result Value Ref Range    Magnesium 2 5 1 6 - 2 6 mg/dL   Phosphorus    Collection Time: 02/11/21  4:54 AM   Result Value Ref Range    Phosphorus 3 9 2 3 - 4 1 mg/dL       Current Facility-Administered Medications   Medication Dose Route Frequency Provider Last Rate Last Admin    acetaminophen (TYLENOL) tablet 650 mg  650 mg Oral [facility] NITHIN Patterson   650 mg at 02/11/21 0500    atorvastatin (LIPITOR) tablet 80 mg  80 mg Oral Daily With NITHIN Fernandez   80 mg at 02/10/21 1554    calcium carbonate (TUMS) chewable tablet 1,000 mg  1,000 mg Oral Daily PRN NITHIN Patterson   1,000 mg at 02/09/21 0600    carvedilol (COREG) tablet 12 5 mg  12 5 mg Oral BID With Meals NITHIN Pink   12 5 mg at 02/10/21 1554    divalproex sodium (DEPAKOTE) EC tablet 250 mg  250 mg Oral Q12H [facility] NITHIN Dean   250 mg at 02/11/21 2763    hydrALAZINE (APRESOLINE) tablet 25 mg  25 mg Oral Q8H [facility] NITHIN Pink   25 mg at 02/10/21 1335    lidocaine (LIDODERM) 5 % patch 1 patch  1 patch Topical Q12H PRN Sabrina Christensen DO   1 patch at 02/11/21 0442    losartan (COZAAR) tablet 50 mg  50 mg Oral Daily NITHIN Pink        nicotine (NICODERM CQ) 14 mg/24hr TD 24 hr patch 1 patch  1 patch Transdermal Q24H NITHIN Patterson   1 patch at 02/10/21 1636    polyethylene glycol (MIRALAX) packet 17 g  17 g Oral Daily PRN NITHIN Patterson        promethazine (PHENERGAN) injection 25 mg  25 mg Intravenous Q4H PRN Curtis Palacios DO   25 mg at 02/11/21 0932    senna-docusate sodium (SENOKOT S) 8 6-50 mg per tablet 1 tablet  1 tablet Oral HS NITHIN Pink   1 tablet at 02/10/21 2348    sodium chloride 0 9 % with KCl 20 mEq/L infusion (premix)  125 mL/hr Intravenous Continuous Charlott Dross, DO        tiotropium [facility]) capsule for inhaler 18 mcg  18 mcg Inhalation Daily NITHIN Patterson   18 mcg at 02/11/21 2121       Invasive Devices Peripheral Intravenous Line            Peripheral IV 02/11/21 Left Forearm less than 1 day                Lab, Imaging and other studies: I have personally reviewed pertinent reports      VTE Pharmacologic Prophylaxis: Enoxaparin (Lovenox)  VTE Mechanical Prophylaxis: sequential compression device    Caleb Buchanan DO

## 2021-02-11 NOTE — PROGRESS NOTES
Neurology Consult Follow Up      Edita Carranza is a 72 y o  female  [place] 27-*    [id number]        Assessment/Recommendations:    1  Recurrent Seizure  2  HTN Emergency  3  RLQ pain  4  Elevated Troponin  5  HLD  6  HTN     -Monitor on Telemetry  -Neuro assessments  -Seizure precautions  -Increase Lipitor to 80mg daily  -Cardio for BP and troponin  -Keppra DC  -Start Depakote 250mg bid, increase to 500mg bid in 2 weeks  -PCP management of pain  -PT/OT      Pt is a 79yr old female who was admitted with RLQ pain who continues to have ongoing distress with elevated BP who developed aura which went into seizure activity  Pt received IV ativan and started on IV Keppra for this activity      Pt does have history of 20+ seizure and has been on Depakote in the past which maintained her seizure free  She reported that she took herself off of it due to weight gain, although realized that it did not seem to make a difference in her weight  She reports she has been seizure free since taken off but had recurrent seizure on 2/9/2021      Pt also reports history of vertigo since a child and has frequent headaches at home  She reports headaches 2-3x a week but noted that she does not recall having them while on her Depakote  Will resume her Depakote and DC her Keppra at this time  Will start at 250mg bid and titrate to 500mg bid in 2 weeks  Pt reports that she had been on 1000mg in the past  Advised that due to restarting, will need to increase with a taper pending her seizure activity  This should help maintain seizure free and assist with Headache prevention  Today, pt continues to have no significant focal deficits, although is slightly limited motor exam due to pain  However, is bilateral exam     Pt has had known seizure activity, no additional studies needed at this time          Edita Carranza will need follow up in 2 months with general attending or advance practitioner   She will not require outpatient neurological testing      Chief Complaint:    Subjective:   Pt reports that she is doing OK today  She continues to have RLQ pain but reports it seems a bit less than yesterday  Reports last BM was over the weekend and was able to have small hard stool today  Denies any headache, dizziness, prodromal like symptomology, vision changes or staring spells today  No adverse effects from starting Depakote last evening    Past Medical History:   Diagnosis Date    COPD (chronic obstructive pulmonary disease) ([facility] )     Radiculopathy of lumbar region     last assessed 03/29/17    Seizures ([facility] )      Social History     Socioeconomic History    Marital status:      Spouse name: Not on file    Number of children: Not on file    Years of education: Not on file    Highest education level: Not on file   Occupational History    Not on file   Social Needs    Financial resource strain: Not on file    Food insecurity     Worry: Not on file     Inability: Not on file    Transportation needs     Medical: Not on file     Non-medical: Not on file   Tobacco Use    Smoking status: Current Some Day Smoker     Packs/day: 0 25    Smokeless tobacco: Current User    Tobacco comment: Per allscripts: Current everyday smoker   Substance and Sexual Activity    Alcohol use: Not Currently     Frequency: Never     Drinks per session: Patient refused     Binge frequency: Never     Comment: Per allscripts: Social    Drug use: Not Currently     Types: Marijuana    Sexual activity: Not Currently     Partners: Male   Lifestyle    Physical activity     Days per week: Not on file     Minutes per session: Not on file    Stress: Not on file   Relationships    Social connections     Talks on phone: Not on file     Gets together: Not on file     Attends [employer] service: Not on file     Active member of club or organization: Not on file     Attends meetings of clubs or organizations: Not on file     Relationship status: Not on file    Intimate partner violence     Fear of current or ex partner: Not on file     Emotionally abused: Not on file     Physically abused: Not on file     Forced sexual activity: Not on file   Other Topics Concern    Not on file   Social History Narrative    Not on file     Family History   Problem Relation Age of Onset    Diabetes Mother     Hyperlipidemia Mother     Cancer Father     Prostate cancer Father        ROS:  Please see HPI for positive symptoms  No fever, no chills, no weight change  Ocular: No diplopia, no blurred vision, spot/zigzag lines   HEENT:  No sore throat, headache or congestion  No neck pain  COR:  No chest pain  No palpitations  Lungs:  no sob  GI:  no  nausea, no vomiting, no diarrhea, no constipation, no anorexia  :  No dysuria, frequency, or urgency  No hematuria  Musculoskeletal:  No joint pain or swelling   Skin:  No rash or itching  Psychiatric:  no anxiety, no depression  Endocrine:  No polyuria or polydipsia  Objective:  BP 90/60   Pulse 89   Temp 97 6 °F (36 4 °C)   Resp 20   Ht 5' (1 524 m)   Wt 79 4 kg (175 lb 0 7 oz)   SpO2 97%   BMI 34 19 kg/m²     General: alert   Mental status: oriented x3  Attention: normal  Knowledge: fair  Language and Speech: normal  Cranial nerves:   CN II: Visual fields are full to confrontation  Fundoscopic exam is normal with sharp discs and no vascular changes  Pupils are 3 mm and briskly reactive to light  CN III, IV, VI: At primary gaze, there is no eye deviation  CN V: Facial sensation is intact in all 3 divisions bilaterally  Corneal responses are intact  CN VII: Face is symmetrical, with normal eye closure and smile  CN VIII: Hearing is normal to rubbing fingers  CN IX, X: Palate elevates symmetrically  Phonation is normal   CN XI: Head turning and shoulder shrug are intact  CN XII: Tongue is midline with normal movements and no atrophy  Motor:   There is no pronator drift of out-stretched arms    Muscle bulk and tone are normal    Remains limited by pain, although pt able to participate more in exam today     Muscle exam  Arm Right Left Leg Right Left   Deltoid 4+/5 4+/5 Iliopsoas 4/5 4/5   Biceps 4+/5 4+/5 Quads 4/5 4/5   Triceps 4+/5 4+/5 Hamstrings 4/5 4/5   Wrist Extension 4+/5 4+/5 Ankle Dorsi Flexion 4+/5 4+/5   Wrist Flexion 4+/5 4+/5 Ankle Plantar Flexion 4+/5 4+/5       Sensory: normal to light touch, temperature, vibration  Proprioception intact  Gait: steady with assist  Coordination: finger to nose and heel to toe normal     Reflexes    RJ BJ TJ KJ AJ Plantars Rosa's   Right 2+ 2+ 2+ 2+ 2+ Downgoing Not present   Left 2+ 2+ 2+ 2+ 2+ Downgoing Not present      Heart: Regular rate and rhythm  Lung: clear to auscultation   Abd: soft, non-tender, non-distended with positive bowel sounds in all quads  Skin: dry and intact    Labs:      Lab Results   Component Value Date    WBC 14 40 (H) 02/11/2021    HGB 18 3 (H) 02/11/2021    HCT 52 9 (H) 02/11/2021    MCV 89 02/11/2021     (H) 02/11/2021     Lab Results   Component Value Date    HGBA1C 5 5 02/09/2021     Lab Results   Component Value Date    ALT 17 02/11/2021    AST 20 02/11/2021    ALKPHOS 105 02/11/2021     Lab Results   Component Value Date    GLUCOSE 126 (H) 03/16/2016    CALCIUM 9 3 02/11/2021     03/16/2016    K 3 8 02/11/2021    CO2 25 02/11/2021    CL 92 (L) 02/11/2021    BUN 24 02/11/2021    CREATININE 1 24 02/11/2021         Review of reports and notes reveal:   Independent review of films/reports:  Cta Dissection Protocol Chest/abdomen/pelvis  Result Date: 2/8/2021  Moderately atherosclerotic thoracic aorta with irregular, ulcerated plaque  No aneurysm or dissection  Diffusely atherosclerotic abdominal aorta with a 60% stenosis in the infrarenal segment, chronic right common iliac artery occlusion, an 80% left common iliac artery stenosis  4 mm right lower lobe pulmonary nodule   This was not present on prior studies, but the area was not imaged  Based on current Fleischner Society 2017 Guidelines on incidental pulmonary nodule, because the patient is considered high risk for lung cancer, 12 month follow-up non-contrast chest CT is recommended  Diverticulosis  Workstation performed: [id number]     Ct Renal Stone Study Abdomen Pelvis Wo Contrast  Result Date: 2/8/2021  No stones, hydronephrosis, appendicitis or other acute intra-abdominal pathology  Workstation performed: [id number]         Thank you for this consult      Total time of encounter:  30 min  More than 50% of the time was used in counseling and/or coordination of care  Extent of couseling and/or coordination of care

## 2021-02-11 NOTE — UTILIZATION REVIEW
Continued Stay Review    Date: 2/11/21                        Current Patient Class: inpatient  Current Level of Care: med surg/telemetry  HPI:65 y o  female initially admitted on 2/8/21  Assessment/Plan:   Type 2 MI - troponin elevated on admission, peaked at 0 5  EKG with nonspecific ST changes  In the setting of malignant hypertension, hypomag, hypokalemia, hypophos  Fam  history significant for CAD  Echo with EF of 55-60%, type 1 DD  Rule out NSTEMI  Scheduled for stress test today  S/P seizure 2/9, loaded with keppra, currently on depakote, neuro following  XOCHITL - Cr up to 1 24 from 0 88  2/2 dehydration  Patient has been nauseous/vomiting however with malignant HTN, unable to adequately hydrate  Will give a bolus and then start continuous IVF  Titrate fluids appropriately, monitor BP/BMP/fluid status      Pertinent Labs/Diagnostic Results:   Results from last 7 days   Lab Units 02/11/21  0454 02/10/21  0446 02/09/21  0657 02/08/21  0945   WBC Thousand/uL 14 40* 17 47* 10 79* 10 55*   HEMOGLOBIN g/dL 18 3* 17 5* 14 5 16 8*   HEMATOCRIT % 52 9* 51 5* 45 1 48 9*   PLATELETS Thousands/uL 434* 421* 363 407*   NEUTROS ABS Thousands/µL 7 81* 12 26* 6 36 8 88*     Results from last 7 days   Lab Units 02/11/21  0454 02/10/21  0446 02/09/21  2218 02/09/21  0657 02/08/21  0945   SODIUM mmol/L 128* 131* 133* 134* 133*   POTASSIUM mmol/L 3 8 3 8 3 5 3 5 3 3*   CHLORIDE mmol/L 92* 95* 95* 102 92*   CO2 mmol/L 25 24 25 22 24   ANION GAP mmol/L 11 12 13 10 17*   BUN mg/dL 24 10 10 11 10   CREATININE mg/dL 1 24 0 88 0 81 0 84 1 11   EGFR ml/min/1 73sq m 46 69 76 73 52   CALCIUM mg/dL 9 3 9 1 9 1 8 8 9 6   MAGNESIUM mg/dL 2 5 1 8 1 7 1 9 1 5*   PHOSPHORUS mg/dL 3 9 3 2 3 0 1 4*  --      Results from last 7 days   Lab Units 02/11/21  0454 02/10/21  0446 02/09/21  0657 02/08/21  0945   AST U/L 20 27 18 16   ALT U/L 17 18 16 22   ALK PHOS U/L 105 112 94 120*   TOTAL PROTEIN g/dL 8 0 8 3* 7 6 9 0*   ALBUMIN g/dL 3 5 3 6 3 5 4 0 TOTAL BILIRUBIN mg/dL 0 80 0 60 0 60 0 60     Results from last 7 days   Lab Units 02/11/21  0454 02/10/21  0446 02/09/21  2218 02/09/21  0657 02/08/21  0945   GLUCOSE RANDOM mg/dL 105 138 118 136 180*     Results from last 7 days   Lab Units 02/09/21  0657   HEMOGLOBIN A1C % 5 5   EAG mg/dl 111     Results from last 7 days   Lab Units 02/08/21  1625 02/08/21  1302 02/08/21  0945   TROPONIN I ng/mL 0 51* 0 50* 0 30*     Results from last 7 days   Lab Units 02/10/21  1021 02/08/21  1150 02/08/21  0945   LACTIC ACID mmol/L 1 2 2 0 3 6*     Results from last 7 days   Lab Units 02/08/21  0945   NT-PRO BNP pg/mL 2,640*     Results from last 7 days   Lab Units 02/08/21  0945   LIPASE u/L 60*     Results from last 7 days   Lab Units 02/08/21  1133   CLARITY UA  Clear   COLOR UA  Yellow   SPEC GRAV UA  1 015   PH UA  7 0   GLUCOSE UA mg/dl 100 (1/10%)*   KETONES UA mg/dl 15 (1+)*   BLOOD UA  Small*   PROTEIN UA mg/dl >=300*   NITRITE UA  Negative   BILIRUBIN UA  Negative   UROBILINOGEN UA E U /dl 0 2   LEUKOCYTES UA  Negative   WBC UA /hpf 1-2   RBC UA /hpf 1-2   BACTERIA UA /hpf Occasional   EPITHELIAL CELLS WET PREP /hpf Occasional     Results from last 7 days   Lab Units 02/10/21  1020 02/08/21  1133 02/08/21  1106 02/08/21  0945   BLOOD CULTURE  Received in Microbiology Lab  Culture in Progress  Received in Microbiology Lab  Culture in Progress  --  No Growth at 48 hrs  No Growth at 48 hrs     URINE CULTURE   --  No Growth <1000 cfu/mL  --   --      Vital Signs: BP 90/60   Pulse 89   Temp 97 6 °F (36 4 °C)   Resp 20   Ht 5' (1 524 m)   Wt 79 4 kg (175 lb 0 7 oz)   SpO2 97%   BMI 34 19 kg/m²     Medications:   acetaminophen, 650 mg, Oral, Q8H JOVITA  atorvastatin, 80 mg, Oral, Daily With Dinner  carvedilol, 12 5 mg, Oral, BID With Meals  divalproex sodium, 250 mg, Oral, Q12H JOVITA  hydrALAZINE, 25 mg, Oral, Q8H JOVIAT  losartan, 50 mg, Oral, Daily  nicotine, 1 patch, Transdermal, Q24H  senna-docusate sodium, 1 tablet, Oral, HS  sodium chloride, 1,000 mL, Intravenous, Once  tiotropium, 18 mcg, Inhalation, Daily    PRN Meds:  calcium carbonate, 1,000 mg, Oral, Daily PRN  lidocaine, 1 patch, Topical, Q12H PRN  polyethylene glycol, 17 g, Oral, Daily PRN  promethazine, 25 mg, Intravenous, Q4H PRN    Discharge Plan: d    Network Utilization Review Department  ATTENTION: Please call with any questions or concerns to [phone] and carefully listen to the prompts so that you are directed to the right person  All voicemails are confidential   Chrystine Can all requests for admission clinical reviews, approved or denied determinations and any other requests to dedicated fax number below belonging to the campus where the patient is receiving treatment   List of dedicated fax numbers for the Facilities:  [address] DENIALS (Administrative/Medical Necessity) [phone]   [address] (Maternity/NICU/Pediatrics) [phone]   [address] [place] [address] Dr María Elena Mcnulty [phone] (  Sean Soliman [facility]) [id number] [place] [address] Damaris Marley 1481 [address] [place] [address] [phone]

## 2021-02-12 ENCOUNTER — TELEPHONE (OUTPATIENT)
Dept: NEUROLOGY | Facility: CLINIC | Age: 66
End: 2021-02-12

## 2021-02-12 ENCOUNTER — TRANSITIONAL CARE MANAGEMENT (OUTPATIENT)
Dept: FAMILY MEDICINE CLINIC | Facility: CLINIC | Age: 66
End: 2021-02-12

## 2021-02-12 VITALS
TEMPERATURE: 98 F | DIASTOLIC BLOOD PRESSURE: 71 MMHG | HEIGHT: 60 IN | HEART RATE: 90 BPM | BODY MASS INDEX: 34.15 KG/M2 | RESPIRATION RATE: 18 BRPM | OXYGEN SATURATION: 97 % | WEIGHT: 173.94 LBS | SYSTOLIC BLOOD PRESSURE: 126 MMHG

## 2021-02-12 PROCEDURE — 99238 HOSP IP/OBS DSCHRG MGMT 30/<: CPT | Performed by: FAMILY MEDICINE

## 2021-02-12 RX ORDER — CARVEDILOL 12.5 MG/1
12.5 TABLET ORAL 2 TIMES DAILY WITH MEALS
Qty: 60 TABLET | Refills: 0 | Status: SHIPPED | OUTPATIENT
Start: 2021-02-12 | End: 2021-03-09

## 2021-02-12 RX ORDER — ATORVASTATIN CALCIUM 80 MG/1
80 TABLET, FILM COATED ORAL
Qty: 90 TABLET | Refills: 3 | Status: SHIPPED | OUTPATIENT
Start: 2021-02-12 | End: 2022-01-18

## 2021-02-12 RX ORDER — LIDOCAINE 50 MG/G
1 PATCH TOPICAL DAILY
Qty: 30 PATCH | Refills: 0
Start: 2021-02-12

## 2021-02-12 RX ORDER — DIVALPROEX SODIUM 250 MG/1
250 TABLET, DELAYED RELEASE ORAL EVERY 12 HOURS SCHEDULED
Qty: 96 TABLET | Refills: 0 | Status: SHIPPED | OUTPATIENT
Start: 2021-02-12 | End: 2021-03-09

## 2021-02-12 RX ORDER — LOSARTAN POTASSIUM 50 MG/1
50 TABLET ORAL DAILY
Qty: 30 TABLET | Refills: 0 | Status: SHIPPED | OUTPATIENT
Start: 2021-02-12 | End: 2021-03-09

## 2021-02-12 RX ORDER — BACLOFEN 10 MG/1
10 TABLET ORAL 3 TIMES DAILY
Qty: 90 TABLET | Refills: 0 | Status: SHIPPED | OUTPATIENT
Start: 2021-02-12

## 2021-02-12 RX ORDER — GABAPENTIN 100 MG/1
100 CAPSULE ORAL 3 TIMES DAILY
Qty: 90 CAPSULE | Refills: 0 | Status: SHIPPED | OUTPATIENT
Start: 2021-02-12 | End: 2021-03-09

## 2021-02-12 RX ADMIN — POTASSIUM CHLORIDE 40 MEQ: 1500 TABLET, EXTENDED RELEASE ORAL at 00:08

## 2021-02-12 RX ADMIN — ACETAMINOPHEN 650 MG: 325 TABLET, FILM COATED ORAL at 06:11

## 2021-02-12 RX ADMIN — SODIUM CHLORIDE AND POTASSIUM CHLORIDE 125 ML/HR: .9; .15 SOLUTION INTRAVENOUS at 02:30

## 2021-02-12 NOTE — PLAN OF CARE
Problem: Potential for Falls  Goal: Patient will remain free of falls  Description: INTERVENTIONS:  - Assess patient frequently for physical needs  -  Identify cognitive and physical deficits and behaviors that affect risk of falls  -  [place] fall precautions as indicated by assessment   - Educate patient/family on patient safety including physical limitations  - Instruct patient to call for assistance with activity based on assessment  - Modify environment to reduce risk of injury  - Consider OT/PT consult to assist with strengthening/mobility  Outcome: Progressing     Problem: SAFETY ADULT  Goal: Patient will remain free of falls  Description: INTERVENTIONS:  - Assess patient frequently for physical needs  -  Identify cognitive and physical deficits and behaviors that affect risk of falls    -  [place] fall precautions as indicated by assessment   - Educate patient/family on patient safety including physical limitations  - Instruct patient to call for assistance with activity based on assessment  - Modify environment to reduce risk of injury  - Consider OT/PT consult to assist with strengthening/mobility  Outcome: Progressing  Goal: Maintain or return to baseline ADL function  Description: INTERVENTIONS:  -  Assess patient's ability to carry out ADLs; assess patient's baseline for ADL function and identify physical deficits which impact ability to perform ADLs (bathing, care of mouth/teeth, toileting, grooming, dressing, etc )  - Assess/evaluate cause of self-care deficits   - Assess range of motion  - Assess patient's mobility; develop plan if impaired  - Assess patient's need for assistive devices and provide as appropriate  - Encourage maximum independence but intervene and supervise when necessary  - Involve family in performance of ADLs  - Assess for home care needs following discharge   - Consider OT consult to assist with ADL evaluation and planning for discharge  - Provide patient education as appropriate  Outcome: Progressing  Goal: Maintain or return mobility status to optimal level  Description: INTERVENTIONS:  - Assess patient's baseline mobility status (ambulation, transfers, stairs, etc )    - Identify cognitive and physical deficits and behaviors that affect mobility  - Identify mobility aids required to assist with transfers and/or ambulation (gait belt, sit-to-stand, lift, walker, cane, etc )  - [place] fall precautions as indicated by assessment  - Record patient progress and toleration of activity level on Mobility SBAR; progress patient to next Phase/Stage  - Instruct patient to call for assistance with activity based on assessment  - Consider rehabilitation consult to assist with strengthening/weightbearing, etc   Outcome: Progressing     Problem: CARDIOVASCULAR - ADULT  Goal: Maintains optimal cardiac output and hemodynamic stability  Description: INTERVENTIONS:  - Monitor I/O, vital signs and rhythm  - Monitor for S/S and trends of decreased cardiac output  - Administer and titrate ordered vasoactive medications to optimize hemodynamic stability  - Assess quality of pulses, skin color and temperature  - Assess for signs of decreased coronary artery perfusion  - Instruct patient to report change in severity of symptoms  Outcome: Progressing  Goal: Absence of cardiac dysrhythmias or at baseline rhythm  Description: INTERVENTIONS:  - Continuous cardiac monitoring, vital signs, obtain 12 lead EKG if ordered  - Administer antiarrhythmic and heart rate control medications as ordered  - Monitor electrolytes and administer replacement therapy as ordered  Outcome: Progressing     Problem: Prexisting or High Potential for Compromised Skin Integrity  Goal: Skin integrity is maintained or improved  Description: INTERVENTIONS:  - Identify patients at risk for skin breakdown  - Assess and monitor skin integrity  - Assess and monitor nutrition and hydration status  - Monitor labs   - Assess for incontinence   - Turn and reposition patient  - Assist with mobility/ambulation  - Relieve pressure over bony prominences  - Avoid friction and shearing  - Provide appropriate hygiene as needed including keeping skin clean and dry  - Evaluate need for skin moisturizer/barrier cream  - Collaborate with interdisciplinary team   - Patient/family teaching  - Consider wound care consult   Outcome: Progressing

## 2021-02-12 NOTE — UTILIZATION REVIEW
Notification of Discharge  This is a Notification of Discharge from our facility [address]  Please be advised that this patient has been discharge from our facility  Below you will find the admission and discharge date and time including the patients disposition  PRESENTATION DATE: 2/8/2021  9:17 AM  OBS ADMISSION DATE:   IP ADMISSION DATE: 2/9/21 1400   DISCHARGE DATE: 2/12/2021  7:30 AM  DISPOSITION: Left against medical advice or discontinued care Left against medical advice or discontinued care   Admission Orders listed below:  Admission Orders (From admission, onward)     Ordered        02/09/21 1359  Inpatient Admission  Once         02/08/21 1214  Place in Observation  Once                   Please contact the UR Department if additional information is required to close this patient's authorization/case  [facility]  Network Utilization Review Department  Main: [phone] x carefully listen to the prompts  All voicemails are confidential   [email or web address]  Send all requests for admission clinical reviews, approved or denied determinations and any other requests to dedicated fax number below belonging to the campus where the patient is receiving treatment   List of dedicated fax numbers:  [address] [place] [address] DENIALS (Administrative/Medical Necessity) [phone]   [address] (Maternity/NICU/Pediatrics) [phone]   Sheila Hammer [phone]   Philippe Newsome [phone]   Shirley Goode [phone]   [facility] [place] [address] [facility] [phone]   [facility]  [phone]   [address] [facility] [address]  [address] [facility] [address] [facility] [phone]

## 2021-02-12 NOTE — DISCHARGE SUMMARY
[facility] Discharge Summary - Medical [facility] 72 y o  female MRN: [id number]    Ana 45  Room / Bed: 2 Cat [place] 325/3 KENDELL MAXWELL Encounter: [id number]    BRIEF OVERVIEW  Admitting Provider: Phoebe España DO  Discharge Provider: No att  providers found    Discharge To:  [facility]    Outpatient Follow-Up:  [facility], cardiology, Neurology  Things to address at first follow up visit:   1  Patient suffered from type 2 MI  Pharm stress test equivocal for ischemia  She needs to follow-up with cardiology, Dr Chrissie Mora  Patient was started on high-dose Lipitor 80 mg daily, Coreg 12 5 mg b i d  Is she compliant with these new medications? 2  Patient had labile blood pressure, low in the 80 systolic and high of 750 systolic, check blood pressure  Is patient checking blood pressure at home? Offer BP cuff  3  Patient with seizure in the hospital bone 3 5 years of seizure-free activity  She was started on Depakote 250 mg b i d to complete 2 weeks and then should have her dose increased to 500 mg b i d  She is to follow up with Neurology, Dr Mariia Reyes, as an outpatient  If she compliant with Depakote and has she seen Dr Mariia Reyes or have an appointment yet? 4  Patient suffered from intractable lumbar back pain secondary to her lumbar radiculopathy with disc bulge at L4-L5  She was started on gabapentin and baclofen  She was unable to take NSAIDs as she has allergies to all them  Is patient compliant with her new pain medicine regimen? Showed refer patient to pain management, Dr Dari Castorena, as an outpatient  5  Patient suffered from hyponatremia in the hospital secondary to dehydration and nausea/vomiting  Her sodium day prior to discharge was 130, however she refused labs on day of discharge  Patient needs repeat BMP ordered at [facility] appointment  5  Patient left AMA, and exhibited erratic behavior on day of discharge  House patient's mental state?   Consider psych/therapy referral   6  Patient had 4 mm lung nodule identified on CT, she needs a repeat CT in 12 months for follow-up and surveillance  Labs and results pending at discharge: none    Admission Date: 2/8/2021     Discharge Date: 2/12/2021  7:30 AM    Primary Discharge Diagnosis  Principal Problem:    Elevated troponin  Active Problems:    Acute right flank pain    Essential hypertension with hypertensive urgency    Intervertebral disc disorder with radiculopathy of lumbosacral region    Depression    Lumbar spinal stenosis    COPD (chronic obstructive pulmonary disease) (HCC)    Seizures (HCC)    Hyponatremia    Hypokalemia    SIRS (systemic inflammatory response syndrome) (HCC)    Hypomagnesemia    Hypophosphatemia  Resolved Problems:    * No resolved hospital problems  *      Secondary Discharge Diagnoses  None  Consulting Providers   Cardiology - Dr Kathrin Luna  Neurology - Dr Dominique Gavin    Procedures Performed/Pertinent Test results  2/11: Na 128>130, Lactic Acid 1 2, WBC 14 40, Hb 18 3, HCT 52 9, ,  Cr 1 24 (BL 0 8), K 3 8>3 4  NM Stress test: equivocal ischemia, no perfusion defects, EF 79%, cannot rule out balanced ischemia  Normal at rest and with stress  2/10: , LA wnl, Na 131, WBC 17 4 K, Hb 17, Mag 1 8  2/9: Chol 254 Tg 254 HDL 36  A1C 5 5 WBC 10 79 Na 134 Glu 136 Phos 1 4>3, Mg 1 9>1 7, K3 5>3 5  Echo: EF 83-84%, grade 1 diastolic dysfxn  2/8: Hgb 16 8, WBC 10 55, Plt 407  K 3 3, Cr 1 11, Mg 1 5  Trop: 0 30>0 50>0 51  BNP 2,640  Lactic: 3 6, 2 0  CT renal stone study: No stones, hydronephrosis, appendicitis or other acute intra-abdominal pathology  Degenerative spondylosis is similar to previous lumbar spine MRI dated 4/10/2017  Again this is most pronounced at L4-5 disc bulge and severe facet arthropathy causing mild canal stenosis and bilateral foraminal stenosis, right worse than left  CTA dissection protocol:  Moderately atherosclerotic thoracic aorta with irregular, ulcerated plaque  No aneurysm or dissection  Diffusely atherosclerotic abdominal aorta with a 60% stenosis in the infrarenal segment, chronic right common iliac artery occlusion, an 80% left common iliac artery stenosis  4mm right lower lobe pulmonary nodule not seen on previous studies  HPI - from admission H&P  Mitchel Olivas is a 72 y o  female with PMH COPD, seizures and spinal stenosis who presents with a one day history of right flank pain that started the day PTA while she was out shopping with her son  Patient states the pain was improved by Tylenol but did not completely resolve  She was then woken from sleep at approximately midnight with 10/10 right flank pain that was radiating to her right abdomen  Patient states at this time she also experienced shortness of breath, diaphoresis, nausea and vomiting, vertigo, and incontinence of urine  She reports a brief episode of chest pain during this time which resolved quickly  Patient states the 10/10 flank pain remained unchanged so she called her son to bring her to the hospital at around 7 am  Her right flank pain is now 5/10 and she denies current CP, SOB, diaphoresis, N/V, vertigo and any further episodes of incontinence  Hospital Course  Patient was admitted to the hospital for type 2 MI  Her troponins peaked at 051  Cardiology was consulted and patient underwent an echocardiogram which demonstrated EF of 55-60%  She subsequently underwent a Lexiscan stress test which was equivocal for ischemia  A lipid panel was drawn during her stay and demonstrated uncontrolled hyperlipidemia and her atorvastatin was increased  At discharge she was sent home on atorvastatin and Coreg, she was not prescribed aspirin as she has an allergy to it  she is to follow-up with cardiology as an outpatient  During her stay patient suffered from malignant hypertension, systolic blood pressure high of 237   She was symptomatic with headache blurry vision nausea and vomiting  She initially was started on home meds, hydrochlorothiazide and losartan, with poor control blood pressure  Coreg was added and titrated up as well as hydralazine p o  Eventually her blood pressure returned to normal   It was thought potentially that her blood pressure was elevated secondary to intractable back pain from her lumbar radiculopathy  She was discharged home on losartan and Coreg  Her hydrochlorothiazide was discontinued given her hypernatremia during hospital stay  She is to follow-up with cardiology  Patient also with intractable low back pain  Initially thought to be flank pain in attributed to renal pathology however renal stone was ruled out on CT and CTA of the chest abdomen pelvis failed to demonstrate any type of renal pathology or renal artery stenosis  Her renal function also remained stable  On the CT stone study was shown that she suffered from significant degenerative disc disease of the lumbar spine including a disc bulge at L4-L5  She also examined positive for straight leg test and slump test   Narcotic opiates were found to significantly lower her blood pressure in the ED  She ultimately was started on Tylenol, lidocaine patch, gabapentin 100 mg t i d  And baclofen 10 mg t i d  With better control of her pain  She was sent home on this regimen  She should be referred to Dr Panfilo Gomez, pain management, as an outpatient  , as she has followed with him in the past     Patient was found to have hypovolemia hyponatremia on admission that worsened with her nausea and vomiting  Thought to be secondary to hydrochlorothiazide use, vomiting and dehydration  She eventually was started on fluids when her blood pressure was better controlled with improvement in her sodium  Unfortunately she left AMA and refused to have labs drawn on the day of discharge    She should have a BMP ordered at her TCM visit to ensure sodium has returned to normal levels  Patient suffered from a tonic-clonic seizure during her hospital stay that occurred in the setting of malignant hypertension and tramadol 50 mg administration for pain control  Upon further review patient states she had remote history of seizures but had not had a seizure in 3-4 years and took herself off her Depakote approximately 3 and half years ago due to weight gain  Neurology was consulted and patient was restarted on her Depakote  She was instructed to follow-up with Neurology on discharge  Depression - patient was resumed on home venlafaxine and trazodone  They were held for a day following her seizure given that venlafaxine can lower the seizure threshold  They were restarted at discharge  On day of discharge patient appeared erratic and irritable, refusing medications and demanding to leave  She ultimately left and hastily signed an AMA form  There was question whether patient has a substance use disorder given her allergy to all NSAIDs and this should be evaluated at her TCM visit      Physical Exam at Discharge  Patient refused physical exam on day of discharge    Medications   Discharge Medication List as of 2/12/2021  9:02 AM      CONTINUE these medications which have CHANGED    Details   atorvastatin (LIPITOR) 80 mg tablet Take 1 tablet (80 mg total) by mouth daily with dinner, Starting Fri 2/12/2021, Normal             Discharge Medication List as of 2/12/2021  9:02 AM      CONTINUE these medications which have NOT CHANGED    Details   albuterol (Ventolin HFA) 90 mcg/act inhaler Inhale 2 puffs every 6 (six) hours as needed for wheezing, Starting Tue 10/27/2020, No Print      nicotine (NICODERM CQ) 14 mg/24hr TD 24 hr patch Place 1 patch on the skin every 24 hours, Starting Tue 3/3/2020, Normal      traZODone (DESYREL) 150 mg tablet take 1 tablet by mouth at bedtime, Normal      umeclidinium bromide (INCRUSE ELLIPTA) 62 5 mcg/inh AEPB inhaler Inhale 1 puff daily, Starting Tue 6/19/2018, No Print      venlafaxine (EFFEXOR-XR) 75 mg 24 hr capsule take 1 capsule by mouth once daily WITH BREAKFAST ALTERNATE WITH 2 CAPS A DAY FOR 1 WEEK THEN FOLLOW UP WITH MD, Normal            Discharge Medication List as of 2/12/2021  9:02 AM      START taking these medications    Details   baclofen 10 mg tablet Take 1 tablet (10 mg total) by mouth 3 (three) times a day, Starting Fri 2/12/2021, Normal      carvedilol (COREG) 12 5 mg tablet Take 1 tablet (12 5 mg total) by mouth 2 (two) times a day with meals, Starting Fri 2/12/2021, Normal      divalproex sodium (DEPAKOTE) 250 mg EC tablet Take 1 tablet (250 mg total) by mouth every 12 (twelve) hours Take 1 tablet twice daily for 12 days, then take 2 tablets twice daily thereafter, Starting Fri 2/12/2021, Normal      gabapentin (NEURONTIN) 100 mg capsule Take 1 capsule (100 mg total) by mouth 3 (three) times a day, Starting Fri 2/12/2021, Normal      lidocaine (LIDODERM) 5 % Apply 1 patch topically daily Remove & Discard patch within 12 hours or as directed by MD, Starting Fri 2/12/2021, No Print      losartan (COZAAR) 50 mg tablet Take 1 tablet (50 mg total) by mouth daily, Starting Fri 2/12/2021, Normal            Discharge Medication List as of 2/12/2021  9:02 AM      STOP taking these medications       valsartan-hydrochlorothiazide (DIOVAN-HCT) 320-25 MG per tablet Comments:   Reason for Stopping:                Allergies  Allergies   Allergen Reactions    Aspirin Anaphylaxis    Asparaginase Derivatives     Ibuprofen     Nsaids     Robaxin [Methocarbamol]     Toradol [Ketorolac Tromethamine]        Diet restrictions:  As tolerated  Activity restrictions: As tolerated  Code Status: Prior  Advance Directive and Living Will: <no information>  Power of :    POLST:      Discharge Condition: stable      Discharge  Statement   I spent 30 minutes discharging the patient  This time was spent on the day of discharge   I had direct contact with the patient on the day of discharge  Additional documentation is required if more than 30 minutes were spent on discharge

## 2021-02-12 NOTE — TELEPHONE ENCOUNTER
1ST ATTEMPT LMOM for pt to call in to sched HFU appt  SLW/Back Pain/[facility] [address]    NOTE FROM CHART:  Chief Complaint   Patient presents with   [address] will need follow up in 2 months with general attending or advance practitioner  She will not require outpatient neurological testing

## 2021-02-13 LAB
BACTERIA BLD CULT: NORMAL
BACTERIA BLD CULT: NORMAL

## 2021-02-15 LAB
BACTERIA BLD CULT: NORMAL
BACTERIA BLD CULT: NORMAL

## 2021-02-16 NOTE — TELEPHONE ENCOUNTER
3RD ATTEMPT LMOM for pt to call in to sched HFU appt  MAGALYSW/Brendan Willis  Mailed letter to pt's home

## 2021-02-17 LAB — RENIN PLAS-CCNC: 1.02 NG/ML/HR (ref 0.17–5.38)

## 2021-02-18 LAB — ALDOST SERPL-MCNC: 5.4 NG/DL (ref 0–30)

## 2021-03-05 DIAGNOSIS — I21.4 NSTEMI (NON-ST ELEVATED MYOCARDIAL INFARCTION) (HCC): ICD-10-CM

## 2021-03-05 DIAGNOSIS — I10 ESSENTIAL HYPERTENSION: ICD-10-CM

## 2021-03-05 DIAGNOSIS — R56.9 SEIZURE (HCC): ICD-10-CM

## 2021-03-05 DIAGNOSIS — M48.061 SPINAL STENOSIS OF LUMBAR REGION, UNSPECIFIED WHETHER NEUROGENIC CLAUDICATION PRESENT: ICD-10-CM

## 2021-03-09 DIAGNOSIS — J44.9 COPD, MILD (HCC): ICD-10-CM

## 2021-03-09 RX ORDER — DIVALPROEX SODIUM 250 MG/1
TABLET, DELAYED RELEASE ORAL
Qty: 96 TABLET | Refills: 0 | Status: SHIPPED | OUTPATIENT
Start: 2021-03-09 | End: 2021-04-08

## 2021-03-09 RX ORDER — GABAPENTIN 100 MG/1
CAPSULE ORAL
Qty: 90 CAPSULE | Refills: 0 | Status: SHIPPED | OUTPATIENT
Start: 2021-03-09 | End: 2021-04-08

## 2021-03-09 RX ORDER — ALBUTEROL SULFATE 90 UG/1
2 AEROSOL, METERED RESPIRATORY (INHALATION) EVERY 6 HOURS PRN
Qty: 18 G | Refills: 3
Start: 2021-03-09 | End: 2021-03-12 | Stop reason: SDUPTHER

## 2021-03-09 RX ORDER — CARVEDILOL 12.5 MG/1
TABLET ORAL
Qty: 60 TABLET | Refills: 0 | Status: SHIPPED | OUTPATIENT
Start: 2021-03-09 | End: 2021-04-08

## 2021-03-09 RX ORDER — LOSARTAN POTASSIUM 50 MG/1
TABLET ORAL
Qty: 30 TABLET | Refills: 0 | Status: SHIPPED | OUTPATIENT
Start: 2021-03-09 | End: 2021-04-08

## 2021-03-09 NOTE — TELEPHONE ENCOUNTER
Dr Jaycee Khoury and Dr Mary Martel      Pt needs refill for albuterol inhalater    [facility] [place]

## 2021-03-12 ENCOUNTER — OFFICE VISIT (OUTPATIENT)
Dept: FAMILY MEDICINE CLINIC | Facility: CLINIC | Age: 66
End: 2021-03-12
Payer: COMMERCIAL

## 2021-03-12 VITALS
DIASTOLIC BLOOD PRESSURE: 62 MMHG | HEIGHT: 60 IN | RESPIRATION RATE: 18 BRPM | BODY MASS INDEX: 31.8 KG/M2 | TEMPERATURE: 97.6 F | HEART RATE: 85 BPM | OXYGEN SATURATION: 96 % | WEIGHT: 162 LBS | SYSTOLIC BLOOD PRESSURE: 148 MMHG

## 2021-03-12 DIAGNOSIS — I10 ESSENTIAL HYPERTENSION: ICD-10-CM

## 2021-03-12 DIAGNOSIS — Z12.31 ENCOUNTER FOR SCREENING MAMMOGRAM FOR BREAST CANCER: ICD-10-CM

## 2021-03-12 DIAGNOSIS — R56.9 SEIZURES (HCC): ICD-10-CM

## 2021-03-12 DIAGNOSIS — E66.01 MORBID OBESITY (HCC): ICD-10-CM

## 2021-03-12 DIAGNOSIS — E78.00 HYPERCHOLESTEREMIA: ICD-10-CM

## 2021-03-12 DIAGNOSIS — J44.9 COPD, MILD (HCC): ICD-10-CM

## 2021-03-12 DIAGNOSIS — Z71.6 ENCOUNTER FOR SMOKING CESSATION COUNSELING: ICD-10-CM

## 2021-03-12 DIAGNOSIS — Z12.11 SCREENING FOR COLON CANCER: Primary | ICD-10-CM

## 2021-03-12 PROCEDURE — 3008F BODY MASS INDEX DOCD: CPT | Performed by: FAMILY MEDICINE

## 2021-03-12 PROCEDURE — 3078F DIAST BP <80 MM HG: CPT | Performed by: FAMILY MEDICINE

## 2021-03-12 PROCEDURE — 1100F PTFALLS ASSESS-DOCD GE2>/YR: CPT | Performed by: FAMILY MEDICINE

## 2021-03-12 PROCEDURE — 3077F SYST BP >= 140 MM HG: CPT | Performed by: FAMILY MEDICINE

## 2021-03-12 PROCEDURE — 99214 OFFICE O/P EST MOD 30 MIN: CPT | Performed by: FAMILY MEDICINE

## 2021-03-12 PROCEDURE — 4004F PT TOBACCO SCREEN RCVD TLK: CPT | Performed by: FAMILY MEDICINE

## 2021-03-12 PROCEDURE — 3288F FALL RISK ASSESSMENT DOCD: CPT | Performed by: FAMILY MEDICINE

## 2021-03-12 PROCEDURE — 3725F SCREEN DEPRESSION PERFORMED: CPT | Performed by: FAMILY MEDICINE

## 2021-03-12 RX ORDER — ALBUTEROL SULFATE 90 UG/1
2 AEROSOL, METERED RESPIRATORY (INHALATION) EVERY 6 HOURS PRN
Qty: 18 G | Refills: 3
Start: 2021-03-12 | End: 2021-09-16 | Stop reason: SDUPTHER

## 2021-03-12 NOTE — PROGRESS NOTES
Assessment/Plan:       Diagnoses and all orders for this visit:    Screening for colon cancer  -     Cologuard; Future    COPD, mild (HCC)  -     albuterol (Ventolin HFA) 90 mcg/act inhaler; Inhale 2 puffs every 6 (six) hours as needed for wheezing    Encounter for screening mammogram for breast cancer  -     Mammo screening bilateral w 3d & cad; Future    Encounter for smoking cessation counseling  Comments:  Patient currently on nicotine patch  States she is making progress to was quitting tobacco use  Seizures ([facility] )    Morbid obesity ([facility] )  Comments:  BMI of 31 64 counseled patient on dietary modifications and exercise  Essential hypertension  Comments:  Stable  Continue Coreg 12 5 mg b i d , losartan 50 mg q d  Hypercholesteremia  Comments:  Stable  Currently on Lipitor 80 mg q d  Other orders  -     Cancel: Ambulatory referral to Ophthalmology; Future          Subjective:      Patient ID: Alexander Waldrop is a 72 y o  female  42-year-old female with past medical history of HTN, COPD and seizure disorders who presents today for 6 month follow-up  Patient at this time reports no acute issues  Patient denies any recent seizure activity, CP, SOB, cough, N/V/D  Patient states she is otherwise in her usual state of health  Patient states she would like to have her albuterol refilled  Patient endorses continued use of tobacco despite being on nicotine patch  She states she is in the process of quitting tobacco use  The following portions of the patient's history were reviewed and updated as appropriate: She  has a past medical history of COPD (chronic obstructive pulmonary disease) ([facility] ), Radiculopathy of lumbar region, and Seizures ([facility] )    She   Patient Active Problem List    Diagnosis Date Noted    Essential hypertension 03/13/2021    Hypophosphatemia 02/09/2021    Acute right flank pain 02/08/2021    Elevated troponin 02/08/2021    Hypomagnesemia 02/08/2021    COPD (chronic obstructive pulmonary disease) (HCC)     Seizures (HCC)     Hyponatremia     Hypokalemia     SIRS (systemic inflammatory response syndrome) ([facility] )     History of absence seizures 2020    Encounter for smoking cessation counseling 2020    Type 2 diabetes mellitus without complication, without long-term current use of insulin ([facility] ) 2020    Chronic fatigue 2018    Pulmonary nodule 2018    Smoker 2018    Chronic low back pain 2017    Intervertebral disc disorder with radiculopathy of lumbosacral region 2017    Cervical radiculopathy 2017    Depression 2017    Liver disease 2017    Lumbar spinal stenosis 2017    Abnormal ultrasound of liver 2016    Morbid obesity ([place] [address] ) 2016    Hypercholesteremia 2015    COPD, mild ([place] [address] ) 2015     She  has a past surgical history that includes  section; Epidural block injection (Right, 2017); and Tonsillectomy  Her family history includes Cancer in her father; Diabetes in her mother; Hyperlipidemia in her mother; Prostate cancer in her father  She  reports that she has been smoking  She has been smoking about 0 50 packs per day  She uses smokeless tobacco  She reports previous alcohol use  She reports previous drug use  Drug: Marijuana    Current Outpatient Medications   Medication Sig Dispense Refill    albuterol (Ventolin HFA) 90 mcg/act inhaler Inhale 2 puffs every 6 (six) hours as needed for wheezing 18 g 3    atorvastatin (LIPITOR) 80 mg tablet Take 1 tablet (80 mg total) by mouth daily with dinner 90 tablet 3    baclofen 10 mg tablet Take 1 tablet (10 mg total) by mouth 3 (three) times a day 90 tablet 0    carvedilol (COREG) 12 5 mg tablet take 1 tablet by mouth twice a day with meals 60 tablet 0    divalproex sodium (DEPAKOTE) 250 mg EC tablet take 1 tablet by mouth every 12 hours for 12 DAYS then 2 tablets by mouth twice a day THEREAFTER 96 tablet 0  gabapentin (NEURONTIN) 100 mg capsule take 1 capsule by mouth three times a day 90 capsule 0    lidocaine (LIDODERM) 5 % Apply 1 patch topically daily Remove & Discard patch within 12 hours or as directed by MD 30 patch 0    losartan (COZAAR) 50 mg tablet take 1 tablet by mouth once daily 30 tablet 0    nicotine (NICODERM CQ) 14 mg/24hr TD 24 hr patch Place 1 patch on the skin every 24 hours (Patient not taking: Reported on 2/8/2021) 28 patch 0    traZODone (DESYREL) 150 mg tablet take 1 tablet by mouth at bedtime 30 tablet 2    umeclidinium bromide (INCRUSE ELLIPTA) 62 5 mcg/inh AEPB inhaler Inhale 1 puff daily (Patient not taking: Reported on 3/3/2020)  0    venlafaxine (EFFEXOR-XR) 75 mg 24 hr capsule take 1 capsule by mouth once daily WITH BREAKFAST ALTERNATE WITH 2 CAPS A DAY FOR 1 WEEK THEN FOLLOW UP WITH MD 90 capsule 3     No current facility-administered medications for this visit        Current Outpatient Medications on File Prior to Visit   Medication Sig    atorvastatin (LIPITOR) 80 mg tablet Take 1 tablet (80 mg total) by mouth daily with dinner    baclofen 10 mg tablet Take 1 tablet (10 mg total) by mouth 3 (three) times a day    carvedilol (COREG) 12 5 mg tablet take 1 tablet by mouth twice a day with meals    divalproex sodium (DEPAKOTE) 250 mg EC tablet take 1 tablet by mouth every 12 hours for 12 DAYS then 2 tablets by mouth twice a day THEREAFTER    gabapentin (NEURONTIN) 100 mg capsule take 1 capsule by mouth three times a day    lidocaine (LIDODERM) 5 % Apply 1 patch topically daily Remove & Discard patch within 12 hours or as directed by MD    losartan (COZAAR) 50 mg tablet take 1 tablet by mouth once daily    nicotine (NICODERM CQ) 14 mg/24hr TD 24 hr patch Place 1 patch on the skin every 24 hours (Patient not taking: Reported on 2/8/2021)    traZODone (DESYREL) 150 mg tablet take 1 tablet by mouth at bedtime    umeclidinium bromide (INCRUSE ELLIPTA) 62 5 mcg/inh AEPB inhaler Inhale 1 puff daily (Patient not taking: Reported on 3/3/2020)    venlafaxine (EFFEXOR-XR) 75 mg 24 hr capsule take 1 capsule by mouth once daily WITH BREAKFAST ALTERNATE WITH 2 CAPS A DAY FOR 1 WEEK THEN FOLLOW UP WITH MD     No current facility-administered medications on file prior to visit  She is allergic to aspirin; asparaginase derivatives; ibuprofen; nsaids; pegaspargase; robaxin [methocarbamol]; and toradol [ketorolac tromethamine]       Review of Systems   Constitutional: Negative  HENT: Negative  Eyes: Negative  Respiratory: Negative  Cardiovascular: Negative  Gastrointestinal: Negative  Musculoskeletal: Positive for arthralgias  Neurological: Negative  Objective:      /62 (BP Location: Left arm, Patient Position: Sitting, Cuff Size: Adult)   Pulse 85   Temp 97 6 °F (36 4 °C) (Tympanic)   Resp 18   Ht 5' (1 524 m)   Wt 73 5 kg (162 lb)   SpO2 96%   BMI 31 64 kg/m²          Physical Exam  Vitals signs reviewed  Constitutional:       General: She is not in acute distress  Appearance: Normal appearance  She is obese  She is not ill-appearing, toxic-appearing or diaphoretic  HENT:      Head: Normocephalic and atraumatic  Right Ear: External ear normal       Left Ear: External ear normal       Nose: Nose normal  No congestion or rhinorrhea  Cardiovascular:      Rate and Rhythm: Normal rate and regular rhythm  Pulses: Normal pulses  Heart sounds: Normal heart sounds  No murmur  No friction rub  No gallop  Pulmonary:      Effort: Pulmonary effort is normal       Breath sounds: Normal breath sounds  Skin:     General: Skin is warm  Capillary Refill: Capillary refill takes less than 2 seconds  Neurological:      Mental Status: She is alert and oriented to person, place, and time  Mental status is at baseline

## 2021-03-13 PROBLEM — I10 ESSENTIAL HYPERTENSION: Status: RESOLVED | Noted: 2018-05-07 | Resolved: 2021-03-13

## 2021-03-13 PROBLEM — I10 ESSENTIAL HYPERTENSION: Status: ACTIVE | Noted: 2021-03-13

## 2021-04-01 DIAGNOSIS — M48.061 SPINAL STENOSIS OF LUMBAR REGION, UNSPECIFIED WHETHER NEUROGENIC CLAUDICATION PRESENT: ICD-10-CM

## 2021-04-01 DIAGNOSIS — R56.9 SEIZURE (HCC): ICD-10-CM

## 2021-04-01 DIAGNOSIS — I21.4 NSTEMI (NON-ST ELEVATED MYOCARDIAL INFARCTION) (HCC): ICD-10-CM

## 2021-04-01 DIAGNOSIS — I10 ESSENTIAL HYPERTENSION: ICD-10-CM

## 2021-04-01 PROCEDURE — 4010F ACE/ARB THERAPY RXD/TAKEN: CPT | Performed by: FAMILY MEDICINE

## 2021-04-08 RX ORDER — LOSARTAN POTASSIUM 50 MG/1
TABLET ORAL
Qty: 30 TABLET | Refills: 0 | Status: SHIPPED | OUTPATIENT
Start: 2021-04-08 | End: 2021-05-03

## 2021-04-08 RX ORDER — CARVEDILOL 12.5 MG/1
TABLET ORAL
Qty: 60 TABLET | Refills: 0 | Status: SHIPPED | OUTPATIENT
Start: 2021-04-08 | End: 2021-05-03

## 2021-04-08 RX ORDER — GABAPENTIN 100 MG/1
CAPSULE ORAL
Qty: 90 CAPSULE | Refills: 0 | Status: SHIPPED | OUTPATIENT
Start: 2021-04-08 | End: 2021-05-03

## 2021-04-08 RX ORDER — DIVALPROEX SODIUM 250 MG/1
TABLET, DELAYED RELEASE ORAL
Qty: 96 TABLET | Refills: 0 | Status: SHIPPED | OUTPATIENT
Start: 2021-04-08 | End: 2021-05-03

## 2021-04-22 DIAGNOSIS — F33.42 RECURRENT MAJOR DEPRESSIVE DISORDER, IN FULL REMISSION (HCC): ICD-10-CM

## 2021-04-22 DIAGNOSIS — G47.00 INSOMNIA, UNSPECIFIED TYPE: ICD-10-CM

## 2021-04-22 RX ORDER — TRAZODONE HYDROCHLORIDE 150 MG/1
TABLET ORAL
Qty: 30 TABLET | Refills: 2 | Status: SHIPPED | OUTPATIENT
Start: 2021-04-22 | End: 2021-11-01

## 2021-04-25 DIAGNOSIS — R56.9 SEIZURE (HCC): ICD-10-CM

## 2021-04-30 DIAGNOSIS — M48.061 SPINAL STENOSIS OF LUMBAR REGION, UNSPECIFIED WHETHER NEUROGENIC CLAUDICATION PRESENT: ICD-10-CM

## 2021-04-30 DIAGNOSIS — I21.4 NSTEMI (NON-ST ELEVATED MYOCARDIAL INFARCTION) (HCC): ICD-10-CM

## 2021-04-30 DIAGNOSIS — I10 ESSENTIAL HYPERTENSION: ICD-10-CM

## 2021-05-03 RX ORDER — GABAPENTIN 100 MG/1
CAPSULE ORAL
Qty: 90 CAPSULE | Refills: 0 | Status: SHIPPED | OUTPATIENT
Start: 2021-05-03 | End: 2021-05-31

## 2021-05-03 RX ORDER — LOSARTAN POTASSIUM 50 MG/1
TABLET ORAL
Qty: 30 TABLET | Refills: 0 | Status: SHIPPED | OUTPATIENT
Start: 2021-05-03 | End: 2021-05-31

## 2021-05-03 RX ORDER — DIVALPROEX SODIUM 250 MG/1
TABLET, DELAYED RELEASE ORAL
Qty: 96 TABLET | Refills: 0 | Status: SHIPPED | OUTPATIENT
Start: 2021-05-03 | End: 2021-05-31

## 2021-05-03 RX ORDER — CARVEDILOL 12.5 MG/1
TABLET ORAL
Qty: 60 TABLET | Refills: 0 | Status: SHIPPED | OUTPATIENT
Start: 2021-05-03 | End: 2021-05-31

## 2021-05-29 DIAGNOSIS — I21.4 NSTEMI (NON-ST ELEVATED MYOCARDIAL INFARCTION) (HCC): ICD-10-CM

## 2021-05-29 DIAGNOSIS — M48.061 SPINAL STENOSIS OF LUMBAR REGION, UNSPECIFIED WHETHER NEUROGENIC CLAUDICATION PRESENT: ICD-10-CM

## 2021-05-29 DIAGNOSIS — I10 ESSENTIAL HYPERTENSION: ICD-10-CM

## 2021-05-29 DIAGNOSIS — R56.9 SEIZURE (HCC): ICD-10-CM

## 2021-05-31 RX ORDER — DIVALPROEX SODIUM 250 MG/1
TABLET, DELAYED RELEASE ORAL
Qty: 96 TABLET | Refills: 0 | Status: SHIPPED | OUTPATIENT
Start: 2021-05-31 | End: 2021-07-02 | Stop reason: SDUPTHER

## 2021-05-31 RX ORDER — CARVEDILOL 12.5 MG/1
TABLET ORAL
Qty: 60 TABLET | Refills: 0 | Status: SHIPPED | OUTPATIENT
Start: 2021-05-31 | End: 2021-07-02 | Stop reason: SDUPTHER

## 2021-05-31 RX ORDER — LOSARTAN POTASSIUM 50 MG/1
TABLET ORAL
Qty: 30 TABLET | Refills: 0 | Status: SHIPPED | OUTPATIENT
Start: 2021-05-31 | End: 2021-06-10 | Stop reason: SDUPTHER

## 2021-05-31 RX ORDER — GABAPENTIN 100 MG/1
CAPSULE ORAL
Qty: 90 CAPSULE | Refills: 0 | Status: SHIPPED | OUTPATIENT
Start: 2021-05-31 | End: 2021-07-02 | Stop reason: SDUPTHER

## 2021-06-08 ENCOUNTER — TELEPHONE (OUTPATIENT)
Dept: FAMILY MEDICINE CLINIC | Facility: CLINIC | Age: 66
End: 2021-06-08

## 2021-06-08 NOTE — TELEPHONE ENCOUNTER
Patient requesting refill of Diovan  Ran out 2 days ago  I don't see Diovan on her list, she states that she has been taking Diovan 160 mg  Last seen 3/12/21  Please advise if patient should still be taking      thanks

## 2021-06-10 DIAGNOSIS — I10 ESSENTIAL HYPERTENSION: ICD-10-CM

## 2021-06-10 RX ORDER — LOSARTAN POTASSIUM 50 MG/1
50 TABLET ORAL DAILY
Qty: 30 TABLET | Refills: 0 | Status: SHIPPED | OUTPATIENT
Start: 2021-06-10 | End: 2021-07-18

## 2021-06-10 NOTE — TELEPHONE ENCOUNTER
Patient is on Losartan and Diovan is Valsartan  She should not be on both   I have refilled her Losartan and sent an RX to her pharmacy

## 2021-07-02 DIAGNOSIS — M48.061 SPINAL STENOSIS OF LUMBAR REGION, UNSPECIFIED WHETHER NEUROGENIC CLAUDICATION PRESENT: ICD-10-CM

## 2021-07-02 DIAGNOSIS — I10 ESSENTIAL HYPERTENSION: ICD-10-CM

## 2021-07-02 DIAGNOSIS — R56.9 SEIZURE (HCC): ICD-10-CM

## 2021-07-02 DIAGNOSIS — I21.4 NSTEMI (NON-ST ELEVATED MYOCARDIAL INFARCTION) (HCC): ICD-10-CM

## 2021-07-05 RX ORDER — DIVALPROEX SODIUM 250 MG/1
500 TABLET, DELAYED RELEASE ORAL EVERY 12 HOURS SCHEDULED
Qty: 96 TABLET | Refills: 0 | Status: SHIPPED | OUTPATIENT
Start: 2021-07-05 | End: 2021-09-07

## 2021-07-05 RX ORDER — CARVEDILOL 12.5 MG/1
12.5 TABLET ORAL 2 TIMES DAILY WITH MEALS
Qty: 60 TABLET | Refills: 0 | Status: SHIPPED | OUTPATIENT
Start: 2021-07-05 | End: 2021-07-27

## 2021-07-05 RX ORDER — GABAPENTIN 100 MG/1
100 CAPSULE ORAL 3 TIMES DAILY
Qty: 90 CAPSULE | Refills: 0 | Status: SHIPPED | OUTPATIENT
Start: 2021-07-05 | End: 2021-07-27

## 2021-07-18 DIAGNOSIS — I10 ESSENTIAL HYPERTENSION: ICD-10-CM

## 2021-07-18 RX ORDER — LOSARTAN POTASSIUM 50 MG/1
TABLET ORAL
Qty: 30 TABLET | Refills: 0 | Status: SHIPPED | OUTPATIENT
Start: 2021-07-18 | End: 2021-08-14

## 2021-07-24 DIAGNOSIS — G47.00 INSOMNIA, UNSPECIFIED TYPE: ICD-10-CM

## 2021-07-24 DIAGNOSIS — F33.42 RECURRENT MAJOR DEPRESSIVE DISORDER, IN FULL REMISSION (HCC): ICD-10-CM

## 2021-07-27 DIAGNOSIS — I21.4 NSTEMI (NON-ST ELEVATED MYOCARDIAL INFARCTION) (HCC): ICD-10-CM

## 2021-07-27 DIAGNOSIS — I10 ESSENTIAL HYPERTENSION: ICD-10-CM

## 2021-07-27 DIAGNOSIS — M48.061 SPINAL STENOSIS OF LUMBAR REGION, UNSPECIFIED WHETHER NEUROGENIC CLAUDICATION PRESENT: ICD-10-CM

## 2021-07-27 RX ORDER — CARVEDILOL 12.5 MG/1
TABLET ORAL
Qty: 60 TABLET | Refills: 0 | Status: SHIPPED | OUTPATIENT
Start: 2021-07-27 | End: 2021-08-31

## 2021-07-27 RX ORDER — GABAPENTIN 100 MG/1
CAPSULE ORAL
Qty: 90 CAPSULE | Refills: 0 | Status: SHIPPED | OUTPATIENT
Start: 2021-07-27 | End: 2021-08-31

## 2021-08-14 DIAGNOSIS — I10 ESSENTIAL HYPERTENSION: ICD-10-CM

## 2021-08-14 RX ORDER — LOSARTAN POTASSIUM 50 MG/1
TABLET ORAL
Qty: 30 TABLET | Refills: 0 | Status: SHIPPED | OUTPATIENT
Start: 2021-08-14 | End: 2021-09-14

## 2021-08-31 DIAGNOSIS — I21.4 NSTEMI (NON-ST ELEVATED MYOCARDIAL INFARCTION) (HCC): ICD-10-CM

## 2021-08-31 DIAGNOSIS — I10 ESSENTIAL HYPERTENSION: ICD-10-CM

## 2021-08-31 DIAGNOSIS — M48.061 SPINAL STENOSIS OF LUMBAR REGION, UNSPECIFIED WHETHER NEUROGENIC CLAUDICATION PRESENT: ICD-10-CM

## 2021-08-31 RX ORDER — GABAPENTIN 100 MG/1
CAPSULE ORAL
Qty: 90 CAPSULE | Refills: 0 | Status: SHIPPED | OUTPATIENT
Start: 2021-08-31 | End: 2021-09-30

## 2021-08-31 RX ORDER — CARVEDILOL 12.5 MG/1
TABLET ORAL
Qty: 60 TABLET | Refills: 0 | Status: SHIPPED | OUTPATIENT
Start: 2021-08-31 | End: 2021-09-29

## 2021-09-07 DIAGNOSIS — R56.9 SEIZURE (HCC): ICD-10-CM

## 2021-09-07 RX ORDER — DIVALPROEX SODIUM 250 MG/1
TABLET, DELAYED RELEASE ORAL
Qty: 96 TABLET | Refills: 0 | Status: SHIPPED | OUTPATIENT
Start: 2021-09-07 | End: 2021-09-24

## 2021-09-09 ENCOUNTER — VBI (OUTPATIENT)
Dept: ADMINISTRATIVE | Facility: OTHER | Age: 66
End: 2021-09-09

## 2021-09-14 DIAGNOSIS — I10 ESSENTIAL HYPERTENSION: ICD-10-CM

## 2021-09-14 PROCEDURE — 4010F ACE/ARB THERAPY RXD/TAKEN: CPT | Performed by: FAMILY MEDICINE

## 2021-09-14 RX ORDER — LOSARTAN POTASSIUM 50 MG/1
TABLET ORAL
Qty: 30 TABLET | Refills: 0 | Status: SHIPPED | OUTPATIENT
Start: 2021-09-14 | End: 2021-10-13

## 2021-09-16 ENCOUNTER — OFFICE VISIT (OUTPATIENT)
Dept: FAMILY MEDICINE CLINIC | Facility: CLINIC | Age: 66
End: 2021-09-16
Payer: COMMERCIAL

## 2021-09-16 VITALS
HEIGHT: 60 IN | WEIGHT: 149 LBS | HEART RATE: 69 BPM | TEMPERATURE: 97.8 F | OXYGEN SATURATION: 96 % | BODY MASS INDEX: 29.25 KG/M2 | DIASTOLIC BLOOD PRESSURE: 86 MMHG | SYSTOLIC BLOOD PRESSURE: 160 MMHG | RESPIRATION RATE: 18 BRPM

## 2021-09-16 DIAGNOSIS — Z11.4 ENCOUNTER FOR SCREENING FOR HIV: ICD-10-CM

## 2021-09-16 DIAGNOSIS — E11.9 TYPE 2 DIABETES MELLITUS WITHOUT COMPLICATION, WITHOUT LONG-TERM CURRENT USE OF INSULIN (HCC): ICD-10-CM

## 2021-09-16 DIAGNOSIS — Z23 ENCOUNTER FOR IMMUNIZATION: Primary | ICD-10-CM

## 2021-09-16 DIAGNOSIS — Z71.6 ENCOUNTER FOR SMOKING CESSATION COUNSELING: ICD-10-CM

## 2021-09-16 DIAGNOSIS — Z11.59 NEED FOR HEPATITIS C SCREENING TEST: ICD-10-CM

## 2021-09-16 DIAGNOSIS — Z12.11 COLON CANCER SCREENING: ICD-10-CM

## 2021-09-16 DIAGNOSIS — J44.9 COPD, MILD (HCC): ICD-10-CM

## 2021-09-16 DIAGNOSIS — Z74.8 ASSISTANCE WITH TRANSPORTATION: ICD-10-CM

## 2021-09-16 DIAGNOSIS — M48.061 SPINAL STENOSIS OF LUMBAR REGION, UNSPECIFIED WHETHER NEUROGENIC CLAUDICATION PRESENT: ICD-10-CM

## 2021-09-16 PROCEDURE — 4004F PT TOBACCO SCREEN RCVD TLK: CPT | Performed by: FAMILY MEDICINE

## 2021-09-16 PROCEDURE — 3079F DIAST BP 80-89 MM HG: CPT | Performed by: FAMILY MEDICINE

## 2021-09-16 PROCEDURE — 90715 TDAP VACCINE 7 YRS/> IM: CPT | Performed by: FAMILY MEDICINE

## 2021-09-16 PROCEDURE — 3008F BODY MASS INDEX DOCD: CPT | Performed by: FAMILY MEDICINE

## 2021-09-16 PROCEDURE — 90662 IIV NO PRSV INCREASED AG IM: CPT | Performed by: FAMILY MEDICINE

## 2021-09-16 PROCEDURE — 90471 IMMUNIZATION ADMIN: CPT | Performed by: FAMILY MEDICINE

## 2021-09-16 PROCEDURE — 99213 OFFICE O/P EST LOW 20 MIN: CPT | Performed by: FAMILY MEDICINE

## 2021-09-16 PROCEDURE — 1160F RVW MEDS BY RX/DR IN RCRD: CPT | Performed by: FAMILY MEDICINE

## 2021-09-16 PROCEDURE — 3077F SYST BP >= 140 MM HG: CPT | Performed by: FAMILY MEDICINE

## 2021-09-16 PROCEDURE — 90472 IMMUNIZATION ADMIN EACH ADD: CPT | Performed by: FAMILY MEDICINE

## 2021-09-16 RX ORDER — NICOTINE 21 MG/24HR
1 PATCH, TRANSDERMAL 24 HOURS TRANSDERMAL EVERY 24 HOURS
Qty: 28 PATCH | Refills: 0 | Status: SHIPPED | OUTPATIENT
Start: 2021-09-16

## 2021-09-16 RX ORDER — ALBUTEROL SULFATE 90 UG/1
2 AEROSOL, METERED RESPIRATORY (INHALATION) EVERY 6 HOURS PRN
Qty: 18 G | Refills: 3
Start: 2021-09-16

## 2021-09-16 NOTE — PROGRESS NOTES
[address] [facility]  72 y o   09/16/21      CC:  Diabetes visit  Care gaps addressed this visit:  Order for Cologuard, diabetic foot exam, DTaP,  Flu shot  Follow up: 1 month for annual physical    Problem List Items Addressed This Visit        Endocrine    Type 2 diabetes mellitus without complication, without long-term current use of insulin ([facility] )    Relevant Orders    Ambulatory referral to Ophthalmology    HEMOGLOBIN A1C W/ EAG ESTIMATION    Microalbumin / creatinine urine ratio      Other Visit Diagnoses     Encounter for immunization    -  Primary    Relevant Orders    TDAP VACCINE GREATER THAN OR EQUAL TO 8YO IM (Completed)    influenza vaccine, high-dose, PF 0 7 mL (FLUZONE HIGH-DOSE) (Completed)    Encounter for screening for HIV        Relevant Orders    Human Immunodeficiency Virus 1/2 Antigen / Antibody ( Fourth Generation) with Reflex Testing    Need for hepatitis C screening test        Relevant Orders    Hepatitis C antibody    Colon cancer screening        Relevant Orders    Cologuard    Assistance with transportation        Relevant Orders    Ambulatory referral to social work care management program       Discussed with patien the benefits, contraindications and side effects of the following vaccines: Tetanus, Diphtheria, Pertussis or Influenza   Discussed 4 components of the vaccine/s  Subjective:      Patient is a 60-year-old female with past medical history significant for diabetes  Her last hemoglobin A1c was within normal limits of 5 5  She does not take any medication and has done well with diet control alone  She knows she is due for retinal exam   Diabetic foot exam was done today  Will repeat hemoglobin A1c as well as a microalbumin creatinine ratio  Discussed importance of healthy diet and exercise  I also gave patient a referral to physical therapy as she has been feeling slightly off balance because of her lumbar stenosis    I suspect this is due to muscle tightness and deconditioning  She states that there is a physical therapist she likes in [place]  Review of Systems   Constitutional: Negative for chills and fever  Eyes: Positive for visual disturbance (States vision is getting worse)  Respiratory: Negative for cough, shortness of breath and wheezing  Cardiovascular: Negative for chest pain and palpitations  Gastrointestinal: Negative for diarrhea, nausea and vomiting  Genitourinary: Negative for difficulty urinating and dysuria  Musculoskeletal: Positive for back pain  Neurological: Negative for dizziness and headaches          The following portions of the patient's history were reviewed and updated as appropriate:  current medications, past family history, past medical history, past social history, past surgical history and problem list     Current Outpatient Medications on File Prior to Visit   Medication Sig Dispense Refill    albuterol (Ventolin HFA) 90 mcg/act inhaler Inhale 2 puffs every 6 (six) hours as needed for wheezing 18 g 3    atorvastatin (LIPITOR) 80 mg tablet Take 1 tablet (80 mg total) by mouth daily with dinner 90 tablet 3    baclofen 10 mg tablet Take 1 tablet (10 mg total) by mouth 3 (three) times a day 90 tablet 0    carvedilol (COREG) 12 5 mg tablet take 1 tablet by mouth twice a day with meals 60 tablet 0    divalproex sodium (DEPAKOTE) 250 mg EC tablet take 2 tablets by mouth every 12 hours 96 tablet 0    gabapentin (NEURONTIN) 100 mg capsule take 1 capsule by mouth three times a day 90 capsule 0    losartan (COZAAR) 50 mg tablet take 1 tablet by mouth once daily 30 tablet 0    traZODone (DESYREL) 150 mg tablet take 1 tablet by mouth at bedtime 30 tablet 2    venlafaxine (EFFEXOR-XR) 75 mg 24 hr capsule take 1 capsule by mouth once daily WITH BREAKFAST ALTERNATE WITH 2 CAPS A DAY FOR 1 WEEK THEN FOLLOW UP WITH MD 90 capsule 3    lidocaine (LIDODERM) 5 % Apply 1 patch topically daily Remove & Discard patch within 12 hours or as directed by MD 30 patch 0    nicotine (NICODERM CQ) 14 mg/24hr TD 24 hr patch Place 1 patch on the skin every 24 hours (Patient not taking: Reported on 2/8/2021) 28 patch 0    umeclidinium bromide (INCRUSE ELLIPTA) 62 5 mcg/inh AEPB inhaler Inhale 1 puff daily (Patient not taking: Reported on 3/3/2020)  0     No current facility-administered medications on file prior to visit  Objective:    /86 (BP Location: Left arm, Patient Position: Sitting, Cuff Size: Standard)   Pulse 69   Temp 97 8 °F (36 6 °C) (Tympanic)   Resp 18   Ht 5' (1 524 m)   Wt 67 6 kg (149 lb)   SpO2 96%   BMI 29 10 kg/m²     Physical Exam  Constitutional:       General: She is not in acute distress  Appearance: She is obese  She is not ill-appearing  Eyes:      General: No scleral icterus  Right eye: No discharge  Left eye: No discharge  Extraocular Movements: Extraocular movements intact  Conjunctiva/sclera: Conjunctivae normal       Pupils: Pupils are equal, round, and reactive to light  Cardiovascular:      Rate and Rhythm: Normal rate and regular rhythm  Pulses: no weak pulses          Dorsalis pedis pulses are 2+ on the right side and 2+ on the left side  Posterior tibial pulses are 2+ on the right side and 2+ on the left side  Pulmonary:      Effort: Pulmonary effort is normal       Comments: Decreased breath sounds in bilateral lung bases  Musculoskeletal:      Comments: See diabetic foot exam below   Feet:      Right foot:      Skin integrity: No ulcer, skin breakdown, erythema, warmth, callus or dry skin  Left foot:      Skin integrity: No ulcer, skin breakdown, erythema, warmth, callus or dry skin  Skin:     General: Skin is warm  Neurological:      General: No focal deficit present  Mental Status: She is alert and oriented to person, place, and time     Psychiatric:         Mood and Affect: Mood normal          Behavior: Behavior normal  Thought Content: Thought content normal          Judgment: Judgment normal             BMI Counseling: Body mass index is 29 1 kg/m²  The BMI is above normal  Nutrition recommendations include limiting drinks that contain sugar  Exercise recommendations include moderate physical activity 150 minutes/week and exercising 3-5 times per week  Rationale for BMI follow-up plan is due to patient being overweight or obese  [place] Plan of Care: referral to physical therapy  Some portions of this record may have been generated with voice recognition software  There may be translation, syntax, or grammatical errors  Occasional wrong word or "sound-a-like" substitutions may have occurred due to the inherent limitations of the voice recognition software  Ivon Irene 61   PGY3    Patient's shoes and socks removed  Right Foot/Ankle   Right Foot Inspection  Skin Exam: skin normal and skin intact no dry skin, no warmth, no callus, no erythema, no maceration, no abnormal color, no pre-ulcer, no ulcer and no callus                          Toe Exam: ROM and strength within normal limits  Sensory   Vibration: intact  Proprioception: intact   Monofilament testing: intact  Vascular    The right DP pulse is 2+  The right PT pulse is 2+  Left Foot/Ankle  Left Foot Inspection  Skin Exam: skin normal and skin intactno dry skin, no warmth, no erythema, no maceration, normal color, no pre-ulcer, no ulcer and no callus                         Toe Exam: ROM and strength within normal limits                   Sensory   Vibration: intact  Proprioception: intact  Monofilament: intact  Vascular    The left DP pulse is 2+  The left PT pulse is 2+  Assign Risk Category:  No deformity present; No loss of protective sensation;  No weak pulses       Risk: 0

## 2021-09-22 ENCOUNTER — PATIENT OUTREACH (OUTPATIENT)
Dept: FAMILY MEDICINE CLINIC | Facility: CLINIC | Age: 66
End: 2021-09-22

## 2021-09-22 NOTE — PROGRESS NOTES
Received referral for  outreach  Pt reportedly is in need of transportation  I called and spoke with pt  She said she usually uses [employer] but they are no reliable  Her neighbor could drive her but she has no car  Pt will need a ride next month to her PCP appt here at [facility]  I told pt that we at [facility] can assist pts by scheduling [employer] rides  Pt appreciated this  I suggested she contact office a few days prior to let them know she needs a ride  She will do so  I reviewed with pt her social determinants of health  Aside from the transportation, she does not have any other concerns  She utlitizes food stamps and is connected with [employer] sent to Dr Keren Callahan

## 2021-09-29 DIAGNOSIS — I21.4 NSTEMI (NON-ST ELEVATED MYOCARDIAL INFARCTION) (HCC): ICD-10-CM

## 2021-09-29 DIAGNOSIS — I10 ESSENTIAL HYPERTENSION: ICD-10-CM

## 2021-09-29 RX ORDER — CARVEDILOL 12.5 MG/1
TABLET ORAL
Qty: 60 TABLET | Refills: 0 | Status: SHIPPED | OUTPATIENT
Start: 2021-09-29 | End: 2021-10-27

## 2021-09-30 DIAGNOSIS — M48.061 SPINAL STENOSIS OF LUMBAR REGION, UNSPECIFIED WHETHER NEUROGENIC CLAUDICATION PRESENT: ICD-10-CM

## 2021-09-30 RX ORDER — GABAPENTIN 100 MG/1
CAPSULE ORAL
Qty: 90 CAPSULE | Refills: 0 | Status: SHIPPED | OUTPATIENT
Start: 2021-09-30 | End: 2021-10-28

## 2021-10-04 ENCOUNTER — RA CDI HCC (OUTPATIENT)
Dept: OTHER | Facility: HOSPITAL | Age: 66
End: 2021-10-04

## 2021-10-13 DIAGNOSIS — I10 ESSENTIAL HYPERTENSION: ICD-10-CM

## 2021-10-13 RX ORDER — LOSARTAN POTASSIUM 50 MG/1
TABLET ORAL
Qty: 30 TABLET | Refills: 0 | Status: SHIPPED | OUTPATIENT
Start: 2021-10-13 | End: 2021-11-11

## 2021-10-18 ENCOUNTER — TELEPHONE (OUTPATIENT)
Dept: PHYSICAL THERAPY | Facility: CLINIC | Age: 66
End: 2021-10-18

## 2021-10-21 ENCOUNTER — APPOINTMENT (OUTPATIENT)
Dept: LAB | Facility: HOSPITAL | Age: 66
End: 2021-10-21
Payer: COMMERCIAL

## 2021-10-21 DIAGNOSIS — E10.630 TYPE 1 DIABETES MELLITUS WITH PERIODONTAL DISEASE (HCC): Primary | ICD-10-CM

## 2021-10-21 DIAGNOSIS — R56.9 SEIZURE (HCC): ICD-10-CM

## 2021-10-21 LAB
CREAT UR-MCNC: 182 MG/DL
MICROALBUMIN UR-MCNC: 593 MG/L (ref 0–20)
MICROALBUMIN/CREAT 24H UR: 326 MG/G CREATININE (ref 0–30)

## 2021-10-21 PROCEDURE — 3062F POS MACROALBUMINURIA REV: CPT | Performed by: FAMILY MEDICINE

## 2021-10-21 PROCEDURE — 82043 UR ALBUMIN QUANTITATIVE: CPT

## 2021-10-21 PROCEDURE — 82570 ASSAY OF URINE CREATININE: CPT

## 2021-10-27 DIAGNOSIS — I10 ESSENTIAL HYPERTENSION: ICD-10-CM

## 2021-10-27 DIAGNOSIS — I21.4 NSTEMI (NON-ST ELEVATED MYOCARDIAL INFARCTION) (HCC): ICD-10-CM

## 2021-10-27 RX ORDER — CARVEDILOL 12.5 MG/1
TABLET ORAL
Qty: 60 TABLET | Refills: 0 | Status: SHIPPED | OUTPATIENT
Start: 2021-10-27 | End: 2021-11-27

## 2021-10-28 DIAGNOSIS — M48.061 SPINAL STENOSIS OF LUMBAR REGION, UNSPECIFIED WHETHER NEUROGENIC CLAUDICATION PRESENT: ICD-10-CM

## 2021-10-28 RX ORDER — GABAPENTIN 100 MG/1
CAPSULE ORAL
Qty: 90 CAPSULE | Refills: 0 | Status: SHIPPED | OUTPATIENT
Start: 2021-10-28 | End: 2021-11-27

## 2021-11-01 DIAGNOSIS — G47.00 INSOMNIA, UNSPECIFIED TYPE: ICD-10-CM

## 2021-11-01 DIAGNOSIS — F33.42 RECURRENT MAJOR DEPRESSIVE DISORDER, IN FULL REMISSION (HCC): ICD-10-CM

## 2021-11-01 RX ORDER — TRAZODONE HYDROCHLORIDE 150 MG/1
TABLET ORAL
Qty: 30 TABLET | Refills: 2 | Status: SHIPPED | OUTPATIENT
Start: 2021-11-01

## 2021-11-01 RX ORDER — DIVALPROEX SODIUM 250 MG/1
500 TABLET, DELAYED RELEASE ORAL EVERY 12 HOURS
Qty: 120 TABLET | Refills: 0 | Status: SHIPPED | OUTPATIENT
Start: 2021-11-01 | End: 2021-11-23

## 2021-11-01 RX ORDER — TRAZODONE HYDROCHLORIDE 150 MG/1
150 TABLET ORAL
Qty: 30 TABLET | Refills: 2 | OUTPATIENT
Start: 2021-11-01

## 2021-11-04 ENCOUNTER — VBI (OUTPATIENT)
Dept: ADMINISTRATIVE | Facility: OTHER | Age: 66
End: 2021-11-04

## 2021-11-11 DIAGNOSIS — I10 ESSENTIAL HYPERTENSION: ICD-10-CM

## 2021-11-11 RX ORDER — LOSARTAN POTASSIUM 50 MG/1
TABLET ORAL
Qty: 30 TABLET | Refills: 0 | Status: SHIPPED | OUTPATIENT
Start: 2021-11-11 | End: 2021-12-14

## 2021-11-23 DIAGNOSIS — R56.9 SEIZURE (HCC): ICD-10-CM

## 2021-11-23 RX ORDER — DIVALPROEX SODIUM 250 MG/1
500 TABLET, DELAYED RELEASE ORAL EVERY 12 HOURS
Qty: 120 TABLET | Refills: 0 | Status: SHIPPED | OUTPATIENT
Start: 2021-11-23 | End: 2021-12-27

## 2021-11-27 DIAGNOSIS — I21.4 NSTEMI (NON-ST ELEVATED MYOCARDIAL INFARCTION) (HCC): ICD-10-CM

## 2021-11-27 DIAGNOSIS — M48.061 SPINAL STENOSIS OF LUMBAR REGION, UNSPECIFIED WHETHER NEUROGENIC CLAUDICATION PRESENT: ICD-10-CM

## 2021-11-27 DIAGNOSIS — I10 ESSENTIAL HYPERTENSION: ICD-10-CM

## 2021-11-27 RX ORDER — GABAPENTIN 100 MG/1
CAPSULE ORAL
Qty: 90 CAPSULE | Refills: 0 | Status: SHIPPED | OUTPATIENT
Start: 2021-11-27 | End: 2021-12-29

## 2021-11-27 RX ORDER — CARVEDILOL 12.5 MG/1
TABLET ORAL
Qty: 60 TABLET | Refills: 0 | Status: SHIPPED | OUTPATIENT
Start: 2021-11-27 | End: 2021-12-29

## 2021-12-14 DIAGNOSIS — I10 ESSENTIAL HYPERTENSION: ICD-10-CM

## 2021-12-14 RX ORDER — LOSARTAN POTASSIUM 50 MG/1
TABLET ORAL
Qty: 30 TABLET | Refills: 0 | Status: SHIPPED | OUTPATIENT
Start: 2021-12-14 | End: 2022-01-07

## 2021-12-29 DIAGNOSIS — M48.061 SPINAL STENOSIS OF LUMBAR REGION, UNSPECIFIED WHETHER NEUROGENIC CLAUDICATION PRESENT: ICD-10-CM

## 2021-12-29 DIAGNOSIS — I21.4 NSTEMI (NON-ST ELEVATED MYOCARDIAL INFARCTION) (HCC): ICD-10-CM

## 2021-12-29 DIAGNOSIS — I10 ESSENTIAL HYPERTENSION: ICD-10-CM

## 2021-12-29 RX ORDER — GABAPENTIN 100 MG/1
CAPSULE ORAL
Qty: 90 CAPSULE | Refills: 0 | Status: SHIPPED | OUTPATIENT
Start: 2021-12-29 | End: 2022-01-24

## 2021-12-29 RX ORDER — CARVEDILOL 12.5 MG/1
TABLET ORAL
Qty: 60 TABLET | Refills: 0 | Status: SHIPPED | OUTPATIENT
Start: 2021-12-29 | End: 2022-01-24

## 2022-01-07 DIAGNOSIS — I10 ESSENTIAL HYPERTENSION: ICD-10-CM

## 2022-01-07 RX ORDER — LOSARTAN POTASSIUM 50 MG/1
TABLET ORAL
Qty: 30 TABLET | Refills: 0 | Status: SHIPPED | OUTPATIENT
Start: 2022-01-07 | End: 2022-04-12 | Stop reason: SDUPTHER

## 2022-01-18 DIAGNOSIS — E78.00 HYPERCHOLESTEREMIA: ICD-10-CM

## 2022-01-18 RX ORDER — ATORVASTATIN CALCIUM 80 MG/1
TABLET, FILM COATED ORAL
Qty: 90 TABLET | Refills: 3 | Status: SHIPPED | OUTPATIENT
Start: 2022-01-18

## 2022-01-24 DIAGNOSIS — I21.4 NSTEMI (NON-ST ELEVATED MYOCARDIAL INFARCTION) (HCC): ICD-10-CM

## 2022-01-24 DIAGNOSIS — M48.061 SPINAL STENOSIS OF LUMBAR REGION, UNSPECIFIED WHETHER NEUROGENIC CLAUDICATION PRESENT: ICD-10-CM

## 2022-01-24 DIAGNOSIS — I10 ESSENTIAL HYPERTENSION: ICD-10-CM

## 2022-01-24 RX ORDER — GABAPENTIN 100 MG/1
CAPSULE ORAL
Qty: 90 CAPSULE | Refills: 0 | Status: SHIPPED | OUTPATIENT
Start: 2022-01-24 | End: 2022-01-27 | Stop reason: SDUPTHER

## 2022-01-24 RX ORDER — CARVEDILOL 12.5 MG/1
TABLET ORAL
Qty: 60 TABLET | Refills: 0 | Status: SHIPPED | OUTPATIENT
Start: 2022-01-24 | End: 2022-01-27 | Stop reason: SDUPTHER

## 2022-01-27 DIAGNOSIS — I21.4 NSTEMI (NON-ST ELEVATED MYOCARDIAL INFARCTION) (HCC): ICD-10-CM

## 2022-01-27 DIAGNOSIS — M48.061 SPINAL STENOSIS OF LUMBAR REGION, UNSPECIFIED WHETHER NEUROGENIC CLAUDICATION PRESENT: ICD-10-CM

## 2022-01-27 DIAGNOSIS — I10 ESSENTIAL HYPERTENSION: ICD-10-CM

## 2022-01-27 RX ORDER — GABAPENTIN 100 MG/1
100 CAPSULE ORAL 3 TIMES DAILY
Qty: 90 CAPSULE | Refills: 0 | Status: SHIPPED | OUTPATIENT
Start: 2022-01-27 | End: 2022-02-18

## 2022-01-27 RX ORDER — CARVEDILOL 12.5 MG/1
12.5 TABLET ORAL 2 TIMES DAILY WITH MEALS
Qty: 60 TABLET | Refills: 0 | Status: SHIPPED | OUTPATIENT
Start: 2022-01-27 | End: 2022-02-18

## 2022-02-18 DIAGNOSIS — I10 ESSENTIAL HYPERTENSION: ICD-10-CM

## 2022-02-18 DIAGNOSIS — M48.061 SPINAL STENOSIS OF LUMBAR REGION, UNSPECIFIED WHETHER NEUROGENIC CLAUDICATION PRESENT: ICD-10-CM

## 2022-02-18 DIAGNOSIS — I21.4 NSTEMI (NON-ST ELEVATED MYOCARDIAL INFARCTION) (HCC): ICD-10-CM

## 2022-02-18 RX ORDER — CARVEDILOL 12.5 MG/1
TABLET ORAL
Qty: 60 TABLET | Refills: 0 | Status: SHIPPED | OUTPATIENT
Start: 2022-02-18 | End: 2022-04-12 | Stop reason: SDUPTHER

## 2022-02-18 RX ORDER — GABAPENTIN 100 MG/1
CAPSULE ORAL
Qty: 90 CAPSULE | Refills: 0 | Status: SHIPPED | OUTPATIENT
Start: 2022-02-18

## 2022-04-12 ENCOUNTER — OFFICE VISIT (OUTPATIENT)
Dept: FAMILY MEDICINE CLINIC | Facility: CLINIC | Age: 67
End: 2022-04-12
Payer: COMMERCIAL

## 2022-04-12 VITALS
TEMPERATURE: 97 F | HEART RATE: 97 BPM | SYSTOLIC BLOOD PRESSURE: 168 MMHG | BODY MASS INDEX: 30.08 KG/M2 | WEIGHT: 154 LBS | RESPIRATION RATE: 16 BRPM | DIASTOLIC BLOOD PRESSURE: 74 MMHG

## 2022-04-12 DIAGNOSIS — I10 PRIMARY HYPERTENSION: Primary | ICD-10-CM

## 2022-04-12 DIAGNOSIS — I10 ESSENTIAL HYPERTENSION: ICD-10-CM

## 2022-04-12 DIAGNOSIS — I21.4 NSTEMI (NON-ST ELEVATED MYOCARDIAL INFARCTION) (HCC): ICD-10-CM

## 2022-04-12 DIAGNOSIS — L65.9 HAIR LOSS: ICD-10-CM

## 2022-04-12 PROCEDURE — 1160F RVW MEDS BY RX/DR IN RCRD: CPT | Performed by: FAMILY MEDICINE

## 2022-04-12 PROCEDURE — 99213 OFFICE O/P EST LOW 20 MIN: CPT | Performed by: FAMILY MEDICINE

## 2022-04-12 PROCEDURE — 4010F ACE/ARB THERAPY RXD/TAKEN: CPT | Performed by: FAMILY MEDICINE

## 2022-04-12 PROCEDURE — 1101F PT FALLS ASSESS-DOCD LE1/YR: CPT | Performed by: FAMILY MEDICINE

## 2022-04-12 PROCEDURE — 3288F FALL RISK ASSESSMENT DOCD: CPT | Performed by: FAMILY MEDICINE

## 2022-04-12 PROCEDURE — 4004F PT TOBACCO SCREEN RCVD TLK: CPT | Performed by: FAMILY MEDICINE

## 2022-04-12 RX ORDER — CARVEDILOL 12.5 MG/1
12.5 TABLET ORAL 2 TIMES DAILY WITH MEALS
Qty: 60 TABLET | Refills: 0 | Status: SHIPPED | OUTPATIENT
Start: 2022-04-12 | End: 2022-04-12

## 2022-04-12 RX ORDER — LOSARTAN POTASSIUM 100 MG/1
100 TABLET ORAL DAILY
Qty: 30 TABLET | Refills: 1 | Status: CANCELLED | OUTPATIENT
Start: 2022-04-12

## 2022-04-12 RX ORDER — LOSARTAN POTASSIUM 50 MG/1
50 TABLET ORAL DAILY
Qty: 30 TABLET | Refills: 3 | Status: SHIPPED | OUTPATIENT
Start: 2022-04-12 | End: 2022-08-01

## 2022-04-12 RX ORDER — CARVEDILOL 12.5 MG/1
12.5 TABLET ORAL 2 TIMES DAILY WITH MEALS
Qty: 60 TABLET | Refills: 3 | Status: SHIPPED | OUTPATIENT
Start: 2022-04-12 | End: 2022-07-12

## 2022-04-12 NOTE — PROGRESS NOTES
Assessment/Plan:    No problem-specific Assessment & Plan notes found for this encounter  Diagnoses and all orders for this visit:    Primary hypertension  -     Comprehensive metabolic panel; Future  -     Comprehensive metabolic panel    Essential hypertension with hypertensive urgency  -     Discontinue: carvedilol (COREG) 12 5 mg tablet; Take 1 tablet (12 5 mg total) by mouth 2 (two) times a day with meals  -     losartan (COZAAR) 50 mg tablet; Take 1 tablet (50 mg total) by mouth daily  -     carvedilol (COREG) 12 5 mg tablet; Take 1 tablet (12 5 mg total) by mouth 2 (two) times a day with meals    NSTEMI (non-ST elevated myocardial infarction) ([facility])  -     Discontinue: carvedilol (COREG) 12 5 mg tablet; Take 1 tablet (12 5 mg total) by mouth 2 (two) times a day with meals  -     carvedilol (COREG) 12 5 mg tablet; Take 1 tablet (12 5 mg total) by mouth 2 (two) times a day with meals    Hair loss  -     TSH, 3rd generation with Free T4 reflex; Future  -     TSH, 3rd generation with Free T4 reflex  -     CBC and differential; Future  -     CBC and differential      labs above for hair loss  Continue the Coreg and Cozaar as patient had been out of her medicine for 1 week which explains her elevated pressure today  She does have a blood pressure cuff and advised to check her blood pressure daily and return at the next 2 months at her convenience for annual physical which time we can recheck her blood pressure and assess response  All her questions were answered  Subjective:      Patient ID: Alfonse Essex is a 77 y o  female      Patient presents today for blood pressure follow-up  - current regimen is Cozaar 50 mg daily, Coreg 12 5 mg b i d   - reports she read out of her medicine 1 week ago has not take it for 1 week  - BP elevated today 168/74  - no signs or symptoms of end-organ damage    Hair loss  - patient reports diffuse thinning of hair with some shedding  - no focal areas of hair loss      The following portions of the patient's history were reviewed and updated as appropriate:   She  has a past medical history of COPD (chronic obstructive pulmonary disease) ([facility] ), Radiculopathy of lumbar region, and Seizures ([facility] )  She   Patient Active Problem List    Diagnosis Date Noted    Elevated troponin 2021    Acute right flank pain 2021    Essential hypertension 2021    Hypophosphatemia 2021    Hypomagnesemia 2021    COPD (chronic obstructive pulmonary disease) (HCC)     Seizures (HCC)     Hyponatremia     Hypokalemia     SIRS (systemic inflammatory response syndrome) ([facility] )     History of absence seizures 2020    Encounter for smoking cessation counseling 2020    Type 2 diabetes mellitus without complication, without long-term current use of insulin ([facility] ) 2020    Chronic fatigue 2018    Pulmonary nodule 2018    Smoker 2018    Chronic low back pain 2017    Intervertebral disc disorder with radiculopathy of lumbosacral region 2017    Cervical radiculopathy 2017    Depression 2017    Liver disease 2017    Lumbar spinal stenosis 2017    Abnormal ultrasound of liver 2016    [facility] ) 2016    Hypercholesteremia 2015    COPD, mild ([facility] ) 2015     She  has a past surgical history that includes  section; Epidural block injection (Right, 2017); and Tonsillectomy  Her family history includes Cancer in her father; Diabetes in her mother; Hyperlipidemia in her mother; Prostate cancer in her father  She  reports that she has been smoking  She has been smoking about 0 50 packs per day  She uses smokeless tobacco  She reports previous alcohol use  She reports previous drug use  Drug: Marijuana    Current Outpatient Medications   Medication Sig Dispense Refill    albuterol (Ventolin HFA) 90 mcg/act inhaler Inhale 2 puffs every 6 (six) hours as needed for wheezing 18 g 3    atorvastatin (LIPITOR) 80 mg tablet take 1 tablet by mouth once daily with dinner 90 tablet 3    baclofen 10 mg tablet Take 1 tablet (10 mg total) by mouth 3 (three) times a day 90 tablet 0    carvedilol (COREG) 12 5 mg tablet Take 1 tablet (12 5 mg total) by mouth 2 (two) times a day with meals 60 tablet 3    divalproex sodium (DEPAKOTE) 250 mg EC tablet Take 2 tablets (500 mg total) by mouth every 12 (twelve) hours for 14 days 56 tablet 0    gabapentin (NEURONTIN) 100 mg capsule take 1 capsule by mouth three times a day 90 capsule 0    lidocaine (LIDODERM) 5 % Apply 1 patch topically daily Remove & Discard patch within 12 hours or as directed by MD 30 patch 0    losartan (COZAAR) 50 mg tablet Take 1 tablet (50 mg total) by mouth daily 30 tablet 3    nicotine (NICODERM CQ) 14 mg/24hr TD 24 hr patch Place 1 patch on the skin every 24 hours 28 patch 0    traZODone (DESYREL) 150 mg tablet take 1 tablet by mouth at bedtime 30 tablet 2     No current facility-administered medications for this visit       Current Outpatient Medications on File Prior to Visit   Medication Sig    albuterol (Ventolin HFA) 90 mcg/act inhaler Inhale 2 puffs every 6 (six) hours as needed for wheezing    atorvastatin (LIPITOR) 80 mg tablet take 1 tablet by mouth once daily with dinner    baclofen 10 mg tablet Take 1 tablet (10 mg total) by mouth 3 (three) times a day    divalproex sodium (DEPAKOTE) 250 mg EC tablet Take 2 tablets (500 mg total) by mouth every 12 (twelve) hours for 14 days    gabapentin (NEURONTIN) 100 mg capsule take 1 capsule by mouth three times a day    lidocaine (LIDODERM) 5 % Apply 1 patch topically daily Remove & Discard patch within 12 hours or as directed by MD    nicotine (NICODERM CQ) 14 mg/24hr TD 24 hr patch Place 1 patch on the skin every 24 hours    traZODone (DESYREL) 150 mg tablet take 1 tablet by mouth at bedtime    [DISCONTINUED] carvedilol (COREG) 12 5 mg tablet take 1 tablet by mouth twice a day with meals    [DISCONTINUED] losartan (COZAAR) 50 mg tablet take 1 tablet by mouth once daily     No current facility-administered medications on file prior to visit  She is allergic to aspirin, asparaginase derivatives, ibuprofen, nsaids, pegaspargase, robaxin [methocarbamol], and toradol [ketorolac tromethamine]       Review of Systems   Constitutional: Negative for chills and fever  Eyes: Negative for visual disturbance  Respiratory: Negative for shortness of breath  Cardiovascular: Negative for chest pain  Musculoskeletal: Positive for back pain  Skin:        Hair loss   Neurological: Negative for dizziness and headaches  Objective:      /74   Pulse 97   Temp (!) 97 °F (36 1 °C)   Resp 16   Wt 69 9 kg (154 lb)   BMI 30 08 kg/m²          Physical Exam  Constitutional:       Appearance: She is well-developed  HENT:      Head: Normocephalic and atraumatic  Right Ear: External ear normal       Left Ear: External ear normal    Cardiovascular:      Rate and Rhythm: Regular rhythm  Heart sounds: Normal heart sounds  No murmur heard  Pulmonary:      Effort: Pulmonary effort is normal       Breath sounds: Normal breath sounds  Neurological:      Mental Status: She is alert and oriented to person, place, and time     Psychiatric:         Speech: Speech normal          Behavior: Behavior normal

## 2022-04-26 ENCOUNTER — LAB (OUTPATIENT)
Dept: LAB | Facility: HOSPITAL | Age: 67
End: 2022-04-26
Payer: COMMERCIAL

## 2022-04-26 DIAGNOSIS — R56.9 SEIZURES (HCC): ICD-10-CM

## 2022-04-26 DIAGNOSIS — L65.9 BALDNESS: ICD-10-CM

## 2022-04-26 LAB
ALBUMIN SERPL BCP-MCNC: 3.7 G/DL (ref 3.5–5)
ALP SERPL-CCNC: 136 U/L (ref 46–116)
ALT SERPL W P-5'-P-CCNC: 22 U/L (ref 12–78)
ANION GAP SERPL CALCULATED.3IONS-SCNC: 7 MMOL/L (ref 4–13)
AST SERPL W P-5'-P-CCNC: 16 U/L (ref 5–45)
BASOPHILS # BLD AUTO: 0.04 THOUSANDS/ΜL (ref 0–0.1)
BASOPHILS NFR BLD AUTO: 1 % (ref 0–1)
BILIRUB SERPL-MCNC: 1.03 MG/DL (ref 0.2–1)
BUN SERPL-MCNC: 9 MG/DL (ref 5–25)
CALCIUM SERPL-MCNC: 8.9 MG/DL (ref 8.3–10.1)
CHLORIDE SERPL-SCNC: 106 MMOL/L (ref 100–108)
CO2 SERPL-SCNC: 27 MMOL/L (ref 21–32)
CREAT SERPL-MCNC: 0.72 MG/DL (ref 0.6–1.3)
EOSINOPHIL # BLD AUTO: 0.25 THOUSAND/ΜL (ref 0–0.61)
EOSINOPHIL NFR BLD AUTO: 4 % (ref 0–6)
ERYTHROCYTE [DISTWIDTH] IN BLOOD BY AUTOMATED COUNT: 14.8 % (ref 11.6–15.1)
GFR SERPL CREATININE-BSD FRML MDRD: 87 ML/MIN/1.73SQ M
GLUCOSE P FAST SERPL-MCNC: 104 MG/DL (ref 65–99)
HCT VFR BLD AUTO: 44.4 % (ref 34.8–46.1)
HGB BLD-MCNC: 14.9 G/DL (ref 11.5–15.4)
IMM GRANULOCYTES # BLD AUTO: 0.02 THOUSAND/UL (ref 0–0.2)
IMM GRANULOCYTES NFR BLD AUTO: 0 % (ref 0–2)
LYMPHOCYTES # BLD AUTO: 2.91 THOUSANDS/ΜL (ref 0.6–4.47)
LYMPHOCYTES NFR BLD AUTO: 41 % (ref 14–44)
MCH RBC QN AUTO: 30.4 PG (ref 26.8–34.3)
MCHC RBC AUTO-ENTMCNC: 33.6 G/DL (ref 31.4–37.4)
MCV RBC AUTO: 91 FL (ref 82–98)
MONOCYTES # BLD AUTO: 0.52 THOUSAND/ΜL (ref 0.17–1.22)
MONOCYTES NFR BLD AUTO: 7 % (ref 4–12)
NEUTROPHILS # BLD AUTO: 3.34 THOUSANDS/ΜL (ref 1.85–7.62)
NEUTS SEG NFR BLD AUTO: 47 % (ref 43–75)
NRBC BLD AUTO-RTO: 0 /100 WBCS
PLATELET # BLD AUTO: 216 THOUSANDS/UL (ref 149–390)
PMV BLD AUTO: 10 FL (ref 8.9–12.7)
POTASSIUM SERPL-SCNC: 4.3 MMOL/L (ref 3.5–5.3)
PROT SERPL-MCNC: 7.2 G/DL (ref 6.4–8.2)
RBC # BLD AUTO: 4.9 MILLION/UL (ref 3.81–5.12)
SODIUM SERPL-SCNC: 140 MMOL/L (ref 136–145)
TSH SERPL DL<=0.05 MIU/L-ACNC: 1.34 UIU/ML (ref 0.45–4.5)
VALPROATE SERPL-MCNC: <3 UG/ML (ref 50–100)
WBC # BLD AUTO: 7.08 THOUSAND/UL (ref 4.31–10.16)

## 2022-04-26 PROCEDURE — 80164 ASSAY DIPROPYLACETIC ACD TOT: CPT

## 2022-04-26 PROCEDURE — 84443 ASSAY THYROID STIM HORMONE: CPT

## 2022-04-26 PROCEDURE — 85025 COMPLETE CBC W/AUTO DIFF WBC: CPT

## 2022-04-26 PROCEDURE — 80053 COMPREHEN METABOLIC PANEL: CPT

## 2022-04-26 PROCEDURE — 36415 COLL VENOUS BLD VENIPUNCTURE: CPT

## 2022-04-26 PROCEDURE — 80165 DIPROPYLACETIC ACID FREE: CPT

## 2022-04-29 LAB
Lab: NORMAL
MISCELLANEOUS LAB TEST RESULT: NORMAL

## 2022-05-02 ENCOUNTER — TELEPHONE (OUTPATIENT)
Dept: FAMILY MEDICINE CLINIC | Facility: CLINIC | Age: 67
End: 2022-05-02

## 2022-05-02 NOTE — TELEPHONE ENCOUNTER
Tried to call patient to review lab results  I am concerned that valproic acid level is below therapeutic range  Asked for call  Back

## 2022-06-01 ENCOUNTER — TELEPHONE (OUTPATIENT)
Dept: FAMILY MEDICINE CLINIC | Facility: CLINIC | Age: 67
End: 2022-06-01

## 2022-06-01 NOTE — TELEPHONE ENCOUNTER
YARELIS from Radiology - Pt has not return for a recommended study    Under media tab for your review

## 2022-06-03 DIAGNOSIS — R91.1 PULMONARY NODULE: Primary | ICD-10-CM

## 2022-06-13 ENCOUNTER — OFFICE VISIT (OUTPATIENT)
Dept: FAMILY MEDICINE CLINIC | Facility: CLINIC | Age: 67
End: 2022-06-13
Payer: COMMERCIAL

## 2022-06-13 VITALS
SYSTOLIC BLOOD PRESSURE: 190 MMHG | OXYGEN SATURATION: 96 % | HEIGHT: 60 IN | RESPIRATION RATE: 16 BRPM | BODY MASS INDEX: 29.82 KG/M2 | WEIGHT: 151.9 LBS | HEART RATE: 74 BPM | DIASTOLIC BLOOD PRESSURE: 80 MMHG | TEMPERATURE: 97.3 F

## 2022-06-13 DIAGNOSIS — I16.0 HYPERTENSIVE URGENCY: ICD-10-CM

## 2022-06-13 DIAGNOSIS — I10 ESSENTIAL HYPERTENSION: Primary | ICD-10-CM

## 2022-06-13 DIAGNOSIS — G89.29 CHRONIC NECK PAIN: ICD-10-CM

## 2022-06-13 DIAGNOSIS — E11.9 TYPE 2 DIABETES MELLITUS WITHOUT COMPLICATION, WITHOUT LONG-TERM CURRENT USE OF INSULIN (HCC): ICD-10-CM

## 2022-06-13 DIAGNOSIS — M54.2 CHRONIC NECK PAIN: ICD-10-CM

## 2022-06-13 PROCEDURE — 4004F PT TOBACCO SCREEN RCVD TLK: CPT | Performed by: FAMILY MEDICINE

## 2022-06-13 PROCEDURE — 1160F RVW MEDS BY RX/DR IN RCRD: CPT | Performed by: FAMILY MEDICINE

## 2022-06-13 PROCEDURE — 99213 OFFICE O/P EST LOW 20 MIN: CPT | Performed by: FAMILY MEDICINE

## 2022-06-13 RX ORDER — BLOOD PRESSURE TEST KIT
KIT MISCELLANEOUS DAILY
Qty: 1 KIT | Refills: 0 | Status: SHIPPED | OUTPATIENT
Start: 2022-06-13
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
## 2022-06-27 ENCOUNTER — OFFICE VISIT (OUTPATIENT)
Dept: FAMILY MEDICINE CLINIC | Facility: CLINIC | Age: 67
End: 2022-06-27
Payer: COMMERCIAL

## 2022-06-27 ENCOUNTER — TELEPHONE (OUTPATIENT)
Dept: FAMILY MEDICINE CLINIC | Facility: CLINIC | Age: 67
End: 2022-06-27

## 2022-06-27 VITALS
HEIGHT: 60 IN | SYSTOLIC BLOOD PRESSURE: 158 MMHG | HEART RATE: 76 BPM | WEIGHT: 147 LBS | OXYGEN SATURATION: 98 % | RESPIRATION RATE: 18 BRPM | DIASTOLIC BLOOD PRESSURE: 72 MMHG | BODY MASS INDEX: 28.86 KG/M2 | TEMPERATURE: 97.3 F

## 2022-06-27 DIAGNOSIS — Z13.820 OSTEOPOROSIS SCREENING: ICD-10-CM

## 2022-06-27 DIAGNOSIS — E11.9 TYPE 2 DIABETES MELLITUS WITHOUT COMPLICATION, WITHOUT LONG-TERM CURRENT USE OF INSULIN (HCC): ICD-10-CM

## 2022-06-27 DIAGNOSIS — Z12.31 ENCOUNTER FOR SCREENING MAMMOGRAM FOR BREAST CANCER: ICD-10-CM

## 2022-06-27 DIAGNOSIS — I10 ESSENTIAL HYPERTENSION: Primary | ICD-10-CM

## 2022-06-27 LAB — SL AMB POCT HEMOGLOBIN AIC: 5.9 (ref ?–6.5)

## 2022-06-27 PROCEDURE — 3008F BODY MASS INDEX DOCD: CPT | Performed by: FAMILY MEDICINE

## 2022-06-27 PROCEDURE — 3044F HG A1C LEVEL LT 7.0%: CPT | Performed by: FAMILY MEDICINE

## 2022-06-27 PROCEDURE — 83036 HEMOGLOBIN GLYCOSYLATED A1C: CPT | Performed by: FAMILY MEDICINE

## 2022-06-27 PROCEDURE — 99213 OFFICE O/P EST LOW 20 MIN: CPT | Performed by: FAMILY MEDICINE

## 2022-06-27 NOTE — PROGRESS NOTES
Assessment/Plan:      Diagnoses and all orders for this visit:    Essential hypertension        - Patient was prescribed automated blood pressure cuff for home use, via paper script  -  Patient advised to continue medication as prescribed  -  Patient was encouraged to follow-up with [facility]  -    Patient advised if symptoms worsen to call [place] and/or present to ED  Type 2 diabetes mellitus without complication, without long-term current use of insulin (HCC)  -     POCT hemoglobin A1c  -     IRIS Diabetic eye exam    Encounter for screening mammogram for breast cancer  -     Mammo screening bilateral w 3d & cad; Future    Osteoporosis screening  -     DXA bone density spine hip and pelvis; Future     Subjective:     Patient ID: Janene Ying is a 77 y o  female  HPI  60-year-old female presented to clinic for follow-up of hypertension with new medication regimen  Patient approximately 2 weeks ago was seen in the clinic with a blood pressure 190/80 without symptoms  Patient's medication regimen of losartan 50 mg and Coreg 12 5 mg was continued with the addition of chlorthalidone 15 mg daily  Patient reported during the 1st week of taking the new medication she felt like she was an official with some increased lightheadedness and dizziness over baseline  Patient reports polyuria/more frequent urination and has been increasing her liquids accordingly  Patient states currently she is not having any additional symptoms and all of the side effects beside the polyuria have since resolved  Patient states she does have a history of vertigo seizures insomnia as well as neck and back pain which is not changed from baseline  Patient's blood pressure today was elevated however not urgent category as previously found in office    Patient reports she is not able to take her blood pressures at home due to not having on automated machine however will take it once a week when she goes to the pharmacy  Patient reports she has noticed the decline in her blood pressure over the past 2 weeks  Patient denies any shortness of breath, chest pain, palpitations, fever, chills, nausea, vomiting, change in bowel habits, change in urination, change in appetite  Review of Systems   Constitutional: Negative for appetite change, chills, diaphoresis, fatigue and fever  HENT: Negative for congestion, rhinorrhea, sore throat and trouble swallowing  Eyes: Negative for photophobia and visual disturbance  Respiratory: Negative for cough, shortness of breath and wheezing  Cardiovascular: Negative for chest pain, palpitations and leg swelling  Gastrointestinal: Negative for abdominal distention, abdominal pain, blood in stool, constipation, diarrhea, nausea and vomiting  Endocrine: Positive for polyuria  Genitourinary: Positive for frequency (increased since taking chlorthalidone)  Negative for difficulty urinating, dysuria and hematuria  Musculoskeletal: Positive for arthralgias, back pain and neck pain  Skin: Negative for color change, pallor and rash  Neurological: Positive for dizziness (chronic, back to baseline)  Negative for syncope, weakness, light-headedness and headaches  Objective:     Physical Exam  Constitutional:       General: She is not in acute distress  Appearance: Normal appearance  She is not ill-appearing  Eyes:      Extraocular Movements: Extraocular movements intact  Conjunctiva/sclera: Conjunctivae normal    Cardiovascular:      Rate and Rhythm: Normal rate and regular rhythm  Pulses: Normal pulses  Heart sounds: Normal heart sounds  Pulmonary:      Effort: Pulmonary effort is normal       Breath sounds: Normal breath sounds  Abdominal:      General: Bowel sounds are normal  There is no distension  Palpations: Abdomen is soft  Tenderness: There is no abdominal tenderness   There is no right CVA tenderness, left CVA tenderness, guarding or rebound  Musculoskeletal:      Right lower leg: No edema  Left lower leg: No edema  Skin:     General: Skin is dry  Capillary Refill: Capillary refill takes less than 2 seconds  Coloration: Skin is not jaundiced or pale  Neurological:      General: No focal deficit present  Mental Status: She is alert and oriented to person, place, and time  Mental status is at baseline     Psychiatric:         Mood and Affect: Mood normal          Behavior: Behavior normal

## 2022-07-09 DIAGNOSIS — I10 ESSENTIAL HYPERTENSION: ICD-10-CM

## 2022-07-09 RX ORDER — CHLORTHALIDONE 15 MG/1
TABLET ORAL
Qty: 30 TABLET | Refills: 0 | Status: SHIPPED | OUTPATIENT
Start: 2022-07-09 | End: 2022-08-17

## 2022-07-12 DIAGNOSIS — I21.4 NSTEMI (NON-ST ELEVATED MYOCARDIAL INFARCTION) (HCC): ICD-10-CM

## 2022-07-12 DIAGNOSIS — I10 ESSENTIAL HYPERTENSION: ICD-10-CM

## 2022-07-12 RX ORDER — CARVEDILOL 12.5 MG/1
TABLET ORAL
Qty: 60 TABLET | Refills: 3 | Status: SHIPPED | OUTPATIENT
Start: 2022-07-12

## 2022-07-30 DIAGNOSIS — I10 ESSENTIAL HYPERTENSION: ICD-10-CM

## 2022-08-01 RX ORDER — LOSARTAN POTASSIUM 50 MG/1
TABLET ORAL
Qty: 30 TABLET | Refills: 3 | Status: SHIPPED | OUTPATIENT
Start: 2022-08-01

## 2022-08-17 DIAGNOSIS — I10 ESSENTIAL HYPERTENSION: ICD-10-CM

## 2022-08-17 RX ORDER — CHLORTHALIDONE 15 MG/1
TABLET ORAL
Qty: 30 TABLET | Refills: 0 | Status: SHIPPED | OUTPATIENT
Start: 2022-08-17

## 2022-10-03 ENCOUNTER — VBI (OUTPATIENT)
Dept: ADMINISTRATIVE | Facility: OTHER | Age: 67
End: 2022-10-03

## 2022-12-10 DIAGNOSIS — I10 ESSENTIAL HYPERTENSION: ICD-10-CM

## 2022-12-12 RX ORDER — LOSARTAN POTASSIUM 50 MG/1
TABLET ORAL
Qty: 30 TABLET | Refills: 2 | Status: SHIPPED | OUTPATIENT
Start: 2022-12-12

## 2023-03-27 ENCOUNTER — OFFICE VISIT (OUTPATIENT)
Dept: FAMILY MEDICINE CLINIC | Facility: CLINIC | Age: 68
End: 2023-03-27

## 2023-03-27 VITALS
HEIGHT: 60 IN | HEART RATE: 86 BPM | BODY MASS INDEX: 30.63 KG/M2 | DIASTOLIC BLOOD PRESSURE: 80 MMHG | SYSTOLIC BLOOD PRESSURE: 120 MMHG | TEMPERATURE: 97.7 F | OXYGEN SATURATION: 97 % | WEIGHT: 156 LBS | RESPIRATION RATE: 18 BRPM

## 2023-03-27 DIAGNOSIS — Z13.820 ENCOUNTER FOR SCREENING FOR OSTEOPOROSIS: ICD-10-CM

## 2023-03-27 DIAGNOSIS — I21.4 NSTEMI (NON-ST ELEVATED MYOCARDIAL INFARCTION) (HCC): ICD-10-CM

## 2023-03-27 DIAGNOSIS — E11.9 TYPE 2 DIABETES MELLITUS WITHOUT COMPLICATION, WITHOUT LONG-TERM CURRENT USE OF INSULIN (HCC): ICD-10-CM

## 2023-03-27 DIAGNOSIS — Z12.31 ENCOUNTER FOR SCREENING MAMMOGRAM FOR BREAST CANCER: ICD-10-CM

## 2023-03-27 DIAGNOSIS — E66.9 OBESITY (BMI 30-39.9): ICD-10-CM

## 2023-03-27 DIAGNOSIS — J44.9 COPD, MILD (HCC): ICD-10-CM

## 2023-03-27 DIAGNOSIS — G89.29 CHRONIC PAIN OF RIGHT KNEE: ICD-10-CM

## 2023-03-27 DIAGNOSIS — I10 ESSENTIAL HYPERTENSION: ICD-10-CM

## 2023-03-27 DIAGNOSIS — Z12.11 ENCOUNTER FOR COLORECTAL CANCER SCREENING: ICD-10-CM

## 2023-03-27 DIAGNOSIS — M25.561 CHRONIC PAIN OF RIGHT KNEE: ICD-10-CM

## 2023-03-27 DIAGNOSIS — Z12.12 ENCOUNTER FOR COLORECTAL CANCER SCREENING: ICD-10-CM

## 2023-03-27 DIAGNOSIS — F33.42 RECURRENT MAJOR DEPRESSIVE DISORDER, IN FULL REMISSION (HCC): ICD-10-CM

## 2023-03-27 DIAGNOSIS — E78.00 HYPERCHOLESTEREMIA: ICD-10-CM

## 2023-03-27 DIAGNOSIS — G47.00 INSOMNIA, UNSPECIFIED TYPE: ICD-10-CM

## 2023-03-27 DIAGNOSIS — Z00.00 ANNUAL PHYSICAL EXAM: Primary | ICD-10-CM

## 2023-03-27 PROBLEM — R91.8 LUNG MASS: Status: ACTIVE | Noted: 2018-05-07

## 2023-03-27 PROBLEM — R10.11 RIGHT UPPER QUADRANT ABDOMINAL PAIN: Status: RESOLVED | Noted: 2023-03-27 | Resolved: 2023-03-27

## 2023-03-27 PROBLEM — M47.816 OSTEOARTHRITIS OF LUMBAR SPINE: Status: ACTIVE | Noted: 2017-02-21

## 2023-03-27 PROBLEM — R77.8 ELEVATED TROPONIN: Status: RESOLVED | Noted: 2021-02-08 | Resolved: 2023-03-27

## 2023-03-27 PROBLEM — M54.40 LOW BACK PAIN WITH SCIATICA: Status: ACTIVE | Noted: 2017-05-24

## 2023-03-27 PROBLEM — R10.11 RIGHT UPPER QUADRANT ABDOMINAL PAIN: Status: ACTIVE | Noted: 2023-03-27

## 2023-03-27 PROBLEM — E83.42 HYPOMAGNESEMIA: Status: RESOLVED | Noted: 2021-02-08 | Resolved: 2023-03-27

## 2023-03-27 PROBLEM — M54.40 LOW BACK PAIN WITH SCIATICA: Status: RESOLVED | Noted: 2017-05-24 | Resolved: 2023-03-27

## 2023-03-27 PROBLEM — E83.39 HYPOPHOSPHATEMIA: Status: RESOLVED | Noted: 2021-02-09 | Resolved: 2023-03-27

## 2023-03-27 PROBLEM — R10.9 ACUTE RIGHT FLANK PAIN: Status: RESOLVED | Noted: 2021-02-08 | Resolved: 2023-03-27

## 2023-03-27 PROBLEM — R79.89 ELEVATED TROPONIN: Status: RESOLVED | Noted: 2021-02-08 | Resolved: 2023-03-27

## 2023-03-27 PROBLEM — R53.82 CHRONIC FATIGUE: Status: RESOLVED | Noted: 2018-06-19 | Resolved: 2023-03-27

## 2023-03-27 PROBLEM — M47.816 OSTEOARTHRITIS OF LUMBAR SPINE: Status: RESOLVED | Noted: 2017-02-21 | Resolved: 2023-03-27

## 2023-03-27 PROBLEM — K76.9 LIVER DISEASE: Status: RESOLVED | Noted: 2017-02-21 | Resolved: 2023-03-27

## 2023-03-27 RX ORDER — TRAZODONE HYDROCHLORIDE 50 MG/1
50 TABLET ORAL
Qty: 30 TABLET | Refills: 0 | Status: SHIPPED | OUTPATIENT
Start: 2023-03-27 | End: 2023-04-26

## 2023-03-27 RX ORDER — LOSARTAN POTASSIUM 50 MG/1
50 TABLET ORAL DAILY
Qty: 90 TABLET | Refills: 0 | Status: SHIPPED | OUTPATIENT
Start: 2023-03-27 | End: 2023-06-25

## 2023-03-27 RX ORDER — CARVEDILOL 12.5 MG/1
12.5 TABLET ORAL EVERY 12 HOURS
Qty: 180 TABLET | Refills: 0 | Status: SHIPPED | OUTPATIENT
Start: 2023-03-27 | End: 2023-06-25

## 2023-03-27 RX ORDER — ALBUTEROL SULFATE 90 UG/1
2 AEROSOL, METERED RESPIRATORY (INHALATION) EVERY 6 HOURS PRN
Qty: 18 G | Refills: 3 | Status: SHIPPED | OUTPATIENT
Start: 2023-03-27

## 2023-03-27 RX ORDER — ATORVASTATIN CALCIUM 80 MG/1
80 TABLET, FILM COATED ORAL
Qty: 90 TABLET | Refills: 0 | Status: SHIPPED | OUTPATIENT
Start: 2023-03-27 | End: 2023-06-25

## 2023-03-27 NOTE — ASSESSMENT & PLAN NOTE
Likely secondary to arthritis  -Tenderness over anterior patella and medial joint line  -Due to allergy to aspirin, anaphylaxis, will avoid NSAID's such as Voltaren  - Patient reports she is tolerating with CBD products

## 2023-03-27 NOTE — PROGRESS NOTES
[facility] Office visit    Assessment/Plan:     Annual physical exam  -     Basic metabolic panel; Future  -     CBC and differential; Future  -     Lipid Panel with Direct LDL reflex; Future  -     Declined all vaccines  Slips reprinted for mammogram and DEXA, patient agreeable  -     Cologuard ordered, patient agreeable    Type 2 diabetes mellitus without complication, without long-term current use of insulin ([address] )  Assessment & Plan:  HbA1C stable since 2020, not on any diabetic medications  - Will recheck labs    Orders:  -     Basic metabolic panel; Future  -     HEMOGLOBIN A1C W/ EAG ESTIMATION; Future  -     Urine Microalbumin/creatinine ratio; Future    Hypercholesteremia  -     Atorvastatin (LIPITOR) 80 mg tablet; Take 1 tablet (80 mg total) by mouth daily with dinner    Essential hypertension  Assessment & Plan:  - /80 today  - Refilled Coreg 12 5 BID, Chlorthalidone 15 mg, Losartan 50 mg daily    Orders:  -     carvedilol (COREG) 12 5 mg tablet; Take 1 tablet (12 5 mg total) by mouth every 12 (twelve) hours  -     losartan (COZAAR) 50 mg tablet; Take 1 tablet (50 mg total) by mouth daily  -     chlorthalidone (Thalitone) 15 MG tablet; Take 1 tablet (15 mg total) by mouth daily    COPD, mild (HCC)        -     Mild, from smoking 1/2 ppd, only using albuterol/neb as needed, no recent exacerbations  -     albuterol (Ventolin HFA) 90 mcg/act inhaler;  Inhale 2 puffs every 6 (six) hours as needed for wheezing    Chronic right knee pain  Likely secondary to arthritis       -     Tenderness over anterior patella and medial joint line       -     Due to allergy to aspirin, anaphylaxis, will avoid NSAID's such as Voltaren       -     Patient reports she is tolerating with CBD products    Encounter for colorectal cancer screening  -     Cologuard    Encounter for screening mammogram for breast cancer  -     Mammo screening bilateral w 3d & cad; Future; Expected date: 03/27/2023    Encounter for screening for osteoporosis  -     DXA bone density spine hip and pelvis; Future; Expected date: 03/27/2023    Insomnia, unspecified type  -     traZODone (DESYREL) 50 mg tablet; Take 1 tablet (50 mg total) by mouth daily at bedtime    Recurrent major [facility] disorder[facility]  -     traZODone (DESYREL) 50 mg tablet; Take 1 tablet (50 mg total) by mouth daily at bedtime    Obesity (BMI 30-39  9)     Return in about 1 year (around 3/27/2024) for Annual physical      Subjective:   MEL Pena is a 79 y o  female with a PMHx that includes HTN, DM2, arthritis, presents for physical exam, doing well overall  Patient reports chronic knee pain, but is tolerating  Patient reports she has a history of anaphylaxis to aspirin, and cannot have NSAIDs, however states she is tolerating with CBD products  Patient states she has not used her albuterol inhaler for months, but does use as needed no recent exacerbations or shortness of breath  States she could use a refill  Patient reports she is eating healthier  Patient is happy with her blood pressure today and would like to continue taking her BP meds  Diet: Breakfast - yogurt with granola and bananas, strawberries, coffee  Chicken and venison, vegetables asparagus, brussel sprouts, broccoli, drinks mostly water, occasional juice  Candy  Exercise: Walks 4-5 blocks  Denies other complaints today  Review of Systems   Constitutional: Negative for fatigue and fever  HENT: Negative for sore throat  Eyes: Negative for visual disturbance  Respiratory: Negative for cough and shortness of breath  Cardiovascular: Negative for chest pain and palpitations  Gastrointestinal: Negative for abdominal pain, diarrhea, nausea and vomiting  Genitourinary: Negative for dysuria and hematuria  Musculoskeletal: Negative for back pain  Right knee pain   Skin: Negative for rash  Neurological: Negative for dizziness and headaches     All other systems reviewed and are negative  Objective:     /80 (BP Location: Left arm, Patient Position: Sitting, Cuff Size: Standard)   Pulse 86   Temp 97 7 °F (36 5 °C) (Tympanic Core)   Resp 18   Ht 5' (1 524 m)   Wt 70 8 kg (156 lb)   SpO2 97%   BMI 30 47 kg/m²      Physical Exam  Vitals reviewed  Constitutional:       General: She is not in acute distress  Appearance: Normal appearance  HENT:      Head: Normocephalic  Mouth/Throat:      Mouth: Mucous membranes are moist    Eyes:      Extraocular Movements: Extraocular movements intact  Cardiovascular:      Rate and Rhythm: Normal rate and regular rhythm  Pulses: Normal pulses  Heart sounds: Normal heart sounds  No murmur heard  Pulmonary:      Effort: No respiratory distress  Breath sounds: No wheezing  Abdominal:      General: There is no distension  Palpations: Abdomen is soft  Tenderness: There is no abdominal tenderness  There is no guarding  Musculoskeletal:         General: No deformity  Comments: Rt knee anterior tenderness and medial joint line tenderness   Skin:     General: Skin is warm and dry  Neurological:      General: No focal deficit present  Mental Status: She is alert  Motor: No weakness     Psychiatric:         Mood and Affect: Mood normal           ** Please Note: This note has been constructed using a voice recognition system **     Erika Whitfield MD  03/27/23  11:04 AM

## 2023-05-26 PROBLEM — Z12.11 ENCOUNTER FOR SCREENING FOR MALIGNANT NEOPLASM OF COLON: Status: RESOLVED | Noted: 2020-03-03 | Resolved: 2023-05-26

## 2023-06-28 DIAGNOSIS — I10 ESSENTIAL HYPERTENSION: ICD-10-CM

## 2023-06-28 DIAGNOSIS — G47.00 INSOMNIA, UNSPECIFIED TYPE: ICD-10-CM

## 2023-06-28 DIAGNOSIS — I21.4 NSTEMI (NON-ST ELEVATED MYOCARDIAL INFARCTION) (HCC): ICD-10-CM

## 2023-06-28 DIAGNOSIS — E78.00 HYPERCHOLESTEREMIA: ICD-10-CM

## 2023-06-28 DIAGNOSIS — F33.42 RECURRENT MAJOR DEPRESSIVE DISORDER, IN FULL REMISSION (HCC): ICD-10-CM

## 2023-06-28 RX ORDER — LOSARTAN POTASSIUM 50 MG/1
50 TABLET ORAL DAILY
Qty: 90 TABLET | Refills: 0 | Status: SHIPPED | OUTPATIENT
Start: 2023-06-28 | End: 2023-09-26

## 2023-06-28 RX ORDER — TRAZODONE HYDROCHLORIDE 50 MG/1
50 TABLET ORAL
Qty: 30 TABLET | Refills: 0 | Status: SHIPPED | OUTPATIENT
Start: 2023-06-28 | End: 2023-07-28

## 2023-06-28 RX ORDER — CARVEDILOL 12.5 MG/1
12.5 TABLET ORAL EVERY 12 HOURS
Qty: 180 TABLET | Refills: 0 | Status: SHIPPED | OUTPATIENT
Start: 2023-06-28 | End: 2023-09-26

## 2023-06-28 RX ORDER — ATORVASTATIN CALCIUM 80 MG/1
80 TABLET, FILM COATED ORAL
Qty: 90 TABLET | Refills: 0 | Status: SHIPPED | OUTPATIENT
Start: 2023-06-28 | End: 2023-09-26

## 2023-06-28 NOTE — TELEPHONE ENCOUNTER
Called pt to encourage her to get labs done that were ordered in March  Spoke with pt and she will get labs done  Pt said she is having trouble with obtaining ride from [place]  She says she has tried making appts multiple times but they never pick her up  She is requesting other source of transportation  [place] Hives- is there any other option for transportation for this patient besides [employer]?

## 2023-06-29 ENCOUNTER — PATIENT OUTREACH (OUTPATIENT)
Dept: FAMILY MEDICINE CLINIC | Facility: CLINIC | Age: 68
End: 2023-06-29

## 2023-06-29 DIAGNOSIS — Z78.9 NEED FOR FOLLOW-UP BY SOCIAL WORKER: Primary | ICD-10-CM

## 2023-06-29 NOTE — PROGRESS NOTES
"BUZZ had received an in basket from JACOBO Chilel yesterday  Per in basket, patient having issues with [employer] transportation  Per in basket, would like to know other options for transportation  BUZZ had responded back to Gregory today with the following:    \"Aside from [employer] [facility]), patient has the following available to her:    -[employer] Farrell must call 3 business days in advance for rides  -[facility] [employer] contracts with [employer] and offer volunteer transportation  Patient must sign up for services then will be able to set up rides but must be within 5 business days  -[employer] which is a membership service that offers 24/7 rides via [email or web address]    I will outreach the patient and go over these options with her as well  \"    BUZZ had called the patient via phone  BUZZ left a voicemail with her name and number  BUZZ will attempt to call again at a later date and time  BUZZ will continue to be available     "

## 2023-07-10 ENCOUNTER — PATIENT OUTREACH (OUTPATIENT)
Dept: FAMILY MEDICINE CLINIC | Facility: CLINIC | Age: 68
End: 2023-07-10

## 2023-07-10 NOTE — LETTER
[address] [id number]    Re: Bryant Randolph to reach   7/10/2023       Dear Court Radha,    We tried to reach you by phone and was unfortunately unable to reach you. It is important that you contact the Outpatient Care Manager from [facility] at [phone].      Sincerely,         [address]

## 2023-07-10 NOTE — PROGRESS NOTES
BUZZ had called the patient via phone. BUZZ left a voicemail. BUZZ notes this is the second phone call attempt. [facility] mailed out an unable to reach letter today. [facility] closed referral. Please reconsult [facility] for future needs.

## 2023-10-26 DIAGNOSIS — I10 ESSENTIAL HYPERTENSION: ICD-10-CM

## 2023-10-26 DIAGNOSIS — I21.4 NSTEMI (NON-ST ELEVATED MYOCARDIAL INFARCTION) (HCC): ICD-10-CM

## 2023-11-01 RX ORDER — CARVEDILOL 12.5 MG/1
12.5 TABLET ORAL EVERY 12 HOURS
Qty: 180 TABLET | Refills: 0 | Status: SHIPPED | OUTPATIENT
Start: 2023-11-01 | End: 2024-01-30

## 2023-11-01 RX ORDER — LOSARTAN POTASSIUM 50 MG/1
50 TABLET ORAL DAILY
Qty: 90 TABLET | Refills: 0 | Status: SHIPPED | OUTPATIENT
Start: 2023-11-01 | End: 2024-01-30

## 2023-12-01 DIAGNOSIS — E78.00 HYPERCHOLESTEREMIA: ICD-10-CM

## 2023-12-01 DIAGNOSIS — I10 ESSENTIAL HYPERTENSION: ICD-10-CM

## 2023-12-01 DIAGNOSIS — I21.4 NSTEMI (NON-ST ELEVATED MYOCARDIAL INFARCTION) (HCC): ICD-10-CM

## 2023-12-04 RX ORDER — ATORVASTATIN CALCIUM 80 MG/1
80 TABLET, FILM COATED ORAL
Qty: 90 TABLET | Refills: 0 | Status: SHIPPED | OUTPATIENT
Start: 2023-12-04 | End: 2024-03-03

## 2023-12-04 RX ORDER — CARVEDILOL 12.5 MG/1
12.5 TABLET ORAL EVERY 12 HOURS
Qty: 180 TABLET | Refills: 0 | Status: SHIPPED | OUTPATIENT
Start: 2023-12-04 | End: 2024-03-03

## 2023-12-05 NOTE — TELEPHONE ENCOUNTER
Attempted contacting patient to inform her Rx's have been sent to her pharmacy, and also reminding her to have labs completed before upcoming appointment 12/15/23, reached VM left message.

## 2023-12-14 NOTE — PROGRESS NOTES
[facility] Office visit    Assessment/Plan:     1. Current smoker    2. Type 2 diabetes mellitus without complication, without long-term current use of insulin ([facility])  Assessment & Plan:    Lab Results   Component Value Date    HGBA1C 5.9 06/27/2022     Patient reports she believes her blood sugar/diabetes has been well-controlled with diet.  Patient does not routinely take her blood sugar readings at home and reports that her granddaughter is getting her supplies such as lancets, test strips and hopefully will have more by the end of the week.  Patient denies any hyperglycemic symptoms/episodes.  Patient reports occasional fatigue where she would eat something to feel better however does not check her blood sugar in that time.    Patient advised to take her blood sugars at least once daily.  Patient encouraged to keep symptom log especially episodes of fatigue where she needs to eat  Patient also encouraged to take blood sugar at that time  Patient advised to follow-up with [facility] every 6 months  Patient advised to fulfill HbA1c    Orders:  -     Lipid panel; Future  -     CBC and differential; Future  -     Comprehensive metabolic panel; Future  -     Microalbumin, urine, 24 hour; Future  -     Hemoglobin A1C; Future  -     Lipid panel  -     CBC and differential  -     Comprehensive metabolic panel  -     Hemoglobin A1C    3. History of absence seizures  Assessment & Plan:  Patient reports for her seizures she has been seizure-free for several years.  Patient reports she is no longer taking her Depakote for seizure prophylaxis.    Patient advised to continue to monitor for any seizure-like activity  Patient encouraged to avoid any seizure triggers      4. Essential hypertension  Assessment & Plan:  Patient reports no acute worsening of hypertension and denies any hypotensive episodes.  Patient reports she checks her blood pressure daily with machine and gets anywhere between 140 and 150 systolic  and 70-90 diastolic.  Patient reports she is compliant with her home medication regimen and denies any facial flushing, frequent headaches, lightheadedness, dizziness, seizures or etc. Patient's blood pressure on current office visit found to be 140/80    Patient advised to continue current medication regimen of Coreg 12.5 BID, Chlorthalidone 15 mg, Losartan 50 mg daily.  Patient encouraged to continue taking b/p daily  Patient educated on hyper and hypotensive symptoms.  Patient advised on DASH diet    Orders:  -     Lipid panel; Future  -     CBC and differential; Future  -     Comprehensive metabolic panel; Future  -     Microalbumin, urine, 24 hour; Future  -     Hemoglobin A1C; Future  -     Lipid panel  -     CBC and differential  -     Comprehensive metabolic panel  -     Hemoglobin A1C    5. Chronic obstructive pulmonary disease, unspecified COPD type (HCC)  Assessment & Plan:  Patient reports she was diagnosed with COPD however is currently smoking.  Patient has trialed NicoDerm patch and other nicotine replacement therapies to no relief.  Patient reports she is willing to quit and will start taking out 1 or 2 cigarettes from each pack a week to help her gradually stop smoking altogether.  Patient reports chronic/baseline cough with clear phlegm. Patient previously prescribed albuterol however reports infrequent use.     Patient advised on updated guidelines for COPD management and offered a PFT for risk categorization  Patient declined at this time  Consider LAMA or LABA if patient cough/exercise intolerance continues to worsen      6. Trigger finger, right middle finger  Comments:  Can unlock trigger finger by herself. Reports concern if finger stays locked when she is sleeping. Patient advised f/u with ortho.  Orders:  -     Ambulatory Referral to Orthopedic Surgery; Future    7. Glenohumeral arthritis, left  Comments:  Creptius felt in range of motion with notable weakness. HPI positive for episodes of  feeling stuck and then moving fast. Advised on topicals and f/u with ortho.  Orders:  -     Ambulatory Referral to Orthopedic Surgery; Future    8. Encounter for colorectal cancer screening  -     Cologuard          No follow-ups on file.     Subjective:   MEL Soler is a 67 y.o. female presents to clinic for follow-up of chronic conditions.  Patient reports she does have a stable history of hypertension, diabetes, seizures, COPD and is currently smoking.     Patient reports while she was here about her chronic conditions she also had a few complaints such as pain in her left shoulder and a sticking of her middle finger on her right hand.     Patient denies any recent trauma such as fall or car accident.  Patient reported she noticed the stuck appearance of her middle finger on the right hand about a week ago and her shoulder has been bothering her for several months.  Patient reports her shoulder also locks up and then jerks to where she is trying to move it.  Patient reports some soreness associated with her shoulder however denies any pain associated with middle finger.  Patient reports she was previously in home building and since the pain she has used a heating pad, CBD oil, Biofreeze, Voltaren gel to little relief.  Patient notes she has several allergies to medications such as anaphylaxis to NSAIDs and it limits her ability for pain relief.     Review of Systems   Constitutional:  Negative for appetite change, chills, diaphoresis, fatigue and fever.   HENT:  Negative for congestion, rhinorrhea, sore throat and trouble swallowing.    Eyes:  Negative for photophobia and visual disturbance.   Respiratory:  Negative for cough, shortness of breath and wheezing.    Cardiovascular:  Negative for chest pain, palpitations and leg swelling.   Gastrointestinal:  Negative for abdominal distention, abdominal pain, blood in stool, constipation, diarrhea, nausea and vomiting.   Endocrine: Positive for polyuria.    Genitourinary:  Positive for frequency (increased since taking chlorthalidone). Negative for difficulty urinating, dysuria and hematuria.   Musculoskeletal:  Positive for arthralgias (left shoulder and right middle finger as well as right knee), back pain and neck pain.   Skin:  Negative for color change, pallor and rash.   Neurological:  Positive for dizziness (chronic, back to baseline). Negative for syncope, weakness, light-headedness and headaches.        Objective:     /80 (BP Location: Left arm, Patient Position: Sitting, Cuff Size: Standard)   Pulse 86   Temp 98.5 °F (36.9 °C) (Tympanic)   Ht 5' (1.524 m)   Wt 64.4 kg (142 lb)   SpO2 98%   BMI 27.73 kg/m²      Physical Exam  Constitutional:       General: She is not in acute distress.     Appearance: Normal appearance. She is not ill-appearing.   Eyes:      Extraocular Movements: Extraocular movements intact.      Conjunctiva/sclera: Conjunctivae normal.   Cardiovascular:      Rate and Rhythm: Normal rate and regular rhythm.      Pulses: Normal pulses.      Heart sounds: Normal heart sounds.   Pulmonary:      Effort: Pulmonary effort is normal.      Breath sounds: Normal breath sounds. No stridor. No wheezing or rhonchi.   Abdominal:      General: Bowel sounds are normal. There is no distension.      Palpations: Abdomen is soft.      Tenderness: There is no abdominal tenderness. There is no right CVA tenderness, left CVA tenderness, guarding or rebound.   Musculoskeletal:      Right hand: No tenderness. Normal range of motion. Normal pulse.        Arms:       Right lower leg: No edema.      Left lower leg: No edema.   Skin:     General: Skin is dry.      Capillary Refill: Capillary refill takes less than 2 seconds.      Coloration: Skin is not jaundiced or pale.   Neurological:      Mental Status: She is alert and oriented to person, place, and time. Mental status is at baseline.   Psychiatric:         Mood and Affect: Mood normal.          Behavior: Behavior normal.          ** Please Note: This note has been constructed using a voice recognition system **     Waylon Wright MD  12/15/23  5:36 PM

## 2023-12-15 ENCOUNTER — OFFICE VISIT (OUTPATIENT)
Dept: FAMILY MEDICINE CLINIC | Facility: CLINIC | Age: 68
End: 2023-12-15
Payer: COMMERCIAL

## 2023-12-15 VITALS
HEIGHT: 60 IN | BODY MASS INDEX: 27.88 KG/M2 | OXYGEN SATURATION: 98 % | TEMPERATURE: 98.5 F | WEIGHT: 142 LBS | SYSTOLIC BLOOD PRESSURE: 140 MMHG | HEART RATE: 86 BPM | DIASTOLIC BLOOD PRESSURE: 80 MMHG

## 2023-12-15 DIAGNOSIS — M65.331 TRIGGER FINGER, RIGHT MIDDLE FINGER: ICD-10-CM

## 2023-12-15 DIAGNOSIS — J44.9 CHRONIC OBSTRUCTIVE PULMONARY DISEASE, UNSPECIFIED COPD TYPE (HCC): ICD-10-CM

## 2023-12-15 DIAGNOSIS — M19.012 GLENOHUMERAL ARTHRITIS, LEFT: ICD-10-CM

## 2023-12-15 DIAGNOSIS — I10 ESSENTIAL HYPERTENSION: ICD-10-CM

## 2023-12-15 DIAGNOSIS — Z12.11 ENCOUNTER FOR COLORECTAL CANCER SCREENING: ICD-10-CM

## 2023-12-15 DIAGNOSIS — F17.200 CURRENT SMOKER: Primary | ICD-10-CM

## 2023-12-15 DIAGNOSIS — E11.9 TYPE 2 DIABETES MELLITUS WITHOUT COMPLICATION, WITHOUT LONG-TERM CURRENT USE OF INSULIN (HCC): ICD-10-CM

## 2023-12-15 DIAGNOSIS — Z12.12 ENCOUNTER FOR COLORECTAL CANCER SCREENING: ICD-10-CM

## 2023-12-15 DIAGNOSIS — Z86.69 HISTORY OF ABSENCE SEIZURES: ICD-10-CM

## 2023-12-15 PROCEDURE — 99213 OFFICE O/P EST LOW 20 MIN: CPT | Performed by: FAMILY MEDICINE

## 2023-12-17 NOTE — ASSESSMENT & PLAN NOTE
Patient reports she was diagnosed with COPD however is currently smoking.  Patient has trialed NicoDerm patch and other nicotine replacement therapies to no relief.  Patient reports she is willing to quit and will start taking out 1 or 2 cigarettes from each pack a week to help her gradually stop smoking altogether.  Patient reports chronic/baseline cough with clear phlegm. Patient previously prescribed albuterol however reports infrequent use.     Patient advised on updated guidelines for COPD management and offered a PFT for risk categorization  Patient declined at this time  Consider LAMA or LABA if patient cough/exercise intolerance continues to worsen

## 2023-12-17 NOTE — ASSESSMENT & PLAN NOTE
Lab Results   Component Value Date    HGBA1C 5.9 06/27/2022     Patient reports she believes her blood sugar/diabetes has been well-controlled with diet.  Patient does not routinely take her blood sugar readings at home and reports that her granddaughter is getting her supplies such as lancets, test strips and hopefully will have more by the end of the week.  Patient denies any hyperglycemic symptoms/episodes.  Patient reports occasional fatigue where she would eat something to feel better however does not check her blood sugar in that time.    Patient advised to take her blood sugars at least once daily.  Patient encouraged to keep symptom log especially episodes of fatigue where she needs to eat  Patient also encouraged to take blood sugar at that time  Patient advised to follow-up with [facility] every 6 months  Patient advised to fulfill HbA1c

## 2023-12-17 NOTE — ASSESSMENT & PLAN NOTE
Patient reports for her seizures she has been seizure-free for several years.  Patient reports she is no longer taking her Depakote for seizure prophylaxis.    Patient advised to continue to monitor for any seizure-like activity  Patient encouraged to avoid any seizure triggers

## 2023-12-17 NOTE — ASSESSMENT & PLAN NOTE
Patient reports no acute worsening of hypertension and denies any hypotensive episodes.  Patient reports she checks her blood pressure daily with machine and gets anywhere between 140 and 150 systolic and 70-90 diastolic.  Patient reports she is compliant with her home medication regimen and denies any facial flushing, frequent headaches, lightheadedness, dizziness, seizures or etc. Patient's blood pressure on current office visit found to be 140/80    Patient advised to continue current medication regimen of Coreg 12.5 BID, Chlorthalidone 15 mg, Losartan 50 mg daily.  Patient encouraged to continue taking b/p daily  Patient educated on hyper and hypotensive symptoms.  Patient advised on DASH diet

## 2024-01-26 ENCOUNTER — APPOINTMENT (EMERGENCY)
Dept: RADIOLOGY | Facility: HOSPITAL | Age: 69
DRG: 720 | End: 2024-01-26
Payer: COMMERCIAL

## 2024-01-26 ENCOUNTER — HOSPITAL ENCOUNTER (INPATIENT)
Facility: HOSPITAL | Age: 69
LOS: 3 days | Discharge: HOME | DRG: 720 | End: 2024-01-29
Attending: EMERGENCY MEDICINE | Admitting: INTERNAL MEDICINE
Payer: COMMERCIAL

## 2024-01-26 ENCOUNTER — APPOINTMENT (INPATIENT)
Dept: NON INVASIVE DIAGNOSTICS | Facility: HOSPITAL | Age: 69
DRG: 720 | End: 2024-01-26
Payer: COMMERCIAL

## 2024-01-26 DIAGNOSIS — I10 ESSENTIAL HYPERTENSION: ICD-10-CM

## 2024-01-26 DIAGNOSIS — R63.4 WEIGHT LOSS, UNINTENTIONAL: ICD-10-CM

## 2024-01-26 DIAGNOSIS — G40.A09 ABSENCE SEIZURE (HCC): ICD-10-CM

## 2024-01-26 DIAGNOSIS — I24.9 ACS (ACUTE CORONARY SYNDROME) (HCC): Primary | ICD-10-CM

## 2024-01-26 DIAGNOSIS — J44.9 COPD (CHRONIC OBSTRUCTIVE PULMONARY DISEASE) (HCC): ICD-10-CM

## 2024-01-26 DIAGNOSIS — E78.00 HYPERCHOLESTEREMIA: ICD-10-CM

## 2024-01-26 DIAGNOSIS — R79.89 ELEVATED TROPONIN: ICD-10-CM

## 2024-01-26 DIAGNOSIS — F41.9 ANXIETY: ICD-10-CM

## 2024-01-26 DIAGNOSIS — N17.9 AKI (ACUTE KIDNEY INJURY) (HCC): ICD-10-CM

## 2024-01-26 DIAGNOSIS — N39.0 UTI (URINARY TRACT INFECTION): ICD-10-CM

## 2024-01-26 DIAGNOSIS — D58.2 ELEVATED HEMOGLOBIN (HCC): ICD-10-CM

## 2024-01-26 DIAGNOSIS — R11.0 NAUSEA: ICD-10-CM

## 2024-01-26 DIAGNOSIS — R79.89 ELEVATED TROPONIN LEVEL: ICD-10-CM

## 2024-01-26 PROBLEM — R55 SYNCOPE: Status: ACTIVE | Noted: 2024-01-26

## 2024-01-26 PROBLEM — R94.31 QT PROLONGATION: Status: ACTIVE | Noted: 2024-01-26
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
## 2024-01-26 LAB
2HR DELTA HS TROPONIN: -72 NG/L
ALBUMIN SERPL BCP-MCNC: 4.6 G/DL (ref 3.5–5)
ALP SERPL-CCNC: 152 U/L (ref 34–104)
ALT SERPL W P-5'-P-CCNC: 13 U/L (ref 7–52)
AMORPH URATE CRY URNS QL MICRO: ABNORMAL /HPF
ANION GAP SERPL CALCULATED.3IONS-SCNC: 17 MMOL/L
AORTIC ROOT: 2.6 CM
APICAL FOUR CHAMBER EJECTION FRACTION: 66 %
APTT PPP: 33 SECONDS (ref 23–37)
APTT PPP: 46 SECONDS (ref 23–37)
AST SERPL W P-5'-P-CCNC: 19 U/L (ref 13–39)
AV LVOT PEAK GRADIENT: 3 MMHG
AV PEAK GRADIENT: 14 MMHG
BACTERIA UR QL AUTO: ABNORMAL /HPF
BASOPHILS # BLD AUTO: 0.08 THOUSANDS/ÂΜL (ref 0–0.1)
BASOPHILS NFR BLD AUTO: 0 % (ref 0–1)
BILIRUB SERPL-MCNC: 1.28 MG/DL (ref 0.2–1)
BILIRUB UR QL STRIP: ABNORMAL
BSA FOR ECHO PROCEDURE: 1.53 M2
BUN SERPL-MCNC: 25 MG/DL (ref 5–25)
CALCIUM SERPL-MCNC: 10.7 MG/DL (ref 8.4–10.2)
CAOX CRY URNS QL MICRO: ABNORMAL /HPF
CARDIAC TROPONIN I PNL SERPL HS: 177 NG/L
CARDIAC TROPONIN I PNL SERPL HS: 215 NG/L (ref 8–18)
CARDIAC TROPONIN I PNL SERPL HS: 249 NG/L
CHLORIDE SERPL-SCNC: 97 MMOL/L (ref 96–108)
CHOLEST SERPL-MCNC: 206 MG/DL
CK SERPL-CCNC: 131 U/L (ref 26–192)
CLARITY UR: CLEAR
CO2 SERPL-SCNC: 21 MMOL/L (ref 21–32)
COLOR UR: YELLOW
CREAT SERPL-MCNC: 1.67 MG/DL (ref 0.6–1.3)
E WAVE DECELERATION TIME: 185 MS
E/A RATIO: 0.57
EOSINOPHIL # BLD AUTO: 0 THOUSAND/ÂΜL (ref 0–0.61)
EOSINOPHIL NFR BLD AUTO: 0 % (ref 0–6)
ERYTHROCYTE [DISTWIDTH] IN BLOOD BY AUTOMATED COUNT: 16.8 % (ref 11.6–15.1)
ERYTHROCYTE [DISTWIDTH] IN BLOOD BY AUTOMATED COUNT: 17.3 % (ref 11.6–15.1)
EST. AVERAGE GLUCOSE BLD GHB EST-MCNC: 134 MG/DL
FINE GRAN CASTS URNS QL MICRO: ABNORMAL /LPF
FLUAV RNA RESP QL NAA+PROBE: NEGATIVE
FLUBV RNA RESP QL NAA+PROBE: NEGATIVE
FRACTIONAL SHORTENING: 44 (ref 28–44)
GFR SERPL CREATININE-BSD FRML MDRD: 31 ML/MIN/1.73SQ M
GLUCOSE SERPL-MCNC: 138 MG/DL (ref 65–140)
GLUCOSE SERPL-MCNC: 149 MG/DL (ref 65–140)
GLUCOSE UR STRIP-MCNC: NEGATIVE MG/DL
HBA1C MFR BLD: 6.3 %
HCT VFR BLD AUTO: 58.9 % (ref 34.8–46.1)
HCT VFR BLD AUTO: 63.4 % (ref 34.8–46.1)
HDLC SERPL-MCNC: 62 MG/DL
HGB BLD-MCNC: 21.2 G/DL (ref 11.5–15.4)
HGB BLD-MCNC: 23 G/DL (ref 11.5–15.4)
HGB UR QL STRIP.AUTO: ABNORMAL
IMM GRANULOCYTES # BLD AUTO: 0.12 THOUSAND/UL (ref 0–0.2)
IMM GRANULOCYTES NFR BLD AUTO: 1 % (ref 0–2)
INR PPP: 1.12 (ref 0.84–1.19)
INR PPP: 1.25 (ref 0.84–1.19)
INTERVENTRICULAR SEPTUM IN DIASTOLE (PARASTERNAL SHORT AXIS VIEW): 0.9 CM
INTERVENTRICULAR SEPTUM: 0.9 CM (ref 0.6–1.1)
KETONES UR STRIP-MCNC: NEGATIVE MG/DL
LAAS-AP2: 9.9 CM2
LAAS-AP4: 12 CM2
LACTATE SERPL-SCNC: 1.3 MMOL/L (ref 0.5–2)
LDLC SERPL CALC-MCNC: 102 MG/DL (ref 0–100)
LEFT ATRIUM SIZE: 2.3 CM
LEFT ATRIUM VOLUME (MOD BIPLANE): 27 ML
LEFT ATRIUM VOLUME INDEX (MOD BIPLANE): 17.6 ML/M2
LEFT INTERNAL DIMENSION IN SYSTOLE: 1.9 CM (ref 2.1–4)
LEFT VENTRICULAR INTERNAL DIMENSION IN DIASTOLE: 3.4 CM (ref 3.5–6)
LEFT VENTRICULAR POSTERIOR WALL IN END DIASTOLE: 0.9 CM
LEFT VENTRICULAR STROKE VOLUME: 35 ML
LEUKOCYTE ESTERASE UR QL STRIP: NEGATIVE
LVSV (TEICH): 35 ML
LYMPHOCYTES # BLD AUTO: 3.48 THOUSANDS/ÂΜL (ref 0.6–4.47)
LYMPHOCYTES NFR BLD AUTO: 17 % (ref 14–44)
MCH RBC QN AUTO: 30.2 PG (ref 26.8–34.3)
MCH RBC QN AUTO: 30.2 PG (ref 26.8–34.3)
MCHC RBC AUTO-ENTMCNC: 36 G/DL (ref 31.4–37.4)
MCHC RBC AUTO-ENTMCNC: 36.3 G/DL (ref 31.4–37.4)
MCV RBC AUTO: 83 FL (ref 82–98)
MCV RBC AUTO: 84 FL (ref 82–98)
MONOCYTES # BLD AUTO: 1.14 THOUSAND/ÂΜL (ref 0.17–1.22)
MONOCYTES NFR BLD AUTO: 6 % (ref 4–12)
MUCOUS THREADS UR QL AUTO: ABNORMAL
MV E'TISSUE VEL-SEP: 4 CM/S
MV PEAK A VEL: 0.77 M/S
MV PEAK E VEL: 44 CM/S
MV STENOSIS PRESSURE HALF TIME: 54 MS
MV VALVE AREA P 1/2 METHOD: 4.07
NEUTROPHILS # BLD AUTO: 15.6 THOUSANDS/ÂΜL (ref 1.85–7.62)
NEUTS SEG NFR BLD AUTO: 76 % (ref 43–75)
NITRITE UR QL STRIP: NEGATIVE
NON-SQ EPI CELLS URNS QL MICRO: ABNORMAL /HPF
NRBC BLD AUTO-RTO: 0 /100 WBCS
PH UR STRIP.AUTO: 6.5 [PH]
PLATELET # BLD AUTO: 235 THOUSANDS/UL (ref 149–390)
PLATELET # BLD AUTO: 236 THOUSANDS/UL (ref 149–390)
PMV BLD AUTO: 10.3 FL (ref 8.9–12.7)
PMV BLD AUTO: 9.5 FL (ref 8.9–12.7)
POTASSIUM SERPL-SCNC: 3.8 MMOL/L (ref 3.5–5.3)
PROT SERPL-MCNC: 9 G/DL (ref 6.4–8.4)
PROT UR STRIP-MCNC: >=300 MG/DL
PROTHROMBIN TIME: 14.6 SECONDS (ref 11.6–14.5)
PROTHROMBIN TIME: 15.9 SECONDS (ref 11.6–14.5)
RBC # BLD AUTO: 7.01 MILLION/UL (ref 3.81–5.12)
RBC # BLD AUTO: 7.61 MILLION/UL (ref 3.81–5.12)
RBC #/AREA URNS AUTO: ABNORMAL /HPF
RIGHT ATRIUM AREA SYSTOLE A4C: 7.2 CM2
RIGHT VENTRICLE ID DIMENSION: 2.8 CM
RSV RNA RESP QL NAA+PROBE: NEGATIVE
SARS-COV-2 RNA RESP QL NAA+PROBE: NEGATIVE
SL CV LEFT ATRIUM LENGTH A2C: 3.4 CM
SL CV PED ECHO LEFT VENTRICLE DIASTOLIC VOLUME (MOD BIPLANE) 2D: 46 ML
SL CV PED ECHO LEFT VENTRICLE SYSTOLIC VOLUME (MOD BIPLANE) 2D: 12 ML
SODIUM SERPL-SCNC: 135 MMOL/L (ref 135–147)
SP GR UR STRIP.AUTO: 1.01 (ref 1–1.03)
TRICUSPID ANNULAR PLANE SYSTOLIC EXCURSION: 1.5 CM
TRIGL SERPL-MCNC: 212 MG/DL
TSH SERPL DL<=0.05 MIU/L-ACNC: 5.06 UIU/ML (ref 0.45–4.5)
UROBILINOGEN UR QL STRIP.AUTO: 0.2 E.U./DL
VALPROATE SERPL-MCNC: 23 UG/ML (ref 50–100)
WBC # BLD AUTO: 20.42 THOUSAND/UL (ref 4.31–10.16)
WBC # BLD AUTO: 21.71 THOUSAND/UL (ref 4.31–10.16)
WBC #/AREA URNS AUTO: ABNORMAL /HPF

## 2024-01-26 PROCEDURE — 84439 ASSAY OF FREE THYROXINE: CPT | Performed by: EMERGENCY MEDICINE

## 2024-01-26 PROCEDURE — 82550 ASSAY OF CK (CPK): CPT | Performed by: INTERNAL MEDICINE

## 2024-01-26 PROCEDURE — C9113 INJ PANTOPRAZOLE SODIUM, VIA: HCPCS | Performed by: EMERGENCY MEDICINE

## 2024-01-26 PROCEDURE — 93005 ELECTROCARDIOGRAM TRACING: CPT

## 2024-01-26 PROCEDURE — 36415 COLL VENOUS BLD VENIPUNCTURE: CPT | Performed by: EMERGENCY MEDICINE

## 2024-01-26 PROCEDURE — 93306 TTE W/DOPPLER COMPLETE: CPT | Performed by: INTERNAL MEDICINE

## 2024-01-26 PROCEDURE — 99285 EMERGENCY DEPT VISIT HI MDM: CPT

## 2024-01-26 PROCEDURE — 0241U HB NFCT DS VIR RESP RNA 4 TRGT: CPT | Performed by: EMERGENCY MEDICINE

## 2024-01-26 PROCEDURE — 99285 EMERGENCY DEPT VISIT HI MDM: CPT | Performed by: EMERGENCY MEDICINE

## 2024-01-26 PROCEDURE — 81001 URINALYSIS AUTO W/SCOPE: CPT | Performed by: EMERGENCY MEDICINE

## 2024-01-26 PROCEDURE — 3044F HG A1C LEVEL LT 7.0%: CPT | Performed by: FAMILY MEDICINE

## 2024-01-26 PROCEDURE — 74177 CT ABD & PELVIS W/CONTRAST: CPT

## 2024-01-26 PROCEDURE — 85610 PROTHROMBIN TIME: CPT | Performed by: INTERNAL MEDICINE

## 2024-01-26 PROCEDURE — 70450 CT HEAD/BRAIN W/O DYE: CPT

## 2024-01-26 PROCEDURE — 85730 THROMBOPLASTIN TIME PARTIAL: CPT

## 2024-01-26 PROCEDURE — 96374 THER/PROPH/DIAG INJ IV PUSH: CPT

## 2024-01-26 PROCEDURE — 80061 LIPID PANEL: CPT

## 2024-01-26 PROCEDURE — 85610 PROTHROMBIN TIME: CPT | Performed by: EMERGENCY MEDICINE

## 2024-01-26 PROCEDURE — 84443 ASSAY THYROID STIM HORMONE: CPT | Performed by: EMERGENCY MEDICINE

## 2024-01-26 PROCEDURE — 83605 ASSAY OF LACTIC ACID: CPT

## 2024-01-26 PROCEDURE — C9113 INJ PANTOPRAZOLE SODIUM, VIA: HCPCS

## 2024-01-26 PROCEDURE — G1004 CDSM NDSC: HCPCS

## 2024-01-26 PROCEDURE — 87040 BLOOD CULTURE FOR BACTERIA: CPT

## 2024-01-26 PROCEDURE — 80164 ASSAY DIPROPYLACETIC ACD TOT: CPT

## 2024-01-26 PROCEDURE — 84484 ASSAY OF TROPONIN QUANT: CPT | Performed by: NURSE PRACTITIONER

## 2024-01-26 PROCEDURE — 83036 HEMOGLOBIN GLYCOSYLATED A1C: CPT

## 2024-01-26 PROCEDURE — 99223 1ST HOSP IP/OBS HIGH 75: CPT | Performed by: INTERNAL MEDICINE

## 2024-01-26 PROCEDURE — 96361 HYDRATE IV INFUSION ADD-ON: CPT

## 2024-01-26 PROCEDURE — 96375 TX/PRO/DX INJ NEW DRUG ADDON: CPT

## 2024-01-26 PROCEDURE — 84484 ASSAY OF TROPONIN QUANT: CPT | Performed by: EMERGENCY MEDICINE

## 2024-01-26 PROCEDURE — 82948 REAGENT STRIP/BLOOD GLUCOSE: CPT

## 2024-01-26 PROCEDURE — 93306 TTE W/DOPPLER COMPLETE: CPT

## 2024-01-26 PROCEDURE — 85025 COMPLETE CBC W/AUTO DIFF WBC: CPT | Performed by: EMERGENCY MEDICINE

## 2024-01-26 PROCEDURE — 85730 THROMBOPLASTIN TIME PARTIAL: CPT | Performed by: EMERGENCY MEDICINE

## 2024-01-26 PROCEDURE — 80053 COMPREHEN METABOLIC PANEL: CPT | Performed by: EMERGENCY MEDICINE

## 2024-01-26 PROCEDURE — 87086 URINE CULTURE/COLONY COUNT: CPT

## 2024-01-26 PROCEDURE — 85027 COMPLETE CBC AUTOMATED: CPT | Performed by: EMERGENCY MEDICINE

## 2024-01-26 RX ORDER — DIVALPROEX SODIUM 500 MG/1
500 TABLET, DELAYED RELEASE ORAL EVERY 12 HOURS
Status: DISCONTINUED | OUTPATIENT
Start: 2024-01-26 | End: 2024-01-29 | Stop reason: HOSPADM

## 2024-01-26 RX ORDER — CARVEDILOL 12.5 MG/1
12.5 TABLET ORAL EVERY 12 HOURS
Status: DISCONTINUED | OUTPATIENT
Start: 2024-01-26 | End: 2024-01-27

## 2024-01-26 RX ORDER — LIDOCAINE 50 MG/G
1 PATCH TOPICAL DAILY
Status: DISCONTINUED | OUTPATIENT
Start: 2024-01-26 | End: 2024-01-29 | Stop reason: HOSPADM

## 2024-01-26 RX ORDER — HEPARIN SODIUM 10000 [USP'U]/100ML
3-20 INJECTION, SOLUTION INTRAVENOUS
Status: DISCONTINUED | OUTPATIENT
Start: 2024-01-26 | End: 2024-01-27

## 2024-01-26 RX ORDER — SODIUM CHLORIDE, SODIUM GLUCONATE, SODIUM ACETATE, POTASSIUM CHLORIDE, MAGNESIUM CHLORIDE, SODIUM PHOSPHATE, DIBASIC, AND POTASSIUM PHOSPHATE .53; .5; .37; .037; .03; .012; .00082 G/100ML; G/100ML; G/100ML; G/100ML; G/100ML; G/100ML; G/100ML
50 INJECTION, SOLUTION INTRAVENOUS CONTINUOUS
Status: DISCONTINUED | OUTPATIENT
Start: 2024-01-26 | End: 2024-01-28

## 2024-01-26 RX ORDER — PANTOPRAZOLE SODIUM 40 MG/10ML
40 INJECTION, POWDER, LYOPHILIZED, FOR SOLUTION INTRAVENOUS EVERY 12 HOURS SCHEDULED
Status: DISCONTINUED | OUTPATIENT
Start: 2024-01-26 | End: 2024-01-29

## 2024-01-26 RX ORDER — CEFTRIAXONE 1 G/50ML
1000 INJECTION, SOLUTION INTRAVENOUS EVERY 24 HOURS
Status: DISCONTINUED | OUTPATIENT
Start: 2024-01-26 | End: 2024-01-29

## 2024-01-26 RX ORDER — ALBUTEROL SULFATE 90 UG/1
2 AEROSOL, METERED RESPIRATORY (INHALATION) EVERY 6 HOURS PRN
Status: DISCONTINUED | OUTPATIENT
Start: 2024-01-26 | End: 2024-01-29 | Stop reason: HOSPADM

## 2024-01-26 RX ORDER — HEPARIN SODIUM 1000 [USP'U]/ML
1800 INJECTION, SOLUTION INTRAVENOUS; SUBCUTANEOUS EVERY 6 HOURS PRN
Status: DISCONTINUED | OUTPATIENT
Start: 2024-01-26 | End: 2024-01-27

## 2024-01-26 RX ORDER — HEPARIN SODIUM 1000 [USP'U]/ML
3600 INJECTION, SOLUTION INTRAVENOUS; SUBCUTANEOUS EVERY 6 HOURS PRN
Status: DISCONTINUED | OUTPATIENT
Start: 2024-01-26 | End: 2024-01-27

## 2024-01-26 RX ORDER — NICOTINE 21 MG/24HR
1 PATCH, TRANSDERMAL 24 HOURS TRANSDERMAL EVERY 24 HOURS
Status: DISCONTINUED | OUTPATIENT
Start: 2024-01-26 | End: 2024-01-26

## 2024-01-26 RX ORDER — ACETAMINOPHEN 325 MG/1
650 TABLET ORAL EVERY 6 HOURS PRN
Status: DISCONTINUED | OUTPATIENT
Start: 2024-01-26 | End: 2024-01-29 | Stop reason: HOSPADM

## 2024-01-26 RX ORDER — ONDANSETRON 2 MG/ML
4 INJECTION INTRAMUSCULAR; INTRAVENOUS ONCE
Status: COMPLETED | OUTPATIENT
Start: 2024-01-26 | End: 2024-01-26

## 2024-01-26 RX ORDER — ATORVASTATIN CALCIUM 80 MG/1
80 TABLET, FILM COATED ORAL
Status: DISCONTINUED | OUTPATIENT
Start: 2024-01-26 | End: 2024-01-29

## 2024-01-26 RX ORDER — PANTOPRAZOLE SODIUM 40 MG/10ML
40 INJECTION, POWDER, LYOPHILIZED, FOR SOLUTION INTRAVENOUS
Status: DISCONTINUED | OUTPATIENT
Start: 2024-01-27 | End: 2024-01-26

## 2024-01-26 RX ORDER — PANTOPRAZOLE SODIUM 40 MG/10ML
40 INJECTION, POWDER, LYOPHILIZED, FOR SOLUTION INTRAVENOUS ONCE
Status: COMPLETED | OUTPATIENT
Start: 2024-01-26 | End: 2024-01-26

## 2024-01-26 RX ORDER — CLOPIDOGREL BISULFATE 75 MG/1
75 TABLET ORAL DAILY
Status: DISCONTINUED | OUTPATIENT
Start: 2024-01-26 | End: 2024-01-29 | Stop reason: HOSPADM

## 2024-01-26 RX ADMIN — ATORVASTATIN CALCIUM 80 MG: 80 TABLET, FILM COATED ORAL at 16:12

## 2024-01-26 RX ADMIN — ACETAMINOPHEN 650 MG: 325 TABLET ORAL at 20:41

## 2024-01-26 RX ADMIN — CARVEDILOL 12.5 MG: 12.5 TABLET, FILM COATED ORAL at 13:54

## 2024-01-26 RX ADMIN — HEPARIN SODIUM 12 UNITS/KG/HR: 10000 INJECTION, SOLUTION INTRAVENOUS at 11:19

## 2024-01-26 RX ADMIN — SODIUM CHLORIDE 1000 ML: 0.9 INJECTION, SOLUTION INTRAVENOUS at 08:20

## 2024-01-26 RX ADMIN — PANTOPRAZOLE SODIUM 40 MG: 40 INJECTION, POWDER, FOR SOLUTION INTRAVENOUS at 20:41

## 2024-01-26 RX ADMIN — LIDOCAINE 1 PATCH: 50 PATCH TOPICAL at 15:12

## 2024-01-26 RX ADMIN — SODIUM CHLORIDE, SODIUM GLUCONATE, SODIUM ACETATE, POTASSIUM CHLORIDE, MAGNESIUM CHLORIDE, SODIUM PHOSPHATE, DIBASIC, AND POTASSIUM PHOSPHATE 75 ML/HR: .53; .5; .37; .037; .03; .012; .00082 INJECTION, SOLUTION INTRAVENOUS at 15:13

## 2024-01-26 RX ADMIN — ONDANSETRON 4 MG: 2 INJECTION INTRAMUSCULAR; INTRAVENOUS at 08:20

## 2024-01-26 RX ADMIN — DIVALPROEX SODIUM 500 MG: 500 TABLET, DELAYED RELEASE ORAL at 15:13

## 2024-01-26 RX ADMIN — PANTOPRAZOLE SODIUM 40 MG: 40 INJECTION, POWDER, FOR SOLUTION INTRAVENOUS at 08:23

## 2024-01-26 RX ADMIN — IOHEXOL 100 ML: 350 INJECTION, SOLUTION INTRAVENOUS at 09:26

## 2024-01-26 RX ADMIN — HEPARIN SODIUM 1800 UNITS: 1000 INJECTION INTRAVENOUS; SUBCUTANEOUS at 17:56

## 2024-01-26 RX ADMIN — CEFTRIAXONE 1000 MG: 1 INJECTION, SOLUTION INTRAVENOUS at 17:56

## 2024-01-26 RX ADMIN — NICOTINE 1 PATCH: 7 PATCH, EXTENDED RELEASE TRANSDERMAL at 15:12

## 2024-01-26 RX ADMIN — CLOPIDOGREL 75 MG: 75 TABLET ORAL at 15:13

## 2024-01-26 NOTE — ASSESSMENT & PLAN NOTE
Previously on Trazodone, however not taking due to drowsiness  - Psych consulted, no new medications for depression added at this time  Started buspar for anxiety

## 2024-01-26 NOTE — ASSESSMENT & PLAN NOTE
Presented with elevated troponin 249, epigastric pain, nausea. Patient presented after fall at home/syncopal episode. Reports she has been having chest heaviness that is not new. EKG showed sinus tachycardia, LVH, no obvious ST elevations. Possibly secondary to dehydration/XOCHITL.    Plan:  - Cardiology followed  - Received IV Heparin ACS low gtt  - Received Atorvastatin 80 mg, Plavix 75 mg daily. No aspirin as patient has allergy  - Echo showed EF 75%, normal diastolic function  - Continued on Coreg 12.5 mg BID  - NM stress test on 1/29 showed negative ischemia, normal perfusion

## 2024-01-26 NOTE — ASSESSMENT & PLAN NOTE
Acute    Cr 1.67 on admission with no recent comparison. (Prior baseline 0.7-1)  Attributed to prerenal XOCHITL in setting of dehydration, poor PO intake. Also received IV contrast for CT scan on admission.     1/29: Returned to baseline Creatinine     Plan:  - Received IV Fluids Plasmalyte   - Home Losartan and Chlorthalidone held on admission  - Monitor I/O's

## 2024-01-26 NOTE — H&P
History and Physical - [facility]  Family Medicine Residency     Patient Information: Nereyda Soler 68 y.o. female MRN: [id number]  Unit/Bed#: [address] [place] [address] Encounter: [id number]  Admitting Physician: Teresita Ibrhaim MD   PCP: Waylon Wright MD  Date of Admission:  01/26/24     Assessment and Plan     * Syncope  Assessment & Plan  68F with PMHx that includes COPD, HTN, Depression, absence seizures, and DM well controlled, presents after fall and syncopal episode at home. Patient reported frequent falls at home, and decrease PO intake over the last 5 days. Patient reports feel dizzy and tripping with LOC.      CT Head neg. CT Abd/Pelvis negative. Troponins elevated on admission. Cr elevated on admission likely in setting of dehydration.     Plan:  - Fall precautions  - Cardiology following for elevated troponins, r/o ACS  - IV fluids for possible XOCHITL  - Trend labs  - Monitor V/S    Sepsis (HCC)  Assessment & Plan  Presented with leukocytosis and tachycardia, tachypnea.    CT Head neg for acute findings. CT Abd/Pel neg for acute findings. WBC 20.4, (also Hb 23.0, possibly hemoconcentrated in setting of dehydration on top of underlying polychthemia). Pt reports dysuria, UA showed moderate bacteria, negative for nitrities or leukocytes,. Source possible UTI    - Will check lactate   - Check blood Cx  - Start on empiric Rocephin once Blood Cx obtained  - Urine cx  - Trend CBC    Elevated troponin level r/o ACS  Assessment & Plan  Presented with elevated troponin 249, epigastric pain, nausea. Patient presented after fall at home/syncopal episode. Reports she has been having chest heaviness that is not new. EKG showed sinus tachycardia, LVH, no obvious ST elevations. Possibly secondary to dehydration/XOCHITL.    Plan:  - Cardiology following  - Heparin ACS low  - Atorvastatin 80 mg, Coreg  - Plavix 75 mg daily. No aspirin as patient has allergy  - Echo pending  - Trend troponins    Elevated serum  "creatinine  Assessment & Plan  Acute    Cr 1.67 on admission with no recent comparison. Prior baseline 0.7-1. Possible prerenal XOCHITL in setting of dehydration, poor PO intake over the past few days.  Patient did receive IV contrast for CT scan on admission.    Plan:  - IV Fluids Plasmalyte at 75 cc/hr  - Home Losartan and Chlorthalidone held  - Avoid NSAID's  - Monitor I/O's  - Trend BMP    Essential hypertension  Assessment & Plan  Chronic    BP elevated on admission possibly due to missed doses in the setting of nausea    Plan:  - Coreg 12.5 mg BID  - Hold Cozaar and Chlorthalidone in the setting of possible XOCHITL    Nausea  Assessment & Plan  Presented with nausea, and gradual fatigue, poor appetite, and weight loss. Nonspecific abdominal pain. CT abdomen/pelvis showed no acute findings    - Zofran changed to Tigan given QTC prolongation  - Monitor abdominal pain  - Consider further workup    QT prolongation  Assessment & Plan   on admission    - Received Zofran in the ED  - IM Tigan for nausea/vomiting    Elevated TSH  Assessment & Plan  TSH 5.05 on admission    - Follow up Free T4    COPD (chronic obstructive pulmonary disease) (Formerly [facility])  Assessment & Plan  Chronic    Current smoker. Does not use O2 at home. Only uses rescue inhaler as needed  Does not appear to be in acute exacerbation on admission    - Continue Albuterol PRN  - Monitor O2 sats    Type 2 diabetes mellitus without complication, without long-term current use of insulin (Formerly [facility])  Assessment & Plan  Lab Results   Component Value Date    HGBA1C 5.9 06/27/2022     Prior HbA1C 5.9 in 2022. Will recheck HbA1C in setting of recent falls    History of absence seizures  Assessment & Plan  Chronic  - Previously on Depakote, however did not need to take as she was not having episodes  - On admission, patient reports she has resumed taking Depakote daily due to recent \"staring episodes\"    Plan:  - Continue Depakote  - Check depakote " level    Depression  Assessment & Plan  Previously on Trazodone, however not taking due to drowsiness  - Hold home Trazodone in setting of recent falls    Intervertebral disc disorder with radiculopathy of lumbosacral region  Assessment & Plan  Chronic  - Lidocaine patch  - Tylenol PRN       Fluids: Plasmalyte at 75 cc/hr  Electrolyte repletion: Replete K now, and as needed.  Nutrition:    VTE Prophylaxis: VTE Score: 4 Moderate Risk (Score 3-4) - Pharmacological DVT Prophylaxis Ordered: heparin drip.  Code Status: Level 1 - Full Code  Anticipated Length of Stay:  Patient will be admitted on an Inpatient basis with an anticipated length of stay of more than 2 midnights.     Justification for Hospital Stay: Syncope, elevated troponin, SIRS  Total Time for Visit, including Counseling / Coordination of Care: 35 mins. Greater than 50% of this total time spent on direct patient counseling and coordination of care.  Patient Information Sharing: With the consent of Nereyda Soler, their loved ones granddaughter at bedside notified today by inpatient team of the patient’s condition and current plan. All questions answered.     Chief Complaint:     Chief Complaint   Patient presents with    Fall     Pt. Fell around 2 am  states   her legs go numb and  causes her to fall. Pt. States nausea and vomiting  and  after fall was unable   to get up  until ems arrived then able to get up and walk 2 steps to the    stretcher    Vomiting     Subjective      History of Present Illness:     Nereyda Soler is a 68 y.o. female with a PMHx that includes HTN, COPD, Seizures (absence), depression, and well controlled DM, who presents after fall at home. A few weeks ago she started feeling generalized weakness and malaise with poor appetite and weight loss. Denies constipation On the night prior to admission she reports she fell after feeling dizzy and lost consciousness. She found herself on the ground for unknown amount of time until her  downstairs neighbor was able to come help her. Patient also reports she has been feeling a pressure or heaviness on her chest that has been constant throughout the day for a few weeks. She reports epigastric pain with nausea and has not eaten or drank anything for the past few days. Pt reports she has absence seizures in the past and was not requiring Depakote for a while but recently restarted taking it daily as she had another episode a few weeks ago. Patient also reports feeling anxious recently which is not her baseline. Denies any significant stressors or trauma.      Review of Systems:  Review of Systems   Constitutional:  Positive for fatigue.        Weight loss, poor appetite, decreased PO intake   Respiratory:  Negative for cough and chest tightness.    Cardiovascular:  Positive for chest pain and palpitations.        Chest heaviness   Gastrointestinal:  Positive for nausea. Negative for abdominal pain, diarrhea and vomiting.   Genitourinary:  Positive for dysuria and frequency. Negative for hematuria.   Musculoskeletal:  Positive for arthralgias. Back pain: Chronic.  Neurological:  Positive for dizziness and syncope.   Psychiatric/Behavioral:  Negative for confusion. The patient is nervous/anxious.    All other systems reviewed and are negative.       Past Medical History:   Diagnosis Date    COPD (chronic obstructive pulmonary disease) (HCC)     Liver disease 2017    Patient mentions having nonalcoholic liver disease. Denies alcohol use.    Radiculopathy of lumbar region     last assessed 17    Seizures (HCC)      Past Surgical History:   Procedure Laterality Date     SECTION      EPIDURAL BLOCK INJECTION Right 2017    Procedure: BLOCK / INJECTION EPIDURAL STEROID TRANSFORAMINAL   L4-5;  Surgeon: Sanford De La Vega MD;  Location: [facility] MAIN OR;  Service:     TONSILLECTOMY       Allergies   Allergen Reactions    Aspirin Anaphylaxis    Nsaids Anaphylaxis    Asparaginase Derivatives      Ibuprofen Hives    Pegaspargase Other (See Comments)    Robaxin [Methocarbamol]     Toradol [Ketorolac Tromethamine]      Prior to Admission Medications   Prescriptions Last Dose Informant Patient Reported? Taking?   Blood Pressure KIT Not Taking  No No   Sig: Use in the morning   Patient not taking: Reported on 6/27/2022   albuterol (Ventolin HFA) 90 mcg/act inhaler 1/25/2024  No Yes   Sig: Inhale 2 puffs every 6 (six) hours as needed for wheezing   atorvastatin (LIPITOR) 80 mg tablet 1/25/2024  No Yes   Sig: TAKE 1 TABLET (80 MG TOTAL) BY MOUTH DAILY WITH DINNER   carvedilol (COREG) 12.5 mg tablet 1/25/2024  No Yes   Sig: TAKE 1 TABLET (12.5 MG TOTAL) BY MOUTH EVERY 12 (TWELVE) HOURS   chlorthalidone (Thalitone) 15 MG tablet 1/25/2024  No Yes   Sig: Take 1 tablet (15 mg total) by mouth daily   divalproex sodium (DEPAKOTE) 250 mg EC tablet 1/25/2024  No Yes   Sig: Take 2 tablets (500 mg total) by mouth every 12 (twelve) hours for 14 days   losartan (COZAAR) 50 mg tablet 1/25/2024  No Yes   Sig: Take 1 tablet (50 mg total) by mouth daily   nicotine (NICODERM CQ) 14 mg/24hr TD 24 hr patch Not Taking  No No   Sig: Place 1 patch on the skin every 24 hours   Patient not taking: Reported on 6/27/2022   traZODone (DESYREL) 50 mg tablet   No No   Sig: Take 1 tablet (50 mg total) by mouth daily at bedtime      Facility-Administered Medications: None     Social History     Socioeconomic History    Marital status:      Spouse name: Not on file    Number of children: 3    Years of education: Not on file    Highest education level: Not on file   Occupational History    Not on file   Tobacco Use    Smoking status: Some Days     Current packs/day: 0.50     Types: Cigarettes    Smokeless tobacco: Current    Tobacco comments:     Per allscripts: Current everyday smoker   Vaping Use    Vaping status: Former    Substances: Nicotine   Substance and Sexual Activity    Alcohol use: Not Currently     Comment: Per allscripts:  Social    Drug use: Not Currently     Types: Marijuana    Sexual activity: Not Currently     Partners: Male   Other Topics Concern    Not on file   Social History Narrative    Not on file     Social Determinants of Health     Financial Resource Strain: Not on file   Food Insecurity: Not on file   Transportation Needs: Not on file   Physical Activity: Not on file   Stress: Not on file   Social Connections: Not on file   Intimate Partner Violence: Not on file   Housing Stability: Not on file     Family History   Problem Relation Age of Onset    Diabetes Mother     Hyperlipidemia Mother     Cancer Father     Prostate cancer Father      Patient Pre-hospital Living Situation: Lives alone in apartment  Patient Pre-hospital Level of Mobility: Ambulates  Patient Pre-hospital Diet Restrictions: None          Objective     Physical Exam:   Vitals:   Blood Pressure: (!) 194/96 (01/26/24 1243)  Pulse: 90 (01/26/24 1243)  Temperature: 98.3 °F (36.8 °C) (01/26/24 0747)  Respirations: 18 (01/26/24 1243)  Weight - Scale: 64.4 kg (142 lb) (01/26/24 0747)  SpO2: 94 % (01/26/24 1243)     Physical Exam  Constitutional:       General: She is not in acute distress.     Appearance: She is not ill-appearing or diaphoretic.   HENT:      Head: Normocephalic.      Mouth/Throat:      Mouth: Mucous membranes are dry.   Eyes:      General: No scleral icterus.  Cardiovascular:      Rate and Rhythm: Normal rate and regular rhythm.      Heart sounds: Normal heart sounds. No murmur heard.     No friction rub.   Pulmonary:      Breath sounds: Normal breath sounds. No wheezing, rhonchi or rales.   Abdominal:      General: There is no distension.      Palpations: Abdomen is soft. There is no mass.      Tenderness: There is abdominal tenderness in the right upper quadrant, epigastric area and left lower quadrant. There is no guarding or rebound.   Musculoskeletal:         General: No tenderness.      Right lower leg: No edema.      Left lower leg: No  edema.   Skin:     General: Skin is warm and dry.   Neurological:      Mental Status: She is alert and oriented to person, place, and time.   Psychiatric:         Mood and Affect: Mood normal.        Lab Results: I have personally reviewed pertinent reports.    Results from last 7 days   Lab Units 01/26/24  0828   WBC Thousand/uL 20.42*   HEMOGLOBIN g/dL 23.0*   HEMATOCRIT % 63.4*   PLATELETS Thousands/uL 236   NEUTROS PCT % 76*   LYMPHS PCT % 17   MONOS PCT % 6   EOS PCT % 0     Results from last 7 days   Lab Units 01/26/24  0828   POTASSIUM mmol/L 3.8   CHLORIDE mmol/L 97   CO2 mmol/L 21   BUN mg/dL 25   CREATININE mg/dL 1.67*   CALCIUM mg/dL 10.7*   ALK PHOS U/L 152*   ALT U/L 13   AST U/L 19   EGFR ml/min/1.73sq m 31     Results from last 7 days   Lab Units 01/26/24  0828   INR  1.12                  Results from last 7 days   Lab Units 01/26/24  1055   COLOR UA  Yellow   CLARITY UA  Clear   SPEC GRAV UA  1.015   PH UA  6.5   LEUKOCYTES UA  Negative   NITRITE UA  Negative   GLUCOSE UA mg/dl Negative   KETONES UA mg/dl Negative   BILIRUBIN UA  Small*   BLOOD UA  Moderate*      Results from last 7 days   Lab Units 01/26/24  1055   RBC UA /hpf 2-4   WBC UA /hpf 10-20*   EPITHELIAL CELLS WET PREP /hpf Occasional   BACTERIA UA /hpf Moderate*      Results from last 7 days   Lab Units 01/26/24  1036 01/26/24  0828   HS TNI 0HR ng/L  --  249*   HS TNI 2HR ng/L 177*  --              Imaging: I have personally reviewed pertinent reports.   and I have personally reviewed pertinent films in PACS  CT abdomen pelvis with contrast    Result Date: 1/26/2024  No acute pathology. Colonic diverticulosis. Workstation performed: [id number]     CT head without contrast    Result Date: 1/26/2024  No acute intracranial abnormality. Workstation performed: [id number]      EKG, Pathology, and Other Studies Reviewed on Admission:   EKG  Result Date: 01/26/24  Impression:  Sinus tachycardia, LVH      Entire H&P was discussed with Dr. Ibrahim  who agreed to what is noted above     ** Please Note: This note has been constructed using a voice recognition system **     Jl Brar MD  01/26/24  12:45 PM

## 2024-01-26 NOTE — CONSULTS
"Consultation - Cardiology   Nereyda Soler 68 y.o. female MRN: [id number]  Unit/Bed#: [address] [place] [address] Encounter: [id number]    Assessment/Plan     Assessment:  1. Anorexia with dehydration  2. Abnormal troponins most likely secondary to XOCHITL/Dehydration  3. XOCHITL secondary to #1  4. Frequent falls with unknown downtime concerning for possible rhabdomyolysis  5. COPD/tobacco abuse  6. Hypertension      Plan:  Patient has been admitted to the service of [facility]  1. Patient was started on heparin ACS protocol in the emergency room, will check CK and repeat high-sensitivity troponins if high-sensitivity troponins trending down and CK is positive will discontinue heparin drip.    2. Patient with acute kidney injury, will hold her Cozaar and Chlorthalidone and add Isolyte at 75 ML's per hour with close monitoring of patients labs.    3. 2D echocardiogram ordered by primary team    4. Consider G.I. evaluation for anorexia    5. Consider repeat Lexiscan nuclear stress test versus cardiac catheterization once patient is stable. Can be done in the outpatient setting    6. Further orders as patient condition and testing warrant      History of Present Illness   Physician Requesting Consult: Teresita Ibrahim MD  Reason for Consult / Principal Problem: Epigastric pain, associated with nausea and vomiting. Frequent falls over the last few days with most recent that she felt last night at 2 AM and laid on the floor until EMS arrived this morning.    HPI: Nereyda Soler is a 68 y.o. year old female who presented to the emergency room by EMS. She notes difficulty ambulating with sensation of numbness and tingling in her lower extremities and times when her legs just feel like \"jelly and causes her to fall\". She also notes she has been having epigastric pain with nausea and occasional vomiting. She has not been eating well and states even at times water will cause her stomach to hurt. Admission labs demonstrate acute " kidney injury with creatinine of 1.67 (patient baseline creatinine is around 0.6), white count of 20, hemoglobin of 23 with hematocrit of 63.4. High-sensitivity troponin was 249 and second is 177. 12 lead EKG demonstrated sinus rhythm without any acute change.    Patient has a history for hypertension, COPD, tobacco abuse, and chronic back pain/spinal stenosis. There is family history of coronary artery disease and also diabetes. Nuclear stress test performed in 2021 noted ECG is critical for ischemia, there is no reversible defect but there was evidence of TID.        Inpatient consult to Cardiology  Consult performed by: NITHIN Mcbride  Consult ordered by: Teresita Ibrahim MD        Review of Systems   Constitutional:  Positive for activity change and fatigue.   HENT: Negative.  Negative for congestion, facial swelling, sinus pain and trouble swallowing.    Eyes: Negative.  Negative for visual disturbance.   Respiratory:  Negative for chest tightness.    Cardiovascular:  Negative for chest pain.   Gastrointestinal:  Positive for abdominal distention, abdominal pain, diarrhea, nausea and vomiting.        Anorexia   Endocrine: Negative.  Negative for polydipsia, polyphagia and polyuria.   Genitourinary:  Negative for difficulty urinating.   Musculoskeletal:  Positive for gait problem.        Frequent falls   Skin: Negative.    Neurological:  Positive for dizziness, weakness and light-headedness. Negative for syncope.   Hematological: Negative.    Psychiatric/Behavioral: Negative.         Historical Information   Past Medical History:   Diagnosis Date    COPD (chronic obstructive pulmonary disease) (HCC)     Liver disease 2017    Patient mentions having nonalcoholic liver disease. Denies alcohol use.    Radiculopathy of lumbar region     last assessed 17    Seizures (HCC)      Past Surgical History:   Procedure Laterality Date     SECTION      EPIDURAL BLOCK INJECTION Right 2017     Procedure: BLOCK / INJECTION EPIDURAL STEROID TRANSFORAMINAL   L4-5;  Surgeon: Sanford De La Vega MD;  Location: [facility] MAIN OR;  Service:     TONSILLECTOMY       Social History     Substance and Sexual Activity   Alcohol Use Not Currently    Comment: Per allscripts: Social     Social History     Substance and Sexual Activity   Drug Use Not Currently    Types: Marijuana     E-Cigarette/Vaping    E-Cigarette Use Former User      E-Cigarette/Vaping Substances    Nicotine Yes     THC No     CBD No     Flavoring No     Other No     Unknown No      Social History     Tobacco Use   Smoking Status Some Days    Current packs/day: 0.50    Types: Cigarettes   Smokeless Tobacco Current   Tobacco Comments    Per allscripts: Current everyday smoker     Family History:   Family History   Problem Relation Age of Onset    Diabetes Mother     Hyperlipidemia Mother     Cancer Father     Prostate cancer Father        Meds/Allergies   all current active meds have been reviewed, current meds:   Current Facility-Administered Medications   Medication Dose Route Frequency    heparin (porcine) 25,000 units in 0.45% NaCl 250 mL infusion (premix)  3-20 Units/kg/hr (Order-Specific) Intravenous Titrated    heparin (porcine) injection 1,800 Units  1,800 Units Intravenous Q6H PRN    heparin (porcine) injection 3,600 Units  3,600 Units Intravenous Q6H PRN   , and PTA meds:   Prior to Admission Medications   Prescriptions Last Dose Informant Patient Reported? Taking?   Blood Pressure KIT Not Taking  No No   Sig: Use in the morning   Patient not taking: Reported on 6/27/2022   albuterol (Ventolin HFA) 90 mcg/act inhaler 1/25/2024  No Yes   Sig: Inhale 2 puffs every 6 (six) hours as needed for wheezing   atorvastatin (LIPITOR) 80 mg tablet 1/25/2024  No Yes   Sig: TAKE 1 TABLET (80 MG TOTAL) BY MOUTH DAILY WITH DINNER   carvedilol (COREG) 12.5 mg tablet 1/25/2024  No Yes   Sig: TAKE 1 TABLET (12.5 MG TOTAL) BY MOUTH EVERY 12 (TWELVE) HOURS    chlorthalidone (Thalitone) 15 MG tablet 1/25/2024  No Yes   Sig: Take 1 tablet (15 mg total) by mouth daily   divalproex sodium (DEPAKOTE) 250 mg EC tablet 1/25/2024  No Yes   Sig: Take 2 tablets (500 mg total) by mouth every 12 (twelve) hours for 14 days   losartan (COZAAR) 50 mg tablet 1/25/2024  No Yes   Sig: Take 1 tablet (50 mg total) by mouth daily   nicotine (NICODERM CQ) 14 mg/24hr TD 24 hr patch Not Taking  No No   Sig: Place 1 patch on the skin every 24 hours   Patient not taking: Reported on 6/27/2022   traZODone (DESYREL) 50 mg tablet   No No   Sig: Take 1 tablet (50 mg total) by mouth daily at bedtime      Facility-Administered Medications: None     Allergies   Allergen Reactions    Aspirin Anaphylaxis    Nsaids Anaphylaxis    Asparaginase Derivatives     Ibuprofen Hives    Pegaspargase Other (See Comments)    Robaxin [Methocarbamol]     Toradol [Ketorolac Tromethamine]        Objective   Vitals: Blood pressure (!) 194/96, pulse 90, temperature 98.3 °F (36.8 °C), resp. rate 18, weight 64.4 kg (142 lb), SpO2 94%.  Orthostatic Blood Pressures      Flowsheet Row Most Recent Value   Blood Pressure 194/96 filed at 01/26/2024 1243   Patient Position - Orthostatic VS Lying filed at 01/26/2024 1145              Intake/Output Summary (Last 24 hours) at 1/26/2024 1308  Last data filed at 1/26/2024 0920  Gross per 24 hour   Intake 1000 ml   Output --   Net 1000 ml       Invasive Devices       Peripheral Intravenous Line  Duration             Peripheral IV 01/26/24 Distal;Right;Upper;Ventral (anterior) Arm <1 day    Peripheral IV 01/26/24 Proximal;Right;Ventral (anterior) Forearm <1 day                    Physical Exam  Vitals and nursing note reviewed.   Constitutional:       General: She is not in acute distress.     Appearance: Normal appearance. She is normal weight.   HENT:      Right Ear: External ear normal.      Left Ear: External ear normal.   Eyes:      General: No scleral icterus.        Right eye: No  discharge.         Left eye: No discharge.   Cardiovascular:      Rate and Rhythm: Normal rate and regular rhythm.      Pulses: Normal pulses.      Heart sounds: Normal heart sounds.   Pulmonary:      Effort: Pulmonary effort is normal. No respiratory distress.      Breath sounds: Normal breath sounds.   Abdominal:      General: Bowel sounds are normal. There is no distension.      Palpations: Abdomen is soft.   Musculoskeletal:      Right lower leg: No edema.      Left lower leg: No edema.   Skin:     General: Skin is warm and dry.      Capillary Refill: Capillary refill takes less than 2 seconds.   Neurological:      General: No focal deficit present.      Mental Status: She is alert and oriented to person, place, and time. Mental status is at baseline.   Psychiatric:         Mood and Affect: Mood normal.         Lab Results: I have personally reviewed pertinent lab results.    CBC with diff:   Results from last 7 days   Lab Units 01/26/24  0828   WBC Thousand/uL 20.42*   RBC Million/uL 7.61*   HEMOGLOBIN g/dL 23.0*   HEMATOCRIT % 63.4*   MCV fL 83   MCH pg 30.2   MCHC g/dL 36.3   RDW % 17.3*   MPV fL 9.5   PLATELETS Thousands/uL 236     CMP:   Results from last 7 days   Lab Units 01/26/24  0828   SODIUM mmol/L 135   CHLORIDE mmol/L 97   CO2 mmol/L 21   BUN mg/dL 25   CREATININE mg/dL 1.67*   CALCIUM mg/dL 10.7*   AST U/L 19   ALT U/L 13   ALK PHOS U/L 152*   EGFR ml/min/1.73sq m 31     HS Troponin:   0   Lab Value Date/Time    HSTNI0 249 (H) 01/26/2024 0828    HSTNI2 177 (H) 01/26/2024 1036     BNP:   Results from last 7 days   Lab Units 01/26/24  0828   POTASSIUM mmol/L 3.8   CHLORIDE mmol/L 97   CO2 mmol/L 21   BUN mg/dL 25   CREATININE mg/dL 1.67*   CALCIUM mg/dL 10.7*   EGFR ml/min/1.73sq m 31     Coags:   Results from last 7 days   Lab Units 01/26/24  0828   PTT seconds 33   INR  1.12     TSH:   Results from last 7 days   Lab Units 01/26/24  0828   TSH 3RD GENERATON uIU/mL 5.056*     Magnesium:     Lipid  Profile:     Imaging: I have personally reviewed pertinent reports.    EKG: sinus rhythm  VTE Prophylaxis: Sequential compression device (Venodyne)  and Heparin    Code Status: Prior  Advance Directive and Living Will:      Power of :    POLST:      Loren WELLER  Cardiology

## 2024-01-26 NOTE — PROGRESS NOTES
Pastoral Care Progress Note    2024  Patient: Nereyda Soler : 1955  Admission Date & Time: 2024 0745  MRN: [id number] CSN: [id number]         introduction and facilitated conversation. Patient and family appeared friendly and supported. Spiritual care remains available.               Chaplaincy Interventions Utilized:     Relationship Building: Cultivated a relationship of care and support    Chaplaincy Outcomes Achieved:  Expressed gratitude

## 2024-01-26 NOTE — ASSESSMENT & PLAN NOTE
Chronic    Current smoker. Does not use O2 at home. Only uses rescue inhaler as needed  Does not appear to be in acute exacerbation on admission    - Continue Albuterol PRN  - Monitor O2 sats  -Follow up with pulmonology as outpatient

## 2024-01-26 NOTE — ASSESSMENT & PLAN NOTE
Presented with leukocytosis and tachycardia, tachypnea. LA wnl.    CT Head neg for acute findings. CT Abd/Pel neg for acute findings. WBC 20.4, (also Hb 23.0, possibly hemoconcentrated in setting of dehydration on top of underlying polychthemia). Pt reported dysuria, UA showed moderate bacteria, negative for nitrities or leukocytes.     - Antibiotics: Received Ceftriaxone (1/26-) for empiric treatment of suspected UTI   -continue 3 more days of keflex  - Blood Cx NG @ 48H x 2  - Urine Cx showed 100k CFU mixed contaminants  - Trend CBC and fever curve   -follow up as outpatient

## 2024-01-26 NOTE — ED PROVIDER NOTES
History  Chief Complaint   Patient presents with    Fall     Pt. Fell around 2 am  states   her legs go numb and  causes her to fall. Pt. States nausea and vomiting  and  after fall was unable   to get up  until ems arrived then able to get up and walk 2 steps to the    stretcher    Vomiting     Patient states she has been having increased difficulty recently with weakness particularly in both legs.  Been having increased falls because her legs are numb.  Also states she has been having epigastric pain associated with nausea and occasional vomiting.  Patient has been unable to sleep and states she fell last night around 2 AM.  She denies striking her head or losing consciousness.  Patient states she was initially not able to get up but after EMS arrived at the scene she was able to get up and ambulate unassisted.  She denies chest pain or shortness of breath.  States she has a distant history of seizures but does not think she has been having any seizures.        Prior to Admission Medications   Prescriptions Last Dose Informant Patient Reported? Taking?   Blood Pressure KIT Not Taking  No No   Sig: Use in the morning   Patient not taking: Reported on 6/27/2022   albuterol (Ventolin HFA) 90 mcg/act inhaler 1/25/2024  No Yes   Sig: Inhale 2 puffs every 6 (six) hours as needed for wheezing   atorvastatin (LIPITOR) 80 mg tablet 1/25/2024  No Yes   Sig: TAKE 1 TABLET (80 MG TOTAL) BY MOUTH DAILY WITH DINNER   carvedilol (COREG) 12.5 mg tablet 1/25/2024  No Yes   Sig: TAKE 1 TABLET (12.5 MG TOTAL) BY MOUTH EVERY 12 (TWELVE) HOURS   chlorthalidone (Thalitone) 15 MG tablet 1/25/2024  No Yes   Sig: Take 1 tablet (15 mg total) by mouth daily   divalproex sodium (DEPAKOTE) 250 mg EC tablet 1/25/2024  No Yes   Sig: Take 2 tablets (500 mg total) by mouth every 12 (twelve) hours for 14 days   losartan (COZAAR) 50 mg tablet 1/25/2024  No Yes   Sig: Take 1 tablet (50 mg total) by mouth daily   nicotine (NICODERM CQ) 14 mg/24hr TD  24 hr patch Not Taking  No No   Sig: Place 1 patch on the skin every 24 hours   Patient not taking: Reported on 2022   traZODone (DESYREL) 50 mg tablet   No No   Sig: Take 1 tablet (50 mg total) by mouth daily at bedtime      Facility-Administered Medications: None       Past Medical History:   Diagnosis Date    COPD (chronic obstructive pulmonary disease) (HCC)     Liver disease 2017    Patient mentions having nonalcoholic liver disease. Denies alcohol use.    Radiculopathy of lumbar region     last assessed 17    Seizures (HCC)        Past Surgical History:   Procedure Laterality Date     SECTION      EPIDURAL BLOCK INJECTION Right 2017    Procedure: BLOCK / INJECTION EPIDURAL STEROID TRANSFORAMINAL   L4-5;  Surgeon: Sanford De La Vega MD;  Location: [facility] MAIN OR;  Service:     TONSILLECTOMY         Family History   Problem Relation Age of Onset    Diabetes Mother     Hyperlipidemia Mother     Cancer Father     Prostate cancer Father      I have reviewed and agree with the history as documented.    E-Cigarette/Vaping    E-Cigarette Use Former User      E-Cigarette/Vaping Substances    Nicotine Yes     THC No     CBD No     Flavoring No     Other No     Unknown No      Social History     Tobacco Use    Smoking status: Some Days     Current packs/day: 0.50     Types: Cigarettes    Smokeless tobacco: Current    Tobacco comments:     Per allscripts: Current everyday smoker   Vaping Use    Vaping status: Former    Substances: Nicotine   Substance Use Topics    Alcohol use: Not Currently     Comment: Per allscripts: Social    Drug use: Not Currently     Types: Marijuana       Review of Systems   Constitutional:  Positive for diaphoresis. Negative for chills and fever.   HENT:  Negative for congestion and sore throat.    Eyes:  Negative for visual disturbance.   Respiratory:  Negative for cough and shortness of breath.    Cardiovascular:  Negative for chest pain and palpitations.    Gastrointestinal:  Positive for abdominal pain, nausea and vomiting. Negative for diarrhea.   Genitourinary:  Negative for dysuria.   Musculoskeletal:  Negative for back pain.   Skin:  Negative for color change and rash.   Neurological:  Positive for weakness and numbness. Negative for headaches.   Hematological:  Does not bruise/bleed easily.   Psychiatric/Behavioral:  Negative for confusion.    All other systems reviewed and are negative.      Physical Exam  Physical Exam  Vitals and nursing note reviewed.   Constitutional:       Appearance: Normal appearance. She is diaphoretic.   HENT:      Head: Normocephalic.      Right Ear: External ear normal.      Left Ear: External ear normal.      Nose: Nose normal.      Mouth/Throat:      Mouth: Mucous membranes are moist.   Eyes:      Extraocular Movements: Extraocular movements intact.      Conjunctiva/sclera: Conjunctivae normal.   Cardiovascular:      Rate and Rhythm: Regular rhythm. Tachycardia present.      Pulses: Normal pulses.   Pulmonary:      Effort: Pulmonary effort is normal.   Abdominal:      Palpations: Abdomen is soft.      Tenderness: There is abdominal tenderness.      Comments: Mild epigastric tenderness without rebound or guarding   Musculoskeletal:         General: Normal range of motion.      Cervical back: Normal range of motion and neck supple. No tenderness.      Right lower leg: No edema.      Left lower leg: No edema.   Skin:     General: Skin is warm.      Capillary Refill: Capillary refill takes less than 2 seconds.   Neurological:      General: No focal deficit present.      Mental Status: She is alert and oriented to person, place, and time.   Psychiatric:         Mood and Affect: Mood normal.         Behavior: Behavior normal.         Vital Signs  ED Triage Vitals   Temperature Pulse Respirations Blood Pressure SpO2   01/26/24 0747 01/26/24 0747 01/26/24 0747 01/26/24 0747 01/26/24 0747   98.3 °F (36.8 °C) (!) 110 18 105/56 96 %       Temp src Heart Rate Source Patient Position - Orthostatic VS BP Location FiO2 (%)   -- 01/26/24 0830 01/26/24 0830 01/26/24 0830 --    Monitor Lying Left arm       Pain Score       01/26/24 0747       10 - Worst Possible Pain           Vitals:    01/26/24 1030 01/26/24 1045 01/26/24 1100 01/26/24 1115   BP: (!) 213/95 (!) 188/74 (!) 195/79 124/71   Pulse: 85 94 (!) 106 (!) 106   Patient Position - Orthostatic VS: Lying Lying Lying Lying         Visual Acuity  Visual Acuity      Flowsheet Row Most Recent Value   L Pupil Size (mm) 3   R Pupil Size (mm) 3            ED Medications  Medications   heparin (porcine) 25,000 units in 0.45% NaCl 250 mL infusion (premix) (12 Units/kg/hr × 60 kg (Order-Specific) Intravenous New Bag 1/26/24 1119)   heparin (porcine) injection 3,600 Units (has no administration in time range)   heparin (porcine) injection 1,800 Units (has no administration in time range)   sodium chloride 0.9 % bolus 1,000 mL (0 mL Intravenous Stopped 1/26/24 0920)   ondansetron (ZOFRAN) injection 4 mg (4 mg Intravenous Given 1/26/24 0820)   pantoprazole (PROTONIX) injection 40 mg (40 mg Intravenous Given 1/26/24 0823)   iohexol (OMNIPAQUE) 350 MG/ML injection (MULTI-DOSE) 100 mL (100 mL Intravenous Given 1/26/24 0926)       Diagnostic Studies  Results Reviewed       Procedure Component Value Units Date/Time    HS Troponin I 2hr [[id number]]  (Abnormal) Collected: 01/26/24 1036    Lab Status: Final result Specimen: Blood from Arm, Right Updated: 01/26/24 1115     hs TnI 2hr 177 ng/L      Delta 2hr hsTnI -72 ng/L     UA (URINE) with reflex to Scope [[id number]]  (Abnormal) Collected: 01/26/24 1055    Lab Status: Final result Specimen: Urine, Clean Catch Updated: 01/26/24 1105     Color, UA Yellow     Clarity, UA Clear     Specific Gravity, UA 1.015     pH, UA 6.5     Leukocytes, UA Negative     Nitrite, UA Negative     Protein, UA >=300 mg/dl      Glucose, UA Negative mg/dl      Ketones, UA Negative mg/dl       Urobilinogen, UA 0.2 E.U./dl      Bilirubin, UA Small     Occult Blood, UA Moderate    Urine Microscopic [[id number]] Collected: 01/26/24 1055    Lab Status: In process Specimen: Urine, Clean Catch Updated: 01/26/24 1105    CBC [[id number]]     Lab Status: No result Specimen: Blood     APTT [[id number]]     Lab Status: No result Specimen: Blood     Protime-INR [[id number]]     Lab Status: No result Specimen: Blood     HS Troponin I 4hr [[id number]]     Lab Status: No result Specimen: Blood     Protime-INR [[id number]]  (Abnormal) Collected: 01/26/24 0828    Lab Status: Final result Specimen: Blood from Arm, Right Updated: 01/26/24 0917     Protime 14.6 seconds      INR 1.12    APTT [[id number]]  (Normal) Collected: 01/26/24 0828    Lab Status: Final result Specimen: Blood from Arm, Right Updated: 01/26/24 0917     PTT 33 seconds     TSH, 3rd generation with Free T4 reflex [[id number]]  (Abnormal) Collected: 01/26/24 0828    Lab Status: Final result Specimen: Blood from Arm, Right Updated: 01/26/24 0914     TSH 3RD GENERATON 5.056 uIU/mL     T4, free [[id number]] Collected: 01/26/24 0828    Lab Status: In process Specimen: Blood from Arm, Right Updated: 01/26/24 0913    FLU/RSV/COVID - if FLU/RSV clinically relevant [[id number]]  (Normal) Collected: 01/26/24 0828    Lab Status: Final result Specimen: Nares from Nose Updated: 01/26/24 0912     SARS-CoV-2 Negative     INFLUENZA A PCR Negative     INFLUENZA B PCR Negative     RSV PCR Negative    Narrative:      FOR PEDIATRIC PATIENTS - copy/paste COVID Guidelines URL to browser: [email or web address]    SARS-CoV-2 assay is a Nucleic Acid Amplification assay intended for the  qualitative detection of nucleic acid from SARS-CoV-2 in nasopharyngeal  swabs. Results are for the presumptive identification of SARS-CoV-2 RNA.    Positive results are indicative of infection with SARS-CoV-2, the virus  causing COVID-19, but do not rule  out bacterial infection or co-infection  with other viruses. Laboratories within the [place] and its  territories are required to report all positive results to the appropriate  public health authorities. Negative results do not preclude SARS-CoV-2  infection and should not be used as the sole basis for treatment or other  patient management decisions. Negative results must be combined with  clinical observations, patient history, and epidemiological information.  This test has not been FDA cleared or approved.    This test has been authorized by FDA under an Emergency Use Authorization  (EUA). This test is only authorized for the duration of time the  declaration that circumstances exist justifying the authorization of the  emergency use of an in vitro diagnostic tests for detection of SARS-CoV-2  virus and/or diagnosis of COVID-19 infection under section 564(b)(1) of  the Act, 21 U.S.C. 360bbb-3(b)(1), unless the authorization is terminated  or revoked sooner. The test has been validated but independent review by FDA  and CLIA is pending.    Test performed using [employer] GeneXpert: This RT-PCR assay targets N2,  a region unique to SARS-CoV-2. A conserved region in the E-gene was chosen  for pan-Sarbecovirus detection which includes SARS-CoV-2.    According to CMS-2020-01-R, this platform meets the definition of high-throughput technology.    HS Troponin 0hr (reflex protocol) [[id number]]  (Abnormal) Collected: 01/26/24 0828    Lab Status: Final result Specimen: Blood from Arm, Right Updated: 01/26/24 0905     hs TnI 0hr 249 ng/L     Comprehensive metabolic panel [[id number]]  (Abnormal) Collected: 01/26/24 0828    Lab Status: Final result Specimen: Blood from Arm, Right Updated: 01/26/24 0902     Sodium 135 mmol/L      Potassium 3.8 mmol/L      Chloride 97 mmol/L      CO2 21 mmol/L      ANION GAP 17 mmol/L      BUN 25 mg/dL      Creatinine 1.67 mg/dL      Glucose 149 mg/dL      Calcium 10.7 mg/dL      AST 19  U/L      ALT 13 U/L      Alkaline Phosphatase 152 U/L      Total Protein 9.0 g/dL      Albumin 4.6 g/dL      Total Bilirubin 1.28 mg/dL      eGFR 31 ml/min/1.73sq m     Narrative:      National Kidney Disease Foundation guidelines for Chronic Kidney Disease (CKD):     Stage 1 with normal or high GFR (GFR > 90 mL/min/1.73 square meters)    Stage 2 Mild CKD (GFR = 60-89 mL/min/1.73 square meters)    Stage 3A Moderate CKD (GFR = 45-59 mL/min/1.73 square meters)    Stage 3B Moderate CKD (GFR = 30-44 mL/min/1.73 square meters)    Stage 4 Severe CKD (GFR = 15-29 mL/min/1.73 square meters)    Stage 5 End Stage CKD (GFR <15 mL/min/1.73 square meters)  Note: GFR calculation is accurate only with a steady state creatinine    CBC and differential [[id number]]  (Abnormal) Collected: 01/26/24 0828    Lab Status: Final result Specimen: Blood from Arm, Right Updated: 01/26/24 0839     WBC 20.42 Thousand/uL      RBC 7.61 Million/uL      Hemoglobin 23.0 g/dL      Hematocrit 63.4 %      MCV 83 fL      MCH 30.2 pg      MCHC 36.3 g/dL      RDW 17.3 %      MPV 9.5 fL      Platelets 236 Thousands/uL      nRBC 0 /100 WBCs      Neutrophils Relative 76 %      Immat GRANS % 1 %      Lymphocytes Relative 17 %      Monocytes Relative 6 %      Eosinophils Relative 0 %      Basophils Relative 0 %      Neutrophils Absolute 15.60 Thousands/µL      Immature Grans Absolute 0.12 Thousand/uL      Lymphocytes Absolute 3.48 Thousands/µL      Monocytes Absolute 1.14 Thousand/µL      Eosinophils Absolute 0.00 Thousand/µL      Basophils Absolute 0.08 Thousands/µL     Fingerstick Glucose (POCT) [[id number]]  (Normal) Collected: 01/26/24 0809    Lab Status: Final result Updated: 01/26/24 0810     POC Glucose 138 mg/dl                    CT head without contrast   Final Result by Kenton Dodge MD (01/26 0958)      No acute intracranial abnormality.                  Workstation performed: [id number]         CT abdomen pelvis with contrast   Final Result by  Kenton Dodge MD (01/26 1023)      No acute pathology.      Colonic diverticulosis.               Workstation performed: [id number]                    Procedures  ECG 12 Lead Documentation Only    Date/Time: 1/26/2024 7:56 AM    Performed by: Ashwin Delatorre MD  Authorized by: Ashwin Delatorre MD    Indications / Diagnosis:  Weakness  ECG reviewed by me, the ED Provider: yes    Patient location:  ED  Interpretation:     Interpretation: abnormal    Rate:     ECG rate:  104    ECG rate assessment: tachycardic    Rhythm:     Rhythm: sinus tachycardia    Ectopy:     Ectopy: none    QRS:     QRS axis:  Normal    QRS intervals:  Normal  Conduction:     Conduction: normal    ST segments:     ST segments:  Non-specific  T waves:     T waves: normal             ED Course                               SBIRT 20yo+      Flowsheet Row Most Recent Value   Initial Alcohol Screen: US AUDIT-C     1. How often do you have a drink containing alcohol? 0 Filed at: 01/26/2024 0749   2. How many drinks containing alcohol do you have on a typical day you are drinking?  0 Filed at: 01/26/2024 0749   3a. Male UNDER 65: How often do you have five or more drinks on one occasion? 0 Filed at: 01/26/2024 0749   3b. FEMALE Any Age, or MALE 65+: How often do you have 4 or more drinks on one occassion? 0 Filed at: 01/26/2024 0749   Audit-C Score 0 Filed at: 01/26/2024 0749   RONA: How many times in the past year have you...    Used an illegal drug or used a prescription medication for non-medical reasons? Never Filed at: 01/26/2024 0749                      Medical Decision Making  CT scan of the head and abdomen done.  Cardiac evaluation performed and results discussed with cardiology    Amount and/or Complexity of Data Reviewed  Labs: ordered.  Radiology: ordered.    Risk  Prescription drug management.             Disposition  Final diagnoses:   ACS (acute coronary syndrome) (HCC)   XOCHITL (acute kidney injury) (HCC)   Elevated troponin    Elevated hemoglobin (HCC)     Time reflects when diagnosis was documented in both MDM as applicable and the Disposition within this note       Time User Action Codes Description Comment    1/26/2024 11:28 AM Ashwin Delatorre [I24.9] ACS (acute coronary syndrome) (HCC)     1/26/2024 11:28 AM Ashwin Delatorre [N17.9] XOCHITL (acute kidney injury) (HCC)     1/26/2024 11:29 AM Ashwin Delatorre [R79.89] Elevated troponin     1/26/2024 11:30 AM Ashwin Delatorre [D58.2] Elevated hemoglobin (HCC)           ED Disposition       ED Disposition   Admit    Condition   Stable    Date/Time   Fri Jan 26, 2024 1128    Comment   Case was discussed with hospitalist and the patient's admission status was agreed to be Admission Status: inpatient status to the service of Dr. Ibrahim.               Follow-up Information    None         Patient's Medications   Discharge Prescriptions    No medications on file       No discharge procedures on file.    PDMP Review       None            ED Provider  Electronically Signed by             Ashwin Delatorre MD  01/26/24 8585

## 2024-01-26 NOTE — ASSESSMENT & PLAN NOTE
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

## 2024-01-26 NOTE — ASSESSMENT & PLAN NOTE
68F with PMHx that includes COPD, HTN, Depression, absence seizures, and DM well controlled, presents after fall and syncopal episode at home. Patient reported frequent falls at home, and decrease PO intake over the last 5 days. Patient reports feel dizzy and tripping with LOC.      CT Head neg. CT Abd/Pelvis negative. Troponins elevated on admission. Cr elevated on admission likely in setting of dehydration.   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    Plan:  Continue adequate hydration and PO intake

## 2024-01-26 NOTE — ASSESSMENT & PLAN NOTE
Presented with nausea, and gradual fatigue, poor appetite, and weight loss. Nonspecific abdominal pain. CT abdomen/pelvis showed no acute findings  - Zofran changed to Tigan given QTC prolongation

## 2024-01-26 NOTE — ASSESSMENT & PLAN NOTE
Chronic    BP elevated on admission possibly due to missed doses in the setting of nausea  Held home Cozaar and Chlorthalidone in the setting of possible XOCHITL    Plan:  - Continue Coreg 12.5 mg BID  - Started Norvasc 5 mg daily

## 2024-01-27 ENCOUNTER — APPOINTMENT (INPATIENT)
Dept: RADIOLOGY | Facility: HOSPITAL | Age: 69
DRG: 720 | End: 2024-01-27
Payer: COMMERCIAL

## 2024-01-27 LAB
ALBUMIN SERPL BCP-MCNC: 3.4 G/DL (ref 3.5–5)
ALP SERPL-CCNC: 87 U/L (ref 34–104)
ALT SERPL W P-5'-P-CCNC: 8 U/L (ref 7–52)
ANION GAP SERPL CALCULATED.3IONS-SCNC: 11 MMOL/L
APTT PPP: 125 SECONDS (ref 23–37)
APTT PPP: 59 SECONDS (ref 23–37)
AST SERPL W P-5'-P-CCNC: 15 U/L (ref 13–39)
BASOPHILS # BLD AUTO: 0.04 THOUSANDS/ÂΜL (ref 0–0.1)
BASOPHILS NFR BLD AUTO: 0 % (ref 0–1)
BILIRUB SERPL-MCNC: 0.73 MG/DL (ref 0.2–1)
BUN SERPL-MCNC: 31 MG/DL (ref 5–25)
CALCIUM ALBUM COR SERPL-MCNC: 8.9 MG/DL (ref 8.3–10.1)
CALCIUM SERPL-MCNC: 8.4 MG/DL (ref 8.4–10.2)
CHLORIDE SERPL-SCNC: 102 MMOL/L (ref 96–108)
CK SERPL-CCNC: 114 U/L (ref 26–192)
CO2 SERPL-SCNC: 23 MMOL/L (ref 21–32)
CREAT SERPL-MCNC: 1.01 MG/DL (ref 0.6–1.3)
EOSINOPHIL # BLD AUTO: 0.01 THOUSAND/ÂΜL (ref 0–0.61)
EOSINOPHIL NFR BLD AUTO: 0 % (ref 0–6)
ERYTHROCYTE [DISTWIDTH] IN BLOOD BY AUTOMATED COUNT: 15.1 % (ref 11.6–15.1)
GFR SERPL CREATININE-BSD FRML MDRD: 57 ML/MIN/1.73SQ M
GLUCOSE SERPL-MCNC: 112 MG/DL (ref 65–140)
HCT VFR BLD AUTO: 48.3 % (ref 34.8–46.1)
HGB BLD-MCNC: 17 G/DL (ref 11.5–15.4)
IMM GRANULOCYTES # BLD AUTO: 0.11 THOUSAND/UL (ref 0–0.2)
IMM GRANULOCYTES NFR BLD AUTO: 1 % (ref 0–2)
LYMPHOCYTES # BLD AUTO: 3.9 THOUSANDS/ÂΜL (ref 0.6–4.47)
LYMPHOCYTES NFR BLD AUTO: 23 % (ref 14–44)
MAGNESIUM SERPL-MCNC: 1.7 MG/DL (ref 1.9–2.7)
MCH RBC QN AUTO: 30.2 PG (ref 26.8–34.3)
MCHC RBC AUTO-ENTMCNC: 35.8 G/DL (ref 31.4–37.4)
MCV RBC AUTO: 84 FL (ref 82–98)
MONOCYTES # BLD AUTO: 1.32 THOUSAND/ÂΜL (ref 0.17–1.22)
MONOCYTES NFR BLD AUTO: 8 % (ref 4–12)
NEUTROPHILS # BLD AUTO: 11.67 THOUSANDS/ÂΜL (ref 1.85–7.62)
NEUTS SEG NFR BLD AUTO: 68 % (ref 43–75)
NRBC BLD AUTO-RTO: 0 /100 WBCS
PLATELET # BLD AUTO: 203 THOUSANDS/UL (ref 149–390)
PMV BLD AUTO: 10.8 FL (ref 8.9–12.7)
POTASSIUM SERPL-SCNC: 3.3 MMOL/L (ref 3.5–5.3)
PROT SERPL-MCNC: 6.3 G/DL (ref 6.4–8.4)
RBC # BLD AUTO: 5.72 MILLION/UL (ref 3.81–5.12)
SODIUM SERPL-SCNC: 136 MMOL/L (ref 135–147)
T4 FREE SERPL-MCNC: 1.27 NG/DL (ref 0.61–1.12)
WBC # BLD AUTO: 17.05 THOUSAND/UL (ref 4.31–10.16)

## 2024-01-27 PROCEDURE — 83735 ASSAY OF MAGNESIUM: CPT

## 2024-01-27 PROCEDURE — 99232 SBSQ HOSP IP/OBS MODERATE 35: CPT | Performed by: INTERNAL MEDICINE

## 2024-01-27 PROCEDURE — 90471 IMMUNIZATION ADMIN: CPT

## 2024-01-27 PROCEDURE — 94664 DEMO&/EVAL PT USE INHALER: CPT

## 2024-01-27 PROCEDURE — 85730 THROMBOPLASTIN TIME PARTIAL: CPT | Performed by: INTERNAL MEDICINE

## 2024-01-27 PROCEDURE — 71045 X-RAY EXAM CHEST 1 VIEW: CPT

## 2024-01-27 PROCEDURE — C9113 INJ PANTOPRAZOLE SODIUM, VIA: HCPCS

## 2024-01-27 PROCEDURE — 85025 COMPLETE CBC W/AUTO DIFF WBC: CPT

## 2024-01-27 PROCEDURE — 70551 MRI BRAIN STEM W/O DYE: CPT

## 2024-01-27 PROCEDURE — 82550 ASSAY OF CK (CPK): CPT | Performed by: NURSE PRACTITIONER

## 2024-01-27 PROCEDURE — 80053 COMPREHEN METABOLIC PANEL: CPT

## 2024-01-27 PROCEDURE — 90662 IIV NO PRSV INCREASED AG IM: CPT

## 2024-01-27 RX ORDER — LABETALOL HYDROCHLORIDE 5 MG/ML
10 INJECTION, SOLUTION INTRAVENOUS EVERY 6 HOURS PRN
Status: DISCONTINUED | OUTPATIENT
Start: 2024-01-27 | End: 2024-01-29 | Stop reason: HOSPADM

## 2024-01-27 RX ORDER — POTASSIUM CHLORIDE 20 MEQ/1
40 TABLET, EXTENDED RELEASE ORAL ONCE
Status: COMPLETED | OUTPATIENT
Start: 2024-01-27 | End: 2024-01-27

## 2024-01-27 RX ORDER — ENOXAPARIN SODIUM 100 MG/ML
30 INJECTION SUBCUTANEOUS
Status: DISCONTINUED | OUTPATIENT
Start: 2024-01-27 | End: 2024-01-27

## 2024-01-27 RX ORDER — LABETALOL HYDROCHLORIDE 5 MG/ML
10 INJECTION, SOLUTION INTRAVENOUS ONCE
Status: COMPLETED | OUTPATIENT
Start: 2024-01-27 | End: 2024-01-27

## 2024-01-27 RX ORDER — ENOXAPARIN SODIUM 100 MG/ML
40 INJECTION SUBCUTANEOUS
Status: DISCONTINUED | OUTPATIENT
Start: 2024-01-27 | End: 2024-01-29 | Stop reason: HOSPADM

## 2024-01-27 RX ORDER — CARVEDILOL 12.5 MG/1
12.5 TABLET ORAL EVERY 12 HOURS
Status: DISCONTINUED | OUTPATIENT
Start: 2024-01-27 | End: 2024-01-29 | Stop reason: HOSPADM

## 2024-01-27 RX ORDER — AMLODIPINE BESYLATE 5 MG/1
5 TABLET ORAL DAILY
Status: DISCONTINUED | OUTPATIENT
Start: 2024-01-27 | End: 2024-01-29 | Stop reason: HOSPADM

## 2024-01-27 RX ORDER — MAGNESIUM SULFATE HEPTAHYDRATE 40 MG/ML
2 INJECTION, SOLUTION INTRAVENOUS ONCE
Status: DISCONTINUED | OUTPATIENT
Start: 2024-01-27 | End: 2024-01-27

## 2024-01-27 RX ORDER — DIAZEPAM 2 MG/1
2 TABLET ORAL ONCE AS NEEDED
Status: COMPLETED | OUTPATIENT
Start: 2024-01-27 | End: 2024-01-27

## 2024-01-27 RX ORDER — MAGNESIUM SULFATE HEPTAHYDRATE 40 MG/ML
4 INJECTION, SOLUTION INTRAVENOUS ONCE
Status: COMPLETED | OUTPATIENT
Start: 2024-01-27 | End: 2024-01-27

## 2024-01-27 RX ADMIN — LABETALOL HYDROCHLORIDE 10 MG: 5 INJECTION, SOLUTION INTRAVENOUS at 15:59

## 2024-01-27 RX ADMIN — PANTOPRAZOLE SODIUM 40 MG: 40 INJECTION, POWDER, FOR SOLUTION INTRAVENOUS at 08:16

## 2024-01-27 RX ADMIN — PANTOPRAZOLE SODIUM 40 MG: 40 INJECTION, POWDER, FOR SOLUTION INTRAVENOUS at 20:08

## 2024-01-27 RX ADMIN — ENOXAPARIN SODIUM 40 MG: 40 INJECTION SUBCUTANEOUS at 13:49

## 2024-01-27 RX ADMIN — DIAZEPAM 2 MG: 2 TABLET ORAL at 12:16

## 2024-01-27 RX ADMIN — CLOPIDOGREL 75 MG: 75 TABLET ORAL at 08:16

## 2024-01-27 RX ADMIN — MAGNESIUM SULFATE 4 G: 4 INJECTION INTRAVENOUS at 09:06

## 2024-01-27 RX ADMIN — CARVEDILOL 12.5 MG: 12.5 TABLET, FILM COATED ORAL at 13:49

## 2024-01-27 RX ADMIN — CARVEDILOL 12.5 MG: 12.5 TABLET, FILM COATED ORAL at 20:08

## 2024-01-27 RX ADMIN — NICOTINE 1 PATCH: 7 PATCH, EXTENDED RELEASE TRANSDERMAL at 08:16

## 2024-01-27 RX ADMIN — LIDOCAINE 1 PATCH: 50 PATCH TOPICAL at 08:16

## 2024-01-27 RX ADMIN — POTASSIUM CHLORIDE 40 MEQ: 1500 TABLET, EXTENDED RELEASE ORAL at 09:04

## 2024-01-27 RX ADMIN — AMLODIPINE BESYLATE 5 MG: 5 TABLET ORAL at 17:23

## 2024-01-27 RX ADMIN — SODIUM CHLORIDE, SODIUM GLUCONATE, SODIUM ACETATE, POTASSIUM CHLORIDE, MAGNESIUM CHLORIDE, SODIUM PHOSPHATE, DIBASIC, AND POTASSIUM PHOSPHATE 75 ML/HR: .53; .5; .37; .037; .03; .012; .00082 INJECTION, SOLUTION INTRAVENOUS at 08:15

## 2024-01-27 RX ADMIN — DIVALPROEX SODIUM 500 MG: 500 TABLET, DELAYED RELEASE ORAL at 15:59

## 2024-01-27 RX ADMIN — CEFTRIAXONE 1000 MG: 1 INJECTION, SOLUTION INTRAVENOUS at 16:42

## 2024-01-27 RX ADMIN — ACETAMINOPHEN 650 MG: 325 TABLET ORAL at 13:49

## 2024-01-27 RX ADMIN — ATORVASTATIN CALCIUM 80 MG: 80 TABLET, FILM COATED ORAL at 15:59

## 2024-01-27 RX ADMIN — HEPARIN SODIUM 1800 UNITS: 1000 INJECTION INTRAVENOUS; SUBCUTANEOUS at 09:10

## 2024-01-27 RX ADMIN — INFLUENZA A VIRUS A/VICTORIA/4897/2022 IVR-238 (H1N1) ANTIGEN (FORMALDEHYDE INACTIVATED), INFLUENZA A VIRUS A/DARWIN/9/2021 SAN-010 (H3N2) ANTIGEN (FORMALDEHYDE INACTIVATED), INFLUENZA B VIRUS B/PHUKET/3073/2013 ANTIGEN (FORMALDEHYDE INACTIVATED), AND INFLUENZA B VIRUS B/MICHIGAN/01/2021 ANTIGEN (FORMALDEHYDE INACTIVATED) 0.7 ML: 60; 60; 60; 60 INJECTION, SUSPENSION INTRAMUSCULAR at 08:20

## 2024-01-27 RX ADMIN — DIVALPROEX SODIUM 500 MG: 500 TABLET, DELAYED RELEASE ORAL at 04:37

## 2024-01-27 RX ADMIN — LABETALOL HYDROCHLORIDE 10 MG: 5 INJECTION, SOLUTION INTRAVENOUS at 17:23

## 2024-01-27 RX ADMIN — HEPARIN SODIUM 11 UNITS/KG/HR: 10000 INJECTION, SOLUTION INTRAVENOUS at 01:52

## 2024-01-27 RX ADMIN — CARVEDILOL 12.5 MG: 12.5 TABLET, FILM COATED ORAL at 00:44

## 2024-01-27 NOTE — PLAN OF CARE
Problem: Potential for Falls  Goal: Patient will remain free of falls  Description: INTERVENTIONS:  - Educate patient/family on patient safety including physical limitations  - Instruct patient to call for assistance with activity   - Consult OT/PT to assist with strengthening/mobility   - Keep Call bell within reach  - Keep bed low and locked with side rails adjusted as appropriate  - Keep care items and personal belongings within reach  - Initiate and maintain comfort rounds  - Make Fall Risk Sign visible to staff  - Offer Toileting every 2 Hours, in advance of need  - Initiate/Maintain bed alarm  - Obtain necessary fall risk management equipment: slipper socks  - Apply yellow socks and bracelet for high fall risk patients  - Consider moving patient to room near nurses station  Outcome: Progressing     Problem: PAIN - ADULT  Goal: Verbalizes/displays adequate comfort level or baseline comfort level  Description: Interventions:  - Encourage patient to monitor pain and request assistance  - Assess pain using appropriate pain scale  - Administer analgesics based on type and severity of pain and evaluate response  - Implement non-pharmacological measures as appropriate and evaluate response  - Consider cultural and social influences on pain and pain management  - Notify physician/advanced practitioner if interventions unsuccessful or patient reports new pain  Outcome: Progressing     Problem: INFECTION - ADULT  Goal: Absence or prevention of progression during hospitalization  Description: INTERVENTIONS:  - Assess and monitor for signs and symptoms of infection  - Monitor lab/diagnostic results  - Monitor all insertion sites, i.e. indwelling lines, tubes, and drains  - Monitor endotracheal if appropriate and nasal secretions for changes in amount and color  - [place] appropriate cooling/warming therapies per order  - Administer medications as ordered  - Instruct and encourage patient and family to use good hand  hygiene technique  - Identify and instruct in appropriate isolation precautions for identified infection/condition  Outcome: Progressing  Goal: Absence of fever/infection during neutropenic period  Description: INTERVENTIONS:  - Monitor WBC    Outcome: Progressing     Problem: SAFETY ADULT  Goal: Patient will remain free of falls  Description: INTERVENTIONS:  - Educate patient/family on patient safety including physical limitations  - Instruct patient to call for assistance with activity   - Consult OT/PT to assist with strengthening/mobility   - Keep Call bell within reach  - Keep bed low and locked with side rails adjusted as appropriate  - Keep care items and personal belongings within reach  - Initiate and maintain comfort rounds  - Make Fall Risk Sign visible to staff  - Offer Toileting every 2 Hours, in advance of need  - Initiate/Maintain bed alarm  - Obtain necessary fall risk management equipment: slipper socks, walker  - Apply yellow socks and bracelet for high fall risk patients  - Consider moving patient to room near nurses station  Outcome: Progressing  Goal: Maintain or return to baseline ADL function  Description: INTERVENTIONS:  -  Assess patient's ability to carry out ADLs; assess patient's baseline for ADL function and identify physical deficits which impact ability to perform ADLs (bathing, care of mouth/teeth, toileting, grooming, dressing, etc.)  - Assess/evaluate cause of self-care deficits   - Assess range of motion  - Assess patient's mobility; develop plan if impaired  - Assess patient's need for assistive devices and provide as appropriate  - Encourage maximum independence but intervene and supervise when necessary  - Involve family in performance of ADLs  - Assess for home care needs following discharge   - Consider OT consult to assist with ADL evaluation and planning for discharge  - Provide patient education as appropriate  Outcome: Progressing  Goal: Maintains/Returns to pre admission  functional level  Description: INTERVENTIONS:  - Perform AM-PAC 6 Click Basic Mobility/ Daily Activity assessment daily.  - Set and communicate daily mobility goal to care team and patient/family/caregiver.   - Collaborate with rehabilitation services on mobility goals if consulted  - Perform Range of Motion 3 times a day.  - Reposition patient every 2 hours.  - Dangle patient 3 times a day  - Stand patient 3 times a day  - Ambulate patient 3 times a day  - Out of bed to chair 3 times a day   - Out of bed for meals 3 times a day  - Out of bed for toileting  - Record patient progress and toleration of activity level   Outcome: Progressing     Problem: DISCHARGE PLANNING  Goal: Discharge to home or other facility with appropriate resources  Description: INTERVENTIONS:  - Identify barriers to discharge w/patient and caregiver  - Arrange for needed discharge resources and transportation as appropriate  - Identify discharge learning needs (meds, wound care, etc.)  - Arrange for interpretive services to assist at discharge as needed  - Refer to Case Management Department for coordinating discharge planning if the patient needs post-hospital services based on physician/advanced practitioner order or complex needs related to functional status, cognitive ability, or social support system  Outcome: Progressing     Problem: Knowledge Deficit  Goal: Patient/family/caregiver demonstrates understanding of disease process, treatment plan, medications, and discharge instructions  Description: Complete learning assessment and assess knowledge base.  Interventions:  - Provide teaching at level of understanding  - Provide teaching via preferred learning methods  Outcome: Progressing     Problem: NEUROSENSORY - ADULT  Goal: Achieves stable or improved neurological status  Description: INTERVENTIONS  - Monitor and report changes in neurological status  - Monitor vital signs such as temperature, blood pressure, glucose, and any other  labs ordered   - Initiate measures to prevent increased intracranial pressure  - Monitor for seizure activity and implement precautions if appropriate      Outcome: Progressing  Goal: Remains free of injury related to seizures activity  Description: INTERVENTIONS  - Maintain airway, patient safety  and administer oxygen as ordered  - Monitor patient for seizure activity, document and report duration and description of seizure to physician/advanced practitioner  - If seizure occurs,  ensure patient safety during seizure  - Reorient patient post seizure  - Seizure pads on all 4 side rails  - Instruct patient/family to notify RN of any seizure activity including if an aura is experienced  - Instruct patient/family to call for assistance with activity based on nursing assessment  - Administer anti-seizure medications if ordered    Outcome: Progressing  Goal: Achieves maximal functionality and self care  Description: INTERVENTIONS  - Monitor swallowing and airway patency with patient fatigue and changes in neurological status  - Encourage and assist patient to increase activity and self care.   - Encourage visually impaired, hearing impaired and aphasic patients to use assistive/communication devices  Outcome: Progressing

## 2024-01-27 NOTE — PLAN OF CARE
Problem: Potential for Falls  Goal: Patient will remain free of falls  Description: INTERVENTIONS:  - Educate patient/family on patient safety including physical limitations  - Instruct patient to call for assistance with activity   - Consult OT/PT to assist with strengthening/mobility   - Keep Call bell within reach  - Keep bed low and locked with side rails adjusted as appropriate  - Keep care items and personal belongings within reach  - Initiate and maintain comfort rounds  - Make Fall Risk Sign visible to staff  - Offer Toileting every 2 Hours, in advance of need  - Initiate/Maintain bed alarm  - Obtain necessary fall risk management equipment: socks  - Apply yellow socks and bracelet for high fall risk patients  - Consider moving patient to room near nurses station  Outcome: Progressing     Problem: PAIN - ADULT  Goal: Verbalizes/displays adequate comfort level or baseline comfort level  Description: Interventions:  - Encourage patient to monitor pain and request assistance  - Assess pain using appropriate pain scale  - Administer analgesics based on type and severity of pain and evaluate response  - Implement non-pharmacological measures as appropriate and evaluate response  - Consider cultural and social influences on pain and pain management  - Notify physician/advanced practitioner if interventions unsuccessful or patient reports new pain  Outcome: Progressing     Problem: INFECTION - ADULT  Goal: Absence or prevention of progression during hospitalization  Description: INTERVENTIONS:  - Assess and monitor for signs and symptoms of infection  - Monitor lab/diagnostic results  - Monitor all insertion sites, i.e. indwelling lines, tubes, and drains  - Monitor endotracheal if appropriate and nasal secretions for changes in amount and color  - [place] appropriate cooling/warming therapies per order  - Administer medications as ordered  - Instruct and encourage patient and family to use good hand hygiene  technique  - Identify and instruct in appropriate isolation precautions for identified infection/condition  Outcome: Progressing  Goal: Absence of fever/infection during neutropenic period  Description: INTERVENTIONS:  - Monitor WBC    Outcome: Progressing     Problem: SAFETY ADULT  Goal: Patient will remain free of falls  Description: INTERVENTIONS:  - Educate patient/family on patient safety including physical limitations  - Instruct patient to call for assistance with activity   - Consult OT/PT to assist with strengthening/mobility   - Keep Call bell within reach  - Keep bed low and locked with side rails adjusted as appropriate  - Keep care items and personal belongings within reach  - Initiate and maintain comfort rounds  - Make Fall Risk Sign visible to staff  - Offer Toileting every 2 Hours, in advance of need  - Initiate/Maintain bed alarm  - Obtain necessary fall risk management equipment: socks  - Apply yellow socks and bracelet for high fall risk patients  - Consider moving patient to room near nurses station  Outcome: Progressing  Goal: Maintain or return to baseline ADL function  Description: INTERVENTIONS:  -  Assess patient's ability to carry out ADLs; assess patient's baseline for ADL function and identify physical deficits which impact ability to perform ADLs (bathing, care of mouth/teeth, toileting, grooming, dressing, etc.)  - Assess/evaluate cause of self-care deficits   - Assess range of motion  - Assess patient's mobility; develop plan if impaired  - Assess patient's need for assistive devices and provide as appropriate  - Encourage maximum independence but intervene and supervise when necessary  - Involve family in performance of ADLs  - Assess for home care needs following discharge   - Consider OT consult to assist with ADL evaluation and planning for discharge  - Provide patient education as appropriate  Outcome: Progressing  Goal: Maintains/Returns to pre admission functional  level  Description: INTERVENTIONS:  - Perform AM-PAC 6 Click Basic Mobility/ Daily Activity assessment daily.  - Set and communicate daily mobility goal to care team and patient/family/caregiver.   - Collaborate with rehabilitation services on mobility goals if consulted  - Perform Range of Motion 3 times a day.  - Reposition patient every 2 hours.  - Dangle patient 3 times a day  - Stand patient 3 times a day  - Ambulate patient 3 times a day  - Out of bed to chair 3 times a day   - Out of bed for meals 3 times a day  - Out of bed for toileting  - Record patient progress and toleration of activity level   Outcome: Progressing     Problem: DISCHARGE PLANNING  Goal: Discharge to home or other facility with appropriate resources  Description: INTERVENTIONS:  - Identify barriers to discharge w/patient and caregiver  - Arrange for needed discharge resources and transportation as appropriate  - Identify discharge learning needs (meds, wound care, etc.)  - Arrange for interpretive services to assist at discharge as needed  - Refer to Case Management Department for coordinating discharge planning if the patient needs post-hospital services based on physician/advanced practitioner order or complex needs related to functional status, cognitive ability, or social support system  Outcome: Progressing     Problem: Knowledge Deficit  Goal: Patient/family/caregiver demonstrates understanding of disease process, treatment plan, medications, and discharge instructions  Description: Complete learning assessment and assess knowledge base.  Interventions:  - Provide teaching at level of understanding  - Provide teaching via preferred learning methods  Outcome: Progressing     Problem: NEUROSENSORY - ADULT  Goal: Achieves stable or improved neurological status  Description: INTERVENTIONS  - Monitor and report changes in neurological status  - Monitor vital signs such as temperature, blood pressure, glucose, and any other labs ordered    - Initiate measures to prevent increased intracranial pressure  - Monitor for seizure activity and implement precautions if appropriate      Outcome: Progressing  Goal: Remains free of injury related to seizures activity  Description: INTERVENTIONS  - Maintain airway, patient safety  and administer oxygen as ordered  - Monitor patient for seizure activity, document and report duration and description of seizure to physician/advanced practitioner  - If seizure occurs,  ensure patient safety during seizure  - Reorient patient post seizure  - Seizure pads on all 4 side rails  - Instruct patient/family to notify RN of any seizure activity including if an aura is experienced  - Instruct patient/family to call for assistance with activity based on nursing assessment  - Administer anti-seizure medications if ordered    Outcome: Progressing  Goal: Achieves maximal functionality and self care  Description: INTERVENTIONS  - Monitor swallowing and airway patency with patient fatigue and changes in neurological status  - Encourage and assist patient to increase activity and self care.   - Encourage visually impaired, hearing impaired and aphasic patients to use assistive/communication devices  Outcome: Progressing

## 2024-01-27 NOTE — PROGRESS NOTES
Progress Note - Cardiology   [facility]     Nereyda Soler 68 y.o. female MRN: [id number]  : 1955  Unit/Bed#: [address] [place] [address] Encounter: [id number]    ASSESSMENT:  Epigastric discomfort and lower chest wall reproducible tenderness.     Mildly elevated troponin in setting of XOCHITL/dehydration, fall with unknown downtime and possible rhabdo, uncontrolled hypertension with presenting BP of 213/95 with LVH.  Therefore troponin elevation is probably nonischemic myocardial injury    Hypertension with hypertensive urgency  BP is now controlled     XOCHITL/dehydration, anorexia  Severe polycythemia with hemoglobin of 23  Leukocytosis with white count of 21.71  Possible UTI     RECOMMENDATIONS:  Continue Plavix, atorvastatin and Coreg  She is allergic to aspirin.  DC IV heparin  Ischemic workup and stable, probable Lexiscan on Monday  Consider GI evaluation        Subjective / Objective:     Review of Systems awake and alert.  Complains of constant epigastric discomfort  Vitals: Blood pressure 132/97, pulse 79, temperature 98 °F (36.7 °C), resp. rate 18, height 5' (1.524 m), weight 57.2 kg (126 lb), SpO2 90%.  Vitals:    24 1309 24 1420   Weight: 57.2 kg (126 lb) 57.2 kg (126 lb)     Body mass index is 24.61 kg/m².  BP Readings from Last 3 Encounters:   24 132/97   12/15/23 140/80   23 120/80     Orthostatic Blood Pressures      Flowsheet Row Most Recent Value   Blood Pressure 132/97 filed at 2024 0743   Patient Position - Orthostatic VS Lying filed at 2024 1145          I/O          0701   0700  0701   07 07 0700    IV Piggyback  1000     Total Intake(mL/kg)  1000 (17.5)     Net  +1000            Unmeasured Urine Occurrence  1 x 1 x          Invasive Devices       Peripheral Intravenous Line  Duration             Peripheral IV 24 Distal;Right;Upper;Ventral (anterior) Arm 1 day    Peripheral IV 24  Proximal;Right;Ventral (anterior) Forearm <1 day                    No intake or output data in the 24 hours ending 01/27/24 1009      Physical Exam:   Physical Exam    Neurologic:  Alert & oriented x 3,  no focal deficits noted   Constitutional:  Well developed, well nourished,  With no acute distress  Eyes:  PERRL, conjunctiva normal   HENT:  Atraumatic, external ears normal, nose normal, .  NECK: Normal range of motion, no tenderness, neck is supple , No JVP  Respiratory:  Bilateral air entry, mostly clear to auscultation  Cardiovascular: Regular S1-S2 with a I/VI systolic murmur.  No pericardial rub or gallop audible  GI:  Soft, nondistended, audible bowel sounds, positive tenderness in upper abdomen/epigastrium no hepatosplenomegaly appreciated  Musculoskeletal:  No tenderness, no deformities.    Extremities:  No appreciable pitting edema. Distal pulses are present  Psychiatric:  Speech and behavior appropriate             Medications/ Allergies:     Current Facility-Administered Medications   Medication Dose Route Frequency Provider Last Rate    acetaminophen  650 mg Oral Q6H PRN Jl Brar MD      albuterol  2 puff Inhalation Q6H PRN Jl Brar MD      atorvastatin  80 mg Oral Daily With Dinner Jl Brar MD      carvedilol  12.5 mg Oral Q12H Jl Brar MD      cefTRIAXone  1,000 mg Intravenous Q24H Teresita Ibrahim MD 1,000 mg (01/26/24 1756)    clopidogrel  75 mg Oral Daily NITHIN Mcbride      divalproex sodium  500 mg Oral Q12H Jl Brar MD      heparin (porcine)  3-20 Units/kg/hr (Order-Specific) Intravenous Titrated Jl Brar MD 13 Units/kg/hr (01/27/24 0911)    heparin (porcine)  1,800 Units Intravenous Q6H PRN Jl Brar MD      heparin (porcine)  3,600 Units Intravenous Q6H PRN Jl Brar MD      lidocaine  1 patch Topical Daily Jl Brar MD      magnesium sulfate  4 g Intravenous Once Mariann Watson MD 4 g (01/27/24 0906)    multi-electrolyte  75 mL/hr Intravenous Continuous  Loren Black, CRNP 75 mL/hr (01/27/24 0815)    nicotine  1 patch Transdermal Daily Jl Brar MD      pantoprazole  40 mg Intravenous Q12H [facility] Jl Brar MD      trimethobenzamide  200 mg Intramuscular Q6H PRN Jl Brar MD       acetaminophen, 650 mg, Q6H PRN  albuterol, 2 puff, Q6H PRN  heparin (porcine), 1,800 Units, Q6H PRN  heparin (porcine), 3,600 Units, Q6H PRN  trimethobenzamide, 200 mg, Q6H PRN      Allergies   Allergen Reactions    Aspirin Anaphylaxis    Nsaids Anaphylaxis    Asparaginase Derivatives     Ibuprofen Hives    Pegaspargase Other (See Comments)    Robaxin [Methocarbamol]     Toradol [Ketorolac Tromethamine]            Labs:   Troponins:  Results from last 7 days   Lab Units 01/27/24  0531 01/26/24  0828   CK TOTAL U/L 114 131       CBC with diff:  Results from last 7 days   Lab Units 01/27/24  0531 01/26/24  1347 01/26/24  0828   WBC Thousand/uL 17.05* 21.71* 20.42*   HEMOGLOBIN g/dL 17.0* 21.2* 23.0*   HEMATOCRIT % 48.3* 58.9* 63.4*   MCV fL 84 84 83   PLATELETS Thousands/uL 203 235 236   RBC Million/uL 5.72* 7.01* 7.61*   MCH pg 30.2 30.2 30.2   MCHC g/dL 35.8 36.0 36.3   RDW % 15.1 16.8* 17.3*   MPV fL 10.8 10.3 9.5   NRBC AUTO /100 WBCs 0  --  0       CMP:  Results from last 7 days   Lab Units 01/27/24  0531 01/26/24  0828   SODIUM mmol/L 136 135   POTASSIUM mmol/L 3.3* 3.8   CHLORIDE mmol/L 102 97   CO2 mmol/L 23 21   ANION GAP mmol/L 11 17   BUN mg/dL 31* 25   CREATININE mg/dL 1.01 1.67*   CALCIUM mg/dL 8.4 10.7*   AST U/L 15 19   ALT U/L 8 13   ALK PHOS U/L 87 152*   TOTAL PROTEIN g/dL 6.3* 9.0*   ALBUMIN g/dL 3.4* 4.6   TOTAL BILIRUBIN mg/dL 0.73 1.28*   EGFR ml/min/1.73sq m 57 31       Magnesium:  Results from last 7 days   Lab Units 01/27/24  0531   MAGNESIUM mg/dL 1.7*     Coags:  Results from last 7 days   Lab Units 01/27/24  0812 01/27/24  0019 01/26/24  1642 01/26/24  0828   PTT seconds 59* 125* 46* 33   INR   --   --  1.25* 1.12     TSH:  Results from last 7 days   Lab Units  "01/26/24  0828   TSH 3RD GENERATON uIU/mL 5.056*     No components found for: \"TSH3\"  Lipid Profile:  Results from last 7 days   Lab Units 01/26/24  0828   CHOLESTEROL mg/dL 206*   TRIGLYCERIDES mg/dL 212*   HDL mg/dL 62   LDL CALC mg/dL 102*     Hgb A1c:  Results from last 7 days   Lab Units 01/26/24  1347   HEMOGLOBIN A1C % 6.3*     NT-proBNP: No results for input(s): \"NTBNP\" in the last 72 hours.     Imaging & Testing   I have personally reviewed pertinent reports.    Echo complete w/ contrast if indicated    Result Date: 1/26/2024  Narrative:   Left Ventricle: Left ventricular cavity size is normal. Wall thickness is mildly increased. There is mild concentric hypertrophy. The left ventricular ejection fraction is 75%. Systolic function is hyperdynamic. Wall motion is normal. Diastolic function is normal.   Right Ventricle: Systolic function is low normal.   Mitral Valve: There is trace regurgitation.   Tricuspid Valve: There is trace regurgitation.     CT abdomen pelvis with contrast    Result Date: 1/26/2024  Narrative: CT ABDOMEN AND PELVIS WITH IV CONTRAST INDICATION: Abdominal pain, acute, nonlocalized Abdominal pain vomiting. COMPARISON: CT 2/8/2021. TECHNIQUE: CT examination of the abdomen and pelvis was performed. Multiplanar 2D reformatted images were created from the source data. This examination, like all CT scans performed in the [facility], was performed utilizing techniques to minimize radiation dose exposure, including the use of iterative reconstruction and automated exposure control. Radiation dose length product (DLP) for this visit: 413.27 mGy-cm IV Contrast: 100 mL of iohexol (OMNIPAQUE) Enteric Contrast: Not administered. FINDINGS: ABDOMEN LOWER CHEST: No clinically significant abnormality in the visualized lower chest. Stable minimal scar in the lateral left lower lobe on 2/70. LIVER/BILIARY TREE: Unremarkable. GALLBLADDER: No calcified gallstones. No pericholecystic " inflammatory change. SPLEEN: Unremarkable. PANCREAS: Unremarkable. ADRENAL GLANDS: Unremarkable. KIDNEYS/URETERS: No hydronephrosis or urinary tract calculi. Simple appearing cysts and subcentimeter hypoattenuating lesions, too small to characterize but statistically likely benign, which do not warrant follow-up (Radiology June 2019). STOMACH AND BOWEL: Colonic diverticulosis without findings of acute diverticulitis. APPENDIX: No findings to suggest appendicitis. ABDOMINOPELVIC CAVITY: No ascites. No pneumoperitoneum. No lymphadenopathy. VESSELS: Atherosclerosis without abdominal aortic aneurysm. 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 PELVIS REPRODUCTIVE ORGANS: Unremarkable for patient's age. URINARY BLADDER: Unremarkable. ABDOMINAL WALL/INGUINAL REGIONS: Unremarkable. BONES: No acute fracture or suspicious osseous lesion. Spinal degenerative changes.     Impression: No acute pathology. Colonic diverticulosis. Workstation performed: [id number]     CT head without contrast    Result Date: 1/26/2024  Narrative: CT BRAIN - WITHOUT CONTRAST INDICATION:   Syncope, recurrent Syncope. COMPARISON: None. TECHNIQUE:  CT examination of the brain was performed.  Multiplanar 2D reformatted images were created from the source data. Radiation dose length product (DLP) for this visit:  845.7 mGy-cm .  This examination, like all CT scans performed in the [facility], was performed utilizing techniques to minimize radiation dose exposure, including the use of iterative reconstruction and automated exposure control. IMAGE QUALITY:  Diagnostic. FINDINGS: PARENCHYMA: Decreased attenuation is noted in periventricular and subcortical white matter demonstrating an appearance that is statistically most likely to represent mild microangiopathic change. No CT signs of acute infarction. Partially calcified pineal region cyst measuring 1.2 x 0.9 cm. No mass effect or midline shift.  No acute parenchymal hemorrhage.  VENTRICLES AND EXTRA-AXIAL SPACES:  Normal for the patient's age. VISUALIZED ORBITS: Normal visualized orbits. PARANASAL SINUSES: Normal visualized paranasal sinuses. CALVARIUM AND EXTRACRANIAL SOFT TISSUES:  Normal.     Impression: No acute intracranial abnormality. Workstation performed: [id number]        EKG / Monitor: Personally reviewed.    Sinus rhythm at 70/min    Cardiac testing:   Results for orders placed during the hospital encounter of 21    Echo complete with contrast if indicated    Narrative  [address] [id number] [phone]    Transthoracic Echocardiogram  Limited 2D, M-mode, Doppler, and Color Doppler    Study date:  2021    Patient: ENDY WOODARD  MR number: [id number]  Account number: [id number]  : 1955  Age: 65 years  Gender: Female  Status: Outpatient  Location: Bedside  Height: 60 in  Weight: 174.7 lb  BP: 138/ 84 mmHg    Indications: Hypertension    Diagnoses: 401.9 - HYPERTENSION NOS    Sonographer:  AUNG Avila  Primary Physician:  Nunu Andre DO  Referring Physician:  NITHIN Mcbride  Group:  [facility]  Interpreting Physician:  Med Andrews MD    SUMMARY    LEFT VENTRICLE:  Systolic function was normal. Ejection fraction was estimated in the range of 60 % to 65 % to be 60 %.  There were no regional wall motion abnormalities.  Wall thickness was mildly increased.  Doppler parameters were consistent with abnormal left ventricular relaxation (grade 1 diastolic dysfunction).    HISTORY: PRIOR HISTORY: HTN, COPD, Seizures, SIRS, Lung Nodule, Depression, Obesity, Liver Disease, Chronic Pain    PROCEDURE: The procedure was performed at the bedside. This was a routine study. The transthoracic approach was used. The study included limited 2D imaging, M-mode, limited spectral Doppler, and color Doppler. The heart rate was 96 bpm, at  the start of the study. Images were not obtained  from the subcostal or suprasternal notch acoustic windows. Echocardiographic views were limited due to restricted patient mobility, poor patient compliance, and poor acoustic window  availability. This was a technically difficult study.    LEFT VENTRICLE: Size was normal. Systolic function was normal. Ejection fraction was estimated in the range of 60 % to 65 % to be 60 %. There were no regional wall motion abnormalities. Wall thickness was mildly increased. No evidence of  apical thrombus. DOPPLER: Doppler parameters were consistent with abnormal left ventricular relaxation (grade 1 diastolic dysfunction).    RIGHT VENTRICLE: The size was normal. Systolic function was normal with TAPSE-2.1cm Wall thickness was normal.    LEFT ATRIUM: Size was normal.    RIGHT ATRIUM: Size was normal.    MITRAL VALVE: Valve structure was normal. There was normal leaflet separation. DOPPLER: The transmitral velocity was within the normal range. There was no evidence for stenosis. There was no significant regurgitation.    AORTIC VALVE: The valve was trileaflet. Leaflets exhibited normal thickness and normal cuspal separation. DOPPLER: Transaortic velocity was within the normal range. There was no evidence for stenosis. There was no significant  regurgitation.    TRICUSPID VALVE: The valve structure was normal. There was normal leaflet separation. DOPPLER: The transtricuspid velocity was within the normal range. There was no evidence for stenosis. There was no significant regurgitation.    PULMONIC VALVE: Leaflets exhibited normal thickness, no calcification, and normal cuspal separation. DOPPLER: The transpulmonic velocity was within the normal range. There was no significant regurgitation.    PERICARDIUM: There was no pericardial effusion. The pericardium was normal in appearance.    AORTA: The root exhibited normal size.    SYSTEMIC VEINS: IVC: The inferior vena cava was not well visualized.    SYSTEM MEASUREMENT TABLES    2D  EF  "(Teich): 62.89 %  %FS: 33.2 %  Ao Diam: 2.73 cm  EDV(Teich): 50.41 ml  ESV(Teich): 18.71 ml  HR_2Ch_Q: 86.54 bpm  HR_4Ch_Q: 94.74 bpm  IVSd: 1.02 cm  LA Diam: 3.34 cm  LAAs A4C: 9.18 cm2  LAESV A-L A4C: 19 ml  LAESV MOD A4C: 17.5 ml  LALs A4C: 3.77 cm  LVCO_2Ch_Q: 4.98 L/min  LVCO_4Ch_Q: 5.03 L/min  LVCO_BiP_Q: 5.01 L/min  LVEF_2Ch_Q: 63.41 %  LVEF_4Ch_Q: 59.79 %  LVEF_BiP_Q: 62.35 %  LVIDd: 3.49 cm  LVIDs: 2.33 cm  LVLd_2Ch_Q: 7.76 cm  LVLd_4Ch_Q: 8.44 cm  LVLs_2Ch_Q: 6.63 cm  LVLs_4Ch_Q: 7.05 cm  LVPWd: 0.94 cm  LVSV_2Ch_Q: 57.55 ml  LVSV_4Ch_Q: 53.09 ml  LVSV_BiP_Q: 56.21 ml  LVVED_2Ch_Q: 90.76 ml  LVVED_4Ch_Q: 88.79 ml  LVVED_BiP_Q: 90.15 ml  LVVES_2Ch_Q: 33.21 ml  LVVES_4Ch_Q: 35.7 ml  LVVES_BiP_Q: 33.94 ml  Jana A4C: 10.22 cm2  RAEDV A-L: 20.23 ml  RAEDV MOD: 19.24 ml  RALd: 4.38 cm  RVIDd: 2.26 cm  SV (Teich): 31.7 ml    PW  MV E/A Ratio: 0.88    [facility]etal Commission Accredited Echocardiography Laboratory    Prepared and electronically signed by    Med Andrews MD  Signed 09-Feb-2021 13:21:15            Dr. Rubio Herrera MD, FACC      \"This note has been constructed using a voice recognition system.Therefore there may be syntax, spelling, and/or grammatical errors. Please call if you have any questions. \"  "

## 2024-01-27 NOTE — UTILIZATION REVIEW
Initial Clinical Review    Admission: Date/Time/Statement:   Admission Orders (From admission, onward)       Ordered        01/26/24 1131  INPATIENT ADMISSION  Once                          Orders Placed This Encounter   Procedures    INPATIENT ADMISSION     Standing Status:   Standing     Number of Occurrences:   1     Order Specific Question:   Level of Care     Answer:   Med Surg [16]     Order Specific Question:   Estimated length of stay     Answer:   More than 2 Midnights     Order Specific Question:   Certification     Answer:   I certify that inpatient services are medically necessary for this patient for a duration of greater than two midnights. See H&P and MD Progress Notes for additional information about the patient's course of treatment.     ED Arrival Information       Expected   -    Arrival   1/26/2024 07:43    Acuity   Urgent              Means of arrival   Ambulance    Escorted by   [facility] ([employer]    Service   Hospitalist    Admission type   Emergency              Arrival complaint   leg numbness             Chief Complaint   Patient presents with    Fall     Pt. Fell around 2 am  states   her legs go numb and  causes her to fall. Pt. States nausea and vomiting  and  after fall was unable   to get up  until ems arrived then able to get up and walk 2 steps to the    stretcher    Vomiting       Initial Presentation:   68 yof to ER from home via EMS for increased weakness BLE, increased falls 2nd leg numbness; also reports epigastric pain with associated N&V. Patient reported that on the day of admission she felt very weak, fell and possibly passed out and was not able to get up by herself.  Patient also additionally reported that she has been having dysuria for the last few weeks. Hx HTN, COPD, Seizures (absence), depression, and well controlled DM. Presents diaphoretic, tachycardic,  abd pain, tenderness, nausea. Admission EKG-sinus tachycardia, LVH, inferolateral ST depression possible  repolarization abnormality, prolonged Qtc, leukocytosis, hemoconcentration H/H 23/63, elevated Creatinine, troponin.   Admitted to inpatient status for syncope.     Per cardio:  1. Anorexia with dehydration  2. Abnormal troponins most likely secondary to XOCHITL/Dehydration  3. XOCHITL secondary to #1  4. Frequent falls with unknown downtime concerning for possible rhabdomyolysis  5. COPD/tobacco abuse  6. Hypertension  Plan:  1. Started on heparin ACS protocol in the emergency room, check CK and repeat high-sensitivity troponins if high-sensitivity troponins trending down and CK is positive will discontinue heparin drip.  2. Patient with acute kidney injury, will hold her Cozaar and Chlorthalidone and add Isolyte at 75 ML's per hour with close monitoring of patients labs.  3. 2D echocardiogram   4. Consider G.I. evaluation for anorexia  5. Consider repeat Lexiscan nuclear stress test versus cardiac catheterization once patient is stable.    Date: 1/27/241   Day 2:   Continue Plavix, atorvastatin and Coreg. Ok to DC IV heparin per cardio.  Ischemic workup and stable, probable Lexiscan on Monday. Consider GI evaluation.    ED Triage Vitals   Temperature Pulse Respirations Blood Pressure SpO2   01/26/24 0747 01/26/24 0747 01/26/24 0747 01/26/24 0747 01/26/24 0747   98.3 °F (36.8 °C) (!) 110 18 105/56 96 %      Temp src Heart Rate Source Patient Position - Orthostatic VS BP Location FiO2 (%)   -- 01/26/24 0830 01/26/24 0830 01/26/24 0830 --    Monitor Lying Left arm       Pain Score       01/26/24 0747       10 - Worst Possible Pain          Wt Readings from Last 1 Encounters:   01/26/24 57.2 kg (126 lb)     Additional Vital Signs:   Date/Time Temp Pulse Resp BP MAP (mmHg) SpO2 O2 Device Patient Position - Orthostatic VS   01/27/24 07:43:28 98 °F (36.7 °C) 79 18 132/97 109 90 % -- --   01/27/24 04:08:09 97.2 °F (36.2 °C) Abnormal  96 16 121/74 90 93 % -- --   01/26/24 23:59:52 98 °F (36.7 °C) 95 16 154/94 114 94 % -- --    01/26/24 19:19:26 97.9 °F (36.6 °C) 97 18 150/94 113 95 % -- --   01/26/24 18:05:41 97.5 °F (36.4 °C) 89 -- -- -- 95 % -- --   01/26/24 15:21:39 -- 86 -- 155/93 114 94 % -- --   01/26/24 1420 -- 90 -- 194/96 Abnormal  -- -- -- --   01/26/24 12:43:56 -- 90 18 194/96 Abnormal  129 94 % -- --   01/26/24 1145 -- 93 20 170/74 106 97 % None (Room air) Lying   01/26/24 1130 -- 102 20 161/82 104 97 % None (Room air) Lying   01/26/24 1115 -- 106 Abnormal  20 124/71 90 95 % None (Room air) Lying   01/26/24 1100 -- 106 Abnormal  20 195/79 Abnormal  113 97 % None (Room air) Lying   01/26/24 1045 -- 94 20 188/74 Abnormal  119 96 % None (Room air) Lying   01/26/24 1030 -- 85 20 213/95 Abnormal  137 96 % None (Room air) Lying   01/26/24 1000 -- 85 24 Abnormal  200/98 Abnormal  140 94 % None (Room air) Lying   01/26/24 0945 -- 87 24 Abnormal  143/110 Abnormal  119 95 % None (Room air) Lying   01/26/24 0930 -- 80 20 137/107 Abnormal  117 95 % None (Room air) Lying   01/26/24 0830 -- 85 18 99/74 79 94 % None (Room air) Lying   01/26/24 0747 98.3 °F (36.8 °C) 110 Abnormal  18 105/56 -- 96 % None (Room air) --     Pertinent Labs/Diagnostic Test Results:   XR chest portable   Final Result  (01/27 1329)      No acute cardiopulmonary disease.         CT head without contrast   Final Result  (01/26 0958)      No acute intracranial abnormality.      CT abdomen pelvis with contrast   Final Result (01/26 1023)      No acute pathology.      Colonic diverticulosis.      MRI brain wo contrast    (Results Pending)     1/26 EKG=   Interpretation: abnormal    Rate:     ECG rate:  104     ECG rate assessment: tachycardic    Rhythm:     Rhythm: sinus tachycardia    Ectopy:     Ectopy: none    QRS:     QRS axis:  Normal     QRS intervals:  Normal   Conduction:     Conduction: normal    ST segments:     ST segments:  Non-specific   T waves:     T waves: normal       1/26 Echo=    Left Ventricle: Left ventricular cavity size is normal. Wall thickness  is mildly increased. There is mild concentric hypertrophy. The left ventricular ejection fraction is 75%. Systolic function is hyperdynamic. Wall motion is normal. Diastolic function is normal.    Right Ventricle: Systolic function is low normal.    Mitral Valve: There is trace regurgitation.    Tricuspid Valve: There is trace regurgitation.    Results from last 7 days   Lab Units 01/26/24  0828   SARS-COV-2  Negative     Results from last 7 days   Lab Units 01/27/24  0531 01/26/24  1347 01/26/24  0828   WBC Thousand/uL 17.05* 21.71* 20.42*   HEMOGLOBIN g/dL 17.0* 21.2* 23.0*   HEMATOCRIT % 48.3* 58.9* 63.4*   PLATELETS Thousands/uL 203 235 236   NEUTROS ABS Thousands/µL 11.67*  --  15.60*     Results from last 7 days   Lab Units 01/27/24  0531 01/26/24  0828   SODIUM mmol/L 136 135   POTASSIUM mmol/L 3.3* 3.8   CHLORIDE mmol/L 102 97   CO2 mmol/L 23 21   ANION GAP mmol/L 11 17   BUN mg/dL 31* 25   CREATININE mg/dL 1.01 1.67*   EGFR ml/min/1.73sq m 57 31   CALCIUM mg/dL 8.4 10.7*   MAGNESIUM mg/dL 1.7*  --      Results from last 7 days   Lab Units 01/27/24  0531 01/26/24  0828   AST U/L 15 19   ALT U/L 8 13   ALK PHOS U/L 87 152*   TOTAL PROTEIN g/dL 6.3* 9.0*   ALBUMIN g/dL 3.4* 4.6   TOTAL BILIRUBIN mg/dL 0.73 1.28*     Results from last 7 days   Lab Units 01/26/24  0809   POC GLUCOSE mg/dl 138     Results from last 7 days   Lab Units 01/27/24  0531 01/26/24  0828   GLUCOSE RANDOM mg/dL 112 149*     Results from last 7 days   Lab Units 01/26/24  1347   HEMOGLOBIN A1C % 6.3*   EAG mg/dl 134     Results from last 7 days   Lab Units 01/27/24  0531 01/26/24  0828   CK TOTAL U/L 114 131     Results from last 7 days   Lab Units 01/26/24  1036 01/26/24  0828   HS TNI 0HR ng/L  --  249*   HS TNI 2HR ng/L 177*  --    HSTNI D2 ng/L -72  --      Results from last 7 days   Lab Units 01/27/24  0812 01/27/24  0019 01/26/24  1642 01/26/24  0828   PROTIME seconds  --   --  15.9* 14.6*   INR   --   --  1.25* 1.12   PTT seconds 59*  125* 46* 33     Results from last 7 days   Lab Units 01/26/24  0828   TSH 3RD GENERATON uIU/mL 5.056*     Results from last 7 days   Lab Units 01/26/24  1643   LACTIC ACID mmol/L 1.3     Results from last 7 days   Lab Units 01/26/24  1055   CLARITY UA  Clear   COLOR UA  Yellow   SPEC GRAV UA  1.015   PH UA  6.5   GLUCOSE UA mg/dl Negative   KETONES UA mg/dl Negative   BLOOD UA  Moderate*   PROTEIN UA mg/dl >=300*   NITRITE UA  Negative   BILIRUBIN UA  Small*   UROBILINOGEN UA E.U./dl 0.2   LEUKOCYTES UA  Negative   WBC UA /hpf 10-20*   RBC UA /hpf 2-4   BACTERIA UA /hpf Moderate*   EPITHELIAL CELLS WET PREP /hpf Occasional   MUCUS THREADS  Occasional*     Results from last 7 days   Lab Units 01/26/24  0828   INFLUENZA A PCR  Negative   INFLUENZA B PCR  Negative   RSV PCR  Negative     Results from last 7 days   Lab Units 01/26/24  1642   BLOOD CULTURE  Received in Microbiology Lab. Culture in Progress.  Received in Microbiology Lab. Culture in Progress.     ED Treatment:   Medication Administration from 01/26/2024 0743 to 01/26/2024 1231         Date/Time Order Dose Route Action     01/26/2024 0820 EST sodium chloride 0.9 % bolus 1,000 mL 1,000 mL Intravenous New Bag     01/26/2024 0820 EST ondansetron (ZOFRAN) injection 4 mg 4 mg Intravenous Given     01/26/2024 0823 EST pantoprazole (PROTONIX) injection 40 mg 40 mg Intravenous Given     01/26/2024 0926 EST iohexol (OMNIPAQUE) 350 MG/ML injection (MULTI-DOSE) 100 mL 100 mL Intravenous Given     01/26/2024 1119 EST heparin (porcine) 25,000 units in 0.45% NaCl 250 mL infusion (premix) 12 Units/kg/hr Intravenous New Bag          Past Medical History:   Diagnosis Date    COPD (chronic obstructive pulmonary disease) (HCC)     Liver disease 2/21/2017    Patient mentions having nonalcoholic liver disease. Denies alcohol use.    Radiculopathy of lumbar region     last assessed 03/29/17    Seizures (HCC)      Present on Admission:   Syncope   Sepsis (HCC)   Depression    COPD (chronic obstructive pulmonary disease) (HCC)   Essential hypertension   Type 2 diabetes mellitus without complication, without long-term current use of insulin (HCC)   Intervertebral disc disorder with radiculopathy of lumbosacral region      Admitting Diagnosis: Vomiting [R11.10]  ACS (acute coronary syndrome) (HCC) [I24.9]  Elevated troponin [R79.89]  XOCHITL (acute kidney injury) ([facility]) [N17.9]  Elevated hemoglobin (HCC) [D58.2]  Age/Sex: 68 y.o. female  Admission Orders:  Telemetry  Pt/ot eval & tx  Scd/foot pumps  Consult cardio    Scheduled Medications:  Medications 01/18 01/19 01/20 01/21 01/22 01/23 01/24 01/25 01/26 01/27   atorvastatin (LIPITOR) tablet 80 mg  Dose: 80 mg  Freq: Daily with dinner Route: PO  Start: 01/26/24 1630            1612      1630        carvedilol (COREG) tablet 12.5 mg  Dose: 12.5 mg  Freq: Every 12 hours Route: PO  Start: 01/26/24 1330   Admin Instructions:      Order specific questions:               1354      0044     1349        cefTRIAXone (ROCEPHIN) IVPB (premix in dextrose) 1,000 mg 50 mL  Dose: 1,000 mg  Freq: Every 24 hours Route: IV  Last Dose: 1,000 mg (01/26/24 1756)  Start: 01/26/24 1730   Admin Instructions:      Order specific questions:               1756      1730        clopidogrel (PLAVIX) tablet 75 mg  Dose: 75 mg  Freq: Daily Route: PO  Start: 01/26/24 1430   Admin Instructions:               1513      0816        divalproex sodium (DEPAKOTE) DR tablet 500 mg  Dose: 500 mg  Freq: Every 12 hours Route: PO  Start: 01/26/24 1430   Admin Instructions:               1513      0437     1630        enoxaparin (LOVENOX) subcutaneous injection 30 mg  Dose: 30 mg  Freq: Every 24 hours scheduled Route: SC  Start: 01/27/24 1015 End: 01/27/24 1022   Admin Instructions:                1022-D/C'd  (1044)        enoxaparin (LOVENOX) subcutaneous injection 40 mg  Dose: 40 mg  Freq: Every 24 hours scheduled Route: SC  Start: 01/27/24 1400   Admin Instructions:                 1349        heparin (ACS LOW)  Freq: Once Route: IV  Start: 01/26/24 1115 End: 01/26/24 1110   Admin Instructions:      Order specific questions:               1110-D/C'd       influenza vaccine, high-dose (FLUZONE HIGH-DOSE) IM injection ZULEMA 0.7 mL  Dose: 0.7 mL  Freq: Once Route: IM  Indications of Use: VACCINATION  Start: 01/26/24 1345 End: 01/27/24 0820   Admin Instructions:      Order specific questions:                0820        lidocaine (LIDODERM) 5 % patch 1 patch  Dose: 1 patch  Freq: Daily Route: TP  Start: 01/26/24 1400   Admin Instructions:      Order specific questions:               1512      0238     0816     2016        magnesium sulfate 2 g/50 mL IVPB (premix) 2 g  Dose: 2 g  Freq: Once Route: IV  Start: 01/27/24 0845 End: 01/27/24 0838   Admin Instructions:                0838-D/C'd      magnesium sulfate 4 g/100 mL IVPB (premix) 4 g  Dose: 4 g  Freq: Once Route: IV  Last Dose: 4 g (01/27/24 0906)  Start: 01/27/24 0845 End: 01/27/24 1306   Admin Instructions:                0906        nicotine (NICODERM CQ) 14 mg/24hr TD 24 hr patch 1 patch  Dose: 1 patch  Freq: Every 24 hours Route: TD  Start: 01/26/24 1400 End: 01/26/24 1356   Admin Instructions:               1356-D/C'd       nicotine (NICODERM CQ) 7 mg/24hr TD 24 hr patch 1 patch  Dose: 1 patch  Freq: Daily Route: TD  Start: 01/26/24 1400   Admin Instructions:               1512      0815     0816        ondansetron (ZOFRAN) injection 4 mg  Dose: 4 mg  Freq: Once Route: IV  Start: 01/26/24 0830 End: 01/26/24 0820   Admin Instructions:               0820         pantoprazole (PROTONIX) injection 40 mg  Dose: 40 mg  Freq: Every 12 hours scheduled Route: IV  Start: 01/26/24 2100   Admin Instructions:               2041      0816     2100        pantoprazole (PROTONIX) injection 40 mg  Dose: 40 mg  Freq: Every 24 hours scheduled Route: IV  Start: 01/27/24 0900 End: 01/26/24 1603   Admin Instructions:               1603-D/C'd       pantoprazole  (PROTONIX) injection 40 mg  Dose: 40 mg  Freq: Once Route: IV  Start: 01/26/24 0830 End: 01/26/24 0825   Admin Instructions:               0823         potassium chloride (Klor-Con M20) CR tablet 40 mEq  Dose: 40 mEq  Freq: Once Route: PO  Start: 01/27/24 0845 End: 01/27/24 0904   Admin Instructions:                0904        sodium chloride 0.9 % bolus 1,000 mL  Dose: 1,000 mL  Freq: Once Route: IV  Last Dose: Stopped (01/26/24 0920)  Start: 01/26/24 0830 End: 01/26/24 0920 0820     0920         Legend:       [id number]/1901/2001/2101/2201/2301/2401/2501/2601/27        Continuous Meds Sorted by Name  for Soler, Eckerman as of 01/18/24 through 1/27/24  Legend:       Medications 01/18 01/19 01/20 01/21 01/22 01/23 01/24 01/25 01/26 01/27   heparin (porcine) 25,000 units in 0.45% NaCl 250 mL infusion (premix)  Rate: 1.8-12 mL/hr Dose: 3-20 Units/kg/hr  Weight Dosing Info: 60 kg (Order-Specific)  Freq: Titrated Route: IV  Last Dose: Stopped (01/27/24 1105)  Start: 01/26/24 1115 End: 01/27/24 1014   Admin Instructions:      Order specific questions:               1119     1802      0042     0152     0911     1014-D/C'd  1105        multi-electrolyte (PLASMALYTE-A/ISOLYTE-S PH 7.4) IV solution  Rate: 50 mL/hr Dose: 50 mL/hr  Freq: Continuous Route: IV  Indications of Use: IV Hydration  Last Dose: 50 mL/hr (01/27/24 1105)  Start: 01/26/24 1400            1513      0815     1105        Legend:       [id number]/1801/1901/2001/2101/2201/2301/2401/2501/2601/27        PRN Meds Sorted by Name  for Soler, Nereyda as of 01/18/24 through 1/27/24  Legend:         Medications 01/18 01/19 01/20 01/21 01/22 01/23 01/24 01/25 01/26 01/27   acetaminophen (TYLENOL) tablet 650 mg  Dose: 650 mg  Freq: Every 6 hours PRN Route: PO  PRN Reasons: mild pain,fever,headaches  Indications of Use: FEVER,HEADACHE,MILD PAIN  Start: 01/26/24 1355            0052      1349        albuterol (PROVENTIL HFA,VENTOLIN HFA) inhaler 2  puff  Dose: 2 puff  Freq: Every 6 hours PRN Route: IN  PRN Reason: wheezing  Start: 01/26/24 1351   Admin Instructions:                   diazepam (VALIUM) tablet 2 mg  Dose: 2 mg  Freq: Once as needed Route: PO  PRN Reason: anxiety  PRN Comment: Anxiety with MRI  Start: 01/27/24 1054 End: 01/27/24 1216   Admin Instructions:                1216        heparin (porcine) injection 1,800 Units  Dose: 1,800 Units  Freq: Every 6 hours PRN Route: IV  PRN Comment: PTT 43 - 59 seconds  Start: 01/26/24 1108 End: 01/27/24 1014   Admin Instructions:               1756      0910     1014-D/C'd      heparin (porcine) injection 3,600 Units  Dose: 3,600 Units  Freq: Every 6 hours PRN Route: IV  PRN Comment: PTT less than or equal to 42 seconds  Start: 01/26/24 1108 End: 01/27/24 1014   Admin Instructions:                1014-D/C'd      iohexol (OMNIPAQUE) 350 MG/ML injection (MULTI-DOSE) 100 mL  Dose: 100 mL  Freq: Once in imaging Route: IV  PRN Reason: contrast  Start: 01/26/24 0926 End: 01/26/24 0926 0926         trimethobenzamide (TIGAN) IM injection 200 mg  Dose: 200 mg  Freq: Every 6 hours PRN Route: IM  PRN Reasons: nausea,vomiting  Start: 01/26/24 1429   Admin Instructions:                       Network Utilization Review Department  ATTENTION: Please call with any questions or concerns to [phone] and carefully listen to the prompts so that you are directed to the right person. All voicemails are confidential.   For Discharge needs, contact Care Management DC Support Team at [phone] opt. [phone]  Send all requests for admission clinical reviews, approved or denied determinations and any other requests to dedicated fax number below belonging to the campus where the patient is receiving treatment. List of dedicated fax numbers for the Facilities:  FACILITY NAME UR FAX NUMBER   ADMISSION DENIALS (Administrative/Medical Necessity) [phone]   DISCHARGE SUPPORT TEAM (NETWORK) [phone]   [facility] CHILD [facility]  (Maternity/NICU/Pediatrics) [phone]   [facility] [phone]   [facility] [phone]   [facility] [phone]   [facility] [phone]   [facility] [phone]   [facility] [phone]   [facility] [phone]   [facility] [phone]   [facility] [phone]   [facility] [phone]   [facility] [phone]   [facility] [phone]

## 2024-01-27 NOTE — PROGRESS NOTES
Daily Progress Note - [facility] (formerly [facility])  Family Medicine Residency  Nereyda Soler 68 y.o. female MRN: [id number]  Unit/Bed#: [address] [place] [address] Encounter: [id number]  Admitting Physician: Teresita Ibrahim MD   PCP: Waylon Wright MD  Date of Admission:  1/26/2024  7:45 AM    Assessment and Plan    * Syncope  Assessment & Plan  68F with PMHx that includes COPD, HTN, Depression, absence seizures, and DM well controlled, presents after fall and syncopal episode at home. Patient reported frequent falls at home, and decrease PO intake over the last 5 days. Patient reports feel dizzy and tripping with LOC.      CT Head neg. CT Abd/Pelvis negative. Troponins elevated on admission. Cr elevated on admission likely in setting of dehydration.   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    Plan:  - Fall precautions  - Cardiology following for elevated troponins  - Gentle IV fluids  - Monitor V/S    Nausea  Assessment & Plan  Presented with nausea, and gradual fatigue, poor appetite, and weight loss. Nonspecific abdominal pain. CT abdomen/pelvis showed no acute findings    - Zofran changed to Tigan given QTC prolongation  - Consider GI consult for EGD    QT prolongation  Assessment & Plan   on admission    - Received Zofran in the ED  - IM Tigan for nausea/vomiting    Elevated serum creatinine  Assessment & Plan  Cr 1.67 on admission with no recent comparison. Prior baseline 0.7-1. Possible prerenal XOCHITL in setting of dehydration, poor PO intake over the past few days.  Patient did receive IV contrast for CT scan on admission.    Plan:  - Improving renal function  - IV Fluids Plasmalyte at 50 cc/hr  - Home Losartan and Chlorthalidone held  - Avoid NSAID's  - Monitor I/O's  - Trend BMP    Elevated TSH  Assessment & Plan  TSH 5.05, T4 1.27  - recheck TSH reflex to T4 in a few days    Essential hypertension  Assessment & Plan  Chronic    BP elevated on admission possibly due to missed doses in  the setting of nausea    Plan:  - Coreg 12.5 mg BID  - IV labetalol PRN if systolic >180  - Hold Cozaar and Chlorthalidone in the setting of possible XOCHITL    SIRS (systemic inflammatory response syndrome) ([facility])  Assessment & Plan  Presented with leukocytosis and tachycardia, tachypnea. LA wnl.    CT Head neg for acute findings. CT Abd/Pel neg for acute findings. WBC 20.4, (also Hb 23.0, possibly hemoconcentrated in setting of dehydration on top of underlying polychthemia). Pt reports dysuria, UA showed moderate bacteria, negative for nitrities or leukocytes,. Source possible UTI    - Check blood Cx  - Empiric Rocephin  - Urine cx  - Trend CBC and fever curve    COPD (chronic obstructive pulmonary disease) ([facility])  Assessment & Plan  Chronic    Current smoker. Does not use O2 at home. Only uses rescue inhaler as needed  Does not appear to be in acute exacerbation on admission    - Continue Albuterol PRN  - Monitor O2 sats    Elevated troponin level r/o ACS  Assessment & Plan  Presented with elevated troponin 249, epigastric pain, nausea. Patient presented after fall at home/syncopal episode. Reports she has been having chest heaviness that is not new. EKG showed sinus tachycardia, LVH, no obvious ST elevations. Possibly secondary to dehydration/XOCHITL.    Plan:  - Cardiology following  - Heparin ACS low - heparin gtt discontinued    - Atorvastatin 80 mg   - Coreg 12.5 mg BID  - Plavix 75 mg daily. No aspirin as patient has allergy  - Echo normal EF, normal diastolic function    Type 2 diabetes mellitus without complication, without long-term current use of insulin ([facility])  Assessment & Plan  Lab Results   Component Value Date    HGBA1C 6.3 (H) 01/26/2024     Diet controlled    History of absence seizures  Assessment & Plan  Patient reports having history of both Grand mal vs absence seizure  - Previously on Depakote, however did not need to take as she was not having episodes  - On admission, patient reports she has resumed  "taking Depakote daily due to recent \"staring episodes\"  - Depakote level low    Plan:  - Continue Depakote for now  - Monitor for signs of seizure    Depression  Assessment & Plan  Previously on Trazodone, however not taking due to drowsiness  - Hold home Trazodone in setting of recent falls    Intervertebral disc disorder with radiculopathy of lumbosacral region  Assessment & Plan  Chronic, stable   - Lidocaine patch  - Tylenol PRN        VTE Pharmacologic Prophylaxis: VTE Score: 4 Moderate Risk (Score 3-4) - Pharmacological DVT Prophylaxis Ordered: enoxaparin (Lovenox).    Patient Centered Rounds: I have performed bedside rounds with nursing staff today.    Discussions with Specialists or Other Care Team Provider: Cardiology    Education and Discussions with Family / Patient: yes  Patient Information Sharing: With the consent of Nereyda Soler , their loved ones (daughter) were notified today by inpatient team of the patient’s condition and current plan.  All questions answered.     Time Spent for Care: 20 minutes.  More than 50% of total time spent on counseling and coordination of care as described above.    Current Length of Stay: 1 day(s)    Current Patient Status: Inpatient   Certification Statement: The patient will continue to require additional inpatient hospital stay due to nausea and vomiting, uncontrolled HTN, and SIRS 2/2 UTI    Discharge Plan: >72 hrs    Code Status: Level 1 - Full Code    Subjective:   Patient reported having burning sensation in the chest and subsequent anxiety and panic attack. She also has hot sensation with urination, but denied burning sensation. She is not able to tolerate food well and had vomiting when eating berries.    Objective     Objective:   Vitals:   Temp (24hrs), Av.7 °F (36.5 °C), Min:97.2 °F (36.2 °C), Max:98 °F (36.7 °C)    Temp:  [97.2 °F (36.2 °C)-98 °F (36.7 °C)] 97.7 °F (36.5 °C)  HR:  [75-97] 75  Resp:  [16-18] 18  BP: (121-213)/(74-97) 203/89  SpO2:  [90 " %-95 %] 94 %  Body mass index is 24.61 kg/m².     Input and Output Summary (last 24 hours):     No intake or output data in the 24 hours ending 01/27/24 1622    Physical Exam:   Physical Exam  Vitals reviewed.   Constitutional:       General: She is not in acute distress.     Appearance: Normal appearance. She is not ill-appearing.   HENT:      Head: Normocephalic and atraumatic.   Eyes:      Pupils: Pupils are equal, round, and reactive to light.   Cardiovascular:      Rate and Rhythm: Normal rate and regular rhythm.      Pulses: Normal pulses.      Heart sounds: Normal heart sounds. No murmur heard.  Pulmonary:      Effort: Pulmonary effort is normal. No respiratory distress.      Breath sounds: Normal breath sounds. No wheezing.   Abdominal:      General: Bowel sounds are normal.      Palpations: Abdomen is soft.      Tenderness: There is no abdominal tenderness.   Musculoskeletal:      Right lower leg: No edema.      Left lower leg: No edema.   Skin:     General: Skin is warm and dry.   Neurological:      General: No focal deficit present.      Mental Status: She is alert and oriented to person, place, and time.   Psychiatric:         Mood and Affect: Mood normal.         Behavior: Behavior normal.       Additional Data:     Labs:  Results from last 7 days   Lab Units 01/27/24  0531   WBC Thousand/uL 17.05*   HEMOGLOBIN g/dL 17.0*   HEMATOCRIT % 48.3*   PLATELETS Thousands/uL 203   NEUTROS PCT % 68   LYMPHS PCT % 23   MONOS PCT % 8   EOS PCT % 0     Results from last 7 days   Lab Units 01/27/24  0531   POTASSIUM mmol/L 3.3*   CHLORIDE mmol/L 102   CO2 mmol/L 23   BUN mg/dL 31*   CREATININE mg/dL 1.01   CALCIUM mg/dL 8.4   ALK PHOS U/L 87   ALT U/L 8   AST U/L 15     Results from last 7 days   Lab Units 01/26/24  1642   INR  1.25*     Results from last 7 days   Lab Units 01/26/24  0809   POC GLUCOSE mg/dl 138     Results from last 7 days   Lab Units 01/26/24  1347   HEMOGLOBIN A1C % 6.3*       * I Have Reviewed  All Lab Data Listed Above.  * Additional Pertinent Lab Tests Reviewed: All Labs For Current Hospital Admission Reviewed    Imaging:    Imaging Reports Reviewed Today Include: All Imagings For Current Hospital Admission Reviewed    Recent Cultures (last 7 days):     Results from last 7 days   Lab Units 01/26/24  1642 01/26/24  1055   BLOOD CULTURE  Received in Microbiology Lab. Culture in Progress.  Received in Microbiology Lab. Culture in Progress.  --    URINE CULTURE   --  Culture too young- will reincubate       Last 24 Hours Medication List:   Current Facility-Administered Medications   Medication Dose Route Frequency Provider Last Rate    acetaminophen  650 mg Oral Q6H PRN Jl Brar MD      albuterol  2 puff Inhalation Q6H PRN Jl Brar MD      atorvastatin  80 mg Oral Daily With Dinner Jl Brar MD      carvedilol  12.5 mg Oral Q12H Jl Brar MD      cefTRIAXone  1,000 mg Intravenous Q24H Teresita Ibrahim MD 1,000 mg (01/26/24 3646)    clopidogrel  75 mg Oral Daily NITHIN Mcbride      divalproex sodium  500 mg Oral Q12H Jl Brar MD      enoxaparin  40 mg Subcutaneous Q24H [facility] Rubio Herrera MD      labetalol  10 mg Intravenous Q6H PRN Ethel Mansfield MD      lidocaine  1 patch Topical Daily Jl Brar MD      multi-electrolyte  50 mL/hr Intravenous Continuous Ethel Mansfield MD 50 mL/hr (01/27/24 1105)    nicotine  1 patch Transdermal Daily Jl Brar MD      pantoprazole  40 mg Intravenous Q12H [facility] Jl Brar MD      trimethobenzamide  200 mg Intramuscular Q6H PRN Jl Brar MD               ** Please Note: Dictation voice to text software may have been used in the creation of this document. **    Ethel Mansfield MD  01/27/24  4:22 PM

## 2024-01-28 PROBLEM — E44.0 MODERATE PROTEIN-CALORIE MALNUTRITION (HCC): Status: ACTIVE | Noted: 2024-01-28

## 2024-01-28 LAB
ANION GAP SERPL CALCULATED.3IONS-SCNC: 9 MMOL/L
ATRIAL RATE: 104 BPM
BACTERIA UR CULT: NORMAL
BASOPHILS # BLD AUTO: 0.03 THOUSANDS/ÂΜL (ref 0–0.1)
BASOPHILS NFR BLD AUTO: 0 % (ref 0–1)
BUN SERPL-MCNC: 19 MG/DL (ref 5–25)
CALCIUM SERPL-MCNC: 8.3 MG/DL (ref 8.4–10.2)
CHLORIDE SERPL-SCNC: 101 MMOL/L (ref 96–108)
CO2 SERPL-SCNC: 23 MMOL/L (ref 21–32)
CREAT SERPL-MCNC: 0.71 MG/DL (ref 0.6–1.3)
EOSINOPHIL # BLD AUTO: 0.1 THOUSAND/ÂΜL (ref 0–0.61)
EOSINOPHIL NFR BLD AUTO: 1 % (ref 0–6)
ERYTHROCYTE [DISTWIDTH] IN BLOOD BY AUTOMATED COUNT: 14.6 % (ref 11.6–15.1)
GFR SERPL CREATININE-BSD FRML MDRD: 87 ML/MIN/1.73SQ M
GLUCOSE SERPL-MCNC: 99 MG/DL (ref 65–140)
HCT VFR BLD AUTO: 45.6 % (ref 34.8–46.1)
HGB BLD-MCNC: 15.7 G/DL (ref 11.5–15.4)
IMM GRANULOCYTES # BLD AUTO: 0.04 THOUSAND/UL (ref 0–0.2)
IMM GRANULOCYTES NFR BLD AUTO: 0 % (ref 0–2)
LYMPHOCYTES # BLD AUTO: 4.03 THOUSANDS/ÂΜL (ref 0.6–4.47)
LYMPHOCYTES NFR BLD AUTO: 30 % (ref 14–44)
MAGNESIUM SERPL-MCNC: 2.4 MG/DL (ref 1.9–2.7)
MCH RBC QN AUTO: 29 PG (ref 26.8–34.3)
MCHC RBC AUTO-ENTMCNC: 34.4 G/DL (ref 31.4–37.4)
MCV RBC AUTO: 84 FL (ref 82–98)
MONOCYTES # BLD AUTO: 1.05 THOUSAND/ÂΜL (ref 0.17–1.22)
MONOCYTES NFR BLD AUTO: 8 % (ref 4–12)
NEUTROPHILS # BLD AUTO: 8.17 THOUSANDS/ÂΜL (ref 1.85–7.62)
NEUTS SEG NFR BLD AUTO: 61 % (ref 43–75)
NRBC BLD AUTO-RTO: 0 /100 WBCS
P AXIS: 70 DEGREES
PLATELET # BLD AUTO: 189 THOUSANDS/UL (ref 149–390)
PMV BLD AUTO: 10.2 FL (ref 8.9–12.7)
POTASSIUM SERPL-SCNC: 3.7 MMOL/L (ref 3.5–5.3)
PR INTERVAL: 134 MS
QRS AXIS: 63 DEGREES
QRSD INTERVAL: 76 MS
QT INTERVAL: 386 MS
QTC INTERVAL: 507 MS
RBC # BLD AUTO: 5.41 MILLION/UL (ref 3.81–5.12)
SODIUM SERPL-SCNC: 133 MMOL/L (ref 135–147)
T WAVE AXIS: 65 DEGREES
VENTRICULAR RATE: 104 BPM
WBC # BLD AUTO: 13.42 THOUSAND/UL (ref 4.31–10.16)

## 2024-01-28 PROCEDURE — 97163 PT EVAL HIGH COMPLEX 45 MIN: CPT

## 2024-01-28 PROCEDURE — 85025 COMPLETE CBC W/AUTO DIFF WBC: CPT

## 2024-01-28 PROCEDURE — 99232 SBSQ HOSP IP/OBS MODERATE 35: CPT | Performed by: INTERNAL MEDICINE

## 2024-01-28 PROCEDURE — 99222 1ST HOSP IP/OBS MODERATE 55: CPT | Performed by: INTERNAL MEDICINE

## 2024-01-28 PROCEDURE — 97530 THERAPEUTIC ACTIVITIES: CPT

## 2024-01-28 PROCEDURE — 97167 OT EVAL HIGH COMPLEX 60 MIN: CPT

## 2024-01-28 PROCEDURE — 97535 SELF CARE MNGMENT TRAINING: CPT

## 2024-01-28 PROCEDURE — 83735 ASSAY OF MAGNESIUM: CPT

## 2024-01-28 PROCEDURE — C9113 INJ PANTOPRAZOLE SODIUM, VIA: HCPCS

## 2024-01-28 PROCEDURE — 80048 BASIC METABOLIC PNL TOTAL CA: CPT

## 2024-01-28 RX ORDER — TRAZODONE HYDROCHLORIDE 50 MG/1
50 TABLET ORAL
Status: DISCONTINUED | OUTPATIENT
Start: 2024-01-28 | End: 2024-01-28

## 2024-01-28 RX ORDER — ESCITALOPRAM OXALATE 10 MG/1
10 TABLET ORAL DAILY
Status: DISCONTINUED | OUTPATIENT
Start: 2024-01-29 | End: 2024-01-29

## 2024-01-28 RX ORDER — POTASSIUM CHLORIDE 20 MEQ/1
40 TABLET, EXTENDED RELEASE ORAL ONCE
Status: COMPLETED | OUTPATIENT
Start: 2024-01-28 | End: 2024-01-28

## 2024-01-28 RX ORDER — LANOLIN ALCOHOL/MO/W.PET/CERES
3 CREAM (GRAM) TOPICAL
Status: DISCONTINUED | OUTPATIENT
Start: 2024-01-28 | End: 2024-01-29 | Stop reason: HOSPADM

## 2024-01-28 RX ORDER — LANOLIN ALCOHOL/MO/W.PET/CERES
3 CREAM (GRAM) TOPICAL ONCE
Status: COMPLETED | OUTPATIENT
Start: 2024-01-28 | End: 2024-01-28

## 2024-01-28 RX ADMIN — ATORVASTATIN CALCIUM 80 MG: 80 TABLET, FILM COATED ORAL at 16:40

## 2024-01-28 RX ADMIN — AMLODIPINE BESYLATE 5 MG: 5 TABLET ORAL at 08:09

## 2024-01-28 RX ADMIN — NICOTINE 1 PATCH: 7 PATCH, EXTENDED RELEASE TRANSDERMAL at 08:11

## 2024-01-28 RX ADMIN — Medication 3 MG: at 22:17

## 2024-01-28 RX ADMIN — PANTOPRAZOLE SODIUM 40 MG: 40 INJECTION, POWDER, FOR SOLUTION INTRAVENOUS at 21:08

## 2024-01-28 RX ADMIN — PANTOPRAZOLE SODIUM 40 MG: 40 INJECTION, POWDER, FOR SOLUTION INTRAVENOUS at 08:09

## 2024-01-28 RX ADMIN — Medication 3 MG: at 02:25

## 2024-01-28 RX ADMIN — ACETAMINOPHEN 650 MG: 325 TABLET ORAL at 08:09

## 2024-01-28 RX ADMIN — CEFTRIAXONE 1000 MG: 1 INJECTION, SOLUTION INTRAVENOUS at 16:41

## 2024-01-28 RX ADMIN — DIVALPROEX SODIUM 500 MG: 500 TABLET, DELAYED RELEASE ORAL at 04:54

## 2024-01-28 RX ADMIN — CLOPIDOGREL 75 MG: 75 TABLET ORAL at 08:09

## 2024-01-28 RX ADMIN — ENOXAPARIN SODIUM 40 MG: 40 INJECTION SUBCUTANEOUS at 08:09

## 2024-01-28 RX ADMIN — DIVALPROEX SODIUM 500 MG: 500 TABLET, DELAYED RELEASE ORAL at 16:40

## 2024-01-28 RX ADMIN — POTASSIUM CHLORIDE 40 MEQ: 1500 TABLET, EXTENDED RELEASE ORAL at 08:09

## 2024-01-28 RX ADMIN — LIDOCAINE 1 PATCH: 50 PATCH TOPICAL at 08:10

## 2024-01-28 RX ADMIN — CARVEDILOL 12.5 MG: 12.5 TABLET, FILM COATED ORAL at 08:10

## 2024-01-28 NOTE — OCCUPATIONAL THERAPY NOTE
Occupational Therapy Note     01/27/24 1320   Note Type   Note type Evaluation;Cancelled Session   Cancel Reasons Patient off floor/test  (per nurse pt at MRI)   Licensure   [place] License Number  Mary Rose E. Blanes, [place], OTR/L [id number]

## 2024-01-28 NOTE — PROGRESS NOTES
Progress Note - Cardiology   [facility]     Nereyda Soler 68 y.o. female MRN: [id number]  : 1955  Unit/Bed#: [address] [place] [address] Encounter: [id number]    ASSESSMENT:  Epigastric discomfort and lower chest wall reproducible tenderness.     Mildly elevated troponin in setting of XOCHITL/dehydration, fall with unknown downtime and possible rhabdo, uncontrolled hypertension with presenting BP of 213/95 with LVH.  Therefore troponin elevation is probably nonischemic myocardial injury     Hypertension with hypertensive urgency  BP is improved     XOCHITL/dehydration, anorexia  Severe polycythemia with hemoglobin of 23  Leukocytosis with white count of 21.71  Possible UTI     RECOMMENDATIONS:  Continue Plavix, atorvastatin, amlodipine and Coreg  Patient is allergic to aspirin.  Pharmacologic nuclear stress test tomorrow  Consider GI evaluation           Subjective / Objective:     Review of Systems  Awake, alert.  Has dyspnea on exertion.  Denies any other cardiac symptoms  Vitals: Blood pressure 140/66, pulse 72, temperature 97.9 °F (36.6 °C), resp. rate 18, height 5' (1.524 m), weight 57.2 kg (126 lb), SpO2 96%.  Vitals:    24 1309 24 1420   Weight: 57.2 kg (126 lb) 57.2 kg (126 lb)     Body mass index is 24.61 kg/m².  BP Readings from Last 3 Encounters:   24 140/66   12/15/23 140/80   23 120/80     Orthostatic Blood Pressures      Flowsheet Row Most Recent Value   Blood Pressure 140/66 filed at 2024 0720   Patient Position - Orthostatic VS Lying filed at 2024 1145          I/O          0701   0700  0701   07 07 0700    IV Piggyback 1000      Total Intake(mL/kg) 1000 (17.5)      Net +1000             Unmeasured Urine Occurrence 1 x 1 x           Invasive Devices       Peripheral Intravenous Line  Duration             Peripheral IV 24 Proximal;Right;Ventral (anterior) Forearm 1 day                    No intake or output  data in the 24 hours ending 01/28/24 1036      Physical Exam:   Neurologic:  Alert & oriented x 3,  no focal deficits noted   Constitutional:  Well developed, well nourished,  With no acute distress  Eyes:  PERRL, conjunctiva normal   HENT:  Atraumatic, external ears normal, nose normal, .  NECK: Normal range of motion, no tenderness, neck is supple , No JVP  Respiratory:  Bilateral air entry, mostly clear to auscultation  Cardiovascular: Regular S1-S2 with a I/VI systolic murmur.  No pericardial rub or gallop audible  GI:  Soft, nondistended, audible bowel sounds, positive tenderness in upper abdomen/epigastrium no hepatosplenomegaly appreciated  Musculoskeletal:  No tenderness, no deformities.    Extremities:  No appreciable pitting edema. Distal pulses are present  Psychiatric:  Speech and behavior appropriate           Medications/ Allergies:     Current Facility-Administered Medications   Medication Dose Route Frequency Provider Last Rate    acetaminophen  650 mg Oral Q6H PRN Jl Brar MD      albuterol  2 puff Inhalation Q6H PRN Jl Brar MD      amLODIPine  5 mg Oral Daily Ethel Mansfield MD      atorvastatin  80 mg Oral Daily With Dinner Jl Brar MD      carvedilol  12.5 mg Oral Q12H Ethel Mansfield MD      cefTRIAXone  1,000 mg Intravenous Q24H Teresita Ibrahim MD 1,000 mg (01/27/24 1642)    clopidogrel  75 mg Oral Daily NITHIN Mcbride      divalproex sodium  500 mg Oral Q12H Jl Brar MD      enoxaparin  40 mg Subcutaneous Q24H [facility] Rubio Herrera MD      labetalol  10 mg Intravenous Q6H PRN Ethel Mansfield MD      lidocaine  1 patch Topical Daily Jl Brar MD      multi-electrolyte  50 mL/hr Intravenous Continuous Ethel Mansfield MD 50 mL/hr (01/27/24 1105)    nicotine  1 patch Transdermal Daily Jl Brar MD      pantoprazole  40 mg Intravenous Q12H [facility] Jl Brar MD      traZODone  50 mg Oral HS Sayra Mcgee DO      trimethobenzamide  200 mg Intramuscular Q6H PRN Jl Brar MD    "    acetaminophen, 650 mg, Q6H PRN  albuterol, 2 puff, Q6H PRN  labetalol, 10 mg, Q6H PRN  trimethobenzamide, 200 mg, Q6H PRN      Allergies   Allergen Reactions    Aspirin Anaphylaxis    Nsaids Anaphylaxis    Asparaginase Derivatives     Ibuprofen Hives    Pegaspargase Other (See Comments)    Robaxin [Methocarbamol]     Toradol [Ketorolac Tromethamine]            Labs:   Troponins:  Results from last 7 days   Lab Units 01/27/24  0531 01/26/24  0828   CK TOTAL U/L 114 131       CBC with diff:  Results from last 7 days   Lab Units 01/28/24  0502 01/27/24  0531 01/26/24  1347 01/26/24  0828   WBC Thousand/uL 13.42* 17.05* 21.71* 20.42*   HEMOGLOBIN g/dL 15.7* 17.0* 21.2* 23.0*   HEMATOCRIT % 45.6 48.3* 58.9* 63.4*   MCV fL 84 84 84 83   PLATELETS Thousands/uL 189 203 235 236   RBC Million/uL 5.41* 5.72* 7.01* 7.61*   MCH pg 29.0 30.2 30.2 30.2   MCHC g/dL 34.4 35.8 36.0 36.3   RDW % 14.6 15.1 16.8* 17.3*   MPV fL 10.2 10.8 10.3 9.5   NRBC AUTO /100 WBCs 0 0  --  0       CMP:  Results from last 7 days   Lab Units 01/28/24  0502 01/27/24  0531 01/26/24  0828   SODIUM mmol/L 133* 136 135   POTASSIUM mmol/L 3.7 3.3* 3.8   CHLORIDE mmol/L 101 102 97   CO2 mmol/L 23 23 21   ANION GAP mmol/L 9 11 17   BUN mg/dL 19 31* 25   CREATININE mg/dL 0.71 1.01 1.67*   CALCIUM mg/dL 8.3* 8.4 10.7*   AST U/L  --  15 19   ALT U/L  --  8 13   ALK PHOS U/L  --  87 152*   TOTAL PROTEIN g/dL  --  6.3* 9.0*   ALBUMIN g/dL  --  3.4* 4.6   TOTAL BILIRUBIN mg/dL  --  0.73 1.28*   EGFR ml/min/1.73sq m 87 57 31       Magnesium:  Results from last 7 days   Lab Units 01/28/24  0502 01/27/24  0531   MAGNESIUM mg/dL 2.4 1.7*     Coags:  Results from last 7 days   Lab Units 01/27/24  0812 01/27/24  0019 01/26/24  1642 01/26/24  0828   PTT seconds 59* 125* 46* 33   INR   --   --  1.25* 1.12     TSH:  Results from last 7 days   Lab Units 01/26/24  0828   TSH 3RD GENERATON uIU/mL 5.056*     No components found for: \"TSH3\"  Lipid Profile:  Results from last " "7 days   Lab Units 01/26/24  0828   CHOLESTEROL mg/dL 206*   TRIGLYCERIDES mg/dL 212*   HDL mg/dL 62   LDL CALC mg/dL 102*     Hgb A1c:  Results from last 7 days   Lab Units 01/26/24  1347   HEMOGLOBIN A1C % 6.3*     NT-proBNP: No results for input(s): \"NTBNP\" in the last 72 hours.     Imaging & Testing   I have personally reviewed pertinent reports.    MRI brain wo contrast    Result Date: 1/27/2024  Narrative: MRI BRAIN WITHOUT CONTRAST INDICATION: Seizure, headache, and visual change. COMPARISON:   None. TECHNIQUE:  Multiplanar, multisequence imaging of the brain was performed. IMAGE QUALITY:  Diagnostic. FINDINGS: BRAIN PARENCHYMA: Encephalomalacia identified in the right occipital lobe compatible with chronic infarct. No acute intracranial abnormality. there are no white matter changes in the cerebral hemispheres. VENTRICLES:  Normal for the patient's age. SELLA AND PITUITARY GLAND:  Normal. ORBITS:  Normal. PARANASAL SINUSES:  Normal. VASCULATURE:  Evaluation of the major intracranial vasculature demonstrates appropriate flow voids. CALVARIUM AND SKULL BASE:  Normal. EXTRACRANIAL SOFT TISSUES:  Normal.     Impression: No acute intracranial abnormality. Chronic infarct right occipital lobe. Workstation performed: [id number]     XR chest portable    Result Date: 1/27/2024  Narrative: CHEST INDICATION:   chest pain and weight loss. COMPARISON:  None EXAM PERFORMED/VIEWS:  XR CHEST PORTABLE FINDINGS: Cardiomediastinal silhouette appears unremarkable. The lungs are clear.  No pneumothorax or pleural effusion. Osseous structures appear within normal limits for patient age.     Impression: No acute cardiopulmonary disease. Workstation performed: [id number]     Echo complete w/ contrast if indicated    Result Date: 1/26/2024  Narrative:   Left Ventricle: Left ventricular cavity size is normal. Wall thickness is mildly increased. There is mild concentric hypertrophy. The left ventricular ejection fraction is 75%. " Systolic function is hyperdynamic. Wall motion is normal. Diastolic function is normal.   Right Ventricle: Systolic function is low normal.   Mitral Valve: There is trace regurgitation.   Tricuspid Valve: There is trace regurgitation.     CT abdomen pelvis with contrast    Result Date: 1/26/2024  Narrative: CT ABDOMEN AND PELVIS WITH IV CONTRAST INDICATION: Abdominal pain, acute, nonlocalized Abdominal pain vomiting. COMPARISON: CT 2/8/2021. TECHNIQUE: CT examination of the abdomen and pelvis was performed. Multiplanar 2D reformatted images were created from the source data. This examination, like all CT scans performed in the [facility], was performed utilizing techniques to minimize radiation dose exposure, including the use of iterative reconstruction and automated exposure control. Radiation dose length product (DLP) for this visit: 413.27 mGy-cm IV Contrast: 100 mL of iohexol (OMNIPAQUE) Enteric Contrast: Not administered. FINDINGS: ABDOMEN LOWER CHEST: No clinically significant abnormality in the visualized lower chest. Stable minimal scar in the lateral left lower lobe on 2/70. LIVER/BILIARY TREE: Unremarkable. GALLBLADDER: No calcified gallstones. No pericholecystic inflammatory change. SPLEEN: Unremarkable. PANCREAS: Unremarkable. ADRENAL GLANDS: Unremarkable. KIDNEYS/URETERS: No hydronephrosis or urinary tract calculi. Simple appearing cysts and subcentimeter hypoattenuating lesions, too small to characterize but statistically likely benign, which do not warrant follow-up (Radiology June 2019). STOMACH AND BOWEL: Colonic diverticulosis without findings of acute diverticulitis. APPENDIX: No findings to suggest appendicitis. ABDOMINOPELVIC CAVITY: No ascites. No pneumoperitoneum. No lymphadenopathy. VESSELS: Atherosclerosis without abdominal aortic aneurysm. 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 PELVIS REPRODUCTIVE ORGANS: Unremarkable for patient's age. URINARY BLADDER:  Unremarkable. ABDOMINAL WALL/INGUINAL REGIONS: Unremarkable. BONES: No acute fracture or suspicious osseous lesion. Spinal degenerative changes.     Impression: No acute pathology. Colonic diverticulosis. Workstation performed: [id number]     CT head without contrast    Result Date: 1/26/2024  Narrative: CT BRAIN - WITHOUT CONTRAST INDICATION:   Syncope, recurrent Syncope. COMPARISON: None. TECHNIQUE:  CT examination of the brain was performed.  Multiplanar 2D reformatted images were created from the source data. Radiation dose length product (DLP) for this visit:  845.7 mGy-cm .  This examination, like all CT scans performed in the [facility], was performed utilizing techniques to minimize radiation dose exposure, including the use of iterative reconstruction and automated exposure control. IMAGE QUALITY:  Diagnostic. FINDINGS: PARENCHYMA: Decreased attenuation is noted in periventricular and subcortical white matter demonstrating an appearance that is statistically most likely to represent mild microangiopathic change. No CT signs of acute infarction. Partially calcified pineal region cyst measuring 1.2 x 0.9 cm. No mass effect or midline shift.  No acute parenchymal hemorrhage. VENTRICLES AND EXTRA-AXIAL SPACES:  Normal for the patient's age. VISUALIZED ORBITS: Normal visualized orbits. PARANASAL SINUSES: Normal visualized paranasal sinuses. CALVARIUM AND EXTRACRANIAL SOFT TISSUES:  Normal.     Impression: No acute intracranial abnormality. Workstation performed: [id number]        Cardiac testing:   Results for orders placed during the hospital encounter of 02/08/21    Echo complete with contrast if indicated    Narrative  [address] [id number] [phone]    Transthoracic Echocardiogram  Limited 2D, M-mode, Doppler, and Color Doppler    Study date:  09-Feb-2021    Patient: ENDY WOODARD  MR number: [id number]  Account number:  [id number]  : 1955  Age: 65 years  Gender: Female  Status: Outpatient  Location: Bedside  Height: 60 in  Weight: 174.7 lb  BP: 138/ 84 mmHg    Indications: Hypertension    Diagnoses: 401.9 - HYPERTENSION NOS    Sonographer:  AUNG Avila  Primary Physician:  Nunu Andre DO  Referring Physician:  NITHIN Mcbride  Group:  [facility]  Interpreting Physician:  Med Andrews MD    SUMMARY    LEFT VENTRICLE:  Systolic function was normal. Ejection fraction was estimated in the range of 60 % to 65 % to be 60 %.  There were no regional wall motion abnormalities.  Wall thickness was mildly increased.  Doppler parameters were consistent with abnormal left ventricular relaxation (grade 1 diastolic dysfunction).    HISTORY: PRIOR HISTORY: HTN, COPD, Seizures, SIRS, Lung Nodule, Depression, Obesity, Liver Disease, Chronic Pain    PROCEDURE: The procedure was performed at the bedside. This was a routine study. The transthoracic approach was used. The study included limited 2D imaging, M-mode, limited spectral Doppler, and color Doppler. The heart rate was 96 bpm, at  the start of the study. Images were not obtained from the subcostal or suprasternal notch acoustic windows. Echocardiographic views were limited due to restricted patient mobility, poor patient compliance, and poor acoustic window  availability. This was a technically difficult study.    LEFT VENTRICLE: Size was normal. Systolic function was normal. Ejection fraction was estimated in the range of 60 % to 65 % to be 60 %. There were no regional wall motion abnormalities. Wall thickness was mildly increased. No evidence of  apical thrombus. DOPPLER: Doppler parameters were consistent with abnormal left ventricular relaxation (grade 1 diastolic dysfunction).    RIGHT VENTRICLE: The size was normal. Systolic function was normal with TAPSE-2.1cm Wall thickness was normal.    LEFT ATRIUM: Size was normal.    RIGHT ATRIUM: Size  was normal.    MITRAL VALVE: Valve structure was normal. There was normal leaflet separation. DOPPLER: The transmitral velocity was within the normal range. There was no evidence for stenosis. There was no significant regurgitation.    AORTIC VALVE: The valve was trileaflet. Leaflets exhibited normal thickness and normal cuspal separation. DOPPLER: Transaortic velocity was within the normal range. There was no evidence for stenosis. There was no significant  regurgitation.    TRICUSPID VALVE: The valve structure was normal. There was normal leaflet separation. DOPPLER: The transtricuspid velocity was within the normal range. There was no evidence for stenosis. There was no significant regurgitation.    PULMONIC VALVE: Leaflets exhibited normal thickness, no calcification, and normal cuspal separation. DOPPLER: The transpulmonic velocity was within the normal range. There was no significant regurgitation.    PERICARDIUM: There was no pericardial effusion. The pericardium was normal in appearance.    AORTA: The root exhibited normal size.    SYSTEMIC VEINS: IVC: The inferior vena cava was not well visualized.    SYSTEM MEASUREMENT TABLES    2D  EF (Teich): 62.89 %  %FS: 33.2 %  Ao Diam: 2.73 cm  EDV(Teich): 50.41 ml  ESV(Teich): 18.71 ml  HR_2Ch_Q: 86.54 bpm  HR_4Ch_Q: 94.74 bpm  IVSd: 1.02 cm  LA Diam: 3.34 cm  LAAs A4C: 9.18 cm2  LAESV A-L A4C: 19 ml  LAESV MOD A4C: 17.5 ml  LALs A4C: 3.77 cm  LVCO_2Ch_Q: 4.98 L/min  LVCO_4Ch_Q: 5.03 L/min  LVCO_BiP_Q: 5.01 L/min  LVEF_2Ch_Q: 63.41 %  LVEF_4Ch_Q: 59.79 %  LVEF_BiP_Q: 62.35 %  LVIDd: 3.49 cm  LVIDs: 2.33 cm  LVLd_2Ch_Q: 7.76 cm  LVLd_4Ch_Q: 8.44 cm  LVLs_2Ch_Q: 6.63 cm  LVLs_4Ch_Q: 7.05 cm  LVPWd: 0.94 cm  LVSV_2Ch_Q: 57.55 ml  LVSV_4Ch_Q: 53.09 ml  LVSV_BiP_Q: 56.21 ml  LVVED_2Ch_Q: 90.76 ml  LVVED_4Ch_Q: 88.79 ml  LVVED_BiP_Q: 90.15 ml  LVVES_2Ch_Q: 33.21 ml  LVVES_4Ch_Q: 35.7 ml  LVVES_BiP_Q: 33.94 ml  Jana A4C: 10.22 cm2  RAEDV A-L: 20.23 ml  RAEDV MOD: 19.24  "ml  RALd: 4.38 cm  RVIDd: 2.26 cm  SV (Teich): 31.7 ml    PW  MV E/A Ratio: 0.88    [employer] Accredited Echocardiography Laboratory    Prepared and electronically signed by    Med Andrews MD  Signed 09-Feb-2021 13:21:15            Dr. Rubio Herrera MD, [facility]      \"This note has been constructed using a voice recognition system.Therefore there may be syntax, spelling, and/or grammatical errors. Please call if you have any questions. \"  "

## 2024-01-28 NOTE — MALNUTRITION/BMI
This medical record reflects one or more clinical indicators suggestive of malnutrition.    Malnutrition Findings:   Adult Malnutrition type: Chronic illness  Adult Degree of Malnutrition: Malnutrition of moderate degree  Malnutrition Characteristics: Fat loss, Muscle loss, Weight loss                  360 Statement: Malnutrition of moderated degree r/t inadequate oral intake, chronic illness e/b muscle wasting and subcutaneous fat loss of orbital region and extremeties, pt report of oral aversions and taste changes, and significant wt loss of 11.3% ~ 1 month and 19.2% ~10 months. Will treat with supplement.    BMI Findings:           Body mass index is 24.61 kg/m².     See Nutrition note dated 1/28/24 for additional details.  Completed nutrition assessment is viewable in the nutrition documentation.

## 2024-01-28 NOTE — CONSULTS
Consultation -  [facility]  Nereyda Soler 68 y.o. female MRN: [id number]  Unit/Bed#: [address] [place] [address] Encounter: [id number]        Inpatient consult to gastroenterology  Consult performed by: Tomy Aj MD  Consult ordered by: Ethel Mansfield MD          ASSESSMENT/PLAN:   68-year-old female, history of seizure disorder, admitted with vague symptoms of weakness, syncope, we have been called for vague epigastric discomfort and weight loss.    Epigastric discomfort: Unclear etiology, peptic ulcer disease, gastritis on the differential  -Continue twice daily PPI therapy  -Trial of Carafate  -Outpatient upper endoscopy  -Diet as tolerated    2.  Unintentional weight loss:  -Laboratory studies are normal, CAT scan normal  -Encourage patient to take p.o.  -Will schedule patient for colonoscopy as an outpatient at the same time as her upper endoscopy to exclude GI luminal pathology    GI will sign off          ______________________________________________________________________    Reason for Consult / Principal Problem: Epigastric discomfort    HPI: Nereyda Soler is a 68 y.o. year old female who presents with Syncope 68-year-old female, admitted with vague symptoms, weakness, near syncopal episode, complains of vague epigastric abdominal discomfort for undefined period of time several months to weeks.  Patient reports that she is lost weight unintentionally approximately 47 pounds over the last 6 months.  She reports that she has an epigastric burning, occurs before she eats, she has food aversion, has vomiting when she eats.  The distal episodes only occur once every several weeks, and interim she is able to eat.  She typically has regular bowel moods, occasionally have loose stools.  Denies any melena, rectal oozing, dysphagia, denies any typical heartburn symptoms.    Patient is very vague.  Laboratory studies since admission have been unremarkable.  Has been evaluated for cardiac presentation.   Found to have significant hemoconcentration on admission.    Reports having a  in the past    Was a smoker.  No alcohol.  Retired construction.    Family history is notable for prostate cancer no GI associated malignancies.  History of breast cancer.    Review of Systems:  The remainder of the review of systems was negative except for the pertinent positives noted in HPI.     Historical Information   Past Medical History:   Diagnosis Date    COPD (chronic obstructive pulmonary disease) (HCC)     Liver disease 2017    Patient mentions having nonalcoholic liver disease. Denies alcohol use.    Radiculopathy of lumbar region     last assessed 17    Seizures (HCC)      Past Surgical History:   Procedure Laterality Date     SECTION      EPIDURAL BLOCK INJECTION Right 2017    Procedure: BLOCK / INJECTION EPIDURAL STEROID TRANSFORAMINAL   L4-5;  Surgeon: Sanford De La Vega MD;  Location: [facility] MAIN OR;  Service:     TONSILLECTOMY       Social History   Social History     Substance and Sexual Activity   Alcohol Use Not Currently    Comment: Per allscripts: Social     Social History     Substance and Sexual Activity   Drug Use Not Currently    Types: Marijuana     Social History     Tobacco Use   Smoking Status Some Days    Current packs/day: 0.50    Types: Cigarettes   Smokeless Tobacco Current   Tobacco Comments    Per allscripts: Current everyday smoker     Family History   Problem Relation Age of Onset    Diabetes Mother     Hyperlipidemia Mother     Cancer Father     Prostate cancer Father        Meds/Allergies     Medications Prior to Admission   Medication    albuterol (Ventolin HFA) 90 mcg/act inhaler    atorvastatin (LIPITOR) 80 mg tablet    carvedilol (COREG) 12.5 mg tablet    chlorthalidone (Thalitone) 15 MG tablet    divalproex sodium (DEPAKOTE) 250 mg EC tablet    losartan (COZAAR) 50 mg tablet    Blood Pressure KIT    nicotine (NICODERM CQ) 14 mg/24hr TD 24 hr patch    traZODone  (DESYREL) 50 mg tablet     Current Facility-Administered Medications   Medication Dose Route Frequency    acetaminophen (TYLENOL) tablet 650 mg  650 mg Oral Q6H PRN    albuterol (PROVENTIL HFA,VENTOLIN HFA) inhaler 2 puff  2 puff Inhalation Q6H PRN    amLODIPine (NORVASC) tablet 5 mg  5 mg Oral Daily    atorvastatin (LIPITOR) tablet 80 mg  80 mg Oral Daily With Dinner    carvedilol (COREG) tablet 12.5 mg  12.5 mg Oral Q12H    cefTRIAXone (ROCEPHIN) IVPB (premix in dextrose) 1,000 mg 50 mL  1,000 mg Intravenous Q24H    clopidogrel (PLAVIX) tablet 75 mg  75 mg Oral Daily    divalproex sodium (DEPAKOTE) DR tablet 500 mg  500 mg Oral Q12H    enoxaparin (LOVENOX) subcutaneous injection 40 mg  40 mg Subcutaneous Q24H JOVITA    labetalol (NORMODYNE) injection 10 mg  10 mg Intravenous Q6H PRN    lidocaine (LIDODERM) 5 % patch 1 patch  1 patch Topical Daily    multi-electrolyte (PLASMALYTE-A/ISOLYTE-S PH 7.4) IV solution  50 mL/hr Intravenous Continuous    nicotine (NICODERM CQ) 7 mg/24hr TD 24 hr patch 1 patch  1 patch Transdermal Daily    pantoprazole (PROTONIX) injection 40 mg  40 mg Intravenous Q12H JOVITA    traZODone (DESYREL) tablet 50 mg  50 mg Oral HS    trimethobenzamide (TIGAN) IM injection 200 mg  200 mg Intramuscular Q6H PRN       Allergies   Allergen Reactions    Aspirin Anaphylaxis    Nsaids Anaphylaxis    Asparaginase Derivatives     Ibuprofen Hives    Pegaspargase Other (See Comments)    Robaxin [Methocarbamol]     Toradol [Ketorolac Tromethamine]        Objective     Blood pressure 140/66, pulse 72, temperature 97.9 °F (36.6 °C), resp. rate 18, height 5' (1.524 m), weight 57.2 kg (126 lb), SpO2 96%.    No intake or output data in the 24 hours ending 01/28/24 1026    PHYSICAL EXAM     GEN: well nourished, well developed, no acute distress, elderly female sitting in bed  HEENT: anicteric, MMM, no cervical or supraclavicular lymphadenopathy  CV: RRR, no m/r/g  CHEST: CTA b/l, no WRR  ABD: +BS, soft, NT/ND, no  hepatosplenomegaly, no reproducible tenderness  EXT: no c/c/e  SKIN: no rashes,  NEURO: aaox3    Lab Results:   No results displayed because visit has over 200 results.        Imaging Studies: I have personally reviewed pertinent reports.

## 2024-01-28 NOTE — PLAN OF CARE
Problem: Potential for Falls  Goal: Patient will remain free of falls  Description: INTERVENTIONS:  - Educate patient/family on patient safety including physical limitations  - Instruct patient to call for assistance with activity   - Consult OT/PT to assist with strengthening/mobility   - Keep Call bell within reach  - Keep bed low and locked with side rails adjusted as appropriate  - Keep care items and personal belongings within reach  - Initiate and maintain comfort rounds  - Make Fall Risk Sign visible to staff  - Offer Toileting every 2 Hours, in advance of need  - Initiate/Maintain bed alarm  - Obtain necessary fall risk management equipment: walker   - Apply yellow socks and bracelet for high fall risk patients  - Consider moving patient to room near nurses station  Outcome: Progressing     Problem: PAIN - ADULT  Goal: Verbalizes/displays adequate comfort level or baseline comfort level  Description: Interventions:  - Encourage patient to monitor pain and request assistance  - Assess pain using appropriate pain scale  - Administer analgesics based on type and severity of pain and evaluate response  - Implement non-pharmacological measures as appropriate and evaluate response  - Consider cultural and social influences on pain and pain management  - Notify physician/advanced practitioner if interventions unsuccessful or patient reports new pain  Outcome: Progressing     Problem: INFECTION - ADULT  Goal: Absence or prevention of progression during hospitalization  Description: INTERVENTIONS:  - Assess and monitor for signs and symptoms of infection  - Monitor lab/diagnostic results  - Monitor all insertion sites, i.e. indwelling lines, tubes, and drains  - Monitor endotracheal if appropriate and nasal secretions for changes in amount and color  - Donal[place]on appropriate cooling/warming therapies per order  - Administer medications as ordered  - Instruct and encourage patient and family to use good hand hygiene  technique  - Identify and instruct in appropriate isolation precautions for identified infection/condition  Outcome: Progressing  Goal: Absence of fever/infection during neutropenic period  Description: INTERVENTIONS:  - Monitor WBC    Outcome: Progressing     Problem: SAFETY ADULT  Goal: Patient will remain free of falls  Description: INTERVENTIONS:  - Educate patient/family on patient safety including physical limitations  - Instruct patient to call for assistance with activity   - Consult OT/PT to assist with strengthening/mobility   - Keep Call bell within reach  - Keep bed low and locked with side rails adjusted as appropriate  - Keep care items and personal belongings within reach  - Initiate and maintain comfort rounds  - Make Fall Risk Sign visible to staff  - Offer Toileting every 2 Hours, in advance of need  - Initiate/Maintain bed alarm  - Obtain necessary fall risk management equipment: walker   - Apply yellow socks and bracelet for high fall risk patients  - Consider moving patient to room near nurses station  Outcome: Progressing  Goal: Maintain or return to baseline ADL function  Description: INTERVENTIONS:  -  Assess patient's ability to carry out ADLs; assess patient's baseline for ADL function and identify physical deficits which impact ability to perform ADLs (bathing, care of mouth/teeth, toileting, grooming, dressing, etc.)  - Assess/evaluate cause of self-care deficits   - Assess range of motion  - Assess patient's mobility; develop plan if impaired  - Assess patient's need for assistive devices and provide as appropriate  - Encourage maximum independence but intervene and supervise when necessary  - Involve family in performance of ADLs  - Assess for home care needs following discharge   - Consider OT consult to assist with ADL evaluation and planning for discharge  - Provide patient education as appropriate  Outcome: Progressing  Goal: Maintains/Returns to pre admission functional  level  Description: INTERVENTIONS:  - Perform AM-PAC 6 Click Basic Mobility/ Daily Activity assessment daily.  - Set and communicate daily mobility goal to care team and patient/family/caregiver.   - Collaborate with rehabilitation services on mobility goals if consulted  - Perform Range of Motion 3 times a day.  - Reposition patient every 2 hours.  - Dangle patient 3 times a day  - Stand patient 3 times a day  - Ambulate patient 3 times a day  - Out of bed to chair 3 times a day   - Out of bed for meals 3 times a day  - Out of bed for toileting  - Record patient progress and toleration of activity level   Outcome: Progressing     Problem: DISCHARGE PLANNING  Goal: Discharge to home or other facility with appropriate resources  Description: INTERVENTIONS:  - Identify barriers to discharge w/patient and caregiver  - Arrange for needed discharge resources and transportation as appropriate  - Identify discharge learning needs (meds, wound care, etc.)  - Arrange for interpretive services to assist at discharge as needed  - Refer to Case Management Department for coordinating discharge planning if the patient needs post-hospital services based on physician/advanced practitioner order or complex needs related to functional status, cognitive ability, or social support system  Outcome: Progressing     Problem: Knowledge Deficit  Goal: Patient/family/caregiver demonstrates understanding of disease process, treatment plan, medications, and discharge instructions  Description: Complete learning assessment and assess knowledge base.  Interventions:  - Provide teaching at level of understanding  - Provide teaching via preferred learning methods  Outcome: Progressing     Problem: NEUROSENSORY - ADULT  Goal: Achieves stable or improved neurological status  Description: INTERVENTIONS  - Monitor and report changes in neurological status  - Monitor vital signs such as temperature, blood pressure, glucose, and any other labs ordered    - Initiate measures to prevent increased intracranial pressure  - Monitor for seizure activity and implement precautions if appropriate      Outcome: Progressing  Goal: Remains free of injury related to seizures activity  Description: INTERVENTIONS  - Maintain airway, patient safety  and administer oxygen as ordered  - Monitor patient for seizure activity, document and report duration and description of seizure to physician/advanced practitioner  - If seizure occurs,  ensure patient safety during seizure  - Reorient patient post seizure  - Seizure pads on all 4 side rails  - Instruct patient/family to notify RN of any seizure activity including if an aura is experienced  - Instruct patient/family to call for assistance with activity based on nursing assessment  - Administer anti-seizure medications if ordered    Outcome: Progressing  Goal: Achieves maximal functionality and self care  Description: INTERVENTIONS  - Monitor swallowing and airway patency with patient fatigue and changes in neurological status  - Encourage and assist patient to increase activity and self care.   - Encourage visually impaired, hearing impaired and aphasic patients to use assistive/communication devices  Outcome: Progressing

## 2024-01-28 NOTE — OCCUPATIONAL THERAPY NOTE
"  Occupational Therapy Evaluation/Treatment       01/28/24 2624   OT Last Visit   OT Visit Date 01/28/24   Note Type   Note type Evaluation   Pain Assessment   Pain Assessment Tool 0-10   Pain Score No Pain   Restrictions/Precautions   Other Precautions Impulsive;Chair Alarm;Bed Alarm;Fall Risk;Seizure   Home Living   Type of Home Apartment  (in a house)   Home Layout Multi-level;Stairs to enter with rails  (full flight to enter apt to main level, 2 steps down to lving room, 2 more steps to to kitchen)   Bathroom Shower/Tub Walk-in shower   Bathroom Toilet Standard   Bathroom Equipment   (none)   Bathroom Accessibility Accessible   Home Equipment Cane;Walker   Additional Comments Pt ambulated independently PTA, but used AD as needed   Prior Function   Level of [place] Independent with ADLs;Independent with functional mobility;Independent with IADLS   Lives With Alone   Receives Help From Family   IADLs Family/Friend/Other provides transportation;Independent with meal prep;Independent with medication management   Falls in the last 6 months 1 to 4  (pt admitted to tripping frequently without actual falls as well)   Vocational Retired   Lifestyle   Reciprocal Relationships supportive family   General   Additional Pertinent History MRI brain: No acute intracranial abnormality. Chronic infarct right occipital lobe.   Family/Caregiver Present Yes   Additional General Comments daughter and granddaughter   Subjective   Subjective \"I rarely go to the doctor, let alone the hospital.\"   ADL   Where Assessed Chair   Eating Assistance 7  Independent   Grooming Assistance 5  Supervision/Setup   Grooming Deficit Steadying;Verbal cueing;Supervision/safety;Increased time to complete   UB Bathing Assistance 5  Supervision/Setup   UB Bathing Deficit Setup;Steadying;Supervision/safety;Increased time to complete   LB Bathing Assistance 4  Minimal Assistance   LB Bathing Deficit Setup;Steadying;Verbal " cueing;Supervision/safety;Increased time to complete;Right lower leg including foot;Left lower leg including foot   UB Dressing Assistance 5  Supervision/Setup   UB Dressing Deficit Setup;Increased time to complete   LB Dressing Assistance 4  Minimal Assistance   LB Dressing Deficit Setup;Steadying;Verbal cueing;Supervision/safety;Increased time to complete   Toileting Assistance  5  Supervision/Setup   Toileting Deficit Steadying;Verbal cueing;Supervison/safety;Grab bar use   Bed Mobility   Supine to Sit 7  Independent   Transfers   Sit to Stand 5  Supervision   Additional items Verbal cues   Stand to Sit 5  Supervision   Additional items Verbal cues   Stand pivot 5  Supervision   Additional items Verbal cues  (with RW)   Balance   Static Sitting Good   Dynamic Sitting Fair +   Static Standing Fair +   Dynamic Standing Fair  (with RW)   Ambulatory Fair  (with RW)   Activity Tolerance   Activity Tolerance Patient tolerated treatment well   RUE Assessment   RUE Assessment WFL   LUE Assessment   LUE Assessment WFL   Hand Function   Gross Motor Coordination Functional   Fine Motor Coordination Functional   Hand Function Comments pt reports occasional trigger fingers both hands; referred to hand specialist previously but did not follow up   Vision-Basic Assessment   Current Vision Wears glasses all the time   Cognition   Overall Cognitive Status WFL   Arousal/Participation Alert;Cooperative   Attention Attends with cues to redirect   Orientation Level Oriented X4   Memory Within functional limits   Following Commands Follows all commands and directions without difficulty   Comments impulsive at times, decreased safety awareness   Assessment   Limitation Decreased ADL status;Decreased UE strength;Decreased Safe judgement during ADL;Decreased endurance;Decreased self-care trans;Decreased high-level ADLs  (decreased balance and mobility)   Prognosis Good   Assessment Patient evaluated by Occupational Therapy.  Patient  "admitted with Syncope.  The patients occupational profile, medical and therapy history includes a extensive additional review of physical, cognitive, or psychosocial history related to current functional performance.  Comorbidities affecting functional mobility and ADLS include: COPD, HTN, Depression, anxiety, absence seizures, and DM well controlled, current smoker, recent unintentional weight loss, frequent falls/tripping at home, and decrease PO intake.  Prior to admission, patient was independent with functional mobility without assistive device, independent with ADLS, independent with IADLS, and living alone in multi-level story home with full flight of steps to enter.  The evaluation identifies the following performance deficits: weakness, impaired balance, decreased endurance, increased fall risk, new onset of impairment of functional mobility, decreased ADLS, decreased IADLS, decreased activity tolerance, decreased safety awareness, and decreased strength, that result in activity limitations and/or participation restrictions. This evaluation requires clinical decision making of high complexity, because the patient presents with comorbidites that affect occupational performance and required significant modification of tasks or assistance with consideration of multiple treatment options.  The Barthel Index was used as a functional outcome tool presenting with a score of Barthel Index Score: 70, indicating moderate limitations of functional mobility and ADLS.  The patient's raw score on the -PAC Daily Activity Inpatient Short Form is 21. A raw score of greater than or equal to 19 suggests the patient may benefit from discharge to home. Please refer to the recommendation of the Occupational Therapist for safe discharge planning.  Patient will benefit from skilled Occupational Therapy services to address above deficits and facilitate a safe return to prior level of function.   Goals   Patient Goals \"get back " "home to my animals\"   [facility] Time Frame   (1-7)   Short Term Goal #1 Grooming: independent standing at sink; Bathing: supervision; Upper Body Dressing independent; Lower Body Dressing: supervision; Toileting: independent;  Patient will increase standing tolerance to 5 minutes during ADL task to decrease assistance level and decrease fall risk; Patient will increase functional mobility to and from bathroom with no assistive device with supervision to increase performance with ADLS and to use a toilet; Patient will improve functional activity tolerance to 10 minutes of sustained functional tasks to increase participation in basic self-care and decrease assistance level;  Patient will be able to to verbalize understanding and perform energy conservation/proper body mechanics during ADLS and functional mobility at least 75% of the time with minimal cueing to decrease signs of fatigue and increase stamina to return to prior level of function; Patient will increase dynamic sitting balance to fair+ to improve the ability to sit at edge of bed or on a chair for ADLS;  Patient will increase dynamic standing balance to fair+ to improve postural stability and decrease fall risk during standing ADLS and transfers.   LTG Time Frame   (8-14)   Long Term Goal #1 Bathing: independent; Lower Body Dressing: ;   Patient will increase standing tolerance to 10 minutes during ADL task to decrease assistance level and decrease fall risk; Patient will increase functional mobility to and from bathroom with no assistive device independently to increase performance with ADLS and to use a toilet; Patient will improve functional activity tolerance to 20 minutes of sustained functional tasks to increase participation in basic self-care and decrease assistance level;  Patient will be able to to verbalize understanding and perform energy conservation/proper body mechanics during ADLS and functional mobility at least 90% of the time with no cueing to " decrease signs of fatigue and increase stamina to return to prior level of function; Patient will increase static/dynamic sitting balance to good to improve the ability to sit at edge of bed or on a chair for ADLS;  Patient will increase static/dynamic standing balance to fair+ to improve postural stability and decrease fall risk during standing ADLS and transfers; Pt will score >/= 24/24 on AM-PAC Daily Activity Inpatient scale to promote safe independence with ADLs and functional mobility; Pt will score >/= 90/100 on Barthel Index in order to decrease caregiver assistance needed and increase ability to perform ADLs and functional mobility.   Plan   Treatment Interventions ADL retraining;Functional transfer training;UE strengthening/ROM;Endurance training;Patient/family training;Equipment evaluation/education;Compensatory technique education;Cardiac education;Energy conservation;Activityengagement   Goal Expiration Date 02/12/24   OT Frequency 3-5x/wk   Discharge Recommendation   Rehab Resource Intensity Level, OT III (Minimum Resource Intensity)   Additional Comments  Pt may benefit from out-pt [facility]   AM-[facility] Daily Activity Inpatient   Lower Body Dressing 3   Bathing 3   Toileting 3   Upper Body Dressing 4   Grooming 4   Eating 4   Daily Activity Raw Score 21   Daily Activity Standardized Score (Calc for Raw Score >=11) 44.27   AM-PAC Applied Cognition Inpatient   Following a Speech/Presentation 4   Understanding Ordinary Conversation 4   Taking Medications 4   Remembering Where Things Are Placed or Put Away 4   Remembering List of 4-5 Errands 3   Taking Care of Complicated Tasks 4   Applied Cognition Raw Score 23   Applied Cognition Standardized Score 53.08   Barthel Index   Feeding 10   Bathing 0   Grooming Score 5   Dressing Score 5   Bladder Score 10   Bowels Score 10   Toilet Use Score 5   Transfers (Bed/Chair) Score 10   Mobility (Level Surface) Score 10   Stairs Score 5   Barthel Index Score 70  "  Additional Treatment Session   Start Time 1540   End Time 1602   Treatment Assessment Pt seen for ADL training. Education and training with teachback method begun with pt regarding energy conservation/proper body mechanics during ADLS and functional mobility to decrease fall risks, decrease signs of fatigue and increase stamina needed to return to prior level of function. Functional ambulation 150 feet in hallway to obtain ADL suppplies with supervision and RW. Mod verbal cues needed for pt to maintain visual attention forward vs looking around and backwards. Dynamic standing balance fair with RW. Returned to room and transfer to chair with Supervision and cues for safety. Pt is impulsive at times and forcefully \"flops\" into chair. Provided education about stroke per family's request, reviewed rehab goals, and provided emotional support to pt.  Recommended pt may benefit from a raised toilet seat, grab bars, and [facility] referral. Pt and family demonstrating good verbal understanding of all information provided and all questions answered. Patient left OOB in chair with all needs within reach and tab alarm in place. Continue OT per POC.   End of Consult   Education Provided Yes;Family or social support of family present for education by provider   Patient Position at End of Consult Bedside chair;Bed/Chair alarm activated;All needs within reach   Nurse Communication Nurse aware of consult   Licensure   [place] License Number  Frances Petersen MS, OTR/L, [place] Lic# [id number]     "

## 2024-01-28 NOTE — PROGRESS NOTES
Daily Progress Note - [facility]  Family Medicine Residency  Nereyda Soler 68 y.o. female MRN: [id number]  Unit/Bed#: [address] [place] [address] Encounter: [id number]  Admitting Physician: Sayra Mcgee DO  PCP: Waylon Wright MD  Date of Admission:  1/26/2024  7:45 AM    Summary:     IVFs: multi-electrolyte, Last Rate: 50 mL/hr (01/27/24 1105)      Diet: Diet Cardiovascular; Stress Test (1 Meal)  Diet Cardiovascular; Sodium 2 GM  VTE:  Enoxaparin (Lovenox) . Mechanical prophylaxis in place.   Patient Centered Rounds: I have performed bedside rounds with nursing staff today.  Specialists/Other Care Team Provider:   Cardiology   GI    LOS: 2 day(s)  Status: Inpatient   Disposition: The patient will continue to require additional inpatient hospital stay due to ischemia work up   Code Status: Level 1 - Full Code      Assessment and Plan  * Syncope  Assessment & Plan  68F with PMHx that includes COPD, HTN, Depression, absence seizures, and DM well controlled, presents after fall and syncopal episode at home. Patient reported frequent falls at home, and decrease PO intake over the last 5 days. Patient reports feel dizzy and tripping with LOC.      CT Head neg. CT Abd/Pelvis negative. Troponins elevated on admission. Cr elevated on admission likely in setting of dehydration.   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    Plan:  - Fall precautions  - Cardiology following for elevated troponins  - Discontinue IVF  - Monitor V/S    SIRS (systemic inflammatory response syndrome) (HCC)  Assessment & Plan  Presented with leukocytosis and tachycardia, tachypnea. LA wnl.    CT Head neg for acute findings. CT Abd/Pel neg for acute findings. WBC 20.4, (also Hb 23.0, possibly hemoconcentrated in setting of dehydration on top of underlying polychthemia). Pt reports dysuria, UA showed moderate bacteria, negative for nitrities or leukocytes,. Source possible UTI    - Check blood Cx  - Empiric  Rocephin  - Urine cx  - Trend CBC and fever curve    Elevated troponin level r/o ACS  Assessment & Plan  Presented with elevated troponin 249, epigastric pain, nausea. Patient presented after fall at home/syncopal episode. Reports she has been having chest heaviness that is not new. EKG showed sinus tachycardia, LVH, no obvious ST elevations. Possibly secondary to dehydration/XOCHITL.    Plan:  - Cardiology following  - Plan for chemical stress Monday  - Heparin ACS low   -heparin gtt discontinued    - Atorvastatin 80 mg   - Coreg 12.5 mg BID  - Plavix 75 mg daily. No aspirin as patient has allergy  - Echo normal EF, normal diastolic function    Nausea  Assessment & Plan  Presented with nausea, and gradual fatigue, poor appetite, and weight loss. Nonspecific abdominal pain. CT abdomen/pelvis showed no acute findings    - Zofran changed to Tigan given QTC prolongation  - GI following    - EGD outpatient per plan    QT prolongation  Assessment & Plan   on admission    - Received Zofran in the ED  - IM Tigan for nausea/vomiting    Elevated serum creatinine  Assessment & Plan  Cr 1.67 on admission with no recent comparison. Prior baseline 0.7-1. Possible prerenal XOCHITL in setting of dehydration, poor PO intake over the past few days.  Patient did receive IV contrast for CT scan on admission.    Plan:  - Improving renal function  - IV Fluids Plasmalyte at 50 cc/hr  - Home Losartan and Chlorthalidone held  - Avoid NSAID's  - Monitor I/O's  - Trend BMP    Elevated TSH  Assessment & Plan  TSH 5.05, T4 1.27  - recheck TSH reflex to T4 in a few days    Essential hypertension  Assessment & Plan  Chronic    BP elevated on admission possibly due to missed doses in the setting of nausea    Plan:  - Coreg 12.5 mg BID  - IV labetalol PRN if systolic >180  - start norvasc 10mg  - Hold Cozaar and Chlorthalidone in the setting of possible XOCHITL    COPD (chronic obstructive pulmonary disease) (HCC)  Assessment & Plan  Chronic    Current  "smoker. Does not use O2 at home. Only uses rescue inhaler as needed  Does not appear to be in acute exacerbation on admission    - Continue Albuterol PRN  - Monitor O2 sats    Type 2 diabetes mellitus without complication, without long-term current use of insulin ([facility])  Assessment & Plan  Lab Results   Component Value Date    HGBA1C 6.3 (H) 2024     Diet controlled    History of absence seizures  Assessment & Plan  Patient reports having history of both Grand mal vs absence seizure  - Previously on Depakote, however did not need to take as she was not having episodes  - On admission, patient reports she has resumed taking Depakote daily due to recent \"staring episodes\"  - Depakote level low    Plan:  - Continue Depakote for now  - Monitor for signs of seizure    Depression  Assessment & Plan  Previously on Trazodone, however not taking due to drowsiness  - Hold home Trazodone in setting of recent falls    Intervertebral disc disorder with radiculopathy of lumbosacral region  Assessment & Plan  Chronic, stable   - Lidocaine patch  - Tylenol PRN      Subjective:   Patient evaluated at bedside, reporting that nausea is improving, able to tolerate PO intake. Additionally noting that she is having some significant anxiety.     Objective     Objective:   Vitals:   Temp (24hrs), Av.9 °F (36.6 °C), Min:97.8 °F (36.6 °C), Max:97.9 °F (36.6 °C)    Temp:  [97.8 °F (36.6 °C)-97.9 °F (36.6 °C)] 97.9 °F (36.6 °C)  HR:  [66-80] 66  Resp:  [18-20] 20  BP: (140-200)/(65-89) 148/65  SpO2:  [93 %-96 %] 95 %  Body mass index is 24.61 kg/m².     Input and Output Summary (last 24 hours):     No intake or output data in the 24 hours ending 24 1530    Physical Exam:   Physical Exam  Vitals reviewed.   Constitutional:       General: She is not in acute distress.     Appearance: Normal appearance. She is not ill-appearing.   HENT:      Head: Normocephalic and atraumatic.   Eyes:      Pupils: Pupils are equal, round, and " reactive to light.   Cardiovascular:      Rate and Rhythm: Normal rate and regular rhythm.      Pulses: Normal pulses.      Heart sounds: Normal heart sounds. No murmur heard.  Pulmonary:      Effort: Pulmonary effort is normal. No respiratory distress.      Breath sounds: Normal breath sounds. No wheezing.   Abdominal:      General: Bowel sounds are normal.      Palpations: Abdomen is soft.      Tenderness: There is no abdominal tenderness.   Musculoskeletal:      Right lower leg: No edema.      Left lower leg: No edema.   Skin:     General: Skin is warm and dry.   Neurological:      General: No focal deficit present.      Mental Status: She is alert and oriented to person, place, and time.   Psychiatric:         Mood and Affect: Mood normal.         Behavior: Behavior normal.         Additional Data:     Labs:  Results from last 7 days   Lab Units 01/28/24  0502   WBC Thousand/uL 13.42*   HEMOGLOBIN g/dL 15.7*   HEMATOCRIT % 45.6   PLATELETS Thousands/uL 189   NEUTROS PCT % 61   LYMPHS PCT % 30   MONOS PCT % 8   EOS PCT % 1     Results from last 7 days   Lab Units 01/28/24  0502 01/27/24  0531   POTASSIUM mmol/L 3.7 3.3*   CHLORIDE mmol/L 101 102   CO2 mmol/L 23 23   BUN mg/dL 19 31*   CREATININE mg/dL 0.71 1.01   CALCIUM mg/dL 8.3* 8.4   ALK PHOS U/L  --  87   ALT U/L  --  8   AST U/L  --  15     Results from last 7 days   Lab Units 01/26/24  1642   INR  1.25*     Results from last 7 days   Lab Units 01/26/24  0809   POC GLUCOSE mg/dl 138     Results from last 7 days   Lab Units 01/26/24  1347   HEMOGLOBIN A1C % 6.3*       * I Have Reviewed All Lab Data Listed Above.  * Additional Pertinent Lab Tests Reviewed: All Labs Within Last 24 Hours Reviewed    Imaging:    Imaging Reports Reviewed Today Include: none  Imaging Personally Reviewed by Myself Includes:  none    Recent Cultures (last 7 days):     Results from last 7 days   Lab Units 01/26/24  1642 01/26/24  1055   BLOOD CULTURE  No Growth at 24 hrs.  No Growth  at 24 hrs.  --    URINE CULTURE   --  Culture too young- will reincubate       Last 24 Hours Medication List:   Current Facility-Administered Medications   Medication Dose Route Frequency Provider Last Rate    acetaminophen  650 mg Oral Q6H PRN Jl Brar MD      albuterol  2 puff Inhalation Q6H PRN Jl Brar MD      amLODIPine  5 mg Oral Daily Ethel Mansfield MD      atorvastatin  80 mg Oral Daily With Dinner Jl Brar MD      carvedilol  12.5 mg Oral Q12H Ethel Mansfield MD      cefTRIAXone  1,000 mg Intravenous Q24H Teresita Ibrahim MD 1,000 mg (01/27/24 1642)    clopidogrel  75 mg Oral Daily NITHIN Mcbride      divalproex sodium  500 mg Oral Q12H Jl Brar MD      enoxaparin  40 mg Subcutaneous Q24H [facility] Rubio Herrera MD      labetalol  10 mg Intravenous Q6H PRN Ethel Mansfield MD      lidocaine  1 patch Topical Daily Jl Brar MD      multi-electrolyte  50 mL/hr Intravenous Continuous Ethel Mansfield MD 50 mL/hr (01/27/24 1105)    nicotine  1 patch Transdermal Daily Jl Brar MD      pantoprazole  40 mg Intravenous Q12H [facility] Jl Brar MD      trimethobenzamide  200 mg Intramuscular Q6H PRN Jl Brar MD            Patient Information Sharing: With the consent of Nereyda Soler , their loved ones were notified today by inpatient team of the patient’s condition and current plan.  All questions answered.     Time Spent for Care: 45 minutes.  More than 50% of total time spent on counseling and coordination of care as described above.    ** Please Note: Dictation voice to text software may have been used in the creation of this document. **    Sayra Mcgee,   01/28/24  3:30 PM

## 2024-01-28 NOTE — PLAN OF CARE
Problem: PHYSICAL THERAPY ADULT  Goal: Performs mobility at highest level of function for planned discharge setting.  See evaluation for individualized goals.  Description: Treatment/Interventions: ADL retraining, Functional transfer training, LE strengthening/ROM, Elevations, Therapeutic exercise, Gait training, Equipment eval/education, Patient/family training  Equipment Recommended:  (Pt has a cane and walker)       See flowsheet documentation for full assessment, interventions and recommendations.  Note: Prognosis: Good  Problem List: Decreased mobility, Impaired balance  Assessment: Patient is an 68 y.o. year old female seen for Physical Therapy evaluation. Patient admitted with Syncope.  Comorbidities affecting patient's physical performance include: syncope, seizures, COPD, DM, depression, cervical radiculopathy.  Personal factors affecting patient at time of initial evaluation include: stairs to enter home, inability to navigate community distances, inability to navigate level surfaces without external assistance, positive fall history, and depression. Prior to admission, patient was independent with functional mobility without assistive device and independent with ADLS.  Please find objective findings from Physical Therapy assessment regarding body systems outlined above with impairments and limitations including impaired balance, pain, decreased activity tolerance, decreased functional mobility tolerance, and fall risk.  The Barthel Index was used as a functional outcome tool presenting with a score of Barthel Index Score: 80 today indicating moderate limitations of functional mobility and ADLS.  Patient's clinical presentation is currently unstable/unpredictable as seen in patient's presentation of increased fall risk and new onset of impairment of functional mobility. Pt would benefit from continued Physical Therapy treatment to address deficits as defined above and maximize level of functional  mobility. As demonstrated by objective findings, the assigned level of complexity for this evaluation is high.The patient's AM-PAC Basic Mobility Inpatient Short Form Raw Score is 22. A Raw score of greater than 16 suggests the patient may benefit from discharge to home. Please also refer to the recommendation of the Physical Therapist for safe discharge planning.        Rehab Resource Intensity Level, PT: III (Minimum Resource Intensity)    See flowsheet documentation for full assessment.

## 2024-01-28 NOTE — PHYSICAL THERAPY NOTE
"       PHYSICAL THERAPY EVALUATION/TREATMENT     01/28/24 0925   PT Last Visit   PT Visit Date 01/28/24   Note Type   Note type Evaluation   Pain Assessment   Pain Assessment Tool 0-10   Pain Score No Pain   Restrictions/Precautions   Other Precautions Fall Risk;Bed Alarm;Chair Alarm;Seizure   Home Living   Type of Home Apartment   Home Layout Multi-level  (full flight of steps to enter, 4 steps down into kitchen)   Home Equipment Cane;Walker   Prior Function   Level of [place] Independent with ADLs;Independent with functional mobility  (Does not drive)   Lives With Alone   Receives Help From Family   Falls in the last 6 months 1 to 4   General   Additional Pertinent History Pt admitted s/p syncopy/fall.   Pt reports she is feeling alot better now.   Family/Caregiver Present No   Cognition   Overall Cognitive Status WFL   Arousal/Participation Alert   Orientation Level Oriented X4   Following Commands Follows all commands and directions without difficulty   Subjective   Subjective \"I am feeling better\"   RLE Assessment   RLE Assessment   (ROM WFL, MMT4+/5)   LLE Assessment   LLE Assessment   (ROM WFL, MMT 4+/5)   Bed Mobility   Supine to Sit 7  Independent   Transfers   Sit to Stand 5  Supervision   Stand to Sit 5  Supervision   Stand pivot 5  Supervision   Additional items   (holding furniture)   Ambulation/Elevation   Gait pattern Wide DANIEL  (mildly unsteady, reaching for rail in l[place]aga)   Gait Assistance   (supervision/min assist)   Assistive Device   (none)   Distance 75 feet   Balance   Static Sitting Good   Static Standing Fair +   Ambulatory Fair -   Activity Tolerance   Activity Tolerance Patient tolerated treatment well   Assessment   Prognosis Good   Problem List Decreased mobility;Impaired balance   Assessment Patient is an 68 y.o. year old female seen for Physical Therapy evaluation. Patient admitted with Syncope.  Comorbidities affecting patient's physical performance include: syncope, seizures, COPD, " "DM, depression, cervical radiculopathy.  Personal factors affecting patient at time of initial evaluation include: stairs to enter home, inability to navigate community distances, inability to navigate level surfaces without external assistance, positive fall history, and depression. Prior to admission, patient was independent with functional mobility without assistive device and independent with ADLS.  Please find objective findings from Physical Therapy assessment regarding body systems outlined above with impairments and limitations including impaired balance, pain, decreased activity tolerance, decreased functional mobility tolerance, and fall risk.  The Barthel Index was used as a functional outcome tool presenting with a score of Barthel Index Score: 80 today indicating moderate limitations of functional mobility and ADLS.  Patient's clinical presentation is currently unstable/unpredictable as seen in patient's presentation of increased fall risk and new onset of impairment of functional mobility. Pt would benefit from continued Physical Therapy treatment to address deficits as defined above and maximize level of functional mobility. As demonstrated by objective findings, the assigned level of complexity for this evaluation is high.The patient's -[facility] Basic Mobility Inpatient Short Form Raw Score is 22. A Raw score of greater than 16 suggests the patient may benefit from discharge to home. Please also refer to the recommendation of the Physical Therapist for safe discharge planning.   Goals   Patient Goals \"no falls\" \"go back to my squirrels and cat\"   STG Expiration Date 02/04/24   Short Term Goal #1 Independent transfers, modified independent ambulation with least restrictive device indoor surfaces 100 feet, no falls, independent up-and-down 1 flight of steps the patient can enter and exit her apartment   LTG Expiration Date 02/11/24   Long Term Goal #1 Modified independent ambulation outdoor surfaces with " "least restrictive device 100 feet so patient can walk from her apartment down to the river   Plan   Treatment/Interventions ADL retraining;Functional transfer training;LE strengthening/ROM;Elevations;Therapeutic exercise;Gait training;Equipment eval/education;Patient/family training   PT Frequency Other (Comment)  (5x/week)   Discharge Recommendation   Rehab Resource Intensity Level, PT III (Minimum Resource Intensity)   Equipment Recommended   (Pt has a cane and walker)   AM-PAC Basic Mobility Inpatient   Turning in Flat Bed Without Bedrails 4   Lying on Back to Sitting on Edge of Flat Bed Without Bedrails 4   Moving Bed to Chair 4   Standing Up From Chair Using Arms 4   Walk in Room 3   Climb 3-5 Stairs With Railing 3   Basic Mobility Inpatient Raw Score 22   Basic Mobility Standardized Score 47.4   Highest Level Of Mobility   JH-HLM Goal 7: Walk 25 feet or more   JH-HLM Achieved 8: Walk 250 feet ot more   Barthel Index   Feeding 10   Bathing 0   Grooming Score 5   Dressing Score 10   Bladder Score 10   Bowels Score 10   Toilet Use Score 5   Transfers (Bed/Chair) Score 15   Mobility (Level Surface) Score 10   Stairs Score 5   Barthel Index Score 80   Additional Treatment Session   Start Time 0915   End Time 0925   Treatment Assessment S:  \"I can walk again\"  O:  Gait training with a rolling walker x 75 feet, x 150 feet.  A:  Pt's quality of gait improved with walker.  P: Continue PT to improve balance and indpendence with functional mobility.   End of Consult   Patient Position at End of Consult All needs within reach;Bedside chair;Bed/Chair alarm activated   Licensure   [place] License Number  [employer] [id number]     "

## 2024-01-28 NOTE — PLAN OF CARE
Problem: Potential for Falls  Goal: Patient will remain free of falls  Description: INTERVENTIONS:  - Educate patient/family on patient safety including physical limitations  - Instruct patient to call for assistance with activity   - Consult OT/PT to assist with strengthening/mobility   - Keep Call bell within reach  - Keep bed low and locked with side rails adjusted as appropriate  - Keep care items and personal belongings within reach  - Initiate and maintain comfort rounds  - Make Fall Risk Sign visible to staff  - Offer Toileting every 2 Hours, in advance of need  - Initiate/Maintain bed alarm  - Obtain necessary fall risk management equipment: yellow socks  - Apply yellow socks and bracelet for high fall risk patients  - Consider moving patient to room near nurses station  Outcome: Progressing     Problem: PAIN - ADULT  Goal: Verbalizes/displays adequate comfort level or baseline comfort level  Description: Interventions:  - Encourage patient to monitor pain and request assistance  - Assess pain using appropriate pain scale  - Administer analgesics based on type and severity of pain and evaluate response  - Implement non-pharmacological measures as appropriate and evaluate response  - Consider cultural and social influences on pain and pain management  - Notify physician/advanced practitioner if interventions unsuccessful or patient reports new pain  Outcome: Progressing     Problem: INFECTION - ADULT  Goal: Absence or prevention of progression during hospitalization  Description: INTERVENTIONS:  - Assess and monitor for signs and symptoms of infection  - Monitor lab/diagnostic results  - Monitor all insertion sites, i.e. indwelling lines, tubes, and drains  - Monitor endotracheal if appropriate and nasal secretions for changes in amount and color  - Robertson appropriate cooling/warming therapies per order  - Administer medications as ordered  - Instruct and encourage patient and family to use good hand  hygiene technique  - Identify and instruct in appropriate isolation precautions for identified infection/condition  Outcome: Progressing  Goal: Absence of fever/infection during neutropenic period  Description: INTERVENTIONS:  - Monitor WBC    Outcome: Progressing     Problem: SAFETY ADULT  Goal: Patient will remain free of falls  Description: INTERVENTIONS:  - Educate patient/family on patient safety including physical limitations  - Instruct patient to call for assistance with activity   - Consult OT/PT to assist with strengthening/mobility   - Keep Call bell within reach  - Keep bed low and locked with side rails adjusted as appropriate  - Keep care items and personal belongings within reach  - Initiate and maintain comfort rounds  - Make Fall Risk Sign visible to staff  - Offer Toileting every 2 Hours, in advance of need  - Initiate/Maintain bed alarm  - Obtain necessary fall risk management equipment: yellow socks  - Apply yellow socks and bracelet for high fall risk patients  - Consider moving patient to room near nurses station  Outcome: Progressing  Goal: Maintain or return to baseline ADL function  Description: INTERVENTIONS:  -  Assess patient's ability to carry out ADLs; assess patient's baseline for ADL function and identify physical deficits which impact ability to perform ADLs (bathing, care of mouth/teeth, toileting, grooming, dressing, etc.)  - Assess/evaluate cause of self-care deficits   - Assess range of motion  - Assess patient's mobility; develop plan if impaired  - Assess patient's need for assistive devices and provide as appropriate  - Encourage maximum independence but intervene and supervise when necessary  - Involve family in performance of ADLs  - Assess for home care needs following discharge   - Consider OT consult to assist with ADL evaluation and planning for discharge  - Provide patient education as appropriate  Outcome: Progressing  Goal: Maintains/Returns to pre admission  functional level  Description: INTERVENTIONS:  - Perform AM-PAC 6 Click Basic Mobility/ Daily Activity assessment daily.  - Set and communicate daily mobility goal to care team and patient/family/caregiver.   - Collaborate with rehabilitation services on mobility goals if consulted  - Perform Range of Motion 2 times a day.  - Reposition patient every 2 hours.  - Dangle patient 2 times a day  - Stand patient 2 times a day  - Ambulate patient 2 times a day  - Out of bed to chair 2 times a day   - Out of bed for meals 2 times a day  - Out of bed for toileting  - Record patient progress and toleration of activity level   Outcome: Progressing     Problem: DISCHARGE PLANNING  Goal: Discharge to home or other facility with appropriate resources  Description: INTERVENTIONS:  - Identify barriers to discharge w/patient and caregiver  - Arrange for needed discharge resources and transportation as appropriate  - Identify discharge learning needs (meds, wound care, etc.)  - Arrange for interpretive services to assist at discharge as needed  - Refer to Case Management Department for coordinating discharge planning if the patient needs post-hospital services based on physician/advanced practitioner order or complex needs related to functional status, cognitive ability, or social support system  Outcome: Progressing     Problem: Knowledge Deficit  Goal: Patient/family/caregiver demonstrates understanding of disease process, treatment plan, medications, and discharge instructions  Description: Complete learning assessment and assess knowledge base.  Interventions:  - Provide teaching at level of understanding  - Provide teaching via preferred learning methods  Outcome: Progressing     Problem: NEUROSENSORY - ADULT  Goal: Achieves stable or improved neurological status  Description: INTERVENTIONS  - Monitor and report changes in neurological status  - Monitor vital signs such as temperature, blood pressure, glucose, and any other  labs ordered   - Initiate measures to prevent increased intracranial pressure  - Monitor for seizure activity and implement precautions if appropriate      Outcome: Progressing  Goal: Remains free of injury related to seizures activity  Description: INTERVENTIONS  - Maintain airway, patient safety  and administer oxygen as ordered  - Monitor patient for seizure activity, document and report duration and description of seizure to physician/advanced practitioner  - If seizure occurs,  ensure patient safety during seizure  - Reorient patient post seizure  - Seizure pads on all 4 side rails  - Instruct patient/family to notify RN of any seizure activity including if an aura is experienced  - Instruct patient/family to call for assistance with activity based on nursing assessment  - Administer anti-seizure medications if ordered    Outcome: Progressing  Goal: Achieves maximal functionality and self care  Description: INTERVENTIONS  - Monitor swallowing and airway patency with patient fatigue and changes in neurological status  - Encourage and assist patient to increase activity and self care.   - Encourage visually impaired, hearing impaired and aphasic patients to use assistive/communication devices  Outcome: Progressing

## 2024-01-29 ENCOUNTER — PREP FOR PROCEDURE (OUTPATIENT)
Dept: GASTROENTEROLOGY | Facility: CLINIC | Age: 69
End: 2024-01-29

## 2024-01-29 ENCOUNTER — TELEPHONE (OUTPATIENT)
Dept: GASTROENTEROLOGY | Facility: CLINIC | Age: 69
End: 2024-01-29

## 2024-01-29 ENCOUNTER — APPOINTMENT (INPATIENT)
Dept: RADIOLOGY | Facility: HOSPITAL | Age: 69
DRG: 720 | End: 2024-01-29
Payer: COMMERCIAL

## 2024-01-29 ENCOUNTER — APPOINTMENT (INPATIENT)
Dept: NON INVASIVE DIAGNOSTICS | Facility: HOSPITAL | Age: 69
DRG: 720 | End: 2024-01-29
Payer: COMMERCIAL

## 2024-01-29 VITALS
HEIGHT: 60 IN | SYSTOLIC BLOOD PRESSURE: 136 MMHG | WEIGHT: 126 LBS | DIASTOLIC BLOOD PRESSURE: 67 MMHG | OXYGEN SATURATION: 97 % | HEART RATE: 68 BPM | RESPIRATION RATE: 18 BRPM | TEMPERATURE: 97.8 F | BODY MASS INDEX: 24.74 KG/M2

## 2024-01-29 DIAGNOSIS — R10.13 EPIGASTRIC PAIN: ICD-10-CM

## 2024-01-29 DIAGNOSIS — R63.4 WEIGHT LOSS, UNINTENTIONAL: Primary | ICD-10-CM

## 2024-01-29 PROBLEM — G40.A09 ABSENCE SEIZURE (HCC): Status: ACTIVE | Noted: 2024-01-29

## 2024-01-29 PROBLEM — R11.0 NAUSEA: Status: RESOLVED | Noted: 2024-01-26 | Resolved: 2024-01-29

## 2024-01-29 PROBLEM — N39.0 UTI (URINARY TRACT INFECTION): Status: ACTIVE | Noted: 2024-01-29

## 2024-01-29 PROBLEM — I10 ESSENTIAL HYPERTENSION: Status: RESOLVED | Noted: 2021-03-13 | Resolved: 2024-01-29

## 2024-01-29 PROBLEM — R79.89 ELEVATED SERUM CREATININE: Status: RESOLVED | Noted: 2024-01-26 | Resolved: 2024-01-29

## 2024-01-29 LAB
ANION GAP SERPL CALCULATED.3IONS-SCNC: 10 MMOL/L
BASOPHILS # BLD AUTO: 0.03 THOUSANDS/ÂΜL (ref 0–0.1)
BASOPHILS NFR BLD AUTO: 0 % (ref 0–1)
BUN SERPL-MCNC: 18 MG/DL (ref 5–25)
CALCIUM SERPL-MCNC: 8.2 MG/DL (ref 8.4–10.2)
CHEST PAIN STATEMENT: NORMAL
CHLORIDE SERPL-SCNC: 104 MMOL/L (ref 96–108)
CO2 SERPL-SCNC: 20 MMOL/L (ref 21–32)
CREAT SERPL-MCNC: 0.7 MG/DL (ref 0.6–1.3)
EOSINOPHIL # BLD AUTO: 0.34 THOUSAND/ÂΜL (ref 0–0.61)
EOSINOPHIL NFR BLD AUTO: 3 % (ref 0–6)
ERYTHROCYTE [DISTWIDTH] IN BLOOD BY AUTOMATED COUNT: 14.8 % (ref 11.6–15.1)
GFR SERPL CREATININE-BSD FRML MDRD: 89 ML/MIN/1.73SQ M
GLUCOSE SERPL-MCNC: 97 MG/DL (ref 65–140)
HCT VFR BLD AUTO: 44.6 % (ref 34.8–46.1)
HGB BLD-MCNC: 14.8 G/DL (ref 11.5–15.4)
IMM GRANULOCYTES # BLD AUTO: 0.03 THOUSAND/UL (ref 0–0.2)
IMM GRANULOCYTES NFR BLD AUTO: 0 % (ref 0–2)
LYMPHOCYTES # BLD AUTO: 3.93 THOUSANDS/ÂΜL (ref 0.6–4.47)
LYMPHOCYTES NFR BLD AUTO: 34 % (ref 14–44)
MAGNESIUM SERPL-MCNC: 2.2 MG/DL (ref 1.9–2.7)
MAX DIASTOLIC BP: 66 MMHG
MAX HR PERCENT: 61 %
MAX HR: 94 BPM
MAX PREDICTED HEART RATE: 152 BPM
MCH RBC QN AUTO: 29.7 PG (ref 26.8–34.3)
MCHC RBC AUTO-ENTMCNC: 33.2 G/DL (ref 31.4–37.4)
MCV RBC AUTO: 89 FL (ref 82–98)
MONOCYTES # BLD AUTO: 1.19 THOUSAND/ÂΜL (ref 0.17–1.22)
MONOCYTES NFR BLD AUTO: 10 % (ref 4–12)
NEUTROPHILS # BLD AUTO: 6.06 THOUSANDS/ÂΜL (ref 1.85–7.62)
NEUTS SEG NFR BLD AUTO: 53 % (ref 43–75)
NRBC BLD AUTO-RTO: 0 /100 WBCS
NUC STRESS EJECTION FRACTION: 75 %
PLATELET # BLD AUTO: 150 THOUSANDS/UL (ref 149–390)
PMV BLD AUTO: 10.2 FL (ref 8.9–12.7)
POTASSIUM SERPL-SCNC: 3.9 MMOL/L (ref 3.5–5.3)
PROTOCOL NAME: NORMAL
RATE PRESSURE PRODUCT: NORMAL
RBC # BLD AUTO: 4.99 MILLION/UL (ref 3.81–5.12)
REASON FOR TERMINATION: NORMAL
SL CV REST NUCLEAR ISOTOPE DOSE: 10.1 MCI
SL CV STRESS NUCLEAR ISOTOPE DOSE: 31.6 MCI
SL CV STRESS RECOVERY BP: NORMAL MMHG
SL CV STRESS RECOVERY HR: 84 BPM
SL CV STRESS RECOVERY O2 SAT: 97 %
SODIUM SERPL-SCNC: 134 MMOL/L (ref 135–147)
STRESS ANGINA INDEX: 0
STRESS BASELINE BP: NORMAL MMHG
STRESS BASELINE HR: 68 BPM
STRESS O2 SAT REST: 97 %
STRESS PEAK HR: 91 BPM
STRESS POST ESTIMATED WORKLOAD: 1 METS
STRESS POST EXERCISE DUR MIN: 1 MIN
STRESS POST EXERCISE DUR MIN: 1 MIN
STRESS POST EXERCISE DUR SEC: 0 SEC
STRESS POST O2 SAT PEAK: 98 %
STRESS POST PEAK BP: 144 MMHG
STRESS POST PEAK HR: 94 BPM
STRESS POST PEAK SYSTOLIC BP: 165 MMHG
STRESS/REST PERFUSION RATIO: 1.13
TARGET HR FORMULA: NORMAL
TEST INDICATION: NORMAL
WBC # BLD AUTO: 11.58 THOUSAND/UL (ref 4.31–10.16)

## 2024-01-29 PROCEDURE — A9502 TC99M TETROFOSMIN: HCPCS

## 2024-01-29 PROCEDURE — 78452 HT MUSCLE IMAGE SPECT MULT: CPT

## 2024-01-29 PROCEDURE — 93016 CV STRESS TEST SUPVJ ONLY: CPT | Performed by: INTERNAL MEDICINE

## 2024-01-29 PROCEDURE — 78452 HT MUSCLE IMAGE SPECT MULT: CPT | Performed by: INTERNAL MEDICINE

## 2024-01-29 PROCEDURE — 93018 CV STRESS TEST I&R ONLY: CPT | Performed by: INTERNAL MEDICINE

## 2024-01-29 PROCEDURE — 99232 SBSQ HOSP IP/OBS MODERATE 35: CPT | Performed by: INTERNAL MEDICINE

## 2024-01-29 PROCEDURE — 80048 BASIC METABOLIC PNL TOTAL CA: CPT

## 2024-01-29 PROCEDURE — 93017 CV STRESS TEST TRACING ONLY: CPT

## 2024-01-29 PROCEDURE — 83735 ASSAY OF MAGNESIUM: CPT

## 2024-01-29 PROCEDURE — C9113 INJ PANTOPRAZOLE SODIUM, VIA: HCPCS

## 2024-01-29 PROCEDURE — 99239 HOSP IP/OBS DSCHRG MGMT >30: CPT | Performed by: INTERNAL MEDICINE

## 2024-01-29 PROCEDURE — G1004 CDSM NDSC: HCPCS

## 2024-01-29 PROCEDURE — 85025 COMPLETE CBC W/AUTO DIFF WBC: CPT

## 2024-01-29 RX ORDER — BUSPIRONE HYDROCHLORIDE 5 MG/1
5 TABLET ORAL 3 TIMES DAILY
Status: DISCONTINUED | OUTPATIENT
Start: 2024-01-29 | End: 2024-01-29 | Stop reason: HOSPADM

## 2024-01-29 RX ORDER — ATORVASTATIN CALCIUM 40 MG/1
40 TABLET, FILM COATED ORAL
Qty: 30 TABLET | Refills: 0 | Status: SHIPPED | OUTPATIENT
Start: 2024-01-29

## 2024-01-29 RX ORDER — CARVEDILOL 12.5 MG/1
12.5 TABLET ORAL EVERY 12 HOURS
Qty: 60 TABLET | Refills: 0 | Status: SHIPPED | OUTPATIENT
Start: 2024-01-29 | End: 2024-02-28

## 2024-01-29 RX ORDER — BUSPIRONE HYDROCHLORIDE 5 MG/1
5 TABLET ORAL 3 TIMES DAILY
Qty: 90 TABLET | Refills: 0 | Status: SHIPPED | OUTPATIENT
Start: 2024-01-29 | End: 2024-02-28

## 2024-01-29 RX ORDER — PANTOPRAZOLE SODIUM 40 MG/1
40 TABLET, DELAYED RELEASE ORAL
Qty: 60 TABLET | Refills: 0 | Status: SHIPPED | OUTPATIENT
Start: 2024-01-29 | End: 2024-02-28

## 2024-01-29 RX ORDER — PANTOPRAZOLE SODIUM 40 MG/1
40 TABLET, DELAYED RELEASE ORAL
Status: DISCONTINUED | OUTPATIENT
Start: 2024-01-29 | End: 2024-01-29 | Stop reason: HOSPADM

## 2024-01-29 RX ORDER — AMLODIPINE BESYLATE 5 MG/1
5 TABLET ORAL DAILY
Qty: 30 TABLET | Refills: 0 | Status: SHIPPED | OUTPATIENT
Start: 2024-01-30

## 2024-01-29 RX ORDER — CEPHALEXIN 500 MG/1
500 CAPSULE ORAL EVERY 12 HOURS SCHEDULED
Qty: 6 CAPSULE | Refills: 0 | Status: SHIPPED | OUTPATIENT
Start: 2024-01-29 | End: 2024-02-01

## 2024-01-29 RX ORDER — REGADENOSON 0.08 MG/ML
0.4 INJECTION, SOLUTION INTRAVENOUS ONCE
Status: COMPLETED | OUTPATIENT
Start: 2024-01-29 | End: 2024-01-29

## 2024-01-29 RX ORDER — CLOPIDOGREL BISULFATE 75 MG/1
75 TABLET ORAL DAILY
Qty: 30 TABLET | Refills: 0 | Status: SHIPPED | OUTPATIENT
Start: 2024-01-30 | End: 2024-02-29

## 2024-01-29 RX ORDER — CEFTRIAXONE 1 G/50ML
1000 INJECTION, SOLUTION INTRAVENOUS EVERY 24 HOURS
Status: DISCONTINUED | OUTPATIENT
Start: 2024-01-29 | End: 2024-01-29 | Stop reason: HOSPADM

## 2024-01-29 RX ORDER — ATORVASTATIN CALCIUM 40 MG/1
40 TABLET, FILM COATED ORAL
Status: DISCONTINUED | OUTPATIENT
Start: 2024-01-29 | End: 2024-01-29 | Stop reason: HOSPADM

## 2024-01-29 RX ADMIN — ENOXAPARIN SODIUM 40 MG: 40 INJECTION SUBCUTANEOUS at 08:14

## 2024-01-29 RX ADMIN — ACETAMINOPHEN 650 MG: 325 TABLET ORAL at 08:14

## 2024-01-29 RX ADMIN — CLOPIDOGREL 75 MG: 75 TABLET ORAL at 08:15

## 2024-01-29 RX ADMIN — PANTOPRAZOLE SODIUM 40 MG: 40 TABLET, DELAYED RELEASE ORAL at 16:52

## 2024-01-29 RX ADMIN — CARVEDILOL 12.5 MG: 12.5 TABLET, FILM COATED ORAL at 08:15

## 2024-01-29 RX ADMIN — REGADENOSON 0.4 MG: 0.08 INJECTION, SOLUTION INTRAVENOUS at 12:10

## 2024-01-29 RX ADMIN — AMLODIPINE BESYLATE 5 MG: 5 TABLET ORAL at 08:15

## 2024-01-29 RX ADMIN — ATORVASTATIN CALCIUM 40 MG: 40 TABLET, FILM COATED ORAL at 16:52

## 2024-01-29 RX ADMIN — CEFTRIAXONE 1000 MG: 1 INJECTION, SOLUTION INTRAVENOUS at 16:45

## 2024-01-29 RX ADMIN — DIVALPROEX SODIUM 500 MG: 500 TABLET, DELAYED RELEASE ORAL at 16:52

## 2024-01-29 RX ADMIN — DIVALPROEX SODIUM 500 MG: 500 TABLET, DELAYED RELEASE ORAL at 05:25

## 2024-01-29 RX ADMIN — PANTOPRAZOLE SODIUM 40 MG: 40 INJECTION, POWDER, FOR SOLUTION INTRAVENOUS at 08:14

## 2024-01-29 RX ADMIN — LIDOCAINE 1 PATCH: 50 PATCH TOPICAL at 08:15

## 2024-01-29 RX ADMIN — NICOTINE 1 PATCH: 7 PATCH, EXTENDED RELEASE TRANSDERMAL at 08:15

## 2024-01-29 RX ADMIN — BUSPIRONE HYDROCHLORIDE 5 MG: 5 TABLET ORAL at 13:20

## 2024-01-29 RX ADMIN — BUSPIRONE HYDROCHLORIDE 5 MG: 5 TABLET ORAL at 18:37

## 2024-01-29 NOTE — TELEPHONE ENCOUNTER
Left message for patient to call and schedule a colonoscopy with Dr. Aj at [facility]. Order is in chart. Will call her again fi she doesn't return call to schedule.

## 2024-01-29 NOTE — CASE MANAGEMENT
Case Management Assessment & Discharge Planning Note    Patient name Nereyda Soler  Location [facility] [place] [facility] [place] [facility]-* MRN [id number]  : 1955 Date 2024       Current Admission Date: 2024  Current Admission Diagnosis:Syncope   Patient Active Problem List    Diagnosis Date Noted    Weight loss, unintentional 2024    Moderate protein-calorie malnutrition (HCC) 2024    Elevated TSH 2024    Syncope 2024    QT prolongation 2024    Nausea 2024    Body mass index (BMI) of 40.0-44.9 in adult (HCC) 2023    Chronic pain of right knee 2023    Essential hypertension 2021    Elevated troponin level r/o ACS 2021    COPD (chronic obstructive pulmonary disease) ([facility])     Seizures ([facility])     SIRS (systemic inflammatory response syndrome) ([facility])     History of absence seizures 2020    Type 2 diabetes mellitus without complication, without long-term current use of insulin ([facility]) 2020    Lung mass 2018    Current smoker 2018    Intervertebral disc disorder with radiculopathy of lumbosacral region 2017    Cervical radiculopathy 2017    Depression 2017    Morbid obesity ([facility]) 2016    Hypercholesteremia 2015      LOS (days): 3  Geometric Mean LOS (GMLOS) (days): 2.9  Days to GMLOS:-0.2     OBJECTIVE:    Risk of Unplanned Readmission Score: 12.6      Current admission status: Inpatient     Preferred Pharmacy:   [facility] [place], [place] -  [facility] [address] [address]  Phone: [phone] Fax: [phone]    Primary Care Provider: Waylon Wright MD    Primary Insurance: [facility]  Secondary Insurance:     ASSESSMENT:  Active Health Care Proxies    There are no active Health Care Proxies on file.       Readmission Root Cause  30 Day Readmission: No    Patient Information  Admitted from:: Home  Mental Status: Alert  During Assessment patient  was accompanied by: Other-Comment (Granddtr)  Assessment information provided by:: Patient  Primary Caregiver: Self  Support Systems: Children, Friends/neighbors, Family members  County of Residence: [place]  What city do you live in?: [place], [place]  Home entry access options. Select all that apply.: Stairs  Number of steps to enter home.: One Flight  Type of Current Residence: Apartment  Floor Level: 2  Living Arrangements: Lives Alone    Activities of Daily Living Prior to Admission  Functional Status: Independent  Completes ADLs independently?: Yes  Ambulates independently?: Yes  Does patient currently own DME?: Yes  What DME does the patient currently own?: [employer]     Patient Information Continued  Income Source: SSI/SSD  Does patient have prescription coverage?: Yes  Does patient receive dialysis treatments?: No     Means of Transportation  Means of Transport to Appts:: Family transport    Housing Stability: Low Risk  (1/29/2024)    Housing Stability Vital Sign     Unable to Pay for Housing in the Last Year: No     Number of Places Lived in the Last Year: 1     Unstable Housing in the Last Year: No   Food Insecurity: No Food Insecurity (1/29/2024)    Hunger Vital Sign     Worried About Running Out of Food in the Last Year: Never true     Ran Out of Food in the Last Year: Never true   Transportation Needs: Unmet Transportation Needs (1/29/2024)    PRAPARE - Transportation     Lack of Transportation (Medical): Yes     Lack of Transportation (Non-Medical): Yes   Utilities: Not At Risk (1/29/2024)    [facility] Utilities     Threatened with loss of utilities: No     DISCHARGE DETAILS:    Discharge planning discussed with:: Patient, Granddtr  Freedom of Choice: Yes  Comments - Freedom of Choice: SW reviewed recommendation by therapy for home health services. Patient verbalized understanding and ageement with rec. Patient consenting to blanket [facility] referrals in-network C providers.  CM contacted  family/caregiver?: Yes  Were Treatment Team discharge recommendations reviewed with patient/caregiver?: Yes  Did patient/caregiver verbalize understanding of patient care needs?: Yes     Contacts  Patient Contacts: Granddtr  Relationship to Patient:: Family  Contact Method: In Person  Reason/Outcome: Discharge Planning    Other Referral/Resources/Interventions Provided:  Interventions: [facility]  Referral Comments: [facility] referrals sent in A[place]. Responses pending.     Treatment Team Recommendation: Home with Home Health Care  Discharge Destination Plan:: Home with Home Health Care  Transport at Discharge : Family

## 2024-01-29 NOTE — ASSESSMENT & PLAN NOTE
Presented with progressive fatigue, epigastric pain, weight loss. CT Abd/Pelvis showed no acute findings.     GI consulted  - Recommends outpatient EGD and colonoscopy  - PPI BID  -Start carafate

## 2024-01-29 NOTE — ASSESSMENT & PLAN NOTE
Malnutrition Findings:   Adult Malnutrition type: Chronic illness  Adult Degree of Malnutrition: Malnutrition of moderate degree  Malnutrition Characteristics: Fat loss, Muscle loss, Weight loss                  360 Statement: Malnutrition of moderated degree r/t inadequate oral intake, chronic illness e/b muscle wasting and subcutaneous fat loss of orbital region and extremeties, pt report of oral aversions and taste changes, and significant wt loss of 11.3% ~ 1 month and 19.2% ~10 months. Will treat with supplement.    BMI Findings:           Body mass index is 24.61 kg/m².

## 2024-01-29 NOTE — DISCHARGE SUMMARY
Discharge Summary - [facility]     Patient Information: Nereyda Soler 68 y.o. female MRN: [id number]  Unit/Bed#: [address] [place] [address] Encounter: [id number]     Admitting Physician: Teresita Ibrahim MD  Discharging Physician/Practitioner: Teresita Ibrahim MD   PCP: Waylon Wright MD  Admission Date:   Admission Orders (From admission, onward)       Ordered        01/26/24 1131  INPATIENT ADMISSION  Once                          Discharge Date: 01/29/24      Reason for Admission: Vomiting [R11.10]  ACS (acute coronary syndrome) ([facility]) [I24.9]  Elevated troponin [R79.89]  XOCHITL (acute kidney injury) ([facility]) [N17.9]  Elevated hemoglobin ([facility]) [D58.2]     Discharge Diagnoses:      Principal Problem:    Syncope  Active Problems:    SIRS (systemic inflammatory response syndrome) ([facility])    Elevated troponin level r/o ACS    Intervertebral disc disorder with radiculopathy of lumbosacral region    Depression    History of absence seizures    Type 2 diabetes mellitus without complication, without long-term current use of insulin ([facility])    COPD (chronic obstructive pulmonary disease) ([facility])    Essential hypertension    Elevated TSH    QT prolongation    Moderate protein-calorie malnutrition ([facility])    Weight loss, unintentional  Resolved Problems:    Elevated serum creatinine    Nausea    Consultations During Hospital Stay:  ·       Cardiology, Gastroenterologgy, Psychiatry     Procedures Performed:   ·       NM stress test     Significant Findings / Test Results:   1/29: WBC 11.58  Stress test: no perfusion defects - EKG negative for ischemia  1/28: WBC 13.42  1/27: WBC 17.05  1/26/24 : WBC 21.71 > 20.42, Hb 23 > 21.2, Cr 1.67, TSH 5.056, free T4 1.27, Depakote level 23, A1c 6.3, UA showed moderate bacteria, negative for nitrities or leukocytes, LA WNL  Lipid panel : T Cholesterol 206, ,   Troponin : 249>177    TTE : LV EF 75%, systolic function hyperdynamic, normal diastolic  function. RV systolic function low normal  EKG showed sinus tachycardia, LVH, no obvious ST elevations.    CT head wo contrast 1/26 : No acute intracranial abnormality.  CT abdomen pelvis w contrast 1/26 : No acute pathology.Colonic diverticulosis.  MRI brain (1/27): Chronic infarct right occipital lobe. No acute abnormality.      Incidental Findings:   ·       none      Test Results Pending at Discharge (will require follow up):   ·       urine culture     Outpatient Tests Requested:  ·       Repeat TSH     Outpatient follow-up Requested:  -GI  -Neurology  -Cardiology  -Psychiatry  -Pulmonology  -PCP    Complications:  none    Things to address at first visit after hospitalization   Are you able to take all of your medications?  Are you tolerating your changes well?  How is your nausea?  Have you been able to schedule with the specialists?    Hospital Course:     Nereyda Soler is a 68 y.o. female patient with a PMHx that includes HTN, COPD, DM2 (diet controlled), depression, absence seizures, and nicotine dependence, who originally presented to the hospital on 1/26/2024 due to a fall/syncopal episode at home. Patient had been having nausea and poor PO intake x 1 week, and was unable to take her home medications. She also noted progressive fatigue, epigastric pain, and weight loss. On admission patient was found to have elevated troponins and XOCHITL which were attributed to dehydration. Patient was started on IV heparin and cardiology was consulted. Patient also noted intermittent right eye blindness. MRI was negative for acute findings. Chronic right occipital infarct was noted. Patient had a stress test which showed negative for ischemia. GI was consulted for fatigue, epigastric pain, and weight loss and recommended trial of Carafate, PPI therapy twice daily, and outpatient endoscopy and colonoscopy. Psychiatry was consulted due to anxiety and patient was started on Buspar 5 mg TID.     Additional details outlined  in problem based assessment below:       * Syncope  Assessment & Plan  68F with PMHx that includes COPD, HTN, Depression, absence seizures, and DM well controlled, presents after fall and syncopal episode at home. Patient reported frequent falls at home, and decrease PO intake over the last 5 days. Patient reports feel dizzy and tripping with LOC.      CT Head neg. CT Abd/Pelvis negative. Troponins elevated on admission. Cr elevated on admission likely in setting of dehydration.   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    Plan:  Continue adequate hydration and PO intake     SIRS (systemic inflammatory response syndrome) (HCC)  Assessment & Plan  Presented with leukocytosis and tachycardia, tachypnea. LA wnl.    CT Head neg for acute findings. CT Abd/Pel neg for acute findings. WBC 20.4, (also Hb 23.0, possibly hemoconcentrated in setting of dehydration on top of underlying polychthemia). Pt reported dysuria, UA showed moderate bacteria, negative for nitrities or leukocytes.     - Antibiotics: Received Ceftriaxone (1/26-) for empiric treatment of suspected UTI   -continue 3 more days of keflex  - Blood Cx NG @ 48H x 2  - Urine Cx showed 100k CFU mixed contaminants  - Trend CBC and fever curve   -follow up as outpatient    Elevated troponin level r/o ACS  Assessment & Plan  Presented with elevated troponin 249, epigastric pain, nausea. Patient presented after fall at home/syncopal episode. Reports she has been having chest heaviness that is not new. EKG showed sinus tachycardia, LVH, no obvious ST elevations. Possibly secondary to dehydration/XOCHITL.    Plan:  - Cardiology followed  - Received IV Heparin ACS low gtt  - Received Atorvastatin 80 mg, Plavix 75 mg daily. No aspirin as patient has allergy  - Echo showed EF 75%, normal diastolic function  - Continued on Coreg 12.5 mg BID  - NM stress test on 1/29 showed negative ischemia, normal perfusion    Weight loss, unintentional  Assessment &  Plan  Presented with progressive fatigue, epigastric pain, weight loss. CT Abd/Pelvis showed no acute findings.     GI consulted  - Recommends outpatient EGD and colonoscopy  - PPI BID  -Start carafate    Moderate protein-calorie malnutrition (HCC)  Assessment & Plan  Malnutrition Findings:   Adult Malnutrition type: Chronic illness  Adult Degree of Malnutrition: Malnutrition of moderate degree  Malnutrition Characteristics: Fat loss, Muscle loss, Weight loss                  360 Statement: Malnutrition of moderated degree r/t inadequate oral intake, chronic illness e/b muscle wasting and subcutaneous fat loss of orbital region and extremeties, pt report of oral aversions and taste changes, and significant wt loss of 11.3% ~ 1 month and 19.2% ~10 months. Will treat with supplement.    BMI Findings:           Body mass index is 24.61 kg/m².       QT prolongation  Assessment & Plan   on admission    - Received Zofran in the ED  - IM Tigan for nausea/vomiting    Elevated TSH  Assessment & Plan  TSH 5.05, T4 1.27  - Recheck TSH reflex to T4 after discharge    Essential hypertension  Assessment & Plan  Chronic    BP elevated on admission possibly due to missed doses in the setting of nausea  Held home Cozaar and Chlorthalidone in the setting of possible XOCHITL    Plan:  - Continue Coreg 12.5 mg BID  - Started Norvasc 5 mg daily    COPD (chronic obstructive pulmonary disease) ([facility])  Assessment & Plan  Chronic    Current smoker. Does not use O2 at home. Only uses rescue inhaler as needed  Does not appear to be in acute exacerbation on admission    - Continue Albuterol PRN  - Monitor O2 sats  -Follow up with pulmonology as outpatient     Type 2 diabetes mellitus without complication, without long-term current use of insulin ([facility])  Assessment & Plan  Lab Results   Component Value Date    HGBA1C 6.3 (H) 01/26/2024     Diet controlled    History of absence seizures  Assessment & Plan  Patient reports having history of both  "Grand mal vs absence seizure  - Previously on Depakote, however did not need to take as she was not having episodes  - On admission, patient reports she has resumed taking Depakote daily due to recent \"staring episodes\"  - Depakote level low on admission    Plan:  - Continue Depakote 500 mg BID as patient was only taking once daily  -Follow up with neurology outpatient     Depression  Assessment & Plan  Previously on Trazodone, however not taking due to drowsiness  - Psych consulted, no new medications for depression added at this time  Started buspar for anxiety    Intervertebral disc disorder with radiculopathy of lumbosacral region  Assessment & Plan  Chronic, stable   - Lidocaine patch  - Tylenol PRN    Nausea-resolved as of 1/29/2024  Assessment & Plan  Presented with nausea, and gradual fatigue, poor appetite, and weight loss. Nonspecific abdominal pain. CT abdomen/pelvis showed no acute findings  - Zofran changed to Tigan given QTC prolongation    Elevated serum creatinine-resolved as of 1/29/2024  Assessment & Plan  Acute    Cr 1.67 on admission with no recent comparison. (Prior baseline 0.7-1)  Attributed to prerenal XOCHITL in setting of dehydration, poor PO intake. Also received IV contrast for CT scan on admission.     1/29: Returned to baseline Creatinine     Plan:  - Received IV Fluids Plasmalyte   - Home Losartan and Chlorthalidone held on admission  - Monitor I/O's       Condition at Discharge: stable      Discharge Day Visit / Exam:      Vitals: Blood Pressure: 136/67 (01/29/24 1421)  Pulse: 68 (01/29/24 1200)  Temperature: 97.8 °F (36.6 °C) (01/29/24 1421)  Temp Source: Oral (01/29/24 0807)  Respirations: 18 (01/29/24 1421)  Height: 5' (152.4 cm) (01/26/24 1420)  Weight - Scale: 57.2 kg (126 lb) (01/26/24 1420)  SpO2: 97 % (01/29/24 1200)    Exam:   Physical Exam  Vitals reviewed.   Constitutional:       General: She is not in acute distress.     Appearance: Normal appearance. She is not ill-appearing. "   HENT:      Head: Normocephalic and atraumatic.   Eyes:      Pupils: Pupils are equal, round, and reactive to light.   Cardiovascular:      Rate and Rhythm: Normal rate and regular rhythm.      Pulses: Normal pulses.      Heart sounds: Normal heart sounds. No murmur heard.  Pulmonary:      Effort: Pulmonary effort is normal. No respiratory distress.      Breath sounds: No wheezing.      Comments: Decreased breath sounds  Abdominal:      General: Bowel sounds are normal.      Palpations: Abdomen is soft.      Tenderness: There is no abdominal tenderness.   Musculoskeletal:      Right lower leg: No edema.      Left lower leg: No edema.   Skin:     General: Skin is warm and dry.   Neurological:      General: No focal deficit present.      Mental Status: She is alert and oriented to person, place, and time.   Psychiatric:         Mood and Affect: Mood normal.         Behavior: Behavior normal.         Patient Information Sharing: With the consent of [place] Soler, their loved ones were notified today by inpatient team of the patient’s condition and current plan.  All questions answered.      Discharge instructions/Information to patient and family:   See after visit summary for information provided to patient and family.       Discharge Medications:     Medication List      CONTINUE taking these medications     Blood Pressure Kit; Use in the morning     ASK your doctor about these medications     albuterol 90 mcg/act inhaler; Commonly known as: Ventolin HFA; Inhale 2   puffs every 6 (six) hours as needed for wheezing   atorvastatin 80 mg tablet; Commonly known as: LIPITOR; TAKE 1 TABLET (80   MG TOTAL) BY MOUTH DAILY WITH DINNER   carvedilol 12.5 mg tablet; Commonly known as: COREG; TAKE 1 TABLET (12.5   MG TOTAL) BY MOUTH EVERY 12 (TWELVE) HOURS   chlorthalidone 15 MG tablet; Commonly known as: Thalitone; Take 1 tablet   (15 mg total) by mouth daily   divalproex sodium 250 mg DR tablet; Commonly known as: DEPAKOTE;  Take 2   tablets (500 mg total) by mouth every 12 (twelve) hours for 14 days   losartan 50 mg tablet; Commonly known as: COZAAR; Take 1 tablet (50 mg   total) by mouth daily   nicotine 14 mg/24hr TD 24 hr patch; Commonly known as: NICODERM CQ;   Place 1 patch on the skin every 24 hours   traZODone 50 mg tablet; Commonly known as: DESYREL; Take 1 tablet (50 mg   total) by mouth daily at bedtime        Disposition:   Home      Discharge Statement:  I spent 45 minutes discharging the patient. This time was spent on the day of discharge. I had direct contact with the patient on the day of discharge. Greater than 50% of the total time was spent examining patient, answering all patient questions, arranging and discussing plan of care with patient as well as directly providing post-discharge instructions.  Additional time then spent on discharge activities.     ** Please Note: This note has been constructed using a voice recognition system **     Sayra Mcgee DO   01/29/24  4:32 PM

## 2024-01-29 NOTE — PLAN OF CARE
Problem: Potential for Falls  Goal: Patient will remain free of falls  Description: INTERVENTIONS:  - Educate patient/family on patient safety including physical limitations  - Instruct patient to call for assistance with activity   - Consult OT/PT to assist with strengthening/mobility   - Keep Call bell within reach  - Keep bed low and locked with side rails adjusted as appropriate  - Keep care items and personal belongings within reach  - Initiate and maintain comfort rounds  - Make Fall Risk Sign visible to staff  - Offer Toileting every 2 Hours, in advance of need  - Initiate/Maintain bed alarm  - Obtain necessary fall risk management equipment: yellow socks  - Apply yellow socks and bracelet for high fall risk patients  - Consider moving patient to room near nurses station  Outcome: Progressing     Problem: PAIN - ADULT  Goal: Verbalizes/displays adequate comfort level or baseline comfort level  Description: Interventions:  - Encourage patient to monitor pain and request assistance  - Assess pain using appropriate pain scale  - Administer analgesics based on type and severity of pain and evaluate response  - Implement non-pharmacological measures as appropriate and evaluate response  - Consider cultural and social influences on pain and pain management  - Notify physician/advanced practitioner if interventions unsuccessful or patient reports new pain  Outcome: Progressing     Problem: INFECTION - ADULT  Goal: Absence or prevention of progression during hospitalization  Description: INTERVENTIONS:  - Assess and monitor for signs and symptoms of infection  - Monitor lab/diagnostic results  - Monitor all insertion sites, i.e. indwelling lines, tubes, and drains  - Monitor endotracheal if appropriate and nasal secretions for changes in amount and color  - [place] appropriate cooling/warming therapies per order  - Administer medications as ordered  - Instruct and encourage patient and family to use good hand  hygiene technique  - Identify and instruct in appropriate isolation precautions for identified infection/condition  Outcome: Progressing  Goal: Absence of fever/infection during neutropenic period  Description: INTERVENTIONS:  - Monitor WBC    Outcome: Progressing     Problem: SAFETY ADULT  Goal: Patient will remain free of falls  Description: INTERVENTIONS:  - Educate patient/family on patient safety including physical limitations  - Instruct patient to call for assistance with activity   - Consult OT/PT to assist with strengthening/mobility   - Keep Call bell within reach  - Keep bed low and locked with side rails adjusted as appropriate  - Keep care items and personal belongings within reach  - Initiate and maintain comfort rounds  - Make Fall Risk Sign visible to staff  - Offer Toileting every 2 Hours, in advance of need  - Initiate/Maintain bed alarm  - Obtain necessary fall risk management equipment: yellow socks  - Apply yellow socks and bracelet for high fall risk patients  - Consider moving patient to room near nurses station  Outcome: Progressing  Goal: Maintain or return to baseline ADL function  Description: INTERVENTIONS:  -  Assess patient's ability to carry out ADLs; assess patient's baseline for ADL function and identify physical deficits which impact ability to perform ADLs (bathing, care of mouth/teeth, toileting, grooming, dressing, etc.)  - Assess/evaluate cause of self-care deficits   - Assess range of motion  - Assess patient's mobility; develop plan if impaired  - Assess patient's need for assistive devices and provide as appropriate  - Encourage maximum independence but intervene and supervise when necessary  - Involve family in performance of ADLs  - Assess for home care needs following discharge   - Consider OT consult to assist with ADL evaluation and planning for discharge  - Provide patient education as appropriate  Outcome: Progressing  Goal: Maintains/Returns to pre admission  functional level  Description: INTERVENTIONS:  - Perform AM-PAC 6 Click Basic Mobility/ Daily Activity assessment daily.  - Set and communicate daily mobility goal to care team and patient/family/caregiver.   - Collaborate with rehabilitation services on mobility goals if consulted  - Perform Range of Motion 2 times a day.  - Reposition patient every 2 hours.  - Dangle patient 2 times a day  - Stand patient 2 times a day  - Ambulate patient 2 times a day  - Out of bed to chair 2 times a day   - Out of bed for meals 2 times a day  - Out of bed for toileting  - Record patient progress and toleration of activity level   Outcome: Progressing     Problem: DISCHARGE PLANNING  Goal: Discharge to home or other facility with appropriate resources  Description: INTERVENTIONS:  - Identify barriers to discharge w/patient and caregiver  - Arrange for needed discharge resources and transportation as appropriate  - Identify discharge learning needs (meds, wound care, etc.)  - Arrange for interpretive services to assist at discharge as needed  - Refer to Case Management Department for coordinating discharge planning if the patient needs post-hospital services based on physician/advanced practitioner order or complex needs related to functional status, cognitive ability, or social support system  Outcome: Progressing     Problem: Knowledge Deficit  Goal: Patient/family/caregiver demonstrates understanding of disease process, treatment plan, medications, and discharge instructions  Description: Complete learning assessment and assess knowledge base.  Interventions:  - Provide teaching at level of understanding  - Provide teaching via preferred learning methods  Outcome: Progressing     Problem: NEUROSENSORY - ADULT  Goal: Achieves stable or improved neurological status  Description: INTERVENTIONS  - Monitor and report changes in neurological status  - Monitor vital signs such as temperature, blood pressure, glucose, and any other  labs ordered   - Initiate measures to prevent increased intracranial pressure  - Monitor for seizure activity and implement precautions if appropriate      Outcome: Progressing  Goal: Remains free of injury related to seizures activity  Description: INTERVENTIONS  - Maintain airway, patient safety  and administer oxygen as ordered  - Monitor patient for seizure activity, document and report duration and description of seizure to physician/advanced practitioner  - If seizure occurs,  ensure patient safety during seizure  - Reorient patient post seizure  - Seizure pads on all 4 side rails  - Instruct patient/family to notify RN of any seizure activity including if an aura is experienced  - Instruct patient/family to call for assistance with activity based on nursing assessment  - Administer anti-seizure medications if ordered    Outcome: Progressing  Goal: Achieves maximal functionality and self care  Description: INTERVENTIONS  - Monitor swallowing and airway patency with patient fatigue and changes in neurological status  - Encourage and assist patient to increase activity and self care.   - Encourage visually impaired, hearing impaired and aphasic patients to use assistive/communication devices  Outcome: Progressing     Problem: Nutrition/Hydration-ADULT  Goal: Nutrient/Hydration intake appropriate for improving, restoring or maintaining nutritional needs  Description: Monitor and assess patient's nutrition/hydration status for malnutrition. Collaborate with interdisciplinary team and initiate plan and interventions as ordered.  Monitor patient's weight and dietary intake as ordered or per policy. Utilize nutrition screening tool and intervene as necessary. Determine patient's food preferences and provide high-protein, high-caloric foods as appropriate.     INTERVENTIONS:  - Monitor oral intake, urinary output, labs, and treatment plans  - Assess nutrition and hydration status and recommend course of action  -  Evaluate amount of meals eaten  - Assist patient with eating if necessary   - Allow adequate time for meals  - Recommend/ encourage appropriate diets, oral nutritional supplements, and vitamin/mineral supplements  - Order, calculate, and assess calorie counts as needed  - Recommend, monitor, and adjust tube feedings and TPN/PPN based on assessed needs  - Assess need for intravenous fluids  - Provide specific nutrition/hydration education as appropriate  - Include patient/family/caregiver in decisions related to nutrition  Outcome: Progressing

## 2024-01-29 NOTE — PROGRESS NOTES
Progress Note - Cardiology   [facility]     Nereyda Soler 68 y.o. female MRN: [id number]  : 1955  Unit/Bed#: [address] [place] [address]01 Encounter: [id number]    Assessment and Plan:   1. Anorexia with dehydration: followed by primary team    -   patient seen by ROHAN for unintentional weight loss and abdominal pain, patient apparently has lost 47 pounds over the last six months    -   trial of PPI Carafate ordered    2. Abnormal troponins most likely secondary to XOCHITL/Dehydration:  hs trop:  249 (0hr), 177 (2hr), 215 (4hr)    -   continue Coreg 12.5 mg BID    -   patient for lexiscan nuclear stress test today to evaluate for ischemia    3. XOCHITL secondary to #1: patient's creatinine was 1.6 on admission, baseline creatinine is around 0.5.    -   Creatinine has improved with IV hydration    4. Frequent falls with unknown downtime concerning for possible rhabdomyolysis: patient notes poor appetite and frequent falls at home. Patient did lie on the floor for approximately six hours prior to admission       5. COPD/tobacco abuse: encourage smoking cessation    6. Hypertension: blood pressures improved with titration Coreg to 12.5 mg BID and addition of Norvasc 5 mg daily    -   continue to monitor    Subjective / Objective:   Patient seen and examined. No complaints offered at this time. She is scheduled for nuclear stress test today to evaluate for ischemia.    Vitals: Blood pressure 133/67, pulse 67, temperature 97.7 °F (36.5 °C), temperature source Oral, resp. rate 16, height 5' (1.524 m), weight 57.2 kg (126 lb), SpO2 96%.  Vitals:    24 1309 24 1420   Weight: 57.2 kg (126 lb) 57.2 kg (126 lb)     Body mass index is 24.61 kg/m².  BP Readings from Last 3 Encounters:   24 133/67   12/15/23 140/80   23 120/80     Orthostatic Blood Pressures      Flowsheet Row Most Recent Value   Blood Pressure 133/67 filed at 2024 0807   Patient Position - Orthostatic VS Sitting filed at  01/29/2024 0807          I/O         01/27 0701  01/28 0700 01/28 0701  01/29 0700 01/29 0701  01/30 0700    IV Piggyback       Total Intake(mL/kg)       Net              Unmeasured Urine Occurrence 1 x            Invasive Devices       Peripheral Intravenous Line  Duration             Peripheral IV 01/26/24 Proximal;Right;Ventral (anterior) Forearm 3 days    Peripheral IV 01/29/24 Dorsal (posterior);Left Wrist <1 day                    No intake or output data in the 24 hours ending 01/29/24 1204      Physical Exam:   Physical Exam  Vitals and nursing note reviewed.   Constitutional:       General: She is not in acute distress.     Appearance: Normal appearance. She is normal weight.   HENT:      Right Ear: External ear normal.      Left Ear: External ear normal.      Nose: Nose normal.   Eyes:      General: No scleral icterus.        Right eye: No discharge.         Left eye: No discharge.   Cardiovascular:      Rate and Rhythm: Normal rate and regular rhythm.      Pulses: Normal pulses.      Heart sounds: Murmur heard.   Pulmonary:      Effort: Pulmonary effort is normal. No accessory muscle usage or respiratory distress.      Breath sounds: Examination of the right-lower field reveals decreased breath sounds. Examination of the left-lower field reveals decreased breath sounds. Decreased breath sounds present.   Abdominal:      General: Bowel sounds are normal. There is no distension.      Palpations: Abdomen is soft.   Musculoskeletal:      Right lower leg: No edema.      Left lower leg: No edema.   Skin:     General: Skin is warm and dry.      Capillary Refill: Capillary refill takes less than 2 seconds.   Neurological:      General: No focal deficit present.      Mental Status: She is alert and oriented to person, place, and time. Mental status is at baseline.   Psychiatric:         Mood and Affect: Mood normal.              Medications/ Allergies:     Current Facility-Administered Medications   Medication  Dose Route Frequency Provider Last Rate    acetaminophen  650 mg Oral Q6H PRN Jl Brar MD      albuterol  2 puff Inhalation Q6H PRN Jl Brar MD      amLODIPine  5 mg Oral Daily Ethel Mansfield MD      atorvastatin  40 mg Oral Daily With Dinner Merry Almanzar MD      busPIRone  5 mg Oral TID Ashwin Desir DO      carvedilol  12.5 mg Oral Q12H Ethel Mansfield MD      cefTRIAXone  1,000 mg Intravenous Q24H Teresita Ibrahim MD 1,000 mg (01/28/24 1641)    clopidogrel  75 mg Oral Daily NITHIN Mcbride      divalproex sodium  500 mg Oral Q12H Jl Brar MD      enoxaparin  40 mg Subcutaneous Q24H [facility] Rubio Herrera MD      labetalol  10 mg Intravenous Q6H PRN Ethel Mansfield MD      lidocaine  1 patch Topical Daily Jl Brra MD      melatonin  3 mg Oral HS Darryl Lira MD      nicotine  1 patch Transdermal Daily Jl Brar MD      pantoprazole  40 mg Oral BID AC Sayra Mcgee DO      trimethobenzamide  200 mg Intramuscular Q6H PRN Jl Brar MD       acetaminophen, 650 mg, Q6H PRN  albuterol, 2 puff, Q6H PRN  labetalol, 10 mg, Q6H PRN  trimethobenzamide, 200 mg, Q6H PRN      Allergies   Allergen Reactions    Aspirin Anaphylaxis    Nsaids Anaphylaxis    Asparaginase Derivatives     Ibuprofen Hives    Pegaspargase Other (See Comments)    Robaxin [Methocarbamol]     Toradol [Ketorolac Tromethamine]        VTE Pharmacologic Prophylaxis:   Sequential compression device (Venodyne)     Labs:   Troponins:  Results from last 7 days   Lab Units 01/27/24  0531 01/26/24  1347 01/26/24  1036 01/26/24  0828   CK TOTAL U/L 114  --   --  131   HS TNI RAND ng/L  --  215*  --   --    HSTNI D2 ng/L  --   --  -72  --      CBC with diff:  Results from last 7 days   Lab Units 01/29/24  0533 01/28/24  0502 01/27/24  0531 01/26/24  1347 01/26/24  0828   WBC Thousand/uL 11.58* 13.42* 17.05* 21.71* 20.42*   HEMOGLOBIN g/dL 14.8 15.7* 17.0* 21.2* 23.0*   HEMATOCRIT % 44.6 45.6 48.3* 58.9* 63.4*   MCV fL 89 84 84 84 83   PLATELETS  "Thousands/uL 150 189 203 235 236   RBC Million/uL 4.99 5.41* 5.72* 7.01* 7.61*   MCH pg 29.7 29.0 30.2 30.2 30.2   MCHC g/dL 33.2 34.4 35.8 36.0 36.3   RDW % 14.8 14.6 15.1 16.8* 17.3*   MPV fL 10.2 10.2 10.8 10.3 9.5   NRBC AUTO /100 WBCs 0 0 0  --  0     CMP:  Results from last 7 days   Lab Units 01/29/24  0533 01/28/24  0502 01/27/24  0531 01/26/24  0828   SODIUM mmol/L 134* 133* 136 135   POTASSIUM mmol/L 3.9 3.7 3.3* 3.8   CHLORIDE mmol/L 104 101 102 97   CO2 mmol/L 20* 23 23 21   ANION GAP mmol/L 10 9 11 17   BUN mg/dL 18 19 31* 25   CREATININE mg/dL 0.70 0.71 1.01 1.67*   CALCIUM mg/dL 8.2* 8.3* 8.4 10.7*   AST U/L  --   --  15 19   ALT U/L  --   --  8 13   ALK PHOS U/L  --   --  87 152*   TOTAL PROTEIN g/dL  --   --  6.3* 9.0*   ALBUMIN g/dL  --   --  3.4* 4.6   TOTAL BILIRUBIN mg/dL  --   --  0.73 1.28*   EGFR ml/min/1.73sq m 89 87 57 31     Magnesium:  Results from last 7 days   Lab Units 01/29/24  0533 01/28/24  0502 01/27/24  0531   MAGNESIUM mg/dL 2.2 2.4 1.7*     Coags:  Results from last 7 days   Lab Units 01/27/24  0812 01/27/24  0019 01/26/24  1642 01/26/24  0828   PTT seconds 59* 125* 46* 33   INR   --   --  1.25* 1.12     TSH:  Results from last 7 days   Lab Units 01/26/24  0828   TSH 3RD GENERATON uIU/mL 5.056*     No components found for: \"TSH3\"  Lipid Profile:  Results from last 7 days   Lab Units 01/26/24  0828   CHOLESTEROL mg/dL 206*   TRIGLYCERIDES mg/dL 212*   HDL mg/dL 62   LDL CALC mg/dL 102*     Hgb A1c:  Results from last 7 days   Lab Units 01/26/24  1347   HEMOGLOBIN A1C % 6.3*       Imaging & Testing   I have personally reviewed pertinent reports.    MRI brain wo contrast    Result Date: 1/27/2024  Narrative: MRI BRAIN WITHOUT CONTRAST INDICATION: Seizure, headache, and visual change. COMPARISON:   None. TECHNIQUE:  Multiplanar, multisequence imaging of the brain was performed. IMAGE QUALITY:  Diagnostic. FINDINGS: BRAIN PARENCHYMA: Encephalomalacia identified in the right occipital " lobe compatible with chronic infarct. No acute intracranial abnormality. there are no white matter changes in the cerebral hemispheres. VENTRICLES:  Normal for the patient's age. SELLA AND PITUITARY GLAND:  Normal. ORBITS:  Normal. PARANASAL SINUSES:  Normal. VASCULATURE:  Evaluation of the major intracranial vasculature demonstrates appropriate flow voids. CALVARIUM AND SKULL BASE:  Normal. EXTRACRANIAL SOFT TISSUES:  Normal.     Impression: No acute intracranial abnormality. Chronic infarct right occipital lobe. Workstation performed: [id number]     XR chest portable    Result Date: 1/27/2024  Narrative: CHEST INDICATION:   chest pain and weight loss. COMPARISON:  None EXAM PERFORMED/VIEWS:  XR CHEST PORTABLE FINDINGS: Cardiomediastinal silhouette appears unremarkable. The lungs are clear.  No pneumothorax or pleural effusion. Osseous structures appear within normal limits for patient age.     Impression: No acute cardiopulmonary disease. Workstation performed: [id number]     Echo complete w/ contrast if indicated    Result Date: 1/26/2024  Narrative:   Left Ventricle: Left ventricular cavity size is normal. Wall thickness is mildly increased. There is mild concentric hypertrophy. The left ventricular ejection fraction is 75%. Systolic function is hyperdynamic. Wall motion is normal. Diastolic function is normal.   Right Ventricle: Systolic function is low normal.   Mitral Valve: There is trace regurgitation.   Tricuspid Valve: There is trace regurgitation.     CT abdomen pelvis with contrast    Result Date: 1/26/2024  Narrative: CT ABDOMEN AND PELVIS WITH IV CONTRAST INDICATION: Abdominal pain, acute, nonlocalized Abdominal pain vomiting. COMPARISON: CT 2/8/2021. TECHNIQUE: CT examination of the abdomen and pelvis was performed. Multiplanar 2D reformatted images were created from the source data. This examination, like all CT scans performed in the [facility], was performed utilizing techniques to  minimize radiation dose exposure, including the use of iterative reconstruction and automated exposure control. Radiation dose length product (DLP) for this visit: 413.27 mGy-cm IV Contrast: 100 mL of iohexol (OMNIPAQUE) Enteric Contrast: Not administered. FINDINGS: ABDOMEN LOWER CHEST: No clinically significant abnormality in the visualized lower chest. Stable minimal scar in the lateral left lower lobe on 2/70. LIVER/BILIARY TREE: Unremarkable. GALLBLADDER: No calcified gallstones. No pericholecystic inflammatory change. SPLEEN: Unremarkable. PANCREAS: Unremarkable. ADRENAL GLANDS: Unremarkable. KIDNEYS/URETERS: No hydronephrosis or urinary tract calculi. Simple appearing cysts and subcentimeter hypoattenuating lesions, too small to characterize but statistically likely benign, which do not warrant follow-up (Radiology June 2019). STOMACH AND BOWEL: Colonic diverticulosis without findings of acute diverticulitis. APPENDIX: No findings to suggest appendicitis. ABDOMINOPELVIC CAVITY: No ascites. No pneumoperitoneum. No lymphadenopathy. VESSELS: Atherosclerosis without abdominal aortic aneurysm. 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 PELVIS REPRODUCTIVE ORGANS: Unremarkable for patient's age. URINARY BLADDER: Unremarkable. ABDOMINAL WALL/INGUINAL REGIONS: Unremarkable. BONES: No acute fracture or suspicious osseous lesion. Spinal degenerative changes.     Impression: No acute pathology. Colonic diverticulosis. Workstation performed: [id number]     CT head without contrast    Result Date: 1/26/2024  Narrative: CT BRAIN - WITHOUT CONTRAST INDICATION:   Syncope, recurrent Syncope. COMPARISON: None. TECHNIQUE:  CT examination of the brain was performed.  Multiplanar 2D reformatted images were created from the source data. Radiation dose length product (DLP) for this visit:  845.7 mGy-cm .  This examination, like all CT scans performed in the [facility], was performed utilizing techniques to  "minimize radiation dose exposure, including the use of iterative reconstruction and automated exposure control. IMAGE QUALITY:  Diagnostic. FINDINGS: PARENCHYMA: Decreased attenuation is noted in periventricular and subcortical white matter demonstrating an appearance that is statistically most likely to represent mild microangiopathic change. No CT signs of acute infarction. Partially calcified pineal region cyst measuring 1.2 x 0.9 cm. No mass effect or midline shift.  No acute parenchymal hemorrhage. VENTRICLES AND EXTRA-AXIAL SPACES:  Normal for the patient's age. VISUALIZED ORBITS: Normal visualized orbits. PARANASAL SINUSES: Normal visualized paranasal sinuses. CALVARIUM AND EXTRACRANIAL SOFT TISSUES:  Normal.     Impression: No acute intracranial abnormality. Workstation performed: [id number]       Loren WELLER  Cardiology      \"This note was completed in part utilizing Familiar direct voice recognition software.   Grammatical errors, random word insertion, spelling mistakes, and incomplete sentences may be an occasional consequence of the system secondary to software limitations, ambient noise and hardware issues.    Please read the chart carefully and recognize, using context, where substitutions have occurred.  If you have any questions or concerns about the context, text or information contained within the body of this dictation, please contact myself, the provider, for further clarification.\"  "

## 2024-01-29 NOTE — CONSULTS
"Consultation - Behavioral Health   Nereyda Soler 68 y.o. female MRN: [id number]  Unit/Bed#: [address] [place] [address] Encounter: [id number]      Chief Complaint: \"Anxiety\"    History of Present Illness   Physician Requesting Consult: Teresita Ibrahim MD  Reason for Consult / Principal Problem: Dx 1.  Anxiety    Nereyda Soler is a 68 y.o. female with past medical history of hypertension, COPD, seizure disorder, diabetes type 2, depression who presented to the ED for increased difficulty with weakness, increased falls, epigastric pain associated with nausea and occasional vomiting and is admitted for syncope. Psychiatry consultation is requested due to medication for anxiety. Per primary team patient has previously tried trazodone and Effexor, is potentially open to SSRI but feels they have made her \"clench her teeth and feel tight\".  Lexapro was ordered for the patient however patient has not received this medication yet.    Patient is calm and cooperative with interview today.  Reports experiencing anxiety.  States that she has had anxiety since she was \"a kid\".  Reports that recently she has been waking up feeling anxious and having \"panic attacks\" she reports experiencing worrying, restlessness, impending sense of doom, feeling ears easily annoyed/irritable.  She reports feeling \"sad, lonely\" though denies feeling depressed.  Reports decreased sleep about 3 to 4 hours per night, decreased appetite, decreased concentration.  Reports adequate energy and interest.  She denies suicidal or homicidal ideation, plan, or intent.  She cites protective factors of her 14 grandchildren and 6 great grandchildren.  States that she enjoys going out and helping others.  Patient reports having had a time where she spent \"thousands\" of dollars.  He states that he was in 2007 and states that her parents had both  and that she had relationship issues at that time.  States that she was \"bored\" and described having had \"grief\". " "Patient denies history of being diagnosed with bipolar disorder.  Attempted to assess for history of serge/hypomania the patient and she appeared to report yes to screening criteria but then also denied such.  She denies those times having had elevated mood.  She denies current or previous auditory or visual hallucinations.  Denies/does not endorse paranoia.  Reports having taken psychiatric medication in the past and states that she feels that her jaw would clench, though states that she has experienced this as well without taking certain medications.   She reports interest in starting a medication that to help her anxiety.  Reports that she takes trazodone and that makes her feel \"too tired\".        Psychiatric Review Of Systems:  sleep: yes  appetite changes: yes  weight changes: none reported  energy/anergy: no  interest/pleasure/anhedonia: no  somatic symptoms: yes  anxiety/panic: yes  serge: see hpi    Historical Information   Past Psychiatric History:   Patient denies previous psychiatric hospitalizations.  Currently in treatment with PCP.  Reports previously following with family guidance for psychotherapy  Past Suicide attempts: Denies  Past Violent behavior: Denies  Past Psychiatric medication trial: Trazodone    Substance Abuse History:  Patient reports occasional THC edible use - states that there are weeks she does not use it.  Denies alcohol or other drug use     I have assessed this patient for substance use within the past 12 months    History of IP/OP rehabilitation program: Reports yes for \"pain pills\"  Smoking history: Reports previously smoking a pack per day x 50 years.  States that she recently quit    Family Psychiatric History:   Denies family history of psychiatric illness.  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  Other Pertinent History: None is reported    Traumatic History:   Abuse: Denies  Other Traumatic Events: Reports loss of parents  Denies history of nightmares or flashbacks related to trauma/abuse    Past Medical History:   Diagnosis Date    COPD (chronic obstructive pulmonary disease) ([facility])     Liver disease 2/21/2017    Patient mentions having nonalcoholic liver disease. Denies alcohol use.    Radiculopathy of lumbar region     last assessed 03/29/17    Seizures ([facility])        Medical Review Of Systems:    No current physical complaints verbalized    Meds/Allergies     all current active meds have been reviewed and current meds:   Current Facility-Administered Medications   Medication Dose Route Frequency    acetaminophen (TYLENOL) tablet 650 mg  650 mg Oral Q6H PRN    albuterol (PROVENTIL HFA,VENTOLIN HFA) inhaler 2 puff  2 puff Inhalation Q6H PRN    amLODIPine (NORVASC) tablet 5 mg  5 mg Oral Daily    atorvastatin (LIPITOR) tablet 40 mg  40 mg Oral Daily With Dinner    busPIRone (BUSPAR) tablet 5 mg  5 mg Oral TID    carvedilol (COREG) tablet 12.5 mg  12.5 mg Oral Q12H    cefTRIAXone (ROCEPHIN) IVPB (premix in dextrose) 1,000 mg 50 mL  1,000 mg Intravenous Q24H    clopidogrel (PLAVIX) tablet 75 mg  75 mg Oral Daily    divalproex sodium (DEPAKOTE) DR tablet 500 mg  500 mg Oral Q12H    enoxaparin (LOVENOX) subcutaneous injection 40 mg  40 mg Subcutaneous Q24H JOVITA    labetalol (NORMODYNE) injection 10 mg  10 mg Intravenous Q6H PRN    lidocaine (LIDODERM) 5 % patch 1 patch  1 patch Topical Daily    melatonin tablet 3 mg  3 mg Oral HS    nicotine (NICODERM CQ) 7 mg/24hr TD 24 hr patch 1 patch  1 patch Transdermal Daily    pantoprazole (PROTONIX) EC tablet 40 mg  40 mg Oral BID AC    trimethobenzamide (TIGAN) IM injection  200 mg  200 mg Intramuscular Q6H PRN     Allergies   Allergen Reactions    Aspirin Anaphylaxis    Nsaids Anaphylaxis    Asparaginase Derivatives     Ibuprofen Hives    Pegaspargase Other (See Comments)    Robaxin [Methocarbamol]     Toradol [Ketorolac Tromethamine]        Objective     Vital signs in last 24 hours:  Temp:  [97.7 °F (36.5 °C)-98.3 °F (36.8 °C)] 97.7 °F (36.5 °C)  HR:  [66-81] 67  Resp:  [16-20] 16  BP: (103-148)/(65-70) 133/67    No intake or output data in the 24 hours ending 01/29/24 0908    Mental Status Evaluation:  Appearance:  appears stated age   Behavior:  calm and cooperative   Speech:  normal rate and normal volume   Mood:  anxious   Affect:  Anxious   Language: naming objects and repeating phrases   Thought Process:  goal directed and organized   Associations: intact associations   Thought Content:  no delusions elicited   Perceptual Disturbances: Denies auditory or visual hallucinations and Does not appear to be responding to internal stimuli   Risk Potential: Denies suicidal or homicidal ideation, plan, or intent   Sensorium:  person, place, and situation   Memory:  reports having some difficulty remembering events at time of presentation.  Otherwise memory appears to be grossly intact   Cognition:  reports having some difficulty remembering events at time of presentation.  Otherwise memory appears to be grossly intact   Consciousness:  alert and awake    Attention: attention span and concentration were age appropriate   Intellect: within normal limits   Fund of Knowledge: awareness of current events: Fair, past history: Fair, and vocabulary: Fair   Insight:  fair   Judgment: fair   Muscle Strength and Tone: Not assessed   Gait/Station: not assessed, patient in bed   Motor Activity: no abnormal movements     Lab Results: I have personally reviewed all pertinent laboratory/tests results.     Labs in last 72 hours:   Recent Labs     01/27/24  0531 01/28/24  0502 01/29/24  0533   WBC 17.05*    < > 11.58*   RBC 5.72*   < > 4.99   HGB 17.0*   < > 14.8   HCT 48.3*   < > 44.6      < > 150   RDW 15.1   < > 14.8   NEUTROABS 11.67*   < > 6.06   SODIUM 136   < > 134*   K 3.3*   < > 3.9      < > 104   CO2 23   < > 20*   BUN 31*   < > 18   CREATININE 1.01   < > 0.70   GLUC 112   < > 97   CALCIUM 8.4   < > 8.2*   AST 15  --   --    ALT 8  --   --    ALKPHOS 87  --   --    TP 6.3*  --   --    ALB 3.4*  --   --    TBILI 0.73  --   --     < > = values in this interval not displayed.     Depakote:   Lab Results   Component Value Date    VALPROICTOT 23 (L) 01/26/2024       Imaging Studies: MRI brain wo contrast    Result Date: 1/27/2024  Narrative: MRI BRAIN WITHOUT CONTRAST INDICATION: Seizure, headache, and visual change. COMPARISON:   None. TECHNIQUE:  Multiplanar, multisequence imaging of the brain was performed. IMAGE QUALITY:  Diagnostic. FINDINGS: BRAIN PARENCHYMA: Encephalomalacia identified in the right occipital lobe compatible with chronic infarct. No acute intracranial abnormality. there are no white matter changes in the cerebral hemispheres. VENTRICLES:  Normal for the patient's age. SELLA AND PITUITARY GLAND:  Normal. ORBITS:  Normal. PARANASAL SINUSES:  Normal. VASCULATURE:  Evaluation of the major intracranial vasculature demonstrates appropriate flow voids. CALVARIUM AND SKULL BASE:  Normal. EXTRACRANIAL SOFT TISSUES:  Normal.     Impression: No acute intracranial abnormality. Chronic infarct right occipital lobe. Workstation performed: [id number]       CT head without contrast    Result Date: 1/26/2024  Narrative: CT BRAIN - WITHOUT CONTRAST INDICATION:   Syncope, recurrent Syncope. COMPARISON: None. TECHNIQUE:  CT examination of the brain was performed.  Multiplanar 2D reformatted images were created from the source data. Radiation dose length product (DLP) for this visit:  845.7 mGy-cm .  This examination, like all CT scans performed in the [facility], was performed  "utilizing techniques to minimize radiation dose exposure, including the use of iterative reconstruction and automated exposure control. IMAGE QUALITY:  Diagnostic. FINDINGS: PARENCHYMA: Decreased attenuation is noted in periventricular and subcortical white matter demonstrating an appearance that is statistically most likely to represent mild microangiopathic change. No CT signs of acute infarction. Partially calcified pineal region cyst measuring 1.2 x 0.9 cm. No mass effect or midline shift.  No acute parenchymal hemorrhage. VENTRICLES AND EXTRA-AXIAL SPACES:  Normal for the patient's age. VISUALIZED ORBITS: Normal visualized orbits. PARANASAL SINUSES: Normal visualized paranasal sinuses. CALVARIUM AND EXTRACRANIAL SOFT TISSUES:  Normal.     Impression: No acute intracranial abnormality. Workstation performed: [id number]     EKG/Pathology/Other Studies:   Lab Results   Component Value Date    VENTRATE 104 01/26/2024    ATRIALRATE 104 01/26/2024    PRINT 134 01/26/2024    QRSDINT 76 01/26/2024    QTINT 386 01/26/2024    QTCINT 507 01/26/2024    PAXIS 70 01/26/2024    QRSAXIS 63 01/26/2024    TWAVEAXIS 65 01/26/2024        Code Status: Level 1 - Full Code  Advance Directive and Living Will:      Power of :    POLST:        Assessment/Plan     Assessment:  Nereyda Soler is a 68 y.o. female with past medical history of hypertension, COPD, seizure disorder, diabetes type 2, depression who presented to the ED for increased difficulty with weakness, increased falls, epigastric pain associated with nausea and occasional vomiting and is admitted for syncope. Psychiatry consultation is requested due to medication for anxiety. Per primary team patient has previously tried trazodone and Effexor, is potentially open to SSRI but feels they have made her \"clench her teeth and feel tight\".  Lexapro was ordered for the patient however patient has not received this medication yet.    Patient reports and endorses " "symptomatology consistent with anxiety.  She denies feeling depressed. She denies suicidal or homicidal ideation, plan, or intent.  She cites protective factors of her 14 grandchildren and 6 great grandchildren.  Attempted to assess for history of serge/hypomania however it is unclear if patient has such a history of such episodes/bipolar disorder.  She does not currently endorse serge/hypomania.  Denies/does not endorse current psychotic symptoms. Reports having taken psychiatric medication in the past and states that she feels that her jaw would clench, though states that she has experienced this as well without taking certain medications.   She reports interest in starting a medication that to help her anxiety.  Reports that she takes trazodone and that makes her feel \"too tired\".  Discussed various possible treatment options with the patient.  Discussed that antidepressants can increase risk of manic induction and patient reported concerns regarding such.  Discussed also risk of bruxism with antidepressant medication.  Therefore discussed plan to start BuSpar for anxiety as this medication does not appear to be associated with manic induction nor with bruxism and patient verbalized interest, understanding, and was in agreement with the plan.  There is no clear indication for inpatient psychiatric hospitalization at this time.  She verbalized interest in following up with psychiatry and psychotherapy outpatient and therefore discussed plan to place information in discharge paperwork for her to call to establish care with [facility].  Discussed with patient recommendation for her to follow-up with her PCP after discharge for continued psychiatric medication/treatment and she verbalized understanding and agreement.      Diagnosis:  Generalized anxiety disorder  Mood disorder unspecified      Plan:   Continue medical management  There is no clear indication for inpatient " psychiatric hospitalization at this time  Discontinue Lexapro  Start BuSpar 5 mg TID for anxiety  Depakote per primary team for seizures  Discussed with the primary team  Follow-up with PCP after discharge for continued psychiatric medication/treatment  Information placed in discharge paperwork for patient to establish care with outpatient psychiatry and psychotherapy at [facility]  Psychiatry to follow up as necessary.  Please contact with questions.  For after hours and weekends please contact [facility] ([phone]) for psychiatric purposes       Risks, benefits and possible side effects of Medications:   Risks/benefits/alternativies to treatment discussed, including a myriad of potential adverse medication side effects, to which Nereyda voiced understanding and consented fully to treatment.       Ashwin Desir,   01/29/24      This note has been constructed using a voice recognition system.    There may be translation, syntax,  or grammatical errors. If you have any questions, please contact the dictating provider.

## 2024-01-29 NOTE — DISCHARGE INSTR - AVS FIRST PAGE
Please follow medication instructions as listed on AVS     Please follow up with the following providers:  -[facility]  -Neurology  -Gastroenterology (GI)  -Neurology  -Psychiatry  -Pulmonology

## 2024-01-29 NOTE — PLAN OF CARE
Problem: Potential for Falls  Goal: Patient will remain free of falls  Description: INTERVENTIONS:  - Educate patient/family on patient safety including physical limitations  - Instruct patient to call for assistance with activity   - Consult OT/PT to assist with strengthening/mobility   - Keep Call bell within reach  - Keep bed low and locked with side rails adjusted as appropriate  - Keep care items and personal belongings within reach  - Initiate and maintain comfort rounds  - Make Fall Risk Sign visible to staff  - Apply yellow socks and bracelet for high fall risk patients  - Consider moving patient to room near nurses station  Outcome: Progressing     Problem: PAIN - ADULT  Goal: Verbalizes/displays adequate comfort level or baseline comfort level  Description: Interventions:  - Encourage patient to monitor pain and request assistance  - Assess pain using appropriate pain scale  - Administer analgesics based on type and severity of pain and evaluate response  - Implement non-pharmacological measures as appropriate and evaluate response  - Consider cultural and social influences on pain and pain management  - Notify physician/advanced practitioner if interventions unsuccessful or patient reports new pain  Outcome: Progressing     Problem: INFECTION - ADULT  Goal: Absence or prevention of progression during hospitalization  Description: INTERVENTIONS:  - Assess and monitor for signs and symptoms of infection  - Monitor lab/diagnostic results  - Monitor all insertion sites, i.e. indwelling lines, tubes, and drains  - Monitor endotracheal if appropriate and nasal secretions for changes in amount and color  - Millry appropriate cooling/warming therapies per order  - Administer medications as ordered  - Instruct and encourage patient and family to use good hand hygiene technique  - Identify and instruct in appropriate isolation precautions for identified infection/condition  Outcome: Progressing  Goal: Absence  of fever/infection during neutropenic period  Description: INTERVENTIONS:  - Monitor WBC    Outcome: Progressing     Problem: SAFETY ADULT  Goal: Patient will remain free of falls  Description: INTERVENTIONS:  - Educate patient/family on patient safety including physical limitations  - Instruct patient to call for assistance with activity   - Consult OT/PT to assist with strengthening/mobility   - Keep Call bell within reach  - Keep bed low and locked with side rails adjusted as appropriate  - Keep care items and personal belongings within reach  - Initiate and maintain comfort rounds  - Make Fall Risk Sign visible to staff  - Apply yellow socks and bracelet for high fall risk patients  - Consider moving patient to room near nurses station  Outcome: Progressing  Goal: Maintain or return to baseline ADL function  Description: INTERVENTIONS:  -  Assess patient's ability to carry out ADLs; assess patient's baseline for ADL function and identify physical deficits which impact ability to perform ADLs (bathing, care of mouth/teeth, toileting, grooming, dressing, etc.)  - Assess/evaluate cause of self-care deficits   - Assess range of motion  - Assess patient's mobility; develop plan if impaired  - Assess patient's need for assistive devices and provide as appropriate  - Encourage maximum independence but intervene and supervise when necessary  - Involve family in performance of ADLs  - Assess for home care needs following discharge   - Consider OT consult to assist with ADL evaluation and planning for discharge  - Provide patient education as appropriate  Outcome: Progressing  Goal: Maintains/Returns to pre admission functional level  Description: INTERVENTIONS:  - Perform AM-PAC 6 Click Basic Mobility/ Daily Activity assessment daily.  - Set and communicate daily mobility goal to care team and patient/family/caregiver.   - Collaborate with rehabilitation services on mobility goals if consulted  - Out of bed for meals 3  times a day  - Out of bed for toileting  - Record patient progress and toleration of activity level   Outcome: Progressing     Problem: DISCHARGE PLANNING  Goal: Discharge to home or other facility with appropriate resources  Description: INTERVENTIONS:  - Identify barriers to discharge w/patient and caregiver  - Arrange for needed discharge resources and transportation as appropriate  - Identify discharge learning needs (meds, wound care, etc.)  - Arrange for interpretive services to assist at discharge as needed  - Refer to Case Management Department for coordinating discharge planning if the patient needs post-hospital services based on physician/advanced practitioner order or complex needs related to functional status, cognitive ability, or social support system  Outcome: Progressing     Problem: Knowledge Deficit  Goal: Patient/family/caregiver demonstrates understanding of disease process, treatment plan, medications, and discharge instructions  Description: Complete learning assessment and assess knowledge base.  Interventions:  - Provide teaching at level of understanding  - Provide teaching via preferred learning methods  Outcome: Progressing     Problem: NEUROSENSORY - ADULT  Goal: Achieves stable or improved neurological status  Description: INTERVENTIONS  - Monitor and report changes in neurological status  - Monitor vital signs such as temperature, blood pressure, glucose, and any other labs ordered   - Initiate measures to prevent increased intracranial pressure  - Monitor for seizure activity and implement precautions if appropriate      Outcome: Progressing  Goal: Remains free of injury related to seizures activity  Description: INTERVENTIONS  - Maintain airway, patient safety  and administer oxygen as ordered  - Monitor patient for seizure activity, document and report duration and description of seizure to physician/advanced practitioner  - If seizure occurs,  ensure patient safety during  seizure  - Reorient patient post seizure  - Seizure pads on all 4 side rails  - Instruct patient/family to notify RN of any seizure activity including if an aura is experienced  - Instruct patient/family to call for assistance with activity based on nursing assessment  - Administer anti-seizure medications if ordered    Outcome: Progressing  Goal: Achieves maximal functionality and self care  Description: INTERVENTIONS  - Monitor swallowing and airway patency with patient fatigue and changes in neurological status  - Encourage and assist patient to increase activity and self care.   - Encourage visually impaired, hearing impaired and aphasic patients to use assistive/communication devices  Outcome: Progressing     Problem: Nutrition/Hydration-ADULT  Goal: Nutrient/Hydration intake appropriate for improving, restoring or maintaining nutritional needs  Description: Monitor and assess patient's nutrition/hydration status for malnutrition. Collaborate with interdisciplinary team and initiate plan and interventions as ordered.  Monitor patient's weight and dietary intake as ordered or per policy. Utilize nutrition screening tool and intervene as necessary. Determine patient's food preferences and provide high-protein, high-caloric foods as appropriate.     INTERVENTIONS:  - Monitor oral intake, urinary output, labs, and treatment plans  - Assess nutrition and hydration status and recommend course of action  - Evaluate amount of meals eaten  - Assist patient with eating if necessary   - Allow adequate time for meals  - Recommend/ encourage appropriate diets, oral nutritional supplements, and vitamin/mineral supplements  - Order, calculate, and assess calorie counts as needed  - Assess need for intravenous fluids  - Provide specific nutrition/hydration education as appropriate  - Include patient/family/caregiver in decisions related to nutrition  Outcome: Progressing

## 2024-01-29 NOTE — TELEPHONE ENCOUNTER
----- Message from Tomy Aj MD sent at 1/28/2024 10:29 AM EST -----  Please get the patient for EGD and colonoscopy Dulcolax MiraLAX bowel prep

## 2024-01-30 ENCOUNTER — TELEPHONE (OUTPATIENT)
Dept: PSYCHIATRY | Facility: CLINIC | Age: 69
End: 2024-01-30

## 2024-01-30 ENCOUNTER — TELEPHONE (OUTPATIENT)
Dept: CASE MANAGEMENT | Facility: HOSPITAL | Age: 69
End: 2024-01-30

## 2024-01-30 NOTE — NURSING NOTE
AVS printed and reviewed with the patient at the bedside. Opportunity for questions was provided. The patient is discharged to home in stable condition, accompanied by several family members.

## 2024-01-30 NOTE — TELEPHONE ENCOUNTER
Voicemail left for patient regarding responses from [facility] referrals. At this time only potential available agency is [facility]; however, liaison Shira noted there would be a 2 week delay in SOC.     SW awaiting returned call from patient regarding decision to move forward with services.

## 2024-01-30 NOTE — TELEPHONE ENCOUNTER
IC left voice message for pt about referral that was received for services. IC let pt know that our office does not accept her insurance, so pt should contact other providers on her hospital discharge paperwork to find one that accepts her insurance.

## 2024-01-31 LAB
BACTERIA BLD CULT: NORMAL
BACTERIA BLD CULT: NORMAL

## 2024-01-31 NOTE — UTILIZATION REVIEW
NOTIFICATION OF ADMISSION DISCHARGE   This is a Notification of Discharge from [facility]. Please be advised that this patient has been discharge from our facility. Below you will find the admission and discharge date and time including the patient’s disposition.   UTILIZATION REVIEW CONTACT:  Janie Hyde  Utilization   Network Utilization Review Department  Phone: [phone] x carefully listen to the prompts. All voicemails are confidential.  Email: [email or web address]     ADMISSION INFORMATION  PRESENTATION DATE: 1/26/2024  7:45 AM  OBERVATION ADMISSION DATE:   INPATIENT ADMISSION DATE: 1/26/24 11:31 AM   DISCHARGE DATE: 1/29/2024  7:00 PM   DISPOSITION:Home/Self Care    Network Utilization Review Department  ATTENTION: Please call with any questions or concerns to [phone] and carefully listen to the prompts so that you are directed to the right person. All voicemails are confidential.   For Discharge needs, contact Care Management DC Support Team at [phone] opt. 2  Send all requests for admission clinical reviews, approved or denied determinations and any other requests to dedicated fax number below belonging to the campus where the patient is receiving treatment. List of dedicated fax numbers for the Facilities:  FACILITY NAME UR FAX NUMBER   ADMISSION DENIALS (Administrative/Medical Necessity) [phone]   DISCHARGE SUPPORT TEAM ([facility]) [phone]   PARENT CHILD HEALTH (Maternity/NICU/Pediatrics) [phone]   [facility] [phone]   [facility] [phone]   [facility] [phone]   [facility] [phone]   [facility] [phone]   [facility] [phone]   [facility] [phone]   [facility] [phone]    [facility] [phone]   [facility] [phone]   [facility] [phone]   [facility] [phone]

## 2024-02-01 NOTE — UTILIZATION REVIEW
NOTIFICATION OF ADMISSION DISCHARGE   This is a Notification of Discharge from [facility]. Please be advised that this patient has been discharge from our facility. Below you will find the admission and discharge date and time including the patient’s disposition.   UTILIZATION REVIEW CONTACT:  Janie Hyde  Utilization   Network Utilization Review Department  Phone: [phone] x carefully listen to the prompts. All voicemails are confidential.  Email: [email or web address]     ADMISSION INFORMATION  PRESENTATION DATE: 1/26/2024  7:45 AM  OBERVATION ADMISSION DATE:   INPATIENT ADMISSION DATE: 1/26/24 11:31 AM   DISCHARGE DATE: 1/29/2024  7:00 PM   DISPOSITION:Home/Self Care    Network Utilization Review Department  ATTENTION: Please call with any questions or concerns to [phone] and carefully listen to the prompts so that you are directed to the right person. All voicemails are confidential.   For Discharge needs, contact Care Management DC Support Team at [phone] opt. 2  Send all requests for admission clinical reviews, approved or denied determinations and any other requests to dedicated fax number below belonging to the campus where the patient is receiving treatment. List of dedicated fax numbers for the Facilities:  FACILITY NAME UR FAX NUMBER   ADMISSION DENIALS (Administrative/Medical Necessity) [phone]   DISCHARGE SUPPORT TEAM ([facility]) [phone]   PARENT CHILD HEALTH (Maternity/NICU/Pediatrics) [phone]   [facility] [phone]   [facility] [phone]   [facility] [phone]   [facility] [phone]   [facility] [phone]   [facility] [phone]   [facility] [phone]   [facility] [phone]    [facility] [phone]   Counts include 234 beds at the [facility] [phone]   [facility] [phone]   [facility] [phone]

## 2024-02-05 NOTE — TELEPHONE ENCOUNTER
Scheduled date of EGD/colonoscopy (as of today):03/04/24  Physician performing EGD/colonoscopy:Dr. Aj  Location of EGD/colonoscopy:[facility]  Desired bowel prep reviewed with patient:Miralax, Dulcolax  Instructions reviewed with patient by:amryann  Clearances:  n/a

## 2024-02-08 ENCOUNTER — OFFICE VISIT (OUTPATIENT)
Dept: FAMILY MEDICINE CLINIC | Facility: CLINIC | Age: 69
End: 2024-02-08
Payer: COMMERCIAL

## 2024-02-08 VITALS
HEIGHT: 60 IN | BODY MASS INDEX: 26.7 KG/M2 | SYSTOLIC BLOOD PRESSURE: 130 MMHG | WEIGHT: 136 LBS | TEMPERATURE: 98.3 F | OXYGEN SATURATION: 95 % | HEART RATE: 72 BPM | DIASTOLIC BLOOD PRESSURE: 70 MMHG

## 2024-02-08 DIAGNOSIS — R39.89 SUSPECTED UTI: ICD-10-CM

## 2024-02-08 DIAGNOSIS — E11.9 TYPE 2 DIABETES MELLITUS WITHOUT COMPLICATION, WITHOUT LONG-TERM CURRENT USE OF INSULIN (HCC): ICD-10-CM

## 2024-02-08 DIAGNOSIS — F17.200 TOBACCO DEPENDENCE: ICD-10-CM

## 2024-02-08 DIAGNOSIS — I10 ESSENTIAL HYPERTENSION: ICD-10-CM

## 2024-02-08 DIAGNOSIS — R56.9 SEIZURES (HCC): Primary | ICD-10-CM

## 2024-02-08 PROCEDURE — 99495 TRANSJ CARE MGMT MOD F2F 14D: CPT | Performed by: FAMILY MEDICINE

## 2024-02-08 RX ORDER — DIVALPROEX SODIUM 250 MG/1
500 TABLET, DELAYED RELEASE ORAL EVERY 12 HOURS
Qty: 56 TABLET | Refills: 0 | Status: SHIPPED | OUTPATIENT
Start: 2024-02-08 | End: 2024-02-22

## 2024-02-08 NOTE — ASSESSMENT & PLAN NOTE
/70 mmHg. Controlled, stable and asymptomatic. On Norvasc and Coreg.     Continue Carvedilol 12.5 mg, PO, Q12H  Continue Amlodipine 5 mg, PO, QD  Continue monitoring BP   Continue salt moderation, and DASH like diet   Advised regarding S/Sx of HTN urgency/emergency   
  Lab Results   Component Value Date    HGBA1C 6.3 (H) 01/26/2024     HbA1c at goal. Controlled with diet.     Continue Carb controlled diet   Consider starting SGLT 2 due to Hx of DM and CAD for cardiac protection  
  Pt continues to have episodes of staring spells. Last one yesterday. On Depakote.     Sent Referral for Neurology  Continue Depakote 500 mg, PO, Q12H  
  Urine culture showed 100,000 cfu - mixed contaminant. No longer having symptoms.     Resolved  
No

## 2024-02-08 NOTE — PROGRESS NOTES
[facility] - Outpatient Clinic  Outpatient Visit - MARI: 24     Patient's Information      Name: Nereyda Soler  Age/Sex: 68 y.o. female  MRN: [id number]  : 1955      Assessment/Plan     A/P: Nereyda Soler is a 68 y.o. female patient that came to the clinic today for TCM visit.   Chart reviewed. Plan below.     Suspected UTI    Urine culture showed 100,000 cfu - mixed contaminant. No longer having symptoms. Completed abx.     Resolved    Essential hypertension    /70 mmHg. Controlled, stable and asymptomatic. On Norvasc and Coreg.     Continue Carvedilol 12.5 mg, PO, Q12H  Continue Amlodipine 5 mg, PO, QD  Continue monitoring BP   Continue salt moderation, and DASH like diet   Advised regarding S/Sx of HTN urgency/emergency     Seizures (HCC)    Pt continues to have episodes of staring spells. Last one yesterday. On Depakote.     Sent Referral for Neurology  Continue Depakote 500 mg, PO, Q12H    Type 2 diabetes mellitus without complication, without long-term current use of insulin (HCC)    Lab Results   Component Value Date    HGBA1C 6.3 (H) 2024     HbA1c at goal. Controlled with diet.     Continue Carb controlled diet   Consider starting SGLT 2 due to Hx of DM and CAD for cardiac protection    Associated orders were discussed and explained to the pt. Pertinent care gaps were addressed.   Pt voiced understanding and acceptance with A/P. Pt will call the office if any further questions/concerns.     Next Visit: Return in about 4 weeks (around 3/7/2024) for follow up on HTN and chronic conditions wit PCP, Dr. Wright.     A/P of patient's case was discussed with the Attending, Dr. Blade Leyva. Patient was also seen and assessed per attending.     Subjective     History of Present Illness      Chief Complaint   Patient presents with    Follow-up     68 y.o. female patient came to the clinic for TCM. Pt was in the hospital 24  "for seizure like activity - Stroke like symptoms.      Per DS - \" Patient had been having nausea and poor PO intake x 1 week, and was unable to take her home medications. She also noted progressive fatigue, epigastric pain, and weight loss. On admission patient was found to have elevated troponins and XOCHITL which were attributed to dehydration. Patient was started on IV heparin and cardiology was consulted. Patient also noted intermittent right eye blindness. MRI was negative for acute findings. Chronic right occipital infarct was noted. Patient had a stress test which showed negative for ischemia. GI was consulted for fatigue, epigastric pain, and weight loss and recommended trial of Carafate, PPI therapy twice daily, and outpatient endoscopy and colonoscopy. Psychiatry was consulted due to anxiety and patient was started on Buspar 5 mg TID.\"     Pt is feeling well today. She completed her antibiotics. Refers that one of her medications, maybe the abx caused her \"burned tongue\". Pt refers being compliant with all of her medications. Pt is tolerating well her new medication regimen. Pt continues to have occasional episodes of nausea. Uses candy when it happens. Pt has scheduled a visit already with Pulmonology, Cardiology, and GI. Pt continues to have anxiety. Refers it feeling the same. Pt is asking for her smoking patches. She takes her BP at home but doesn't remember the ranges. Refers having staring episodes \"seizure\" yesterday. No other symptoms. Pt has been eating better and has a better appetite. Refers sometimes seeing blurry through her right eye.     TCM Call       Hospital care reviewed  Records reviewed    Patient was hospitialized at  [facility]    Date of Admission  02/08/21    Date of discharge  02/12/21    Diagnosis  Elevated troponin    Disposition  Home          TCM Call       Scheduled for follow up?  Not Clinically Warranted    Not clinically warranted  Patient left AMA     I have advised " the patient to call PCP with any new or worsening symptoms  Sun DENNY I reviewed patient's hx and updated as appropriate if needed: allergies, current medications, PMHx, FHx, social hx, surgical hx, and problem list.      Objective     Vital Signs     Visit Vitals  /70 (BP Location: Left arm, Patient Position: Sitting, Cuff Size: Standard)   Pulse 72   Temp 98.3 °F (36.8 °C) (Tympanic)   Ht 5' (1.524 m)   Wt 61.7 kg (136 lb)   SpO2 95%   BMI 26.56 kg/m²   OB Status Postmenopausal   Smoking Status Some Days   BSA 1.58 m²      Physical Exam      Physical Exam  Constitutional:       General: She is not in acute distress.     Appearance: Normal appearance. She is overweight. She is not ill-appearing or toxic-appearing.   HENT:      Head: Normocephalic and atraumatic.      Right Ear: External ear normal.      Left Ear: External ear normal.      Nose: Nose normal.      Mouth/Throat:      Mouth: Mucous membranes are moist.   Eyes:      General: No scleral icterus.     Conjunctiva/sclera: Conjunctivae normal.   Cardiovascular:      Rate and Rhythm: Normal rate and regular rhythm.      Pulses: Normal pulses.      Heart sounds: Normal heart sounds.   Pulmonary:      Effort: Pulmonary effort is normal. No respiratory distress.      Breath sounds: Normal breath sounds.   Abdominal:      General: There is no distension.      Palpations: Abdomen is soft.   Musculoskeletal:         General: Normal range of motion.      Cervical back: Normal range of motion.      Right lower leg: No edema.      Left lower leg: No edema.   Skin:     General: Skin is warm and dry.      Findings: No lesion or rash.   Neurological:      Mental Status: She is alert and oriented to person, place, and time.      Motor: No weakness.      Gait: Gait normal.   Psychiatric:         Mood and Affect: Mood normal.         Behavior: Behavior normal.         Thought Content: Thought content normal.         Judgment: Judgment normal.  "         It was a pleasure being of service to Nereyda Soler. Thank you.     Chayo Lu MD., MSMS.   [address]      02/08/24   9:53 AM          Portions of the record may have been created with voice recognition software. Occasional wrong word or \"sound a like\" substitutions may have occurred due to the inherent limitations of voice recognition software. Read the chart carefully and recognize, using context, where substitutions have occurred.   "

## 2024-02-09 ENCOUNTER — CONSULT (OUTPATIENT)
Dept: GASTROENTEROLOGY | Facility: CLINIC | Age: 69
End: 2024-02-09
Payer: COMMERCIAL

## 2024-02-09 ENCOUNTER — TELEPHONE (OUTPATIENT)
Dept: GASTROENTEROLOGY | Facility: CLINIC | Age: 69
End: 2024-02-09

## 2024-02-09 VITALS
HEART RATE: 76 BPM | BODY MASS INDEX: 26.5 KG/M2 | HEIGHT: 60 IN | DIASTOLIC BLOOD PRESSURE: 61 MMHG | OXYGEN SATURATION: 97 % | WEIGHT: 135 LBS | SYSTOLIC BLOOD PRESSURE: 127 MMHG

## 2024-02-09 DIAGNOSIS — R63.4 WEIGHT LOSS, UNINTENTIONAL: ICD-10-CM

## 2024-02-09 DIAGNOSIS — R10.13 EPIGASTRIC PAIN: Primary | ICD-10-CM

## 2024-02-09 PROCEDURE — 99213 OFFICE O/P EST LOW 20 MIN: CPT | Performed by: PHYSICIAN ASSISTANT

## 2024-02-09 NOTE — PROGRESS NOTES
[facility] - Outpatient Progress Note  Nereyda Soler 68 y.o. female MRN: [id number]  Encounter: [id number]    Assessment and Plan    1. Epigastric pain  -Continue pantoprazole 40 mg twice daily  -Plan EGD to rule out peptic ulcer disease and underlying neoplasm  - This risks of this procedure include but are not limited to; anesthesia complications, injury/perforation of the bowel, infection and bleeding.  -Needs to be off Plavix 5 days prior to the procedure.  Follows with Loren Black certified registered nurse practitioner with cardiology      2. Weight loss, unintentional  -CT of the abdomen and pelvis on January 26, 2024 shows no acute pathology and only colonic diverticulosis.  Chest x-ray on January 27, 2024 shows no acute cardiopulmonary disease.  - Plan colonoscopy to rule out underlying neoplasm  - This risks of this procedure include but are not limited to; anesthesia complications, injury/perforation of the bowel, infection and bleeding.        --------------------------------------------------------------------------------------------------------------------    Chief Complaint: Hospital follow-up for epigastric pain and weight loss    HPI: Nereyda Soler is a 68 y.o. female who was seen in consult at the hospital on January 28, 2022 for with syncope, admitted with vague symptoms, weakness, near syncopal episode, complains of vague epigastric abdominal discomfort for undefined period of time several months to weeks.  Patient reports that she is lost weight unintentionally approximately 47 pounds over the last 6 months.  She reported that she has an epigastric burning, occurs before she eats, she has food aversion, has vomiting when she eats.  The episodes only occur once every several weeks, and interim she is able to eat.  She typically has regular bowel moods, occasionally have loose stools.  Denies any melena, rectal oozing, dysphagia, denies any typical heartburn  symptoms.     Laboratory studies were unremarkable     Reports having a  in the past     Was a smoker.  No alcohol.  Retired construction.     Family history no GI associated malignancies.  History of breast cancer.    CT of the abdomen and pelvis on 2024 shows no acute pathology and only colonic diverticulosis.  Chest x-ray on 2024 shows no acute cardiopulmonary disease.    Her weight was 126 pounds back on  and is 135 pounds today.    No pacemaker or defibrillator implanted.   No chronic kidney disease or solitary kidney.  Not on any supplemental oxygen at night.  Not on any antiplatelet or anticoagulants.      Endoscopy History:  EGD - none previously  Colonoscopy - none previously    Review of Systems:   General: negative for fatigue, fever, night sweats or unexpected weight loss  Psychological: negative for anxiety or depression  Ophthalmic: negative for blurry vision or scleral icterus  ENT: negative for headaches, sore throat or dysphagia  Hematological and Lymphatic: negative for pallor or swollen lymph nodes  Respiratory: negative for cough, shortness of breath or wheezing  Cardiovascular: negative for chest pain, edema or murmur  Gastrointestinal: as mentioned in HPI  Genito-Urinary: negative for dysuria or incontinence  Musculoskeletal: negative for joint pain, joint stiffness or joint swelling  Dermatological: negative for pruritus, rash, or jaundice    Current Medications  Current Outpatient Medications   Medication Sig Dispense Refill   • albuterol (Ventolin HFA) 90 mcg/act inhaler Inhale 2 puffs every 6 (six) hours as needed for wheezing 18 g 3   • amLODIPine (NORVASC) 5 mg tablet Take 1 tablet (5 mg total) by mouth daily 30 tablet 0   • atorvastatin (LIPITOR) 40 mg tablet Take 1 tablet (40 mg total) by mouth daily with dinner 30 tablet 0   • busPIRone (BUSPAR) 5 mg tablet Take 1 tablet (5 mg total) by mouth 3 (three) times a day 90 tablet 0   • carvedilol  (COREG) 12.5 mg tablet Take 1 tablet (12.5 mg total) by mouth every 12 (twelve) hours 60 tablet 0   • clopidogrel (PLAVIX) 75 mg tablet Take 1 tablet (75 mg total) by mouth daily 30 tablet 0   • divalproex sodium (DEPAKOTE) 250 mg DR tablet Take 2 tablets (500 mg total) by mouth every 12 (twelve) hours for 14 days 56 tablet 0   • nicotine (NICODERM CQ) 7 mg/24hr TD 24 hr patch Place 1 patch on the skin over 24 hours every 24 hours 28 patch 3   • pantoprazole (PROTONIX) 40 mg tablet Take 1 tablet (40 mg total) by mouth 2 (two) times a day before meals 60 tablet 0   • Blood Pressure KIT Use in the morning (Patient not taking: Reported on 2022) 1 kit 0   • carvedilol (COREG) 12.5 mg tablet TAKE 1 TABLET (12.5 MG TOTAL) BY MOUTH EVERY 12 (TWELVE) HOURS (Patient not taking: Reported on 2024) 180 tablet 0   • nicotine (NICODERM CQ) 14 mg/24hr TD 24 hr patch Place 1 patch on the skin every 24 hours (Patient not taking: Reported on 2024) 28 patch 0     No current facility-administered medications for this visit.       Past Medical History  Past Medical History:   Diagnosis Date   • COPD (chronic obstructive pulmonary disease) (HCC)    • Liver disease 2017    Patient mentions having nonalcoholic liver disease. Denies alcohol use.   • Radiculopathy of lumbar region     last assessed 17   • Seizures (HCC)        Past Surgical History  Past Surgical History:   Procedure Laterality Date   •  SECTION     • EPIDURAL BLOCK INJECTION Right 2017    Procedure: BLOCK / INJECTION EPIDURAL STEROID TRANSFORAMINAL   L4-5;  Surgeon: Sanford De La Vega MD;  Location: [facility] MAIN OR;  Service:    • TONSILLECTOMY         Past Social History   Social History     Socioeconomic History   • Marital status:      Spouse name: None   • Number of children: 3   • Years of education: None   • Highest education level: None   Occupational History   • None   Tobacco Use   • Smoking status: Some Days     Current  packs/day: 0.50     Types: Cigarettes   • Smokeless tobacco: Current   • Tobacco comments:     Per allscripts: Current everyday smoker   Vaping Use   • Vaping status: Former   • Substances: Nicotine   Substance and Sexual Activity   • Alcohol use: Not Currently     Comment: Per allscripts: Social   • Drug use: Yes     Types: Marijuana     Comment: gummies   • Sexual activity: Not Currently     Partners: Male   Other Topics Concern   • None   Social History Narrative   • None     Social Determinants of Health     Financial Resource Strain: Not on file   Food Insecurity: No Food Insecurity (1/29/2024)    Hunger Vital Sign    • Worried About Running Out of Food in the Last Year: Never true    • Ran Out of Food in the Last Year: Never true   Transportation Needs: Unmet Transportation Needs (1/29/2024)    PRAPARE - Transportation    • Lack of Transportation (Medical): Yes    • Lack of Transportation (Non-Medical): Yes   Physical Activity: Not on file   Stress: Not on file   Social Connections: Not on file   Intimate Partner Violence: Not on file   Housing Stability: Low Risk  (1/29/2024)    Housing Stability Vital Sign    • Unable to Pay for Housing in the Last Year: No    • Number of Places Lived in the Last Year: 1    • Unstable Housing in the Last Year: No       Vital Signs  Vitals:    02/09/24 1012   BP: 127/61   BP Location: Left arm   Patient Position: Sitting   Cuff Size: Standard   Pulse: 76   SpO2: 97%   Weight: 61.2 kg (135 lb)   Height: 5' (1.524 m)       Physical Exam:  General appearance: alert, cooperative, no distress  HEENT: normocephalic, anicteric, no eye erythema or discharge, no oropharyngeal thrush  Neck: supple  Lungs: CTA b/l, no rales, rhonchi, or wheezing, unlabored respirations  Heart: RRR, no murmur, rubs, or gallops  Abdomen: soft, non-tender, non-distended, normal bowel sounds, no masses or organomegaly  Rectal: deferred  Extremities: no cyanosis, clubbing, or edema  Musculoskeletal: normal  gait  Skin: color and texture normal, no jaundice, no rashes or lesions  Psychiatric: alert and oriented, normal affect and behavior                                                          Kev Main PA-C

## 2024-02-09 NOTE — TELEPHONE ENCOUNTER
Our mutual patient is scheduled for procedure: colonoscopy and EGD    On: March 4 , 2024     With: Dr. Tomy Aj MD    He/She is taking the following blood thinner: Plavix (Clopidogrel)    Can this be stopped  5days prior to the procedure    Physician Approving clearance:

## 2024-02-09 NOTE — PATIENT INSTRUCTIONS
Scheduled date of EGD/colonoscopy (as of today): 3/4/24  Physician performing EGD/colonoscopy: Jean Marie  Location of EGD/colonoscopy: [facility]  Desired bowel prep reviewed with patient: Miralax/Dulcolax  Instructions reviewed with patient by: Yamila LOVE  Clearances: Plavix hold sent to [facility]

## 2024-02-19 ENCOUNTER — ANESTHESIA (OUTPATIENT)
Dept: ANESTHESIOLOGY | Facility: HOSPITAL | Age: 69
End: 2024-02-19

## 2024-02-19 ENCOUNTER — TELEPHONE (OUTPATIENT)
Dept: NEUROLOGY | Facility: CLINIC | Age: 69
End: 2024-02-19

## 2024-02-19 ENCOUNTER — ANESTHESIA EVENT (OUTPATIENT)
Dept: ANESTHESIOLOGY | Facility: HOSPITAL | Age: 69
End: 2024-02-19

## 2024-02-21 PROBLEM — N39.0 UTI (URINARY TRACT INFECTION): Status: RESOLVED | Noted: 2024-01-29 | Resolved: 2024-02-21

## 2024-02-22 ENCOUNTER — CONSULT (OUTPATIENT)
Dept: PULMONOLOGY | Facility: MEDICAL CENTER | Age: 69
End: 2024-02-22
Payer: COMMERCIAL

## 2024-02-22 VITALS
BODY MASS INDEX: 25.8 KG/M2 | RESPIRATION RATE: 16 BRPM | WEIGHT: 131.4 LBS | SYSTOLIC BLOOD PRESSURE: 138 MMHG | OXYGEN SATURATION: 97 % | HEIGHT: 60 IN | DIASTOLIC BLOOD PRESSURE: 84 MMHG | HEART RATE: 78 BPM

## 2024-02-22 DIAGNOSIS — J44.9 COPD (CHRONIC OBSTRUCTIVE PULMONARY DISEASE) (HCC): ICD-10-CM

## 2024-02-22 DIAGNOSIS — F17.200 NICOTINE DEPENDENCE, UNCOMPLICATED, UNSPECIFIED NICOTINE PRODUCT TYPE: Primary | ICD-10-CM

## 2024-02-22 PROCEDURE — 99407 BEHAV CHNG SMOKING > 10 MIN: CPT | Performed by: STUDENT IN AN ORGANIZED HEALTH CARE EDUCATION/TRAINING PROGRAM

## 2024-02-22 PROCEDURE — 99243 OFF/OP CNSLTJ NEW/EST LOW 30: CPT | Performed by: STUDENT IN AN ORGANIZED HEALTH CARE EDUCATION/TRAINING PROGRAM

## 2024-02-22 RX ORDER — NICOTINE 21 MG/24HR
1 PATCH, TRANSDERMAL 24 HOURS TRANSDERMAL EVERY 24 HOURS
Qty: 28 PATCH | Refills: 2 | Status: SHIPPED | OUTPATIENT
Start: 2024-02-22

## 2024-02-22 NOTE — PATIENT INSTRUCTIONS
It was a pleasure seeing you today!    Start Nicotine patch 21 daily  Obtain CT chest for lung nodules  Follow up in 3 months     Bridgette Miranda MD  Pulmonary and Critical Care Medicine

## 2024-02-22 NOTE — PROGRESS NOTES
Pulmonary Outpatient Consultation Note   Nereyda Soler 68 y.o. female MRN: [id number]  2/22/2024    Referring provider:   Sayra Mcgee,   [address]  [address]  [place] [address]     Reason for Consultation:  COPD management     No problem-specific Assessment & Plan notes found for this encounter.      Assessment:    Active smoker since her teens, up to 1 PPD, now down to about 0.5 PPD. She has not been able to quit before and is very discouraged.  She wants to quit but is unsure how to.  We discussed trying nicotine patches at [facility] mics and she is interested in that.  She thinks she may have tried Chantix or Wellbutrin in the past but cannot recall.  We discussed trying nicotine patches for now and following up in 3 months and decide if she needs nicotine gum or nicotine inhaler or Chantix at that time  Nicotine dependence needs low-dose lung cancer screening.  ? COPD mMRC 1 on no inhalers.  No exacerbations, no indication for PFTs at this time.  She has no pulmonary symptoms.  Hypertension on Coreg amlodipine   History of seizures on Depakote  Type 2 diabetes with A1c 6.3    Plan:    Nicotine 21 joshua patches  At the next visit we can decide if she needs nicotine gum, nicotine inhalers or Chantix  No PFTs indicated as she has no signs or symptoms of COPD.  Low-dose lung cancer screening CT ordered  Tobacco abuse: Discussed risks of ongoing cigarette smoking. Discussed the benefits of quitting immediately and prior to any surgery. Discussed options including support, quit date, medications, and family support. Patient voiced understanding and knowledge. Greater than 10 minutes were needed for discussion of tobacco dependence and quitting counselling.  Follow-up in 3 months    I discussed with her that she is a candidate for lung cancer CT screening.     The following Shared Decision-Making points were covered:  Benefits of screening were discussed, including the rates of reduction in death from lung  cancer and other causes.  Harms of screening were reviewed, including false positive tests, radiation exposure levels, risks of invasive procedures, risks of complications of screening, and risk of overdiagnosis.  I counseled on the importance of adherence to annual lung cancer LDCT screening, impact of co-morbidities, and ability or willingness to undergo diagnosis and treatment.  I counseled on the importance of smoking cessation if a current smoker    Review of Eligibility Criteria: She meets all of the criteria for Lung Cancer Screening.   She is 68 y.o.   She has 20 pack year tobacco history and is a current smoker  She presents no signs or symptoms of lung cancer    After discussion, the patient decided to elect lung cancer screening.      History of Present Illness   HPI:    68-year-old female referred from primary care for smoking use.  Past medical history of a recent admission for syncope which was likely from dehydration, nicotine use, type 2 diabetes, hyperlipidemia, possible seizure activity, hypertension.    Patient here after being referred from primary care when she was hospitalized.  She is an active smoker about half pack per day was up to 1 pack/day and has been doing this all her life since the age of her teens.  She has never quit, she has no exacerbations.  She may have tried Chantix in the past or Wellbutrin she cannot recall.  She was put on a nicotine patch when in the hospital recently and felt like they can help there.    No family history of lung cancer or lung disease    No PFT on file  No echocardiogram on file  No dedicated CT chest on file  Care everywhere reviewed      Review of Systems   Constitutional: Negative.    HENT: Negative.     Eyes: Negative.    Respiratory:  Negative for apnea, cough, choking and shortness of breath.    Cardiovascular: Negative.    Gastrointestinal: Negative.    Endocrine: Negative.    Genitourinary: Negative.    Musculoskeletal: Negative.     Allergic/Immunologic: Negative.    Neurological: Negative.    Hematological: Negative.    Psychiatric/Behavioral: Negative.           Historical Information   Past Medical History:   Diagnosis Date   • COPD (chronic obstructive pulmonary disease) (HCC)    • Liver disease 2017    Patient mentions having nonalcoholic liver disease. Denies alcohol use.   • Radiculopathy of lumbar region     last assessed 17   • Seizures (HCC)      Past Surgical History:   Procedure Laterality Date   •  SECTION     • EPIDURAL BLOCK INJECTION Right 2017    Procedure: BLOCK / INJECTION EPIDURAL STEROID TRANSFORAMINAL   L4-5;  Surgeon: Sanford De La Vega MD;  Location: [facility] MAIN OR;  Service:    • TONSILLECTOMY       Family History   Problem Relation Age of Onset   • Diabetes Mother    • Hyperlipidemia Mother    • Cancer Father    • Prostate cancer Father    • Ulcers Family        Occupational History: na    Social History     Tobacco Use   Smoking Status Some Days   • Current packs/day: 0.50   • Average packs/day: 1 pack/day for 44.1 years (44.1 ttl pk-yrs)   • Types: Cigarettes   • Start date:    Smokeless Tobacco Current   Tobacco Comments    Per allscripts: Current everyday smoker       Meds/Allergies     Current Outpatient Medications:   •  albuterol (Ventolin HFA) 90 mcg/act inhaler, Inhale 2 puffs every 6 (six) hours as needed for wheezing, Disp: 18 g, Rfl: 3  •  amLODIPine (NORVASC) 5 mg tablet, Take 1 tablet (5 mg total) by mouth daily, Disp: 30 tablet, Rfl: 0  •  atorvastatin (LIPITOR) 40 mg tablet, Take 1 tablet (40 mg total) by mouth daily with dinner, Disp: 30 tablet, Rfl: 0  •  busPIRone (BUSPAR) 5 mg tablet, Take 1 tablet (5 mg total) by mouth 3 (three) times a day, Disp: 90 tablet, Rfl: 0  •  carvedilol (COREG) 12.5 mg tablet, Take 1 tablet (12.5 mg total) by mouth every 12 (twelve) hours, Disp: 60 tablet, Rfl: 0  •  clopidogrel (PLAVIX) 75 mg tablet, Take 1 tablet (75 mg total) by mouth daily,  "Disp: 30 tablet, Rfl: 0  •  divalproex sodium (DEPAKOTE) 250 mg DR tablet, Take 2 tablets (500 mg total) by mouth every 12 (twelve) hours for 14 days, Disp: 56 tablet, Rfl: 0  •  nicotine (NICODERM CQ) 21 mg/24 hr TD 24 hr patch, Place 1 patch on the skin over 24 hours every 24 hours, Disp: 28 patch, Rfl: 2  •  pantoprazole (PROTONIX) 40 mg tablet, Take 1 tablet (40 mg total) by mouth 2 (two) times a day before meals, Disp: 60 tablet, Rfl: 0  •  Blood Pressure KIT, Use in the morning (Patient not taking: Reported on 6/27/2022), Disp: 1 kit, Rfl: 0  •  carvedilol (COREG) 12.5 mg tablet, TAKE 1 TABLET (12.5 MG TOTAL) BY MOUTH EVERY 12 (TWELVE) HOURS (Patient not taking: Reported on 2/9/2024), Disp: 180 tablet, Rfl: 0  Allergies   Allergen Reactions   • Aspirin Anaphylaxis   • Nsaids Anaphylaxis   • Asparaginase Derivatives    • Ibuprofen Hives   • Pegaspargase Other (See Comments)   • Robaxin [Methocarbamol]    • Toradol [Ketorolac Tromethamine]        Vitals: Blood pressure 138/84, pulse 78, resp. rate 16, height 5' (1.524 m), weight 59.6 kg (131 lb 6.4 oz), SpO2 97%., Body mass index is 25.66 kg/m². Oxygen Therapy  SpO2: 97 %    Physical Exam:    GEN: alert and oriented x 3, pleasant and cooperative   HEENT:  Normocephalic, atraumatic, anicteric  NECK: No JVD or carotid bruits   HEART: Rate, normal S1 and S2  LUNGS: Clear to auscultation bilaterally; no wheezes, rales, or rhonchi; respiration nonlabored   ABDOMEN:  Normoactive bowel sounds, soft, no tenderness, no distention  EXTREMITIES: peripheral pulses palpable; no edema  NEURO: no gross focal findings; cranial nerves grossly intact   SKIN:  Dry, intact, warm to touch    Labs: I have personally reviewed pertinent lab results.  No results found for: \"IGE\"    Imaging and other studies: I have personally reviewed pertinent films in PACS    Pulmonary function testing:  none     Other Studies: I have personally reviewed pertinent reports.      Bridgette Miranda " MD  Pulmonary and Critical Care Medicine

## 2024-02-28 ENCOUNTER — TELEPHONE (OUTPATIENT)
Dept: GASTROENTEROLOGY | Facility: CLINIC | Age: 69
End: 2024-02-28

## 2024-02-28 NOTE — TELEPHONE ENCOUNTER
Left message confirming procedure for 3/4. She will need a  and will be called Friday with her arrival time. If she doesn't have her prep instructions, they are in her my chart. Any questions, she may call.

## 2024-02-29 ENCOUNTER — TELEPHONE (OUTPATIENT)
Age: 69
End: 2024-02-29

## 2024-02-29 NOTE — TELEPHONE ENCOUNTER
Patient calling to reschedule her colonoscopy and EGD. Procedures have been rescheduled for 4/24/24 with  at [facility]. Patient has prep information.

## 2024-03-01 ENCOUNTER — HOSPITAL ENCOUNTER (OUTPATIENT)
Dept: RADIOLOGY | Facility: HOSPITAL | Age: 69
Discharge: HOME | End: 2024-03-01
Attending: STUDENT IN AN ORGANIZED HEALTH CARE EDUCATION/TRAINING PROGRAM
Payer: COMMERCIAL

## 2024-03-01 DIAGNOSIS — F17.200 NICOTINE DEPENDENCE, UNCOMPLICATED, UNSPECIFIED NICOTINE PRODUCT TYPE: ICD-10-CM

## 2024-03-01 PROCEDURE — 71271 CT THORAX LUNG CANCER SCR C-: CPT

## 2024-03-07 ENCOUNTER — TELEPHONE (OUTPATIENT)
Age: 69
End: 2024-03-07

## 2024-03-07 DIAGNOSIS — R91.1 LUNG NODULE: Primary | ICD-10-CM

## 2024-03-07 NOTE — TELEPHONE ENCOUNTER
Called spoke with patient's son chest CT showed right upper lobe nodule likely inflammatory will repeat chest CT in 3 months just to clarify resolution order has been placed

## 2024-03-12 DIAGNOSIS — I10 ESSENTIAL HYPERTENSION: ICD-10-CM

## 2024-03-12 RX ORDER — CARVEDILOL 12.5 MG/1
12.5 TABLET ORAL EVERY 12 HOURS
Qty: 60 TABLET | Refills: 0 | Status: SHIPPED | OUTPATIENT
Start: 2024-03-12 | End: 2024-04-11

## 2024-03-12 RX ORDER — AMLODIPINE BESYLATE 5 MG/1
5 TABLET ORAL DAILY
Qty: 30 TABLET | Refills: 0 | Status: SHIPPED | OUTPATIENT
Start: 2024-03-12

## 2024-03-12 NOTE — TELEPHONE ENCOUNTER
VM left on RX Line-    Hi my name is Nereyda Soler and I go to [facility] located in [place]. The phone number is [phone]. I need a prescription refill on my high blood pressure pills and I forget the the dose and the name of it, but I'd appreciate it because I have none left. Thank you. Have a good day.    Selected both since none were stated in voicemail...

## 2024-03-20 ENCOUNTER — TELEPHONE (OUTPATIENT)
Dept: OTHER | Facility: OTHER | Age: 69
End: 2024-03-20

## 2024-03-20 NOTE — TELEPHONE ENCOUNTER
Pt would like to schedule her mom to be seen at the [place] location. States mom was in [facility] end of Jan. 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 Please call the daughter regarding scheduling as a new pt.

## 2024-03-21 ENCOUNTER — CONSULT (OUTPATIENT)
Dept: CARDIOLOGY CLINIC | Facility: CLINIC | Age: 69
End: 2024-03-21
Payer: COMMERCIAL

## 2024-03-21 ENCOUNTER — TELEPHONE (OUTPATIENT)
Dept: NEUROLOGY | Facility: CLINIC | Age: 69
End: 2024-03-21

## 2024-03-21 VITALS
SYSTOLIC BLOOD PRESSURE: 156 MMHG | HEART RATE: 64 BPM | WEIGHT: 133 LBS | OXYGEN SATURATION: 95 % | BODY MASS INDEX: 26.11 KG/M2 | DIASTOLIC BLOOD PRESSURE: 80 MMHG | HEIGHT: 60 IN

## 2024-03-21 DIAGNOSIS — I24.9 ACS (ACUTE CORONARY SYNDROME) (HCC): ICD-10-CM

## 2024-03-21 DIAGNOSIS — F17.200 SMOKING: ICD-10-CM

## 2024-03-21 DIAGNOSIS — E78.2 MIXED HYPERLIPIDEMIA: ICD-10-CM

## 2024-03-21 DIAGNOSIS — R09.89 BRUIT (ARTERIAL): ICD-10-CM

## 2024-03-21 DIAGNOSIS — R79.89 ELEVATED TROPONIN LEVEL: Primary | ICD-10-CM

## 2024-03-21 DIAGNOSIS — R55 SYNCOPE AND COLLAPSE: ICD-10-CM

## 2024-03-21 PROCEDURE — 99214 OFFICE O/P EST MOD 30 MIN: CPT | Performed by: INTERNAL MEDICINE

## 2024-03-21 PROCEDURE — 93000 ELECTROCARDIOGRAM COMPLETE: CPT | Performed by: INTERNAL MEDICINE

## 2024-03-21 NOTE — TELEPHONE ENCOUNTER
Left message on the daughter machine [phone] to contact the office to schedule appointment.

## 2024-03-21 NOTE — PROGRESS NOTES
[facility]  [address] [place] [address] [address]   [place] [address]  Cardiology Consultation    Nereyda Soler  [id number]  1955      Consult for: NSTEMI  Appreciate consult by: Waylon Wright MD    1. Elevated troponin level r/o ACS  POCT ECG      2. ACS (acute coronary syndrome) (HCC)  Ambulatory referral to Cardiology    POCT ECG      3. Mixed hyperlipidemia  Lipoprotein NMR    Lipoprotein NMR      4. Syncope and collapse  VAS carotid complete study      5. Smoking  VAS carotid complete study      6. Bruit (arterial)  VAS carotid complete study         Discussion/Summary:   Syncope/non-MI troponin elevation-patient was seen in the hospital and ruled out for acute coronary syndrome.  She had a nuclear stress test which was reviewed.  The nuclear stress test did not show major reversible perfusion defects or evidence of prior infarction.  She may have underlying coronary disease given her history of longstanding smoking, age, hypertension.  We will continue atorvastatin 40 mg and Plavix.  She has a aspirin allergy.  We will obtain updated lipid test.  She was noted to have a bruit and we will check a carotid ultrasound.    Hypertension-she understands the correlation of blood pressure with sodium usage.  She will continue on carvedilol 12.5 mg and amlodipine 5 mg.    History of CVA-continue atorvastatin 40 mg and Plavix.  Smoking cessation advised    Smoking-we discussed smoking cessation    History of seizures on Depakote 500 mg twice a day    COPD  HPI:   68-year-old with recent hospitalization secondary to syncope.  Reviewed her hospital events.  Unclear if the patient had a absents seizure.  Patient reports having a poor p.o. intake for a week prior to event.  She was found to have an XOCHITL with dehydration.  She was noted to have elevated troponins.  MRI was negative for acute CVA findings.  She was noted to have chronic right occipital infarct.  She had a stress test  which was negative for ischemia.  She denies having any reoccurrence of syncope since discharge.  She denies having chest heaviness.  She denies having major palpitations.    Past Medical History:   Diagnosis Date    COPD (chronic obstructive pulmonary disease) (HCC)     Liver disease 2/21/2017    Patient mentions having nonalcoholic liver disease. Denies alcohol use.    Radiculopathy of lumbar region     last assessed 03/29/17    Seizures (HCC)      Social History     Socioeconomic History    Marital status:      Spouse name: Not on file    Number of children: 3    Years of education: Not on file    Highest education level: Not on file   Occupational History    Not on file   Tobacco Use    Smoking status: Some Days     Current packs/day: 0.50     Average packs/day: 1 pack/day for 44.2 years (44.1 ttl pk-yrs)     Types: Cigarettes     Start date: 1980    Smokeless tobacco: Current    Tobacco comments:     Per allscripts: Current everyday smoker   Vaping Use    Vaping status: Former    Substances: Nicotine   Substance and Sexual Activity    Alcohol use: Not Currently     Comment: Per allscripts: Social    Drug use: Yes     Types: Marijuana     Comment: gummies    Sexual activity: Not Currently     Partners: Male   Other Topics Concern    Not on file   Social History Narrative    Not on file     Social Determinants of Health     Financial Resource Strain: Not on file   Food Insecurity: No Food Insecurity (1/29/2024)    Hunger Vital Sign     Worried About Running Out of Food in the Last Year: Never true     Ran Out of Food in the Last Year: Never true   Transportation Needs: Unmet Transportation Needs (1/29/2024)    PRAPARE - Transportation     Lack of Transportation (Medical): Yes     Lack of Transportation (Non-Medical): Yes   Physical Activity: Not on file   Stress: Not on file   Social Connections: Not on file   Intimate Partner Violence: Not on file   Housing Stability: Low Risk  (1/29/2024)    Housing  Stability Vital Sign     Unable to Pay for Housing in the Last Year: No     Number of Places Lived in the Last Year: 1     Unstable Housing in the Last Year: No      Family History   Problem Relation Age of Onset    Diabetes Mother     Hyperlipidemia Mother     Cancer Father     Prostate cancer Father     Ulcers Family      Past Surgical History:   Procedure Laterality Date     SECTION      EPIDURAL BLOCK INJECTION Right 2017    Procedure: BLOCK / INJECTION EPIDURAL STEROID TRANSFORAMINAL   L4-5;  Surgeon: Sanford De La Vega MD;  Location: [facility] MAIN OR;  Service:     TONSILLECTOMY         Current Outpatient Medications:     albuterol (Ventolin HFA) 90 mcg/act inhaler, Inhale 2 puffs every 6 (six) hours as needed for wheezing, Disp: 18 g, Rfl: 3    amLODIPine (NORVASC) 5 mg tablet, Take 1 tablet (5 mg total) by mouth daily, Disp: 30 tablet, Rfl: 0    atorvastatin (LIPITOR) 40 mg tablet, Take 1 tablet (40 mg total) by mouth daily with dinner, Disp: 30 tablet, Rfl: 0    carvedilol (COREG) 12.5 mg tablet, Take 1 tablet (12.5 mg total) by mouth every 12 (twelve) hours, Disp: 60 tablet, Rfl: 0    Blood Pressure KIT, Use in the morning (Patient not taking: Reported on 2022), Disp: 1 kit, Rfl: 0    busPIRone (BUSPAR) 5 mg tablet, Take 1 tablet (5 mg total) by mouth 3 (three) times a day, Disp: 90 tablet, Rfl: 0    carvedilol (COREG) 12.5 mg tablet, TAKE 1 TABLET (12.5 MG TOTAL) BY MOUTH EVERY 12 (TWELVE) HOURS (Patient not taking: Reported on 2024), Disp: 180 tablet, Rfl: 0    clopidogrel (PLAVIX) 75 mg tablet, Take 1 tablet (75 mg total) by mouth daily, Disp: 30 tablet, Rfl: 0    divalproex sodium (DEPAKOTE) 250 mg DR tablet, Take 2 tablets (500 mg total) by mouth every 12 (twelve) hours for 14 days, Disp: 56 tablet, Rfl: 0    nicotine (NICODERM CQ) 21 mg/24 hr TD 24 hr patch, Place 1 patch on the skin over 24 hours every 24 hours (Patient not taking: Reported on 3/21/2024), Disp: 28 patch, Rfl: 2     pantoprazole (PROTONIX) 40 mg tablet, Take 1 tablet (40 mg total) by mouth 2 (two) times a day before meals, Disp: 60 tablet, Rfl: 0  Allergies   Allergen Reactions    Aspirin Anaphylaxis    Nsaids Anaphylaxis    Asparaginase Derivatives     Ibuprofen Hives    Pegaspargase Other (See Comments)    Robaxin [Methocarbamol]     Toradol [Ketorolac Tromethamine]      Vitals:    03/21/24 1542   BP: 156/80   BP Location: Left arm   Patient Position: Sitting   Cuff Size: Standard   Pulse: 64   SpO2: 95%   Weight: 60.3 kg (133 lb)   Height: 5' (1.524 m)       Review of Systems:   Review of Systems   Constitutional: Negative.  Negative for activity change, appetite change, chills, diaphoresis, fatigue, fever and unexpected weight change.   HENT: Negative.  Negative for congestion, dental problem, drooling, ear discharge, ear pain, facial swelling, hearing loss, mouth sores, nosebleeds, postnasal drip, rhinorrhea, sinus pressure, sinus pain, sneezing, sore throat, tinnitus, trouble swallowing and voice change.    Eyes: Negative.  Negative for photophobia, pain, redness, itching and visual disturbance.   Respiratory: Negative.  Negative for apnea, cough, choking, chest tightness, shortness of breath, wheezing and stridor.    Cardiovascular: Negative.  Negative for chest pain, palpitations and leg swelling.   Gastrointestinal: Negative.  Negative for abdominal distention, abdominal pain, anal bleeding, blood in stool, constipation, diarrhea, nausea, rectal pain and vomiting.   Endocrine: Negative.  Negative for cold intolerance, heat intolerance, polydipsia, polyphagia and polyuria.   Genitourinary: Negative.  Negative for decreased urine volume, difficulty urinating, dyspareunia, dysuria, enuresis, flank pain, frequency, genital sores, hematuria, menstrual problem, pelvic pain, urgency, vaginal bleeding, vaginal discharge and vaginal pain.   Musculoskeletal: Negative.  Negative for arthralgias, back pain, gait problem, joint  swelling, myalgias, neck pain and neck stiffness.   Skin: Negative.  Negative for color change, pallor, rash and wound.   Allergic/Immunologic: Negative.  Negative for environmental allergies, food allergies and immunocompromised state.   Neurological: Negative.  Negative for dizziness, tremors, seizures, syncope, facial asymmetry, speech difficulty, weakness, light-headedness, numbness and headaches.   Hematological: Negative.  Negative for adenopathy. Does not bruise/bleed easily.   Psychiatric/Behavioral: Negative.  Negative for agitation, behavioral problems, confusion, decreased concentration, dysphoric mood, hallucinations, self-injury, sleep disturbance and suicidal ideas. The patient is not nervous/anxious and is not hyperactive.    All other systems reviewed and are negative.      Vitals:    03/21/24 1542   BP: 156/80   BP Location: Left arm   Patient Position: Sitting   Cuff Size: Standard   Pulse: 64   SpO2: 95%   Weight: 60.3 kg (133 lb)   Height: 5' (1.524 m)     Physical Examination:   Physical Exam  Constitutional:       General: She is not in acute distress.     Appearance: She is well-developed. She is not diaphoretic.   HENT:      Head: Normocephalic and atraumatic.      Right Ear: External ear normal.      Left Ear: External ear normal.   Eyes:      General: No scleral icterus.        Right eye: No discharge.         Left eye: No discharge.      Conjunctiva/sclera: Conjunctivae normal.      Pupils: Pupils are equal, round, and reactive to light.   Neck:      Thyroid: No thyromegaly.      Vascular: No JVD.      Trachea: No tracheal deviation.   Cardiovascular:      Rate and Rhythm: Normal rate and regular rhythm.      Heart sounds: Murmur heard.      No friction rub. Gallop present.   Pulmonary:      Effort: Pulmonary effort is normal. No respiratory distress.      Breath sounds: Normal breath sounds. No stridor. No wheezing or rales.   Chest:      Chest wall: No tenderness.   Abdominal:       General: Bowel sounds are normal. There is distension.      Palpations: Abdomen is soft. There is no mass.      Tenderness: There is no abdominal tenderness. There is no guarding or rebound.   Musculoskeletal:         General: No tenderness or deformity. Normal range of motion.      Cervical back: Normal range of motion and neck supple.   Skin:     General: Skin is warm and dry.      Coloration: Skin is not pale.      Findings: No erythema or rash.   Neurological:      Mental Status: She is alert and oriented to person, place, and time.      Cranial Nerves: No cranial nerve deficit.      Motor: No abnormal muscle tone.      Coordination: Coordination normal.      Deep Tendon Reflexes: Reflexes are normal and symmetric. Reflexes normal.   Psychiatric:         Behavior: Behavior normal.         Thought Content: Thought content normal.         Judgment: Judgment normal.         Labs:     Lab Results   Component Value Date    WBC 11.58 (H) 01/29/2024    HGB 14.8 01/29/2024    HCT 44.6 01/29/2024    MCV 89 01/29/2024    RDW 14.8 01/29/2024     01/29/2024     BMP:  Lab Results   Component Value Date    SODIUM 134 (L) 01/29/2024    K 3.9 01/29/2024     01/29/2024    CO2 20 (L) 01/29/2024    BUN 18 01/29/2024    CREATININE 0.70 01/29/2024    GLUC 97 01/29/2024    GLUF 104 (H) 04/26/2022    CALCIUM 8.2 (L) 01/29/2024    CORRECTEDCA 8.9 01/27/2024    EGFR 89 01/29/2024    MG 2.2 01/29/2024     LFT:  Lab Results   Component Value Date    AST 15 01/27/2024    ALT 8 01/27/2024    ALKPHOS 87 01/27/2024    TP 6.3 (L) 01/27/2024    ALB 3.4 (L) 01/27/2024      Lab Results   Component Value Date    [id number] 5.056 (H) 01/26/2024     Lab Results   Component Value Date    HGBA1C 6.3 (H) 01/26/2024     Lipid Profile:   Lab Results   Component Value Date    CHOLESTEROL 206 (H) 01/26/2024    HDL 62 01/26/2024    LDLCALC 102 (H) 01/26/2024    TRIG 212 (H) 01/26/2024     Lab Results   Component Value Date    CHOLESTEROL 206  (H) 2024    CHOLESTEROL 254 (H) 2021     Lab Results   Component Value Date    CKTOTAL 114 2024    TROPONINI 0.51 (H) 2021     Lab Results   Component Value Date    NTBNP 2,640 (H) 2021      No results found for this or any previous visit (from the past 672 hour(s)).    Imaging & Testing   I have personally reviewed pertinent reports.      Cardiac Testing   Results for orders placed during the hospital encounter of 21    Echo complete with contrast if indicated    Narrative  [address] [id number] [phone]    Transthoracic Echocardiogram  Limited 2D, M-mode, Doppler, and Color Doppler    Study date:  2021    Patient: ENDY WOODARD  MR number: [id number]  Account number: [id number]  : 1955  Age: 65 years  Gender: Female  Status: Outpatient  Location: Bedside  Height: 60 in  Weight: 174.7 lb  BP: 138/ 84 mmHg    Indications: Hypertension    Diagnoses: 401.9 - HYPERTENSION NOS    Sonographer:  AUNG Avila  Primary Physician:  Nunu Andre DO  Referring Physician:  NITHIN Mcbride  Group:  [facility]  Interpreting Physician:  Med Andrews MD    SUMMARY    LEFT VENTRICLE:  Systolic function was normal. Ejection fraction was estimated in the range of 60 % to 65 % to be 60 %.  There were no regional wall motion abnormalities.  Wall thickness was mildly increased.  Doppler parameters were consistent with abnormal left ventricular relaxation (grade 1 diastolic dysfunction).    HISTORY: PRIOR HISTORY: HTN, COPD, Seizures, SIRS, Lung Nodule, Depression, Obesity, Liver Disease, Chronic Pain    PROCEDURE: The procedure was performed at the bedside. This was a routine study. The transthoracic approach was used. The study included limited 2D imaging, M-mode, limited spectral Doppler, and color Doppler. The heart rate was 96 bpm, at  the start of the study. Images were not obtained  from the subcostal or suprasternal notch acoustic windows. Echocardiographic views were limited due to restricted patient mobility, poor patient compliance, and poor acoustic window  availability. This was a technically difficult study.    LEFT VENTRICLE: Size was normal. Systolic function was normal. Ejection fraction was estimated in the range of 60 % to 65 % to be 60 %. There were no regional wall motion abnormalities. Wall thickness was mildly increased. No evidence of  apical thrombus. DOPPLER: Doppler parameters were consistent with abnormal left ventricular relaxation (grade 1 diastolic dysfunction).    RIGHT VENTRICLE: The size was normal. Systolic function was normal with TAPSE-2.1cm Wall thickness was normal.    LEFT ATRIUM: Size was normal.    RIGHT ATRIUM: Size was normal.    MITRAL VALVE: Valve structure was normal. There was normal leaflet separation. DOPPLER: The transmitral velocity was within the normal range. There was no evidence for stenosis. There was no significant regurgitation.    AORTIC VALVE: The valve was trileaflet. Leaflets exhibited normal thickness and normal cuspal separation. DOPPLER: Transaortic velocity was within the normal range. There was no evidence for stenosis. There was no significant  regurgitation.    TRICUSPID VALVE: The valve structure was normal. There was normal leaflet separation. DOPPLER: The transtricuspid velocity was within the normal range. There was no evidence for stenosis. There was no significant regurgitation.    PULMONIC VALVE: Leaflets exhibited normal thickness, no calcification, and normal cuspal separation. DOPPLER: The transpulmonic velocity was within the normal range. There was no significant regurgitation.    PERICARDIUM: There was no pericardial effusion. The pericardium was normal in appearance.    AORTA: The root exhibited normal size.    SYSTEMIC VEINS: IVC: The inferior vena cava was not well visualized.    SYSTEM MEASUREMENT TABLES    2D  EF  (Teich): 62.89 %  %FS: 33.2 %  Ao Diam: 2.73 cm  EDV(Teich): 50.41 ml  ESV(Teich): 18.71 ml  HR_2Ch_Q: 86.54 bpm  HR_4Ch_Q: 94.74 bpm  IVSd: 1.02 cm  LA Diam: 3.34 cm  LAAs A4C: 9.18 cm2  LAESV A-L A4C: 19 ml  LAESV MOD A4C: 17.5 ml  LALs A4C: 3.77 cm  LVCO_2Ch_Q: 4.98 L/min  LVCO_4Ch_Q: 5.03 L/min  LVCO_BiP_Q: 5.01 L/min  LVEF_2Ch_Q: 63.41 %  LVEF_4Ch_Q: 59.79 %  LVEF_BiP_Q: 62.35 %  LVIDd: 3.49 cm  LVIDs: 2.33 cm  LVLd_2Ch_Q: 7.76 cm  LVLd_4Ch_Q: 8.44 cm  LVLs_2Ch_Q: 6.63 cm  LVLs_4Ch_Q: 7.05 cm  LVPWd: 0.94 cm  LVSV_2Ch_Q: 57.55 ml  LVSV_4Ch_Q: 53.09 ml  LVSV_BiP_Q: 56.21 ml  LVVED_2Ch_Q: 90.76 ml  LVVED_4Ch_Q: 88.79 ml  LVVED_BiP_Q: 90.15 ml  LVVES_2Ch_Q: 33.21 ml  LVVES_4Ch_Q: 35.7 ml  LVVES_BiP_Q: 33.94 ml  Jana A4C: 10.22 cm2  RAEDV A-L: 20.23 ml  RAEDV MOD: 19.24 ml  RALd: 4.38 cm  RVIDd: 2.26 cm  SV (Teich): 31.7 ml    PW  MV E/A Ratio: 0.88    [facility] [place] [facility] Accredited Echocardiography Laboratory    Prepared and electronically signed by    Med Andrews MD  Signed 09-Feb-2021 13:21:15    No results found for this or any previous visit.    No results found for this or any previous visit.    No results found for this or any previous visit.    No results found for this or any previous visit.    Results for orders placed during the hospital encounter of 01/26/24    NM Myocardial Perfusion Spect (Pharmacological Induced Stress and/or Rest)    Interpretation Summary    Perfusion: There are no perfusion defects.    Stress Function: Left ventricular function post-stress is normal. Stress ejection fraction is 75%.    Stress Combined Conclusion: Left ventricular perfusion is normal. Ef 75 %.    Stress ECG: No ST deviation is noted. There were no arrhythmias during recovery. . The ECG was negative for ischemia. The stress ECG is negative for ischemia after pharmacologic vasodilation, without reproduction of symptoms.      EKG: Personally reviewed.    Normal sinus rhythm no acute st/t wave changes      Med  "Darryl LIN [facility] [place]  [phone]  Please call with any questions or suggestions    Counseling :  A description of the counseling:   Goals and Barriers:  Patient's ability to self care:  Medication side effect reviewed with patient in detail and all their questions answered.    \"Portions of the record may have been created with voice recognition software. Occasional wrong word or \"sound a like\" substitutions may have occurred due to the inherent limitations of voice recognition software. Read the chart carefully and recognize, using context, where substitutions have occurred. Please call if you have any questions. \"    "

## 2024-03-30 ENCOUNTER — APPOINTMENT (OUTPATIENT)
Dept: LAB | Facility: HOSPITAL | Age: 69
End: 2024-03-30
Payer: COMMERCIAL

## 2024-03-30 DIAGNOSIS — E78.2 MIXED HYPERLIPIDEMIA: ICD-10-CM

## 2024-03-30 DIAGNOSIS — E11.69 TYPE 2 DIABETES MELLITUS WITH OTHER SPECIFIED COMPLICATION, WITHOUT LONG-TERM CURRENT USE OF INSULIN (HCC): ICD-10-CM

## 2024-03-30 LAB
ALBUMIN SERPL BCP-MCNC: 4.3 G/DL (ref 3.5–5)
ALP SERPL-CCNC: 88 U/L (ref 34–104)
ALT SERPL W P-5'-P-CCNC: 13 U/L (ref 7–52)
ANION GAP SERPL CALCULATED.3IONS-SCNC: 7 MMOL/L (ref 4–13)
AST SERPL W P-5'-P-CCNC: 17 U/L (ref 13–39)
BILIRUB SERPL-MCNC: 1.02 MG/DL (ref 0.2–1)
BUN SERPL-MCNC: 17 MG/DL (ref 5–25)
CALCIUM SERPL-MCNC: 9.3 MG/DL (ref 8.4–10.2)
CHLORIDE SERPL-SCNC: 104 MMOL/L (ref 96–108)
CHOLEST SERPL-MCNC: 119 MG/DL
CO2 SERPL-SCNC: 26 MMOL/L (ref 21–32)
CREAT SERPL-MCNC: 0.59 MG/DL (ref 0.6–1.3)
GFR SERPL CREATININE-BSD FRML MDRD: 94 ML/MIN/1.73SQ M
GLUCOSE P FAST SERPL-MCNC: 101 MG/DL (ref 65–99)
HDLC SERPL-MCNC: 47 MG/DL
LDLC SERPL CALC-MCNC: 55 MG/DL (ref 0–100)
NONHDLC SERPL-MCNC: 72 MG/DL
POTASSIUM SERPL-SCNC: 3.7 MMOL/L (ref 3.5–5.3)
PROT SERPL-MCNC: 7.4 G/DL (ref 6.4–8.4)
SODIUM SERPL-SCNC: 137 MMOL/L (ref 135–147)
TRIGL SERPL-MCNC: 83 MG/DL

## 2024-03-30 PROCEDURE — 80053 COMPREHEN METABOLIC PANEL: CPT

## 2024-03-30 PROCEDURE — 83704 LIPOPROTEIN BLD QUAN PART: CPT

## 2024-03-30 PROCEDURE — 80061 LIPID PANEL: CPT

## 2024-03-30 PROCEDURE — 36415 COLL VENOUS BLD VENIPUNCTURE: CPT

## 2024-04-01 LAB
CHOLEST SERPL-MCNC: 128 MG/DL (ref 100–199)
HDL SERPL-SCNC: 28.3 UMOL/L
HDLC SERPL-MCNC: 56 MG/DL
LDL SERPL QN: 20.2 NM
LDL SERPL QN: 352 NMOL/L
LDL SERPL-SCNC: 728 NMOL/L
LDLC SERPL CALC-MCNC: 56 MG/DL (ref 0–99)
LP-IR SCORE SERPL: <25
TRIGL SERPL-MCNC: 82 MG/DL (ref 0–149)

## 2024-04-04 ENCOUNTER — HOSPITAL ENCOUNTER (OUTPATIENT)
Dept: RADIOLOGY | Facility: HOSPITAL | Age: 69
Discharge: HOME | End: 2024-04-04
Attending: INTERNAL MEDICINE
Payer: COMMERCIAL

## 2024-04-04 ENCOUNTER — OFFICE VISIT (OUTPATIENT)
Age: 69
End: 2024-04-04

## 2024-04-04 VITALS
HEIGHT: 60 IN | SYSTOLIC BLOOD PRESSURE: 154 MMHG | WEIGHT: 127 LBS | RESPIRATION RATE: 19 BRPM | OXYGEN SATURATION: 97 % | BODY MASS INDEX: 24.94 KG/M2 | DIASTOLIC BLOOD PRESSURE: 64 MMHG | HEART RATE: 69 BPM

## 2024-04-04 DIAGNOSIS — M25.561 CHRONIC PAIN OF RIGHT KNEE: ICD-10-CM

## 2024-04-04 DIAGNOSIS — E11.9 TYPE 2 DIABETES MELLITUS WITHOUT COMPLICATION, WITHOUT LONG-TERM CURRENT USE OF INSULIN (HCC): ICD-10-CM

## 2024-04-04 DIAGNOSIS — J44.9 CHRONIC OBSTRUCTIVE PULMONARY DISEASE, UNSPECIFIED COPD TYPE (HCC): ICD-10-CM

## 2024-04-04 DIAGNOSIS — F17.200 CURRENT SMOKER: ICD-10-CM

## 2024-04-04 DIAGNOSIS — F17.200 SMOKING: ICD-10-CM

## 2024-04-04 DIAGNOSIS — R09.89 BRUIT (ARTERIAL): ICD-10-CM

## 2024-04-04 DIAGNOSIS — G89.29 CHRONIC PAIN OF RIGHT KNEE: ICD-10-CM

## 2024-04-04 DIAGNOSIS — F51.01 PRIMARY INSOMNIA: ICD-10-CM

## 2024-04-04 DIAGNOSIS — I10 ESSENTIAL HYPERTENSION: Primary | ICD-10-CM

## 2024-04-04 DIAGNOSIS — R55 SYNCOPE AND COLLAPSE: ICD-10-CM

## 2024-04-04 PROCEDURE — 99213 OFFICE O/P EST LOW 20 MIN: CPT | Performed by: FAMILY MEDICINE

## 2024-04-04 PROCEDURE — 93880 EXTRACRANIAL BILAT STUDY: CPT | Performed by: SURGERY

## 2024-04-04 PROCEDURE — 93880 EXTRACRANIAL BILAT STUDY: CPT

## 2024-04-04 RX ORDER — TRAZODONE HYDROCHLORIDE 50 MG/1
25 TABLET ORAL
Qty: 45 TABLET | Refills: 0 | Status: SHIPPED | OUTPATIENT
Start: 2024-04-04 | End: 2024-07-03

## 2024-04-04 RX ORDER — AMLODIPINE BESYLATE 5 MG/1
5 TABLET ORAL DAILY
Qty: 30 TABLET | Refills: 0 | Status: SHIPPED | OUTPATIENT
Start: 2024-04-04

## 2024-04-04 RX ORDER — CARVEDILOL 12.5 MG/1
12.5 TABLET ORAL EVERY 12 HOURS
Qty: 60 TABLET | Refills: 0 | Status: SHIPPED | OUTPATIENT
Start: 2024-04-04 | End: 2024-05-04

## 2024-04-04 NOTE — PROGRESS NOTES
[facility] Office visit    Assessment/Plan:     1. Essential hypertension  Assessment & Plan:  Patient reports no acute worsening of hypertension and denies any hypotensive episodes.  Patient reports she checks her blood pressure infrequently and gets anywhere between 140 and 150 systolic and 70-90 diastolic.  Patient reports she is compliant with her home medication regimen and denies any facial flushing, frequent headaches, lightheadedness, dizziness, seizures or etc. Patient's blood pressure on current office visit found to be 154/64.    Patient advised to continue current medication regimen of Coreg 12.5 BID and Amlodipine 5 mg daily.  Patient encouraged to continue taking b/p daily  Patient re-educated on hyper and hypotensive symptoms.  Patient advised on DASH diet    Orders:  -     amLODIPine (NORVASC) 5 mg tablet; Take 1 tablet (5 mg total) by mouth daily  -     carvedilol (COREG) 12.5 mg tablet; Take 1 tablet (12.5 mg total) by mouth every 12 (twelve) hours    2. Type 2 diabetes mellitus without complication, without long-term current use of insulin ([facility])  Assessment & Plan:    Lab Results   Component Value Date    HGBA1C 6.3 (H) 01/26/2024     Patient reports she believes her blood sugar/diabetes has been well-controlled with diet.  Patient does not routinely take her blood sugar readings at home.  Patient denies any hyperglycemic or hypoglycemic symptoms/episodes.      Patient advised to take her blood sugars at least once daily.  Patient advised to follow-up with [place] every 6 months and HbA1c every 6 months.      3. Chronic pain of right knee  Assessment & Plan:  Patient reports knee pain gradually worsening. Patient reports pain worse on activity and by the end of the day. On PE, crepitus observed however not ligament laxity was felt. No tenderness on palpation. Patient reported she takes topical creams like tiger balm and etc and accidentally used Voltaren gel previous. Patient denied  any reaction to Voltaren gel previously however does have anaphylaxis with NSAIDs. Patient reported good relief with one time dose of Voltaren. Patient reports she utilizes CBD products to minimal relief.     PT referral made.   Patient advised to complete imaging orders.   Patient encourage to use Brace as needed.  Patient prescribed Voltaren gel  Patient advised with prior history of anaphlaxis with NSAIDs to use sparingly and monitor for any abnormal rashes.   Even though patient mistakenly utilized voltaren gel previously patient was advised to trial small amount prior to utilizing for pain relieve.  Patient advised to stop medication immediately if any anaphylaxis symptoms such as wheezing, feeling throat close, hives and etc.  Patient to present to ED if any anaphylaxis symptoms occur.    Orders:  -     Diclofenac Sodium (VOLTAREN) 1 %; Apply 2 g topically 4 (four) times a day  -     Elastic Bandages & Supports (Knee Brace) MISC; Use daily as needed (difficulty walking)  -     XR knee 3 vw right non injury; Future; Expected date: 04/04/2024  -     Ambulatory Referral to Physical Therapy; Future    4. Chronic obstructive pulmonary disease, unspecified COPD type (HCC)  Assessment & Plan:  Current smoker. Patient reports good control on albuterol therapy as needed. Patient has not need any home O2 supplement previously.     Continue albuterol as needed.  Patient advised to continue follow-up with Pulm.  Patient educated on symptoms of exacerbation and monitor for worsening.       5. Current smoker  Comments:  Over 44 pack year history. Patient reports being previously prescribes NRT however has not been effective. Tobacco cessation encouraged.    6. Primary insomnia  Comments:  Difficulty sleeping treated trazadone previously however takes sparringly due to grogginess in the morning. Patient declines any further medication management.  Orders:  -     traZODone (DESYREL) 50 mg tablet; Take 0.5 tablets (25 mg  "total) by mouth daily at bedtime as needed for sleep          Return in about 4 weeks (around 5/2/2024) for Annual physical.     Subjective:   MEL Soler is a 68 y.o. female presented to clinic for follow-up of chronic conditions such as hypertension, Type 2 diabetes, Seizures and COPD.    Patient reports she believes her blood press is well controlled on current medication regimen and denies any lower limb edema, orthostasis, any hypotension or hypertension symptoms. Patient reports since being seen by [facility] patient has not been able to be seen by neurology yet and had occasional episodes of staring spells. Patient reports good compliance with Depakote. Patient reports she doesn't routinely take her blood sugar at home however denies any hypoglycemic or hyperglycemic symptoms. Patient denies any acute worsening of shortness of breath. Patient also was previously placed on trazodone  for insomnia which the medication was reported to give good relief however would make her groggy the next day.       Patient reports she has also been having pain gradually worsening right knee pain. Patient reports pain worse with activity and at the end of the day. Patient reports difficulty with steps and needs to \"take her time\". Patient reports she has never been imaged or seen by physical therapy for it.     Review of Systems   Constitutional:  Negative for appetite change, chills, fatigue and fever.   HENT:  Negative for sore throat and trouble swallowing.    Respiratory:  Negative for choking, chest tightness, shortness of breath and wheezing.    Cardiovascular:  Negative for chest pain, palpitations and leg swelling.   Gastrointestinal:  Negative for abdominal pain, blood in stool, constipation, diarrhea, nausea and vomiting.   Endocrine: Negative for polydipsia and polyuria.   Genitourinary:  Negative for dysuria and hematuria.   Musculoskeletal:  Positive for arthralgias and gait problem. Negative for joint " swelling.   Skin:  Negative for color change, pallor and rash.   Neurological:  Negative for dizziness, weakness, light-headedness and headaches.   Hematological:  Negative for adenopathy.   Psychiatric/Behavioral:  Positive for sleep disturbance. Negative for agitation and dysphoric mood. The patient is not nervous/anxious.         Objective:     /64 (BP Location: Left arm, Patient Position: Sitting)   Pulse 69   Resp 19   Ht 5' (1.524 m)   Wt 57.6 kg (127 lb)   SpO2 97%   BMI 24.80 kg/m²      Physical Exam  Constitutional:       General: She is not in acute distress.     Appearance: Normal appearance. She is overweight. She is not ill-appearing or toxic-appearing.   Eyes:      General: No scleral icterus.     Conjunctiva/sclera: Conjunctivae normal.   Cardiovascular:      Rate and Rhythm: Normal rate and regular rhythm.      Pulses: Normal pulses.      Heart sounds: Normal heart sounds.   Pulmonary:      Effort: Pulmonary effort is normal. No respiratory distress.      Breath sounds: Wheezing present.   Abdominal:      General: Bowel sounds are normal. There is no distension.      Palpations: Abdomen is soft.      Tenderness: There is no abdominal tenderness. There is no right CVA tenderness, left CVA tenderness, guarding or rebound.   Musculoskeletal:      Cervical back: Normal range of motion.      Right upper leg: Normal.      Left upper leg: Normal.      Right knee: Crepitus present. No deformity, effusion, erythema or ecchymosis. Decreased range of motion. No tenderness. No LCL laxity, MCL laxity, ACL laxity or PCL laxity. Normal alignment, normal meniscus and normal patellar mobility. Normal pulse.      Left knee: Normal.      Right lower leg: Normal. No edema.      Left lower leg: Normal. No edema.   Skin:     General: Skin is warm and dry.      Capillary Refill: Capillary refill takes less than 2 seconds.      Findings: No lesion or rash.   Neurological:      Mental Status: She is alert and  oriented to person, place, and time. Mental status is at baseline.      Motor: No weakness.      Gait: Gait normal.   Psychiatric:         Mood and Affect: Mood normal.         Behavior: Behavior normal.          ** Please Note: This note has been constructed using a voice recognition system **     Waylon Wright MD  04/07/24  10:18 PM

## 2024-04-05 ENCOUNTER — TELEPHONE (OUTPATIENT)
Dept: CARDIOLOGY CLINIC | Facility: CLINIC | Age: 69
End: 2024-04-05

## 2024-04-05 NOTE — TELEPHONE ENCOUNTER
----- Message from Dominique Grijalva sent at 4/5/2024  9:14 AM EDT -----  Regarding: FW: abnormal carotid u/s  Please call patient with results  ----- Message -----  From: Med Andrews MD  Sent: 4/4/2024   1:07 PM EDT  To: [facility]  Subject: abnormal carotid u/s                             Could we tell the patient her carotid u/s was abnormal. I would like her to see the vascular medicine team to discuss management. Continue her cardiac meds

## 2024-04-08 NOTE — ASSESSMENT & PLAN NOTE
Patient reports knee pain gradually worsening. Patient reports pain worse on activity and by the end of the day. On PE, crepitus observed however not ligament laxity was felt. No tenderness on palpation. Patient reported she takes topical creams like tiger balm and etc and accidentally used Voltaren gel previous. Patient denied any reaction to Voltaren gel previously however does have anaphylaxis with NSAIDs. Patient reported good relief with one time dose of Voltaren. Patient reports she utilizes CBD products to minimal relief.     PT referral made.   Patient advised to complete imaging orders.   Patient encourage to use Brace as needed.  Patient prescribed Voltaren gel  Patient advised with prior history of anaphlaxis with NSAIDs to use sparingly and monitor for any abnormal rashes.   Even though patient mistakenly utilized voltaren gel previously patient was advised to trial small amount prior to utilizing for pain relieve.  Patient advised to stop medication immediately if any anaphylaxis symptoms such as wheezing, feeling throat close, hives and etc.  Patient to present to ED if any anaphylaxis symptoms occur.

## 2024-04-08 NOTE — ASSESSMENT & PLAN NOTE
Lab Results   Component Value Date    HGBA1C 6.3 (H) 01/26/2024     Patient reports she believes her blood sugar/diabetes has been well-controlled with diet.  Patient does not routinely take her blood sugar readings at home.  Patient denies any hyperglycemic or hypoglycemic symptoms/episodes.      Patient advised to take her blood sugars at least once daily.  Patient advised to follow-up with [place] every 6 months and HbA1c every 6 months.

## 2024-04-08 NOTE — ASSESSMENT & PLAN NOTE
Current smoker. Patient reports good control on albuterol therapy as needed. Patient has not need any home O2 supplement previously.     Continue albuterol as needed.  Patient advised to continue follow-up with Pulm.  Patient educated on symptoms of exacerbation and monitor for worsening.

## 2024-04-08 NOTE — ASSESSMENT & PLAN NOTE
Patient reports no acute worsening of hypertension and denies any hypotensive episodes.  Patient reports she checks her blood pressure infrequently and gets anywhere between 140 and 150 systolic and 70-90 diastolic.  Patient reports she is compliant with her home medication regimen and denies any facial flushing, frequent headaches, lightheadedness, dizziness, seizures or etc. Patient's blood pressure on current office visit found to be 154/64.    Patient advised to continue current medication regimen of Coreg 12.5 BID and Amlodipine 5 mg daily.  Patient encouraged to continue taking b/p daily  Patient re-educated on hyper and hypotensive symptoms.  Patient advised on DASH diet

## 2024-04-10 ENCOUNTER — ANESTHESIA EVENT (OUTPATIENT)
Dept: ANESTHESIOLOGY | Facility: HOSPITAL | Age: 69
End: 2024-04-10

## 2024-04-10 ENCOUNTER — TELEPHONE (OUTPATIENT)
Dept: CARDIOLOGY CLINIC | Facility: CLINIC | Age: 69
End: 2024-04-10

## 2024-04-10 ENCOUNTER — ANESTHESIA (OUTPATIENT)
Dept: ANESTHESIOLOGY | Facility: HOSPITAL | Age: 69
End: 2024-04-10

## 2024-04-10 DIAGNOSIS — R55 SYNCOPE AND COLLAPSE: ICD-10-CM

## 2024-04-10 DIAGNOSIS — I73.9 PAD (PERIPHERAL ARTERY DISEASE) (HCC): Primary | ICD-10-CM

## 2024-04-10 NOTE — TELEPHONE ENCOUNTER
----- Message from Med Andrews MD sent at 4/10/2024  1:47 PM EDT -----  SHe has significant plaque in her right nexck artery.  Would like her to follow up with vascular medicine. Can we help schedule

## 2024-04-18 ENCOUNTER — TELEPHONE (OUTPATIENT)
Dept: GASTROENTEROLOGY | Facility: CLINIC | Age: 69
End: 2024-04-18

## 2024-04-18 NOTE — TELEPHONE ENCOUNTER
Left message confirming procedure for 4/24 with Dr. Aj. She will need a  and be called day before with her arrival time. If she didn't receive her prep instructions, they are in her my chart. Any questions, she may call.

## 2024-04-24 ENCOUNTER — ANESTHESIA EVENT (OUTPATIENT)
Dept: GASTROENTEROLOGY | Facility: AMBULARY SURGERY CENTER | Age: 69
End: 2024-04-24

## 2024-04-24 ENCOUNTER — ANESTHESIA (OUTPATIENT)
Dept: GASTROENTEROLOGY | Facility: AMBULARY SURGERY CENTER | Age: 69
End: 2024-04-24

## 2024-04-24 ENCOUNTER — HOSPITAL ENCOUNTER (OUTPATIENT)
Dept: GASTROENTEROLOGY | Facility: AMBULARY SURGERY CENTER | Age: 69
Setting detail: OUTPATIENT SURGERY
Discharge: HOME | End: 2024-04-24
Attending: INTERNAL MEDICINE | Admitting: INTERNAL MEDICINE
Payer: COMMERCIAL

## 2024-04-24 VITALS
RESPIRATION RATE: 18 BRPM | DIASTOLIC BLOOD PRESSURE: 72 MMHG | OXYGEN SATURATION: 98 % | SYSTOLIC BLOOD PRESSURE: 163 MMHG | TEMPERATURE: 97.9 F | HEART RATE: 75 BPM

## 2024-04-24 DIAGNOSIS — R63.4 WEIGHT LOSS, UNINTENTIONAL: ICD-10-CM

## 2024-04-24 DIAGNOSIS — R10.13 EPIGASTRIC PAIN: ICD-10-CM

## 2024-04-24 PROCEDURE — 43239 EGD BIOPSY SINGLE/MULTIPLE: CPT | Performed by: INTERNAL MEDICINE

## 2024-04-24 PROCEDURE — 88305 TISSUE EXAM BY PATHOLOGIST: CPT | Performed by: PATHOLOGY

## 2024-04-24 PROCEDURE — 45385 COLONOSCOPY W/LESION REMOVAL: CPT | Performed by: INTERNAL MEDICINE

## 2024-04-24 RX ORDER — SODIUM CHLORIDE, SODIUM LACTATE, POTASSIUM CHLORIDE, CALCIUM CHLORIDE 600; 310; 30; 20 MG/100ML; MG/100ML; MG/100ML; MG/100ML
INJECTION, SOLUTION INTRAVENOUS CONTINUOUS PRN
Status: DISCONTINUED | OUTPATIENT
Start: 2024-04-24 | End: 2024-04-24

## 2024-04-24 RX ORDER — PROPOFOL 10 MG/ML
INJECTION, EMULSION INTRAVENOUS AS NEEDED
Status: DISCONTINUED | OUTPATIENT
Start: 2024-04-24 | End: 2024-04-24

## 2024-04-24 RX ORDER — ONDANSETRON 2 MG/ML
4 INJECTION INTRAMUSCULAR; INTRAVENOUS ONCE AS NEEDED
Status: CANCELLED | OUTPATIENT
Start: 2024-04-24

## 2024-04-24 RX ORDER — LIDOCAINE HYDROCHLORIDE 10 MG/ML
INJECTION, SOLUTION EPIDURAL; INFILTRATION; INTRACAUDAL; PERINEURAL AS NEEDED
Status: DISCONTINUED | OUTPATIENT
Start: 2024-04-24 | End: 2024-04-24

## 2024-04-24 RX ORDER — SODIUM CHLORIDE, SODIUM LACTATE, POTASSIUM CHLORIDE, CALCIUM CHLORIDE 600; 310; 30; 20 MG/100ML; MG/100ML; MG/100ML; MG/100ML
125 INJECTION, SOLUTION INTRAVENOUS CONTINUOUS
Status: DISCONTINUED | OUTPATIENT
Start: 2024-04-24 | End: 2024-04-28 | Stop reason: HOSPADM

## 2024-04-24 RX ORDER — PROPOFOL 10 MG/ML
INJECTION, EMULSION INTRAVENOUS CONTINUOUS PRN
Status: DISCONTINUED | OUTPATIENT
Start: 2024-04-24 | End: 2024-04-24

## 2024-04-24 RX ADMIN — PROPOFOL 50 MG: 10 INJECTION, EMULSION INTRAVENOUS at 10:06

## 2024-04-24 RX ADMIN — LIDOCAINE HYDROCHLORIDE 50 MG: 10 INJECTION, SOLUTION EPIDURAL; INFILTRATION; INTRACAUDAL; PERINEURAL at 10:00

## 2024-04-24 RX ADMIN — PROPOFOL 50 MG: 10 INJECTION, EMULSION INTRAVENOUS at 10:03

## 2024-04-24 RX ADMIN — PROPOFOL 100 MG: 10 INJECTION, EMULSION INTRAVENOUS at 10:00

## 2024-04-24 RX ADMIN — SODIUM CHLORIDE, SODIUM LACTATE, POTASSIUM CHLORIDE, AND CALCIUM CHLORIDE: .6; .31; .03; .02 INJECTION, SOLUTION INTRAVENOUS at 09:53

## 2024-04-24 RX ADMIN — SODIUM CHLORIDE, SODIUM LACTATE, POTASSIUM CHLORIDE, AND CALCIUM CHLORIDE 125 ML/HR: .6; .31; .03; .02 INJECTION, SOLUTION INTRAVENOUS at 09:36

## 2024-04-24 RX ADMIN — PROPOFOL 100 MCG/KG/MIN: 10 INJECTION, EMULSION INTRAVENOUS at 10:08

## 2024-04-24 NOTE — ANESTHESIA POSTPROCEDURE EVALUATION
Post-Op Assessment Note    CV Status:  Stable  Pain Score: 0    Pain management: adequate       Mental Status:  Arousable   Hydration Status:  Stable   PONV Controlled:  Controlled   Airway Patency:  Patent  Two or more mitigation strategies used for obstructive sleep apnea   Post Op Vitals Reviewed: Yes    No anethesia notable event occurred.    Staff: CRNA               BP   146/65   Temp 97   Pulse 72   Resp 16   SpO2 99

## 2024-04-24 NOTE — ANESTHESIA PREPROCEDURE EVALUATION
Procedure:  COLONOSCOPY  EGD    Relevant Problems   ANESTHESIA (within normal limits)      CARDIO   (+) Essential hypertension   (+) Hypercholesteremia      ENDO   (+) Type 2 diabetes mellitus without complication, without long-term current use of insulin (HCC)      NEURO/PSYCH   (+) Absence seizure (HCC)   (+) Depression   (+) Seizures (HCC)      PULMONARY   (+) COPD (chronic obstructive pulmonary disease) ([facility])        Physical Exam    Airway    Mallampati score: II  TM Distance: >3 FB  Neck ROM: full     Dental    lower dentures and upper dentures    Cardiovascular  Rhythm: regular, Rate: normal    Pulmonary   Breath sounds clear to auscultation    Other Findings  post-pubertal.      Anesthesia Plan  ASA Score- 3     Anesthesia Type- IV sedation with anesthesia with ASA Monitors.         Additional Monitors:     Airway Plan:            Plan Factors-Exercise tolerance (METS): >4 METS.    Chart reviewed. EKG reviewed.  Existing labs reviewed. Patient summary reviewed.    Patient is a current smoker.  Patient smoked on day of surgery.    Obstructive sleep apnea risk education given perioperatively.        Induction-     Postoperative Plan-     Informed Consent- Anesthetic plan and risks discussed with patient.  I personally reviewed this patient with the CRNA. Discussed and agreed on the Anesthesia Plan with the CRNA..

## 2024-04-24 NOTE — H&P
History and Physical - [facility]  Nereyda Soler 68 y.o. female MRN: [id number]    HPI: Nereyda Soler is a 68 y.o. year old female who presents with abdominal pain and weight loss.       Review of Systems    Historical Information   Past Medical History:   Diagnosis Date    COPD (chronic obstructive pulmonary disease) (HCC)     Liver disease 2017    Patient mentions having nonalcoholic liver disease. Denies alcohol use.    Radiculopathy of lumbar region     last assessed 17    Seizures (HCC)      Past Surgical History:   Procedure Laterality Date     SECTION      EPIDURAL BLOCK INJECTION Right 2017    Procedure: BLOCK / INJECTION EPIDURAL STEROID TRANSFORAMINAL   L4-5;  Surgeon: Sanford De La Vega MD;  Location: [facility] MAIN OR;  Service:     TONSILLECTOMY       Social History   Social History     Substance and Sexual Activity   Alcohol Use Not Currently    Comment: Per allscripts: Social     Social History     Substance and Sexual Activity   Drug Use Yes    Types: Marijuana    Comment: gummies     Social History     Tobacco Use   Smoking Status Some Days    Current packs/day: 0.50    Average packs/day: 1 pack/day for 44.3 years (44.2 ttl pk-yrs)    Types: Cigarettes    Start date:    Smokeless Tobacco Current   Tobacco Comments    Per allscripts: Current everyday smoker     Family History   Problem Relation Age of Onset    Diabetes Mother     Hyperlipidemia Mother     Cancer Father     Prostate cancer Father     Ulcers Family        Meds/Allergies     (Not in a hospital admission)      Allergies   Allergen Reactions    Aspirin Anaphylaxis    Nsaids Anaphylaxis    Asparaginase Derivatives     Ibuprofen Hives    Pegaspargase Other (See Comments)    Robaxin [Methocarbamol]     Toradol [Ketorolac Tromethamine]        Objective     /68   Pulse 71   Temp 97.9 °F (36.6 °C) (Temporal)   Resp 20   SpO2 98%       PHYSICAL EXAM    Gen: NAD  CV: RRR  CHEST: Clear  ABD:  soft, NT/ND  EXT: no edema  Neuro: AAO      ASSESSMENT/PLAN:  This is a 68 y.o. year old female here for abdominal pain and weight loss.     PLAN:   Procedure: egd/colonoscopy

## 2024-04-29 PROCEDURE — 88305 TISSUE EXAM BY PATHOLOGIST: CPT | Performed by: PATHOLOGY

## 2024-04-30 DIAGNOSIS — R63.4 WEIGHT LOSS, UNINTENTIONAL: Primary | ICD-10-CM

## 2024-05-01 ENCOUNTER — TELEPHONE (OUTPATIENT)
Dept: GASTROENTEROLOGY | Facility: CLINIC | Age: 69
End: 2024-05-01

## 2024-05-01 NOTE — TELEPHONE ENCOUNTER
----- Message from Yamila Oconnor sent at 5/1/2024 10:34 AM EDT -----    ----- Message -----  From: Yamilex Gil  Sent: 4/30/2024   2:44 PM EDT  To: [facility]      ----- Message -----  From: Tomy Aj MD  Sent: 4/30/2024   1:52 PM EDT  To: [facility]    Please schedule office visit with PA in 3 to 4 months.  Please make sure patient completes gastric emptying scan.

## 2024-05-01 NOTE — TELEPHONE ENCOUNTER
Called pt phone, no response. I left a message and will send a letter to pt as well with order attached.

## 2024-05-02 ENCOUNTER — TELEPHONE (OUTPATIENT)
Dept: GASTROENTEROLOGY | Facility: CLINIC | Age: 69
End: 2024-05-02

## 2024-05-02 NOTE — TELEPHONE ENCOUNTER
----- Message from Lena Marquez sent at 5/2/2024 12:55 PM EDT -----    ----- Message -----  From: Tomy Aj MD  Sent: 4/30/2024   1:52 PM EDT  To: [facility]    Good news, the biopsies from small intestine are negative for celiac sprue, the biopsies from the stomach were negative for H. pylori.  Biopsies from esophagus show small area of Melton's esophagus, there is no high-grade dysplasia, no cancer.    We can continue to monitor this.    The polyps from your colon were tubular adenomas, these are precancerous polyps with there is no high-grade dysplasia and no cancer.    As we discussed the prep from your recent colonoscopy was inadequate, recommend repeat colonoscopy in 1 year with a 2-day bowel prep    Will order gastric emptying scan to evaluate your appetite and your symptoms to see if this is a cause of your weight loss.

## 2024-05-02 NOTE — TELEPHONE ENCOUNTER
Called and relayed results and recommendations to patient  Relayed the number to central scheduling   Placed colon recall       Patient wants to know if she should be taking medication to help the small area of Meltno's esophagus     Please advise thank you!

## 2024-05-04 DIAGNOSIS — E78.00 HYPERCHOLESTEREMIA: ICD-10-CM

## 2024-05-06 RX ORDER — ATORVASTATIN CALCIUM 80 MG/1
80 TABLET, FILM COATED ORAL
Qty: 30 TABLET | Refills: 2 | Status: SHIPPED | OUTPATIENT
Start: 2024-05-06 | End: 2024-08-04

## 2024-05-13 ENCOUNTER — OFFICE VISIT (OUTPATIENT)
Age: 69
End: 2024-05-13

## 2024-05-13 ENCOUNTER — TELEPHONE (OUTPATIENT)
Dept: PSYCHIATRY | Facility: CLINIC | Age: 69
End: 2024-05-13

## 2024-05-13 VITALS
BODY MASS INDEX: 23.49 KG/M2 | HEART RATE: 64 BPM | WEIGHT: 124.4 LBS | OXYGEN SATURATION: 95 % | RESPIRATION RATE: 20 BRPM | DIASTOLIC BLOOD PRESSURE: 71 MMHG | TEMPERATURE: 96.9 F | HEIGHT: 61 IN | SYSTOLIC BLOOD PRESSURE: 150 MMHG

## 2024-05-13 DIAGNOSIS — H54.7 LOSS OF VISION: ICD-10-CM

## 2024-05-13 DIAGNOSIS — R79.89 ELEVATED TROPONIN LEVEL: ICD-10-CM

## 2024-05-13 DIAGNOSIS — Z12.31 ENCOUNTER FOR SCREENING MAMMOGRAM FOR BREAST CANCER: ICD-10-CM

## 2024-05-13 DIAGNOSIS — F17.200 TOBACCO DEPENDENCE: ICD-10-CM

## 2024-05-13 DIAGNOSIS — Z23 ENCOUNTER FOR IMMUNIZATION: ICD-10-CM

## 2024-05-13 DIAGNOSIS — Z00.00 ANNUAL PHYSICAL EXAM: ICD-10-CM

## 2024-05-13 DIAGNOSIS — I10 ESSENTIAL HYPERTENSION: ICD-10-CM

## 2024-05-13 DIAGNOSIS — N30.00 ACUTE CYSTITIS WITHOUT HEMATURIA: ICD-10-CM

## 2024-05-13 DIAGNOSIS — R06.02 SOB (SHORTNESS OF BREATH) ON EXERTION: ICD-10-CM

## 2024-05-13 DIAGNOSIS — R30.0 DYSURIA: ICD-10-CM

## 2024-05-13 DIAGNOSIS — I65.21 STENOSIS OF RIGHT INTERNAL CAROTID ARTERY: ICD-10-CM

## 2024-05-13 DIAGNOSIS — E11.9 TYPE 2 DIABETES MELLITUS WITHOUT COMPLICATION, WITHOUT LONG-TERM CURRENT USE OF INSULIN (HCC): Primary | ICD-10-CM

## 2024-05-13 DIAGNOSIS — R06.2 WHEEZING: ICD-10-CM

## 2024-05-13 DIAGNOSIS — Z13.820 SCREENING FOR OSTEOPOROSIS: ICD-10-CM

## 2024-05-13 DIAGNOSIS — F33.42 RECURRENT MAJOR DEPRESSIVE DISORDER, IN FULL REMISSION (HCC): ICD-10-CM

## 2024-05-13 LAB
SL AMB  POCT GLUCOSE, UA: NEGATIVE
SL AMB LEUKOCYTE ESTERASE,UA: 500
SL AMB POCT BILIRUBIN,UA: NEGATIVE
SL AMB POCT BLOOD,UA: NEGATIVE
SL AMB POCT CLARITY,UA: CLEAR
SL AMB POCT COLOR,UA: YELLOW
SL AMB POCT KETONES,UA: NEGATIVE
SL AMB POCT NITRITE,UA: NEGATIVE
SL AMB POCT PH,UA: 6
SL AMB POCT SPECIFIC GRAVITY,UA: 1.01
SL AMB POCT URINE PROTEIN: 30
SL AMB POCT UROBILINOGEN: 0.2

## 2024-05-13 PROCEDURE — 81003 URINALYSIS AUTO W/O SCOPE: CPT | Performed by: FAMILY MEDICINE

## 2024-05-13 PROCEDURE — 90471 IMMUNIZATION ADMIN: CPT | Performed by: FAMILY MEDICINE

## 2024-05-13 PROCEDURE — 90677 PCV20 VACCINE IM: CPT | Performed by: FAMILY MEDICINE

## 2024-05-13 PROCEDURE — 99397 PER PM REEVAL EST PAT 65+ YR: CPT | Performed by: FAMILY MEDICINE

## 2024-05-13 RX ORDER — POLYETHYLENE GLYCOL 3350 17 G
2 POWDER IN PACKET (EA) ORAL AS NEEDED
Qty: 100 EACH | Refills: 0 | Status: SHIPPED | OUTPATIENT
Start: 2024-05-13 | End: 2024-05-28

## 2024-05-13 RX ORDER — CEPHALEXIN 500 MG/1
500 CAPSULE ORAL EVERY 12 HOURS SCHEDULED
Qty: 14 CAPSULE | Refills: 0 | Status: SHIPPED | OUTPATIENT
Start: 2024-05-13 | End: 2024-05-20

## 2024-05-13 RX ORDER — AMLODIPINE AND VALSARTAN 5; 160 MG/1; MG/1
1 TABLET ORAL DAILY
Qty: 30 TABLET | Refills: 0 | Status: SHIPPED | OUTPATIENT
Start: 2024-05-13 | End: 2024-05-23 | Stop reason: CLARIF

## 2024-05-13 RX ORDER — CLOPIDOGREL BISULFATE 75 MG/1
75 TABLET ORAL DAILY
Qty: 90 TABLET | Refills: 0 | Status: SHIPPED | OUTPATIENT
Start: 2024-05-13

## 2024-05-13 NOTE — TELEPHONE ENCOUNTER
Reached out to pt in regards to routine referral. Confirmed insurance. Pt insurance is OON. Advised pt to reach out to insurance company for options.

## 2024-05-13 NOTE — PROGRESS NOTES
ADULT ANNUAL PHYSICAL  [facility] PRACTICE    NAME: Nereyda Soler  AGE: 68 y.o. SEX: female  : 1955     DATE: 2024    Right vision loss pre hospital, vision fussy         Assessment and Plan:     Barretts esophagus, repeat egd in 1 yr from     Vasc surgery follow up?  Repeat CT chest,         Problem List Items Addressed This Visit          Cardiovascular and Mediastinum    Essential hypertension       Endocrine    Type 2 diabetes mellitus without complication, without long-term current use of insulin (HCC) - Primary    Relevant Orders    IRIS Diabetic eye exam    Albumin / creatinine urine ratio (Completed)    Ambulatory Referral to Ophthalmology       Behavioral Health    Tobacco dependence    Depression    Relevant Orders    Ambulatory referral to Psych Services       Other    Elevated troponin level r/o ACS    Relevant Medications    Refill clopidogrel (PLAVIX) 75 mg tablet     Other Visit Diagnoses       Encounter for screening mammogram for breast cancer        Relevant Orders    Mammo screening bilateral w 3d & cad    Encounter for immunization        Relevant Orders    Pneumococcal Conjugate Vaccine 20-valent (Pcv20) (Completed)    Annual physical exam        Screening for osteoporosis        Relevant Orders    DXA bone density spine hip and pelvis    SOB (shortness of breath) on exertion        Relevant Orders    Complete PFT with post bronchodilator    Wheezing        Relevant Orders    Complete PFT with post bronchodilator    Loss of vision        Relevant Orders    Ambulatory Referral to Ophthalmology    Dysuria        Relevant Orders    Urine culture (Completed)    POCT urine dip auto non-scope (Completed)    Stenosis of right internal carotid artery        Relevant Orders    Ambulatory Referral to Ophthalmology    Acute cystitis without hematuria                Immunizations and preventive care screenings were discussed  with patient today. Appropriate education was printed on patient's after visit summary.    Counseling:  Alcohol/drug use: discussed moderation in alcohol intake, the recommendations for healthy alcohol use, and avoidance of illicit drug use.  Dental Health: discussed importance of regular tooth brushing, flossing, and dental visits.  Injury prevention: discussed safety/seat belts, safety helmets, smoke detectors, carbon dioxide detectors, and smoking near bedding or upholstery.  Sexual health: discussed sexually transmitted diseases, partner selection, use of condoms, avoidance of unintended pregnancy, and contraceptive alternatives.  Exercise: the importance of regular exercise/physical activity was discussed. Recommend exercise 3-5 times per week for at least 30 minutes.       Depression Screening and Follow-up Plan: Patient's depression screening was positive with a PHQ-9 score of 16.         Return in about 2 weeks (around 5/27/2024) for Recheck, HTN.     Chief Complaint:     Chief Complaint   Patient presents with    Physical Exam     Concerned could have UTI      History of Present Illness:     Adult Annual Physical   Patient here for a comprehensive physical exam. The patient reports no problems.    Diet and Physical Activity  Diet/Nutrition: well balanced diet.   Exercise:      Depression Screening  PHQ-2/9 Depression Screening    Little interest or pleasure in doing things: 0 - not at all  Feeling down, depressed, or hopeless: 3 - nearly every day  Trouble falling or staying asleep, or sleeping too much: 3 - nearly every day  Feeling tired or having little energy: 3 - nearly every day  Poor appetite or overeating: 3 - nearly every day  Feeling bad about yourself - or that you are a failure or have let yourself or your family down: 0 - not at all  Trouble concentrating on things, such as reading the newspaper or watching television: 3 - nearly every day  Moving or speaking so slowly that other people could  have noticed. Or the opposite - being so fidgety or restless that you have been moving around a lot more than usual: 1 - several days  Thoughts that you would be better off dead, or of hurting yourself in some way: 0 - not at all  PHQ-9 Score: 16  PHQ-9 Interpretation: Moderately severe depression            Review of Systems:     Review of Systems   Constitutional:  Negative for chills and fever.   Respiratory:  Negative for shortness of breath.    Cardiovascular:  Negative for chest pain.   Gastrointestinal:  Negative for diarrhea, nausea and vomiting.   Neurological:  Negative for dizziness and headaches.      Past Medical History:     Past Medical History:   Diagnosis Date    COPD (chronic obstructive pulmonary disease) (HCC)     Liver disease 2017    Patient mentions having nonalcoholic liver disease. Denies alcohol use.    Radiculopathy of lumbar region     last assessed 17    Seizures (HCC)       Past Surgical History:     Past Surgical History:   Procedure Laterality Date     SECTION      EPIDURAL BLOCK INJECTION Right 2017    Procedure: BLOCK / INJECTION EPIDURAL STEROID TRANSFORAMINAL   L4-5;  Surgeon: Sanford De La Vega MD;  Location: [facility] MAIN OR;  Service:     TONSILLECTOMY        Social History:     Social History     Socioeconomic History    Marital status:      Spouse name: None    Number of children: 3    Years of education: None    Highest education level: None   Occupational History    None   Tobacco Use    Smoking status: Every Day     Current packs/day: 1.00     Average packs/day: 1 pack/day for 53.4 years (53.4 ttl pk-yrs)     Types: Cigarettes     Start date:     Smokeless tobacco: Current    Tobacco comments:     Per allscripts: Current everyday smoker   Vaping Use    Vaping status: Former    Substances: Nicotine   Substance and Sexual Activity    Alcohol use: Not Currently     Comment: Per allscripts: Social    Drug use: Yes     Types: Marijuana     Comment:  gummies    Sexual activity: Not Currently     Partners: Male   Other Topics Concern    None   Social History Narrative    None     Social Determinants of Health     Financial Resource Strain: Low Risk  (5/13/2024)    Overall Financial Resource Strain (CARDIA)     Difficulty of Paying Living Expenses: Not hard at all   Food Insecurity: No Food Insecurity (1/29/2024)    Hunger Vital Sign     Worried About Running Out of Food in the Last Year: Never true     Ran Out of Food in the Last Year: Never true   Transportation Needs: Unmet Transportation Needs (1/29/2024)    PRAPARE - Transportation     Lack of Transportation (Medical): Yes     Lack of Transportation (Non-Medical): Yes   Physical Activity: Not on file   Stress: Not on file   Social Connections: Not on file   Intimate Partner Violence: Not on file   Housing Stability: Low Risk  (1/29/2024)    Housing Stability Vital Sign     Unable to Pay for Housing in the Last Year: No     Number of Places Lived in the Last Year: 1     Unstable Housing in the Last Year: No      Family History:     Family History   Problem Relation Age of Onset    Diabetes Mother     Hyperlipidemia Mother     Cancer Father     Prostate cancer Father     Ulcers Family       Current Medications:     Current Outpatient Medications   Medication Sig Dispense Refill    albuterol (Ventolin HFA) 90 mcg/act inhaler Inhale 2 puffs every 6 (six) hours as needed for wheezing 18 g 3    atorvastatin (LIPITOR) 40 mg tablet Take 1 tablet (40 mg total) by mouth daily with dinner 30 tablet 0    atorvastatin (LIPITOR) 80 mg tablet TAKE 1 TABLET (80 MG TOTAL) BY MOUTH DAILY WITH DINNER (Patient not taking: Reported on 5/31/2024) 30 tablet 2    clopidogrel (PLAVIX) 75 mg tablet Take 1 tablet (75 mg total) by mouth daily 90 tablet 0    Diclofenac Sodium (VOLTAREN) 1 % Apply 2 g topically 4 (four) times a day 100 g 0    Elastic Bandages & Supports (Knee Brace) MISC Use daily as needed (difficulty walking) 1 each 0  "   traZODone (DESYREL) 50 mg tablet Take 0.5 tablets (25 mg total) by mouth daily at bedtime as needed for sleep 45 tablet 0    amLODIPine (NORVASC) 5 mg tablet Take 1 tablet (5 mg total) by mouth daily 90 tablet 0    Blood Pressure KIT Use in the morning 1 kit 0    carvedilol (COREG) 12.5 mg tablet TAKE 1 TABLET (12.5 MG TOTAL) BY MOUTH EVERY 12 (TWELVE) HOURS 180 tablet 0    carvedilol (COREG) 12.5 mg tablet Take 1 tablet (12.5 mg total) by mouth every 12 (twelve) hours (Patient not taking: Reported on 5/31/2024) 60 tablet 0    divalproex sodium (DEPAKOTE) 250 mg DR tablet Take 2 tablets (500 mg total) by mouth every 12 (twelve) hours for 14 days (Patient not taking: Reported on 5/28/2024) 56 tablet 0    pantoprazole (PROTONIX) 40 mg tablet Take 1 tablet (40 mg total) by mouth 2 (two) times a day before meals (Patient not taking: Reported on 5/28/2024) 60 tablet 0    valsartan (DIOVAN) 160 mg tablet Take 1 tablet (160 mg total) by mouth daily 90 tablet 0     No current facility-administered medications for this visit.      Allergies:     Allergies   Allergen Reactions    Aspirin Anaphylaxis    Nsaids Anaphylaxis    Asparaginase Derivatives     Ibuprofen Hives    Pegaspargase Other (See Comments)    Robaxin [Methocarbamol]     Toradol [Ketorolac Tromethamine]       Physical Exam:     /71 (BP Location: Right arm, Patient Position: Sitting, Cuff Size: Adult)   Pulse 64   Temp (!) 96.9 °F (36.1 °C) (Tympanic)   Resp 20   Ht 5' 0.5\" (1.537 m)   Wt 56.4 kg (124 lb 6.4 oz)   SpO2 95%   BMI 23.90 kg/m²     Physical Exam  Constitutional:       General: She is not in acute distress.     Appearance: Normal appearance. She is normal weight. She is not ill-appearing, toxic-appearing or diaphoretic.   HENT:      Head: Normocephalic.      Right Ear: Tympanic membrane, ear canal and external ear normal.      Left Ear: Tympanic membrane, ear canal and external ear normal. There is no impacted cerumen.      " Mouth/Throat:      Mouth: Mucous membranes are moist.      Pharynx: Oropharynx is clear.   Eyes:      Extraocular Movements: Extraocular movements intact.      Pupils: Pupils are equal, round, and reactive to light.   Cardiovascular:      Rate and Rhythm: Normal rate and regular rhythm.      Heart sounds: Normal heart sounds. No murmur heard.  Pulmonary:      Effort: Pulmonary effort is normal.      Breath sounds: Normal breath sounds.   Abdominal:      General: Abdomen is flat. Bowel sounds are normal.      Palpations: Abdomen is soft. There is no mass.      Tenderness: There is no abdominal tenderness.   Musculoskeletal:      Cervical back: Neck supple. No tenderness.      Right lower leg: No edema.      Left lower leg: No edema.   Lymphadenopathy:      Cervical: No cervical adenopathy.   Neurological:      Mental Status: She is alert and oriented to person, place, and time.      Sensory: No sensory deficit.      Motor: No weakness.   Psychiatric:         Mood and Affect: Mood normal.         Behavior: Behavior normal.          Daniel Culver MD  [facility]

## 2024-05-15 LAB
ALBUMIN/CREAT UR: 444 MG/G CREAT (ref 0–29)
BACTERIA UR CULT: NORMAL
CREAT UR-MCNC: 50.9 MG/DL
Lab: NORMAL
MICROALBUMIN UR-MCNC: 226.2 UG/ML

## 2024-05-16 ENCOUNTER — TELEPHONE (OUTPATIENT)
Age: 69
End: 2024-05-16

## 2024-05-16 PROBLEM — R80.9 MICROALBUMINURIA: Status: ACTIVE | Noted: 2024-05-16

## 2024-05-16 NOTE — TELEPHONE ENCOUNTER
Patient called and stated that the blood pressure medication that was called into her pharmacy was not authorized by her insurance company.  Please advise.    Thanks!

## 2024-05-20 ENCOUNTER — NURSE TRIAGE (OUTPATIENT)
Age: 69
End: 2024-05-20

## 2024-05-20 NOTE — TELEPHONE ENCOUNTER
"Spoke with patient's [facility] regarding patient having increased anxiety and palpitations; denies chest pain. Patient received results of carotid US, shows 90% stenosis. Patient also states has a \"black curtain\" over her right eye, which is not new; she saw retina specialist today, who advised her to call and get the stenosis taken care of.    Patient has an appointment scheduled for May 31st but would like one sooner if possible.       "

## 2024-05-20 NOTE — TELEPHONE ENCOUNTER
"Pt is a new pt to vascular surgery. Pt is being referred for carotid artery stenosis. Received call from pt's grandchild, Yasmin, regarding getting a sooner appt with vascular than 5/31. Pt was in the background and gave me verbal permission to speak with Yasmin. Pt stated starting a couple of months ago, pt started getting intermittent vision loss in her right eye which she describes as a black curtain coming over her right eye. She states she does get dizzy/lightheaded at times but has a history of vertigo and cannot tell if this is different. Pt denies any s/s of stroke. Called and s/w triage provider Shakira Cardona PA-C regarding this situation. Per Melissa, pt should be seen in the office in the next couple of days and if any worsening s/s or any s/s of a stroke, pt should be evaluated in the ED. An appt was made for tomorrow, 5/21/2024 with SHAZIA Saeed PA-C. Informed pt to report to the ED with any worsening s/s which she agreed.      Reason for Disposition   Single floater (i.e., small speck seems to float across the eye) (Exception: floater(s) are a chronic symptom and this is unchanged from patient's baseline pattern)    Answer Assessment - Initial Assessment Questions  1. DESCRIPTION: \"What is the vision loss like? Describe it for me.\" (e.g., complete vision loss, blurred vision, double vision, floaters, etc.)      Pt stated she has been having what if feels like a black curtain coming over her R eye for a couple of months now  2. LOCATION: \"One or both eyes?\" If one, ask: \"Which eye?\"      R eye  5. PATTERN: \"Does this come and go, or has it been constant since it started?\"      Comes and goes    Protocols used: Vision Loss or Change-ADULT-[place]    " 181

## 2024-05-20 NOTE — TELEPHONE ENCOUNTER
"  Reason for Disposition  • Palpitations    Answer Assessment - Initial Assessment Questions  1. DESCRIPTION: \"Please describe your heart rate or heartbeat that you are having\" (e.g., fast/slow, regular/irregular, skipped or extra beats, \"palpitations\")      Having anxiety, causing palpitations  2. ONSET: \"When did it start?\" (Minutes, hours or days)       Always has them, started after retina doctor appointment today  3. DURATION: \"How long does it last\" (e.g., seconds, minutes, hours)      Comes and goes  4. PATTERN \"Does it come and go, or has it been constant since it started?\"  \"Does it get worse with exertion?\"   \"Are you feeling it now?\"      Comes and goes  6. HEART RATE: \"Can you tell me your heart rate?\" \"How many beats in 15 seconds?\"  (Note: not all patients can do this)        68  7. RECURRENT SYMPTOM: \"Have you ever had this before?\" If Yes, ask: \"When was the last time?\" and \"What happened that time?\"       Yes, anxiety  8. CAUSE: \"What do you think is causing the palpitations?\"      anxiety  9. CARDIAC HISTORY: \"Do you have any history of heart disease?\" (e.g., heart attack, angina, bypass surgery, angioplasty, arrhythmia)       denies  10. OTHER SYMPTOMS: \"Do you have any other symptoms?\" (e.g., dizziness, chest pain, sweating, difficulty breathing)        denies    Protocols used: Heart Rate and Heartbeat Questions-ADULT-[place]    "

## 2024-05-22 ENCOUNTER — TELEPHONE (OUTPATIENT)
Dept: GASTROENTEROLOGY | Facility: CLINIC | Age: 69
End: 2024-05-22

## 2024-05-22 NOTE — TELEPHONE ENCOUNTER
----- Message from Yamila EDDY sent at 5/1/2024 10:34 AM EDT -----    ----- Message -----  From: Yamilex Gil  Sent: 4/30/2024   2:44 PM EDT  To: [facility]      ----- Message -----  From: Tomy Aj MD  Sent: 4/30/2024   1:52 PM EDT  [facility]    Please schedule office visit with PA in 3 to 4 months.  Please make sure patient completes gastric emptying scan.

## 2024-05-22 NOTE — TELEPHONE ENCOUNTER
Called pt to schedule OV with a PA in 3-4m. Pt has gastric emptying study scheduled for 6/4. Pt scheduled OV with Kev 9/24

## 2024-05-23 ENCOUNTER — TELEPHONE (OUTPATIENT)
Age: 69
End: 2024-05-23

## 2024-05-23 DIAGNOSIS — E11.9 TYPE 2 DIABETES MELLITUS WITHOUT COMPLICATION, WITHOUT LONG-TERM CURRENT USE OF INSULIN (HCC): ICD-10-CM

## 2024-05-23 DIAGNOSIS — I10 ESSENTIAL HYPERTENSION: Primary | ICD-10-CM

## 2024-05-23 DIAGNOSIS — R80.9 PROTEINURIA, UNSPECIFIED TYPE: Primary | ICD-10-CM

## 2024-05-23 RX ORDER — VALSARTAN 160 MG/1
160 TABLET ORAL DAILY
Qty: 90 TABLET | Refills: 0 | Status: SHIPPED | OUTPATIENT
Start: 2024-05-23

## 2024-05-23 RX ORDER — AMLODIPINE BESYLATE 5 MG/1
5 TABLET ORAL DAILY
Qty: 90 TABLET | Refills: 0 | Status: SHIPPED | OUTPATIENT
Start: 2024-05-23

## 2024-05-23 NOTE — TELEPHONE ENCOUNTER
Spoke with patient by phone, discussed proteinuria, recommended that patient see a nephrologist for further evaluation, referral placed, phone number given.  Informed patient that office will call to schedule to see her in 2 weeks to follow-up on her blood pressure.  Advised her to check her blood pressure at home and bring her cuff in for validation during the next visit with her blood pressure logs

## 2024-05-24 ENCOUNTER — TELEPHONE (OUTPATIENT)
Dept: NEPHROLOGY | Facility: CLINIC | Age: 69
End: 2024-05-24

## 2024-05-24 ENCOUNTER — TELEPHONE (OUTPATIENT)
Age: 69
End: 2024-05-24

## 2024-05-24 NOTE — TELEPHONE ENCOUNTER
Patient calling back asking so speak with the [facility] staff. I warm transferred the call.

## 2024-05-28 ENCOUNTER — OFFICE VISIT (OUTPATIENT)
Dept: PULMONOLOGY | Facility: MEDICAL CENTER | Age: 69
End: 2024-05-28
Payer: COMMERCIAL

## 2024-05-28 VITALS
WEIGHT: 124 LBS | HEIGHT: 61 IN | BODY MASS INDEX: 23.41 KG/M2 | DIASTOLIC BLOOD PRESSURE: 64 MMHG | OXYGEN SATURATION: 93 % | SYSTOLIC BLOOD PRESSURE: 112 MMHG | HEART RATE: 68 BPM

## 2024-05-28 DIAGNOSIS — R91.1 LUNG NODULE: Primary | ICD-10-CM

## 2024-05-28 DIAGNOSIS — F17.200 NICOTINE DEPENDENCE, UNCOMPLICATED, UNSPECIFIED NICOTINE PRODUCT TYPE: ICD-10-CM

## 2024-05-28 PROCEDURE — 99407 BEHAV CHNG SMOKING > 10 MIN: CPT | Performed by: STUDENT IN AN ORGANIZED HEALTH CARE EDUCATION/TRAINING PROGRAM

## 2024-05-28 PROCEDURE — 99214 OFFICE O/P EST MOD 30 MIN: CPT | Performed by: STUDENT IN AN ORGANIZED HEALTH CARE EDUCATION/TRAINING PROGRAM

## 2024-05-28 NOTE — PROGRESS NOTES
Pulmonary Outpatient Progress Note   Nereyda Soler 68 y.o. female MRN: [id number]  5/28/2024      No problem-specific Assessment & Plan notes found for this encounter.      Assessment:    Active smoker since her teens up to 1 pack/day sometimes she is down to less than a pack a day, she has been smoking for many years and she is having difficulty quitting.  She says in the past she has tried Chantix and Wellbutrin and that gave her anxiety and insomnia.  At our last clinic visit we tried nicotine patches but she felt like it tasted like mold and stopped doing that.  We had an extensive conversation about quitting, she did google some steel device that replicates cigarettes and she may consider trying that.  Nicotine dependence with low-dose lung cancer screening June 6  Lung nodules 5 mm, active smoker therefore high risk  Hypertension on Coreg amlodipine  Seizures on Depakote  Type 2 diabetes  Right-sided ICA stenosis 70 to 99% is going to see vascular surgery soon    Plan:    CT chest next week to look at lung nodules  Patient not interested in nicotine patches, Chantix, gum currently  Tobacco abuse: Discussed risks of ongoing cigarette smoking. Discussed the benefits of quitting immediately and prior to any surgery. Discussed options including support, quit date, medications, and family support. Patient voiced understanding and knowledge. Greater than 10 minutes were needed for discussion of tobacco dependence and quitting counselling.  Follow-up in 6 months    History of Present Illness   HPI:    68-year-old female here for follow-up, initially referred due to smoking.  At her last visit we tried nicotine gum but she felt like this was no help and tasted like mold so he discontinued it.  She is an active smoker up to a pack per day has been smoking since her teens.  She has very difficult time quitting.  She is only 1-1/2 to smokes right now.    She recently found out that she has right ICA stenosis and is  going to see vascular surgery soon.  She also has a history of hypertension and seizures and type 2 diabetes.    She also had a CT that showed lung nodules therefore she is another 1 next week    Review of Systems   Constitutional: Negative.    HENT: Negative.     Eyes: Negative.    Respiratory: Negative.     Cardiovascular: Negative.    Gastrointestinal: Negative.    Endocrine: Negative.    Genitourinary: Negative.    Musculoskeletal: Negative.    Skin: Negative.    Allergic/Immunologic: Negative.    Neurological: Negative.    Hematological: Negative.    Psychiatric/Behavioral: Negative.          Historical Information   Past Medical History:   Diagnosis Date   • COPD (chronic obstructive pulmonary disease) (HCC)    • Liver disease 2017    Patient mentions having nonalcoholic liver disease. Denies alcohol use.   • Radiculopathy of lumbar region     last assessed 17   • Seizures (HCC)      Past Surgical History:   Procedure Laterality Date   •  SECTION     • EPIDURAL BLOCK INJECTION Right 2017    Procedure: BLOCK / INJECTION EPIDURAL STEROID TRANSFORAMINAL   L4-5;  Surgeon: Sanford De La Vega MD;  Location: [facility] MAIN OR;  Service:    • TONSILLECTOMY       Family History   Problem Relation Age of Onset   • Diabetes Mother    • Hyperlipidemia Mother    • Cancer Father    • Prostate cancer Father    • Ulcers Family      Social History     Tobacco Use   Smoking Status Every Day   • Current packs/day: 1.00   • Average packs/day: 1 pack/day for 53.4 years (53.4 ttl pk-yrs)   • Types: Cigarettes   • Start date:    Smokeless Tobacco Current   Tobacco Comments    Per allscripts: Current everyday smoker       Occupational History: na    Meds/Allergies     Current Outpatient Medications:   •  albuterol (Ventolin HFA) 90 mcg/act inhaler, Inhale 2 puffs every 6 (six) hours as needed for wheezing, Disp: 18 g, Rfl: 3  •  amLODIPine (NORVASC) 5 mg tablet, Take 1 tablet (5 mg total) by mouth daily, Disp: 90  "tablet, Rfl: 0  •  atorvastatin (LIPITOR) 80 mg tablet, TAKE 1 TABLET (80 MG TOTAL) BY MOUTH DAILY WITH DINNER, Disp: 30 tablet, Rfl: 2  •  carvedilol (COREG) 12.5 mg tablet, TAKE 1 TABLET (12.5 MG TOTAL) BY MOUTH EVERY 12 (TWELVE) HOURS, Disp: 180 tablet, Rfl: 0  •  clopidogrel (PLAVIX) 75 mg tablet, Take 1 tablet (75 mg total) by mouth daily, Disp: 90 tablet, Rfl: 0  •  Diclofenac Sodium (VOLTAREN) 1 %, Apply 2 g topically 4 (four) times a day, Disp: 100 g, Rfl: 0  •  Elastic Bandages & Supports (Knee Brace) MISC, Use daily as needed (difficulty walking), Disp: 1 each, Rfl: 0  •  traZODone (DESYREL) 50 mg tablet, Take 0.5 tablets (25 mg total) by mouth daily at bedtime as needed for sleep, Disp: 45 tablet, Rfl: 0  •  valsartan (DIOVAN) 160 mg tablet, Take 1 tablet (160 mg total) by mouth daily, Disp: 90 tablet, Rfl: 0  •  atorvastatin (LIPITOR) 40 mg tablet, Take 1 tablet (40 mg total) by mouth daily with dinner (Patient not taking: Reported on 5/28/2024), Disp: 30 tablet, Rfl: 0  •  Blood Pressure KIT, Use in the morning (Patient not taking: Reported on 6/27/2022), Disp: 1 kit, Rfl: 0  •  carvedilol (COREG) 12.5 mg tablet, Take 1 tablet (12.5 mg total) by mouth every 12 (twelve) hours, Disp: 60 tablet, Rfl: 0  •  divalproex sodium (DEPAKOTE) 250 mg DR tablet, Take 2 tablets (500 mg total) by mouth every 12 (twelve) hours for 14 days (Patient not taking: Reported on 5/28/2024), Disp: 56 tablet, Rfl: 0  •  pantoprazole (PROTONIX) 40 mg tablet, Take 1 tablet (40 mg total) by mouth 2 (two) times a day before meals (Patient not taking: Reported on 5/28/2024), Disp: 60 tablet, Rfl: 0  Allergies   Allergen Reactions   • Aspirin Anaphylaxis   • Nsaids Anaphylaxis   • Asparaginase Derivatives    • Ibuprofen Hives   • Pegaspargase Other (See Comments)   • Robaxin [Methocarbamol]    • Toradol [Ketorolac Tromethamine]        Vitals: Blood pressure 112/64, pulse 68, height 5' 0.5\" (1.537 m), weight 56.2 kg (124 lb), SpO2 " "93%., Body mass index is 23.82 kg/m². Oxygen Therapy  SpO2: 93 %  Oxygen Therapy: None (Room air)    Physical Exam:    GEN: alert and oriented x 3, pleasant and cooperative   HEENT:  Normocephalic, atraumatic, anicteric  NECK: No JVD   HEART: Rate, normal S1 and S2  LUNGS: Clear to auscultation bilaterally; no wheezes, rales, or rhonchi; respiration nonlabored   ABDOMEN:  Normoactive bowel sounds, soft, no tenderness, no distention  EXTREMITIES: peripheral pulses palpable; no edema  NEURO: no gross focal findings; cranial nerves grossly intact   SKIN:  Dry, intact, warm to touch    Labs: I have personally reviewed pertinent lab results.  No results found for: \"IGE\"    Imaging and other studies: I have personally reviewed pertinent reports.    Vascular ultrasound with right ICA 70 to 99%    Pulmonary function testing: na    Other Studies: I have personally reviewed pertinent reports.    Cardiology documentation    Bridgette Miranda MD  Pulmonary and Critical Care Medicine     " Statement Selected

## 2024-05-28 NOTE — PATIENT INSTRUCTIONS
It was a pleasure seeing you today!    Try to stop smoking  CT Chest next week  Follow up in 6 months     Bridgette Miranda MD  Pulmonary and Critical Care Medicine

## 2024-05-29 NOTE — PROGRESS NOTES
Assessment/Plan:     Carotid artery stenosis, bilateral  -Reports episodes of OD vision changes in the past year concerning for amaurosis fugax    CV duplex 4/4/24:    R ICA 70-99% (604/207, ratio 7,75); severe ECA stenosis, SCA > 50% and non-visualized innominate     L ICA  50-69% stenosis (177/41, 1.12)     Plan:  -Significant bilateral cerebrovascular disease with severe R ICA stenosis, the brachiocephalic artery not visualized on duplex and evidence of R subclavian artery stenosis  -Symptomatic right eye vision changes for the past 1 year  -MRI of the brain 1/27/2024: Reveals chronic right occipital lobe infarct.  -We reviewed her history and carotid duplex study as above  -We discussed pathophysiology and treatment of carotid stenosis including indications for surgical/ endovascular intervention  -Optimize cardiovascular risk factors  -Discussed the impact of continued smoking on cardiovascular and cerebrovascular disease  -Continue with clopidogrel and statin therapy  -We discussed that her carotid disease is in the range where surgical intervention should be considered, further concern that the lesion is symptomatic  We will start with CTA head and neck.  In the meantime, ask cardiology staff to send a message to her cardiologist to get the process of cardiac clearance started  -She is advised to go to the emergency department if any recurrent neurologic symptoms -We reviewed symptoms of stroke for which she should call 911  -Check CTA head and neck followed by OV with vascular surgeon at next avail      Peripheral arterial disease  -Decreased femoral and distal pulses  -Continue with aspirin and statin therapy  -Recommend regular walking  -She was advised to avoid foot wounds and check her feet daily for any wounds or changes  -We discussed pathophysiology and treatment of PAD  -Will follow-up on this process in the future and defer vascular testing now concentrating on her carotid artery stenosis at this  "time.       Diagnoses and all orders for this visit:    Bilateral carotid artery stenosis  -     CTA head and neck w wo contrast; Future    Tobacco dependence    Type 2 diabetes mellitus without complication, without long-term current use of insulin (HCC)    Essential hypertension    Chronic obstructive pulmonary disease, unspecified COPD type (HCC)    Peripheral arterial disease (HCC)          Subjective:   Patient ID: Nereyda Soelr is a 68 y.o. female.  New patient, presents today to establish care for carotid artery stenosis. Carotid duplex done 4/4/24. Reports 1 year h/o intermittent, momentary R eye vision loss - not recently. She now develops wavy lines in the vision.     HPI    Ms. Soler 68 yoF smoker, COPD, seizure disorder, vertigo, Htn, hypercholesterolemia, prolonged QT, diabetes, severe carotid artery stenosis for which she is referred to vascular surgery for evaluation.     Patient is accompanied by her daughter, Nereyda.     Ms. Soler was discovered bilateral carotid artery stenosis which is severe on the right with velocities falling in the 70 to 99% range with elevated velocities  cm/s and  cm/s and ratio of almost 8.      Patient reports that during the last year she has had right eye episodes.  She has had episodes of momentary loss of vision in the right eye for the past year but none since her\hospitalization in February.  However, since then she has been having \"fuzziness\" in the eye for several seconds intermittently.  The last episode was about 2 weeks ago.  Since the episodes are brief, she never sought specific treatment.  We discussed that this is consistent with her known right ICA stenosis suggesting it is symptomatic. She is instructed to go to the emergency department for any recurrent symptoms.    She also has had frequent falls and balance problems but states that she has had falls since a teenager. She reports \"vertigo\" where she may spin or her surroundings spin. "     She has been on a statin medication for the past couple of years.  She recently started clopidogrel about 2 weeks ago.  The rationale for medical therapy.    She is is an active smoker since teenager. She recently had lung CA screening exam with 5 mm nodules and will be having a follow up CT chest for further evaluation of the nodules.    She is smoking about 1/2 pack/day.  She understands the importance of smoking cessation and is trying to cut back.  She believes that she just has to make up her mind and quit but is not quite at that point yet.  We did discuss the impact of continued smoking on her cardiovascular health and the risk of decreased success of any cardiovascular procedures    She also has difficulty walking which she believes is due to back disease.  Her pulse examination reveals decreased femoral and distal pulses.  She is already started on medical therapy.  She is encouraged to stop smoking and monitor her condition.  We will address this problem at future visit.    She is known to cardiology after having non-STEMI in the setting of syncopal episode in January.   Nuclear medicine study January 29, 2024 showed normal EF 75% evidence of ischemia.  No current chest pain or SOB.    1/2 PPD smoker  A1c 6.3  55  eGFR 94    60 min    Review of Systems   Constitutional: Negative.    HENT: Negative.     Eyes:  Positive for visual disturbance.   Respiratory:  Positive for cough and shortness of breath.    Cardiovascular: Negative.    Gastrointestinal: Negative.    Endocrine: Negative.    Genitourinary: Negative.    Musculoskeletal: Negative.    Skin: Negative.    Allergic/Immunologic: Negative.    Neurological: Negative.    Hematological: Negative.    Psychiatric/Behavioral:  Positive for agitation, confusion and decreased concentration.          Objective:      R shoulder and R carotid bruit    Decreased BS, no wheezes    No wounds; no easily palp pulses in the femoral or decreased pulses in the  feet         Physical Exam  Vitals and nursing note reviewed.   Constitutional:       Appearance: She is well-developed.   HENT:      Head: Normocephalic and atraumatic.   Eyes:      Extraocular Movements: EOM normal.      Pupils: Pupils are equal, round, and reactive to light.   Neck:      Thyroid: No thyromegaly.      Vascular: No JVD.      Trachea: Trachea normal.   Cardiovascular:      Rate and Rhythm: Normal rate and regular rhythm.      Pulses:           Carotid pulses are 2+ on the right side and 2+ on the left side.       Radial pulses are 2+ on the right side and 2+ on the left side.        Dorsalis pedis pulses are 2+ on the right side and 2+ on the left side.      Heart sounds: Normal heart sounds, S1 normal and S2 normal. No murmur heard.     No friction rub. No gallop.   Pulmonary:      Effort: Pulmonary effort is normal. No accessory muscle usage or respiratory distress.      Breath sounds: Normal breath sounds. No wheezing or rales.   Abdominal:      General: Bowel sounds are normal. There is no distension.      Palpations: Abdomen is soft.      Tenderness: There is no abdominal tenderness.   Musculoskeletal:         General: No deformity or edema. Normal range of motion.      Cervical back: Neck supple.   Skin:     General: Skin is warm and dry.      Findings: No lesion or rash.      Nails: There is no clubbing.   Neurological:      Mental Status: She is alert and oriented to person, place, and time.      Comments: Grossly normal    Psychiatric:         Mood and Affect: Mood and affect normal.         Behavior: Behavior is cooperative.             VAS carotid 4/4/24  CLINICAL:  Indications:  Syncope R55. Patient had two recent episodes of syncope.  Physician also noted a bruit on exam not specific to which side.  Operative History:  No prior cardiovascular surgery  Risk Factors  The patient has history of HTN.  Clinical  Right Pressure:  110/60 mm Hg, Left Pressure:  130/50 mm Hg.     FINDINGS:      Right        Impression       PSV  EDV (cm/s)  Ratio    Dist. ICA                      61          18   0.78    Mid. ICA                      178          34   2.29    Prox. ICA    70 - 99%         604         207   7.75    Dist CCA                       71          14           Mid CCA                        78          15   1.22    Prox CCA                       64          12           Ext Carotid  Severe stenosis  317          35   4.07    Prox Vert                      34          11           Subclavian   > 50%            357          23           Innominate   Not visualized                                Left         Impression  PSV  EDV (cm/s)  Ratio    Dist. ICA                110          42   0.70    Mid. ICA                 139          39   0.88    Prox. ICA    50 - 69%    177          41   1.12    Dist CCA                 123          28           Mid CCA                  158          37   2.04    Prox CCA                  77          22           Ext Carotid              112          24   0.71    Vertebral                126          42           Prox Vert                126          42           Subclavian               162          14                    CONCLUSION:  Impression  RIGHT:  There is 70-99% stenosis noted in the internal carotid artery. Plaque is  heterogenous, calcified and irregular.  Vertebral artery flow is antegrade. There is 20 mm Hg. brachial blood pressure  gradient L > R and elevated velocities noted in the subclavian artery. These  findings are consistent with a >50% stenosis in the proximal subclavian artery.     LEFT:  There is 50-69% stenosis noted in the internal carotid artery. Plaque is  heterogenous, calcified and irregular. Vertebral artery flow is antegrade. There  is no significant subclavian artery disease.     No prior study for comparison.  Recommend repeat testing in 6 months as per protocol unless otherwise  indicated.        Tobacco use is a significant  patient-modifiable risk factor for this patient’s vascular disease with multiple vascular comorbidities, and a significant risk factor for failure of and complications from any endovascular or surgical interventions.    I explained to the patient the effects of smoking including peripheral artery disease, coronary artery disease, cerebrovascular disease as well as cancer and chronic obstructive pulmonary disease. I asked the patient to stop smoking immediately. It is never too late to quit, and many studies show significant health benefits as well as economical savings after smoking cessation. I offered to the patient nicotine replacement therapy as well as referral to the smoking cessation program and access to the quit line 8-349-RIXLYGL or ambulatory referral to our network smoking cessation program.    Based on our conversation, this patient appears motivated to quit  And plans to use nicotine replacement to help quit    The patient did not set a quit date. I will continue to  follow up on this issue at our next scheduled visit.     I spent approximately 7 minutes on tobacco cessation counseling with this patient.          I have reviewed and made appropriate changes to the review of systems input by the medical assistant.    Vitals:    05/31/24 0922   BP: 136/82   BP Location: Right arm   Patient Position: Sitting   Pulse: 64   Weight: 57.2 kg (126 lb)   Height: 5' (1.524 m)       Patient Active Problem List   Diagnosis    Intervertebral disc disorder with radiculopathy of lumbosacral region    Lung mass    Tobacco dependence    Cervical radiculopathy    Depression    Hypercholesteremia    Morbid obesity (HCC)    History of absence seizures    Type 2 diabetes mellitus without complication, without long-term current use of insulin (HCC)    Elevated troponin level r/o ACS    COPD (chronic obstructive pulmonary disease) (HCC)    Seizures (HCC)    SIRS (systemic inflammatory response syndrome) (HCC)    Essential  hypertension    Body mass index (BMI) of 40.0-44.9 in adult (HCC)    Chronic pain of right knee    Elevated TSH    Syncope    QT prolongation    Moderate protein-calorie malnutrition (HCC)    Weight loss, unintentional    Absence seizure (HCC)    Suspected UTI    Microalbuminuria    Peripheral arterial disease (HCC)       Past Surgical History:   Procedure Laterality Date     SECTION      EPIDURAL BLOCK INJECTION Right 2017    Procedure: BLOCK / INJECTION EPIDURAL STEROID TRANSFORAMINAL   L4-5;  Surgeon: Sanford De La Vega MD;  Location: [facility] MAIN OR;  Service:     TONSILLECTOMY         Family History   Problem Relation Age of Onset    Diabetes Mother     Hyperlipidemia Mother     Cancer Father     Prostate cancer Father     Ulcers Family        Social History     Socioeconomic History    Marital status:      Spouse name: Not on file    Number of children: 3    Years of education: Not on file    Highest education level: Not on file   Occupational History    Not on file   Tobacco Use    Smoking status: Every Day     Current packs/day: 1.00     Average packs/day: 1 pack/day for 53.4 years (53.4 ttl pk-yrs)     Types: Cigarettes     Start date:     Smokeless tobacco: Current    Tobacco comments:     Per allscripts: Current everyday smoker   Vaping Use    Vaping status: Former    Substances: Nicotine   Substance and Sexual Activity    Alcohol use: Not Currently     Comment: Per allscripts: Social    Drug use: Yes     Types: Marijuana     Comment: gummies    Sexual activity: Not Currently     Partners: Male   Other Topics Concern    Not on file   Social History Narrative    Not on file     Social Determinants of Health     Financial Resource Strain: Low Risk  (2024)    Overall Financial Resource Strain (CARDIA)     Difficulty of Paying Living Expenses: Not hard at all   Food Insecurity: No Food Insecurity (2024)    Hunger Vital Sign     Worried About Running Out of Food in the Last Year:  Never true     Ran Out of Food in the Last Year: Never true   Transportation Needs: Unmet Transportation Needs (1/29/2024)    PRAPARE - Transportation     Lack of Transportation (Medical): Yes     Lack of Transportation (Non-Medical): Yes   Physical Activity: Not on file   Stress: Not on file   Social Connections: Not on file   Intimate Partner Violence: Not on file   Housing Stability: Low Risk  (1/29/2024)    Housing Stability Vital Sign     Unable to Pay for Housing in the Last Year: No     Number of Places Lived in the Last Year: 1     Unstable Housing in the Last Year: No       Allergies   Allergen Reactions    Aspirin Anaphylaxis    Nsaids Anaphylaxis    Asparaginase Derivatives     Ibuprofen Hives    Pegaspargase Other (See Comments)    Robaxin [Methocarbamol]     Toradol [Ketorolac Tromethamine]          Current Outpatient Medications:     albuterol (Ventolin HFA) 90 mcg/act inhaler, Inhale 2 puffs every 6 (six) hours as needed for wheezing, Disp: 18 g, Rfl: 3    amLODIPine (NORVASC) 5 mg tablet, Take 1 tablet (5 mg total) by mouth daily, Disp: 90 tablet, Rfl: 0    atorvastatin (LIPITOR) 40 mg tablet, Take 1 tablet (40 mg total) by mouth daily with dinner, Disp: 30 tablet, Rfl: 0    Blood Pressure KIT, Use in the morning, Disp: 1 kit, Rfl: 0    carvedilol (COREG) 12.5 mg tablet, TAKE 1 TABLET (12.5 MG TOTAL) BY MOUTH EVERY 12 (TWELVE) HOURS, Disp: 180 tablet, Rfl: 0    clopidogrel (PLAVIX) 75 mg tablet, Take 1 tablet (75 mg total) by mouth daily, Disp: 90 tablet, Rfl: 0    Diclofenac Sodium (VOLTAREN) 1 %, Apply 2 g topically 4 (four) times a day, Disp: 100 g, Rfl: 0    Elastic Bandages & Supports (Knee Brace) MISC, Use daily as needed (difficulty walking), Disp: 1 each, Rfl: 0    traZODone (DESYREL) 50 mg tablet, Take 0.5 tablets (25 mg total) by mouth daily at bedtime as needed for sleep, Disp: 45 tablet, Rfl: 0    valsartan (DIOVAN) 160 mg tablet, Take 1 tablet (160 mg total) by mouth daily, Disp: 90  tablet, Rfl: 0    atorvastatin (LIPITOR) 80 mg tablet, TAKE 1 TABLET (80 MG TOTAL) BY MOUTH DAILY WITH DINNER (Patient not taking: Reported on 5/31/2024), Disp: 30 tablet, Rfl: 2    carvedilol (COREG) 12.5 mg tablet, Take 1 tablet (12.5 mg total) by mouth every 12 (twelve) hours (Patient not taking: Reported on 5/31/2024), Disp: 60 tablet, Rfl: 0    divalproex sodium (DEPAKOTE) 250 mg DR tablet, Take 2 tablets (500 mg total) by mouth every 12 (twelve) hours for 14 days (Patient not taking: Reported on 5/28/2024), Disp: 56 tablet, Rfl: 0    pantoprazole (PROTONIX) 40 mg tablet, Take 1 tablet (40 mg total) by mouth 2 (two) times a day before meals (Patient not taking: Reported on 5/28/2024), Disp: 60 tablet, Rfl: 0

## 2024-05-30 ENCOUNTER — TELEPHONE (OUTPATIENT)
Age: 69
End: 2024-05-30

## 2024-05-30 NOTE — TELEPHONE ENCOUNTER
Patient contacted office concerned she had a procedure for tomorrow 5/31.     Verified with pt, no procedure is scheduled.

## 2024-05-31 ENCOUNTER — HOSPITAL ENCOUNTER (OUTPATIENT)
Dept: RADIOLOGY | Facility: HOSPITAL | Age: 69
Discharge: HOME | End: 2024-05-31
Payer: COMMERCIAL

## 2024-05-31 ENCOUNTER — OFFICE VISIT (OUTPATIENT)
Dept: VASCULAR SURGERY | Facility: CLINIC | Age: 69
End: 2024-05-31
Payer: COMMERCIAL

## 2024-05-31 VITALS
HEART RATE: 64 BPM | SYSTOLIC BLOOD PRESSURE: 136 MMHG | BODY MASS INDEX: 24.74 KG/M2 | WEIGHT: 126 LBS | HEIGHT: 60 IN | DIASTOLIC BLOOD PRESSURE: 82 MMHG

## 2024-05-31 DIAGNOSIS — E11.9 TYPE 2 DIABETES MELLITUS WITHOUT COMPLICATION, WITHOUT LONG-TERM CURRENT USE OF INSULIN (HCC): ICD-10-CM

## 2024-05-31 DIAGNOSIS — I73.9 PERIPHERAL ARTERIAL DISEASE (HCC): ICD-10-CM

## 2024-05-31 DIAGNOSIS — R55 SYNCOPE AND COLLAPSE: ICD-10-CM

## 2024-05-31 DIAGNOSIS — F17.200 TOBACCO DEPENDENCE: ICD-10-CM

## 2024-05-31 DIAGNOSIS — I10 ESSENTIAL HYPERTENSION: ICD-10-CM

## 2024-05-31 DIAGNOSIS — I73.9 PAD (PERIPHERAL ARTERY DISEASE) (HCC): ICD-10-CM

## 2024-05-31 DIAGNOSIS — I65.23 BILATERAL CAROTID ARTERY STENOSIS: ICD-10-CM

## 2024-05-31 DIAGNOSIS — I65.23 BILATERAL CAROTID ARTERY STENOSIS: Primary | ICD-10-CM

## 2024-05-31 DIAGNOSIS — J44.9 CHRONIC OBSTRUCTIVE PULMONARY DISEASE, UNSPECIFIED COPD TYPE (HCC): ICD-10-CM

## 2024-05-31 PROCEDURE — 99204 OFFICE O/P NEW MOD 45 MIN: CPT | Performed by: PHYSICIAN ASSISTANT

## 2024-05-31 PROCEDURE — 70498 CT ANGIOGRAPHY NECK: CPT

## 2024-05-31 PROCEDURE — 70496 CT ANGIOGRAPHY HEAD: CPT

## 2024-05-31 RX ADMIN — IOHEXOL 85 ML: 350 INJECTION, SOLUTION INTRAVENOUS at 11:08

## 2024-05-31 NOTE — PATIENT INSTRUCTIONS
Symptoms of stroke:  - Unable to speak or understand speech  - Unable to move one side of the body (arm or leg)  - Visual changes  - Call 911 for any symptoms of stroke        -Significant bilateral carotid disease  -Optimize cardiovascular risk factors  -Discussed the impact of continued smoking on cardiovascular and cerebrovascular disease  -Continue with clopidogrel and statin therapy  -Reviewed symptoms of stroke for which she should call 911  -Check CTA head and neck followed by OV with vascular surgeon at next avail          I offered to the patient nicotine replacement therapy as well as referral to the smoking cessation program and access to the quit line 3-758-AWMXDRY or ambulatory referral to our network smoking cessation program.

## 2024-06-04 ENCOUNTER — HOSPITAL ENCOUNTER (OUTPATIENT)
Dept: RADIOLOGY | Facility: HOSPITAL | Age: 69
Discharge: HOME | End: 2024-06-04
Attending: INTERNAL MEDICINE
Payer: COMMERCIAL

## 2024-06-04 DIAGNOSIS — R63.4 WEIGHT LOSS, UNINTENTIONAL: ICD-10-CM

## 2024-06-04 PROCEDURE — 78264 GASTRIC EMPTYING IMG STUDY: CPT

## 2024-06-04 PROCEDURE — A9541 TC99M SULFUR COLLOID: HCPCS

## 2024-06-05 ENCOUNTER — CONSULT (OUTPATIENT)
Dept: NEUROLOGY | Facility: CLINIC | Age: 69
End: 2024-06-05
Payer: COMMERCIAL

## 2024-06-05 ENCOUNTER — OFFICE VISIT (OUTPATIENT)
Dept: CARDIOLOGY CLINIC | Facility: CLINIC | Age: 69
End: 2024-06-05
Payer: COMMERCIAL

## 2024-06-05 ENCOUNTER — TELEPHONE (OUTPATIENT)
Age: 69
End: 2024-06-05

## 2024-06-05 VITALS
HEIGHT: 60 IN | OXYGEN SATURATION: 93 % | TEMPERATURE: 97.5 F | WEIGHT: 126 LBS | HEART RATE: 66 BPM | DIASTOLIC BLOOD PRESSURE: 74 MMHG | SYSTOLIC BLOOD PRESSURE: 128 MMHG | BODY MASS INDEX: 24.74 KG/M2

## 2024-06-05 VITALS
HEART RATE: 59 BPM | WEIGHT: 126 LBS | DIASTOLIC BLOOD PRESSURE: 62 MMHG | HEIGHT: 60 IN | OXYGEN SATURATION: 96 % | SYSTOLIC BLOOD PRESSURE: 112 MMHG | BODY MASS INDEX: 24.74 KG/M2

## 2024-06-05 DIAGNOSIS — E78.00 HYPERCHOLESTEREMIA: ICD-10-CM

## 2024-06-05 DIAGNOSIS — Z01.810 PREOP CARDIOVASCULAR EXAM: Primary | ICD-10-CM

## 2024-06-05 DIAGNOSIS — I65.21 CAROTID STENOSIS, RIGHT: ICD-10-CM

## 2024-06-05 DIAGNOSIS — I10 ESSENTIAL HYPERTENSION: ICD-10-CM

## 2024-06-05 DIAGNOSIS — R56.9 SEIZURES (HCC): ICD-10-CM

## 2024-06-05 DIAGNOSIS — G40.909 SEIZURE DISORDER (HCC): Primary | ICD-10-CM

## 2024-06-05 PROCEDURE — 99214 OFFICE O/P EST MOD 30 MIN: CPT | Performed by: PHYSICIAN ASSISTANT

## 2024-06-05 PROCEDURE — 93000 ELECTROCARDIOGRAM COMPLETE: CPT | Performed by: PHYSICIAN ASSISTANT

## 2024-06-05 PROCEDURE — 99205 OFFICE O/P NEW HI 60 MIN: CPT | Performed by: PSYCHIATRY & NEUROLOGY

## 2024-06-05 RX ORDER — LEVETIRACETAM 500 MG/1
500 TABLET ORAL EVERY 12 HOURS SCHEDULED
Qty: 60 TABLET | Refills: 4 | Status: SHIPPED | OUTPATIENT
Start: 2024-06-05

## 2024-06-05 NOTE — LETTER
June 5, 2024     Darryl Lira MD  [address]  [address]  [facility] [address]    Patient: Nereyda Soler   YOB: 1955   Date of Visit: 6/5/2024       Dear Dr. Lira:    Thank you for referring Nereyda Soler to me for evaluation. Below are my notes for this consultation.    If you have questions, please do not hesitate to call me. I look forward to following your patient along with you.         Sincerely,        Vishal Kirby MD        CC: No Recipients    Vishal Kirby MD  6/5/2024  4:09 PM  Sign when Signing Visit  Outpatient Neurology History and Physical  Nereyda Soler  [id number]  68 y.o.  1955          Consult: Yes    Darryl Lira MD      Chief Complaint   Patient presents with   • Seizures           History Obtained from: patient and daughter     HPI:     Nereyda Soler is a 69 yo F with PMH of seizure presents to establish care. According to daughter, patient started having seizures in 2011. Son had described it as whole body twitching, jerking with post ictal confusion and would be very sleepy. Her MRI brain had revealed chronic right occipital stroke. Her EEG was negative. She would have 1-2 seizures a year for past 12 years. In Jan of 2024, patient's neighbor heard a thump and found her seizing. Her MRI brain this time showed right occipital chronic stroke but no new process.  She did have UTI at that time. Her left toe gets funny sensation prior to her seizure. Patient was tapered off of depakote some time ago but doesn't know why. She doesn't want to go back on depakote. Patient is poor historian.     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 There was thought to be a syncopal event in Jan as well and carotid doppler revealed the stenosis which was further confirmed with CTA. She's in process of having right CEA with vascular surgery.   She has had loss of vision in right eye for few seconds that  occurred 2-3 times in past 1-2 years.     She's currently on plavix and lipitor.     Past Medical History:   Diagnosis Date   • COPD (chronic obstructive pulmonary disease) (HCC)    • Liver disease 2/21/2017    Patient mentions having nonalcoholic liver disease. Denies alcohol use.   • Radiculopathy of lumbar region     last assessed 03/29/17   • Seizures (HCC)                Current Outpatient Medications on File Prior to Visit   Medication Sig Dispense Refill   • albuterol (Ventolin HFA) 90 mcg/act inhaler Inhale 2 puffs every 6 (six) hours as needed for wheezing 18 g 3   • amLODIPine (NORVASC) 5 mg tablet Take 1 tablet (5 mg total) by mouth daily 90 tablet 0   • atorvastatin (LIPITOR) 80 mg tablet TAKE 1 TABLET (80 MG TOTAL) BY MOUTH DAILY WITH DINNER 30 tablet 2   • Blood Pressure KIT Use in the morning 1 kit 0   • carvedilol (COREG) 12.5 mg tablet TAKE 1 TABLET (12.5 MG TOTAL) BY MOUTH EVERY 12 (TWELVE) HOURS 180 tablet 0   • clopidogrel (PLAVIX) 75 mg tablet Take 1 tablet (75 mg total) by mouth daily 90 tablet 0   • Diclofenac Sodium (VOLTAREN) 1 % Apply 2 g topically 4 (four) times a day 100 g 0   • traZODone (DESYREL) 50 mg tablet Take 0.5 tablets (25 mg total) by mouth daily at bedtime as needed for sleep 45 tablet 0   • valsartan (DIOVAN) 160 mg tablet Take 1 tablet (160 mg total) by mouth daily 90 tablet 0   • Elastic Bandages & Supports (Knee Brace) MISC Use daily as needed (difficulty walking) (Patient not taking: Reported on 6/5/2024) 1 each 0   • [DISCONTINUED] atorvastatin (LIPITOR) 40 mg tablet Take 1 tablet (40 mg total) by mouth daily with dinner (Patient not taking: Reported on 6/5/2024) 30 tablet 0   • [DISCONTINUED] carvedilol (COREG) 12.5 mg tablet Take 1 tablet (12.5 mg total) by mouth every 12 (twelve) hours (Patient not taking: Reported on 5/31/2024) 60 tablet 0   • [DISCONTINUED] divalproex sodium (DEPAKOTE) 250 mg DR tablet Take 2 tablets (500 mg total) by mouth every 12 (twelve) hours  for 14 days (Patient not taking: Reported on 2024) 56 tablet 0   • [DISCONTINUED] pantoprazole (PROTONIX) 40 mg tablet Take 1 tablet (40 mg total) by mouth 2 (two) times a day before meals (Patient not taking: Reported on 2024) 60 tablet 0     No current facility-administered medications on file prior to visit.       Allergies   Allergen Reactions   • Aspirin Anaphylaxis   • Nsaids Anaphylaxis   • Asparaginase Derivatives    • Ibuprofen Hives   • Pegaspargase Other (See Comments)   • Robaxin [Methocarbamol]    • Toradol [Ketorolac Tromethamine]          Family History   Problem Relation Age of Onset   • Diabetes Mother    • Hyperlipidemia Mother    • Cancer Father    • Prostate cancer Father    • Ulcers Family                 Past Surgical History:   Procedure Laterality Date   •  SECTION     • EPIDURAL BLOCK INJECTION Right 2017    Procedure: BLOCK / INJECTION EPIDURAL STEROID TRANSFORAMINAL   L4-5;  Surgeon: Sanford De La Vega MD;  Location: [facility] MAIN OR;  Service:    • TONSILLECTOMY             Social History     Socioeconomic History   • Marital status:      Spouse name: Not on file   • Number of children: 3   • Years of education: Not on file   • Highest education level: Not on file   Occupational History   • Not on file   Tobacco Use   • Smoking status: Every Day     Current packs/day: 1.00     Average packs/day: 1 pack/day for 53.4 years (53.4 ttl pk-yrs)     Types: Cigarettes     Start date:    • Smokeless tobacco: Current   • Tobacco comments:     Per allscripts: Current everyday smoker   Vaping Use   • Vaping status: Former   • Substances: Nicotine   Substance and Sexual Activity   • Alcohol use: Not Currently     Comment: Per allscripts: Social   • Drug use: Yes     Types: Marijuana     Comment: gummies   • Sexual activity: Not Currently     Partners: Male   Other Topics Concern   • Not on file   Social History Narrative   • Not on file     Social Determinants of Health      Financial Resource Strain: Low Risk  (5/13/2024)    Overall Financial Resource Strain (CARDIA)    • Difficulty of Paying Living Expenses: Not hard at all   Food Insecurity: No Food Insecurity (1/29/2024)    Hunger Vital Sign    • Worried About Running Out of Food in the Last Year: Never true    • Ran Out of Food in the Last Year: Never true   Transportation Needs: Unmet Transportation Needs (1/29/2024)    PRAPARE - Transportation    • Lack of Transportation (Medical): Yes    • Lack of Transportation (Non-Medical): Yes   Physical Activity: Not on file   Stress: Not on file   Social Connections: Not on file   Intimate Partner Violence: Not on file   Housing Stability: Low Risk  (1/29/2024)    Housing Stability Vital Sign    • Unable to Pay for Housing in the Last Year: No    • Number of Places Lived in the Last Year: 1    • Unstable Housing in the Last Year: No       Review of Systems  Refer to positive review of systems in HPI  Constitutional- No fever  Eyes- No visual change  ENT- Hearing normal  CV- No chest pain  Resp- No Shortness of breath  GI- No diarrhea  - Bladder normal  MS- No Arthritis   Skin- No rash  Psych- No depression  Endo- No DM  Heme- No nodes    PHYSICAL EXAM:    Vitals:    06/05/24 1510   BP: 128/74   BP Location: Right arm   Patient Position: Sitting   Cuff Size: Standard   Pulse: 66   Temp: 97.5 °F (36.4 °C)   TempSrc: Tympanic   SpO2: 93%   Weight: 57.2 kg (126 lb)   Height: 5' (1.524 m)         Appearance: No Acute Distress  Ophthalmoscopic: Disc Flat, Normal fundus  Carotid/Heart/Peripheral Vascular: No Bruits, RRR  Orientation: Awake, Alert, and Oriented x 3  Mental status:  Memory: Registation 3/3 Recall 3/3  Attention: Normal  Knowledge: Appropriate  Language: No aphasia  Speech: No dysarthria  Cranial Nerves:  2 No Visual Defect on Confrontation; Pupils round, equal, reactive to light  3,4,6 Extraocular Movements Intact; no nystagmus  5 Facial Sensation Intact  7 No facial  asymmetry  8 Intact hearing  9,10 Palate symmetric, normal gag  11 Good shoulder shrug  12 Tongue Midline  Gait: Stable, No ataxia, can perform tandem walking  Coordination: No ataxia with finger to nose testing and heel to shin testing  Sensory: Intact, Symmetric to Pinprick, Light Touch, Vibration, and Joint Position  Muscle Tone: Normal  Muscle exam  Arm Right Left Leg Right Left   Deltoid 5/5 5/5 Iliopsoas 5/5 5/5   Biceps 5/5 5/5 Quads 5/5 5/5   Triceps 5/5 5/5 Hamstrings 5/5 5/5   Wrist Extension 5/5 5/5 Ankle Dorsi Flexion 5/5 5/5   Wrist Flexion 5/5 5/5 Ankle Plantar Flexion 5/5 5/5   Interossei 5/5 5/5 Ankle Eversion 5/5 5/5   APB 5/5 5/5 Ankle Inversion 5/5 5/5       Reflexes   RJ BJ TJ KJ AJ Plantars Rosa's   Right 2+ 2+ 2+ 2+ 2+ Downgoing Not present   Left 2+ 2+ 2+ 2+ 2+ Downgoing Not present       Personal review of          Mri brain, cta head and neck, carotid doppler between Jan -May of 2024.   Discussed results with patient.     Assessment/Plan:     1. Seizure disorder (HCC)  EEG Prolonged > 1 hour    levETIRAcetam (Keppra) 500 mg tablet      2. Seizures (HCC)  Ambulatory Referral to Neurology      3. Carotid stenosis, right            Patient has had long history of seizures and discussed that she needs to stay on AED life long.   She's agreeable to keppra. Will start at 500mg bid.  Will get EEG to see if we can see epileptogenic focus.  She is in process of getting CEA vs stent for right carotid severe stenosis.                 Counseling Documentation:  The patient and/or patient's family were  counseled regarding diagnostic results. Instructions for management,risk factor reductions,prognosis of disease were discussed. Patient and family were educated regarding impressions,risks and benefits of treatment options,importance of compliance with treatment.        Total time of encounter: 60 min  More than 50% of time was spent in counseling and coordination of care of patient.     Vishal Kirby  M.D.  [facility]  [address] [place] [id number]

## 2024-06-05 NOTE — PROGRESS NOTES
Outpatient Neurology History and Physical  Nereyda Soler  [id number]  68 y.o.  1955          Consult: Yes    Darryl Lira MD      Chief Complaint   Patient presents with   • Seizures           History Obtained from: patient and daughter     HPI:     Nereyda Soler is a 69 yo F with PMH of seizure presents to establish care. According to daughter, patient started having seizures in 2011. Son had described it as whole body twitching, jerking with post ictal confusion and would be very sleepy. Her MRI brain had revealed chronic right occipital stroke. Her EEG was negative. She would have 1-2 seizures a year for past 12 years. In Jan of 2024, patient's neighbor heard a thump and found her seizing. Her MRI brain this time showed right occipital chronic stroke but no new process.  She did have UTI at that time. Her left toe gets funny sensation prior to her seizure. Patient was tapered off of depakote some time ago but doesn't know why. She doesn't want to go back on depakote. Patient is poor historian.     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 There was thought to be a syncopal event in Jan as well and carotid doppler revealed the stenosis which was further confirmed with CTA. She's in process of having right CEA with vascular surgery.   She has had loss of vision in right eye for few seconds that occurred 2-3 times in past 1-2 years.     She's currently on plavix and lipitor.     Past Medical History:   Diagnosis Date   • COPD (chronic obstructive pulmonary disease) (HCC)    • Liver disease 2/21/2017    Patient mentions having nonalcoholic liver disease. Denies alcohol use.   • Radiculopathy of lumbar region     last assessed 03/29/17   • Seizures (HCC)                Current Outpatient Medications on File Prior to Visit   Medication Sig Dispense Refill   • albuterol (Ventolin HFA) 90 mcg/act inhaler Inhale 2 puffs every 6 (six) hours as needed  for wheezing 18 g 3   • amLODIPine (NORVASC) 5 mg tablet Take 1 tablet (5 mg total) by mouth daily 90 tablet 0   • atorvastatin (LIPITOR) 80 mg tablet TAKE 1 TABLET (80 MG TOTAL) BY MOUTH DAILY WITH DINNER 30 tablet 2   • Blood Pressure KIT Use in the morning 1 kit 0   • carvedilol (COREG) 12.5 mg tablet TAKE 1 TABLET (12.5 MG TOTAL) BY MOUTH EVERY 12 (TWELVE) HOURS 180 tablet 0   • clopidogrel (PLAVIX) 75 mg tablet Take 1 tablet (75 mg total) by mouth daily 90 tablet 0   • Diclofenac Sodium (VOLTAREN) 1 % Apply 2 g topically 4 (four) times a day 100 g 0   • traZODone (DESYREL) 50 mg tablet Take 0.5 tablets (25 mg total) by mouth daily at bedtime as needed for sleep 45 tablet 0   • valsartan (DIOVAN) 160 mg tablet Take 1 tablet (160 mg total) by mouth daily 90 tablet 0   • Elastic Bandages & Supports (Knee Brace) MISC Use daily as needed (difficulty walking) (Patient not taking: Reported on 6/5/2024) 1 each 0   • [DISCONTINUED] atorvastatin (LIPITOR) 40 mg tablet Take 1 tablet (40 mg total) by mouth daily with dinner (Patient not taking: Reported on 6/5/2024) 30 tablet 0   • [DISCONTINUED] carvedilol (COREG) 12.5 mg tablet Take 1 tablet (12.5 mg total) by mouth every 12 (twelve) hours (Patient not taking: Reported on 5/31/2024) 60 tablet 0   • [DISCONTINUED] divalproex sodium (DEPAKOTE) 250 mg DR tablet Take 2 tablets (500 mg total) by mouth every 12 (twelve) hours for 14 days (Patient not taking: Reported on 5/28/2024) 56 tablet 0   • [DISCONTINUED] pantoprazole (PROTONIX) 40 mg tablet Take 1 tablet (40 mg total) by mouth 2 (two) times a day before meals (Patient not taking: Reported on 5/28/2024) 60 tablet 0     No current facility-administered medications on file prior to visit.       Allergies   Allergen Reactions   • Aspirin Anaphylaxis   • Nsaids Anaphylaxis   • Asparaginase Derivatives    • Ibuprofen Hives   • Pegaspargase Other (See Comments)   • Robaxin [Methocarbamol]    • Toradol [Ketorolac Tromethamine]           Family History   Problem Relation Age of Onset   • Diabetes Mother    • Hyperlipidemia Mother    • Cancer Father    • Prostate cancer Father    • Ulcers Family                 Past Surgical History:   Procedure Laterality Date   •  SECTION     • EPIDURAL BLOCK INJECTION Right 2017    Procedure: BLOCK / INJECTION EPIDURAL STEROID TRANSFORAMINAL   L4-5;  Surgeon: Sanford De La Vega MD;  Location: [facility] MAIN OR;  Service:    • TONSILLECTOMY             Social History     Socioeconomic History   • Marital status:      Spouse name: Not on file   • Number of children: 3   • Years of education: Not on file   • Highest education level: Not on file   Occupational History   • Not on file   Tobacco Use   • Smoking status: Every Day     Current packs/day: 1.00     Average packs/day: 1 pack/day for 53.4 years (53.4 ttl pk-yrs)     Types: Cigarettes     Start date:    • Smokeless tobacco: Current   • Tobacco comments:     Per allscripts: Current everyday smoker   Vaping Use   • Vaping status: Former   • Substances: Nicotine   Substance and Sexual Activity   • Alcohol use: Not Currently     Comment: Per allscripts: Social   • Drug use: Yes     Types: Marijuana     Comment: gummies   • Sexual activity: Not Currently     Partners: Male   Other Topics Concern   • Not on file   Social History Narrative   • Not on file     Social Determinants of Health     Financial Resource Strain: Low Risk  (2024)    Overall Financial Resource Strain (CARDIA)    • Difficulty of Paying Living Expenses: Not hard at all   Food Insecurity: No Food Insecurity (2024)    Hunger Vital Sign    • Worried About Running Out of Food in the Last Year: Never true    • Ran Out of Food in the Last Year: Never true   Transportation Needs: Unmet Transportation Needs (2024)    PRAPARE - Transportation    • Lack of Transportation (Medical): Yes    • Lack of Transportation (Non-Medical): Yes   Physical Activity: Not on file    Stress: Not on file   Social Connections: Not on file   Intimate Partner Violence: Not on file   Housing Stability: Low Risk  (1/29/2024)    Housing Stability Vital Sign    • Unable to Pay for Housing in the Last Year: No    • Number of Places Lived in the Last Year: 1    • Unstable Housing in the Last Year: No       Review of Systems  Refer to positive review of systems in HPI  Constitutional- No fever  Eyes- No visual change  ENT- Hearing normal  CV- No chest pain  Resp- No Shortness of breath  GI- No diarrhea  - Bladder normal  MS- No Arthritis   Skin- No rash  Psych- No depression  Endo- No DM  Heme- No nodes    PHYSICAL EXAM:    Vitals:    06/05/24 1510   BP: 128/74   BP Location: Right arm   Patient Position: Sitting   Cuff Size: Standard   Pulse: 66   Temp: 97.5 °F (36.4 °C)   TempSrc: Tympanic   SpO2: 93%   Weight: 57.2 kg (126 lb)   Height: 5' (1.524 m)         Appearance: No Acute Distress  Ophthalmoscopic: Disc Flat, Normal fundus  Carotid/Heart/Peripheral Vascular: No Bruits, RRR  Orientation: Awake, Alert, and Oriented x 3  Mental status:  Memory: Registation 3/3 Recall 3/3  Attention: Normal  Knowledge: Appropriate  Language: No aphasia  Speech: No dysarthria  Cranial Nerves:  2 No Visual Defect on Confrontation; Pupils round, equal, reactive to light  3,4,6 Extraocular Movements Intact; no nystagmus  5 Facial Sensation Intact  7 No facial asymmetry  8 Intact hearing  9,10 Palate symmetric, normal gag  11 Good shoulder shrug  12 Tongue Midline  Gait: Stable, No ataxia, can perform tandem walking  Coordination: No ataxia with finger to nose testing and heel to shin testing  Sensory: Intact, Symmetric to Pinprick, Light Touch, Vibration, and Joint Position  Muscle Tone: Normal  Muscle exam  Arm Right Left Leg Right Left   Deltoid 5/5 5/5 Iliopsoas 5/5 5/5   Biceps 5/5 5/5 Quads 5/5 5/5   Triceps 5/5 5/5 Hamstrings 5/5 5/5   Wrist Extension 5/5 5/5 Ankle Dorsi Flexion 5/5 5/5   Wrist Flexion 5/5 5/5  Ankle Plantar Flexion 5/5 5/5   Interossei 5/5 5/5 Ankle Eversion 5/5 5/5   APB 5/5 5/5 Ankle Inversion 5/5 5/5       Reflexes   RJ BJ TJ KJ AJ Plantars Rosa's   Right 2+ 2+ 2+ 2+ 2+ Downgoing Not present   Left 2+ 2+ 2+ 2+ 2+ Downgoing Not present       Personal review of          Mri brain, cta head and neck, carotid doppler between Jan -May of 2024.   Discussed results with patient.     Assessment/Plan:     1. Seizure disorder (HCC)  EEG Prolonged > 1 hour    levETIRAcetam (Keppra) 500 mg tablet      2. Seizures (HCC)  Ambulatory Referral to Neurology      3. Carotid stenosis, right            Patient has had long history of seizures and discussed that she needs to stay on AED life long.   She's agreeable to keppra. Will start at 500mg bid.  Will get EEG to see if we can see epileptogenic focus.  She is in process of getting CEA vs stent for right carotid severe stenosis.                 Counseling Documentation:  The patient and/or patient's family were  counseled regarding diagnostic results. Instructions for management,risk factor reductions,prognosis of disease were discussed. Patient and family were educated regarding impressions,risks and benefits of treatment options,importance of compliance with treatment.        Total time of encounter: 60 min  More than 50% of time was spent in counseling and coordination of care of patient.     Vishal Kirby M.D.  [facility]  [address] [place] [id number]

## 2024-06-05 NOTE — TELEPHONE ENCOUNTER
Daughter Nereyda dropped off [facility] paperwork to be completed   Daughter stated that she needs to return the paperwork to her work by June 18th  Scanned into encounter  Placed in pcps folder  Call when ready: [phone]

## 2024-06-05 NOTE — PROGRESS NOTES
Progress Note - Cardiology Office  [facility]    Nereyda Soler 68 y.o. female MRN: [id number]  : 1955  Encounter: [id number]      Assessment:     Preoperative cardiac risk stratification.  Bilateral carotid artery stenosis.  Essential hypertension.  Dyslipidemia.  Peripheral arterial disease.  Type II diabetes.  Seizures.  COPD.  Tobacco abuse.      Discussion Summary and Plan:    Preoperative cardiac risk stratification.  - Patient presents today for preoperative cardiac restratification for TCAR versus carotid endarterectomy for severe right ICA stenosis.  - Pending decision by [facility] vascular surgery regarding surgical intervention with TCAR versus right carotid endarterectomy.  - Patient is considered at least intermediate risk for TCAR or right carotid endarterectomy. There is no contraindication from cardiology standpoint to proceed with either surgical procedure. No further cardiology testing indicated at this time.  - 24 EKG: Sinus bradycardia, 59 bpm.  - 24 nuclear stress test: Stress Combined Conclusion- Left ventricular perfusion is normal. EF 75 %.   - 24 TTE: LVEF 75%. Diastolic function normal. Right ventricle systolic function is low normal.  Trace mitral valve regurgitation.  Trace tricuspid valve regurgitation.    Bilateral carotid artery stenosis.  - 24 CTA head and neck with and without contrast: Severe, preocclusive stenosis right internal carotid artery origin, approximately 90% difficult to precisely measure given the diminutive lumen. Vascular surgery assessment advised.   - 4/10/24 VAS carotid complete:     RIGHT:There is 70-99% stenosis noted in the internal carotid artery. Plaque is  heterogenous, calcified and irregular. Vertebral artery flow is antegrade. There is 20 mm Hg. brachial blood pressure gradient L > R and elevated velocities noted in the subclavian artery. These findings are consistent with a >50% stenosis in the  proximal subclavian artery.     LEFT: There is 50-69% stenosis noted in the internal carotid artery. Plaque is heterogenous, calcified and irregular. Vertebral artery flow is antegrade. There is no significant subclavian artery disease.     - Pending decision by [facility] vascular [facility] regarding surgical intervention with TCAR versus carotid endarterectomy  - Follows outpatient with [facility] Vascular surgery.    Essential hypertension.  - BP during today's office visit, 112/62.  - Patient is currently on amlodipine 5 mg daily, Coreg 12.5 mg twice daily and valsartan 160 mg daily.  - 1/26/24 TTE: LVEF 75%.  Diastolic function normal. Right ventricle systolic function is low normal.  Trace mitral valve regurgitation.  Trace tricuspid valve regurgitation.    Dyslipidemia.  - Currently on Lipitor 80 mg daily.  - 3/30/24 lipoprotein NMR: Cholesterol 128, Triglycerides 82, LDL 56, LDL-P 728, DL size 20.2, small LDL-P 352, HDL 56,    Peripheral arterial disease.  - Follows outpatient with [facility] Vascular surgery.    Type II diabetes.  - 1/26/24 hgbA1c: 6.3.   - Care per PCP.     Seizures.  - Follows outpatient with [facility] Neurology.    COPD.  - Follows outpatient with [facility] Pulmonology.    Tobacco abuse.  - Patient states that she is actively smoking, approximately 0.5 pack/day.  - Discuss with patient recommendations for complete smoking cessation.      Patient / Caretaker was advised and educated to call our office  immediately if  patient has any new symptoms of chest pain/shortness of breath, near-syncope, syncope, light headedness sustained palpitations  or any other cardiovascular symptoms before their scheduled follow-up appointment.  Office number was provided #[phone].  Please call [phone] if any questions.  Counseling :  A description of the counseling.  Goals and Barriers.  Patient's ability to self care: Yes  Medication side effect reviewed with patient in detail and all their  questions answered to their satisfaction.    HPI :     Nereyda Soler is a 68 y.o. female with PMHx of essential hypertension, dyslipidemia, bilateral carotid artery stenosis, peripheral arterial disease, DMII, seizures, COPD, tobacco abuse, presents for preoperative cardiac risk stratification for TCAR versus right carotid endarterectomy in setting of severe right ICA stenosis.     Patient states that she is fairly active at baseline.  States that she is able to walk at least 2 blocks without experiencing any symptoms.  She states that she is able to climb steps without getting short of breath.  Patient denies experiencing chest pain, palpitations, shortness of breath at rest or with exertion, lower extreme edema, orthopnea, lightheadedness, dizziness, headache, nausea, vomiting.    Review of Systems   All other systems reviewed and are negative.      Historical Information   Past Medical History:   Diagnosis Date    COPD (chronic obstructive pulmonary disease) (HCC)     Liver disease 2017    Patient mentions having nonalcoholic liver disease. Denies alcohol use.    Radiculopathy of lumbar region     last assessed 17    Seizures (HCC)      Past Surgical History:   Procedure Laterality Date     SECTION      EPIDURAL BLOCK INJECTION Right 2017    Procedure: BLOCK / INJECTION EPIDURAL STEROID TRANSFORAMINAL   L4-5;  Surgeon: Sanford De La Vega MD;  Location: [facility] MAIN OR;  Service:     TONSILLECTOMY       Social History     Substance and Sexual Activity   Alcohol Use Not Currently    Comment: Per allscripts: Social     Social History     Substance and Sexual Activity   Drug Use Yes    Types: Marijuana    Comment: gummies     Social History     Tobacco Use   Smoking Status Every Day    Current packs/day: 1.00    Average packs/day: 1 pack/day for 53.4 years (53.4 ttl pk-yrs)    Types: Cigarettes    Start date:    Smokeless Tobacco Current   Tobacco Comments    Per allscripts: Current everyday  smoker     Family History:   Family History   Problem Relation Age of Onset    Diabetes Mother     Hyperlipidemia Mother     Cancer Father     Prostate cancer Father     Ulcers Family        Meds/Allergies     Allergies   Allergen Reactions    Aspirin Anaphylaxis    Nsaids Anaphylaxis    Asparaginase Derivatives     Ibuprofen Hives    Pegaspargase Other (See Comments)    Robaxin [Methocarbamol]     Toradol [Ketorolac Tromethamine]        Current Outpatient Medications:     albuterol (Ventolin HFA) 90 mcg/act inhaler, Inhale 2 puffs every 6 (six) hours as needed for wheezing, Disp: 18 g, Rfl: 3    amLODIPine (NORVASC) 5 mg tablet, Take 1 tablet (5 mg total) by mouth daily, Disp: 90 tablet, Rfl: 0    atorvastatin (LIPITOR) 80 mg tablet, TAKE 1 TABLET (80 MG TOTAL) BY MOUTH DAILY WITH DINNER, Disp: 30 tablet, Rfl: 2    Blood Pressure KIT, Use in the morning, Disp: 1 kit, Rfl: 0    clopidogrel (PLAVIX) 75 mg tablet, Take 1 tablet (75 mg total) by mouth daily, Disp: 90 tablet, Rfl: 0    Diclofenac Sodium (VOLTAREN) 1 %, Apply 2 g topically 4 (four) times a day, Disp: 100 g, Rfl: 0    Elastic Bandages & Supports (Knee Brace) MISC, Use daily as needed (difficulty walking), Disp: 1 each, Rfl: 0    traZODone (DESYREL) 50 mg tablet, Take 0.5 tablets (25 mg total) by mouth daily at bedtime as needed for sleep, Disp: 45 tablet, Rfl: 0    valsartan (DIOVAN) 160 mg tablet, Take 1 tablet (160 mg total) by mouth daily, Disp: 90 tablet, Rfl: 0    carvedilol (COREG) 12.5 mg tablet, TAKE 1 TABLET (12.5 MG TOTAL) BY MOUTH EVERY 12 (TWELVE) HOURS, Disp: 180 tablet, Rfl: 0    Vitals: Blood pressure 112/62, pulse 59, height 5' (1.524 m), weight 57.2 kg (126 lb), SpO2 96%.    Body mass index is 24.61 kg/m².  Wt Readings from Last 3 Encounters:   06/05/24 57.2 kg (126 lb)   05/31/24 57.2 kg (126 lb)   05/28/24 56.2 kg (124 lb)     Vitals:    06/05/24 1203   Weight: 57.2 kg (126 lb)     BP Readings from Last 3 Encounters:   06/05/24 112/62    24 136/82   24 112/64       Physical Exam:  Physical Exam  Vitals reviewed.   Constitutional:       General: She is not in acute distress.  Cardiovascular:      Rate and Rhythm: Regular rhythm. Bradycardia present.      Pulses: Normal pulses.      Heart sounds: Murmur heard.   Pulmonary:      Effort: Pulmonary effort is normal. No respiratory distress.      Breath sounds: Normal breath sounds.   Abdominal:      General: Abdomen is flat. There is no distension.      Palpations: Abdomen is soft.      Tenderness: There is no abdominal tenderness.   Musculoskeletal:      Right lower leg: No edema.      Left lower leg: No edema.   Skin:     General: Skin is warm and dry.   Neurological:      Mental Status: She is alert and oriented to person, place, and time.         Diagnostic Studies Review Cardio:      EK/05/24 EKG: Sinus bradycardia, 59 bpm.    Cardiac testing:   NM Myocardial Perfusion Spect (Pharmacological Induced Stress and/or Rest)  Result date: 24    Interpretation Summary    Perfusion: There are no perfusion defects.    Stress Function: Left ventricular function post-stress is normal. Stress ejection fraction is 75%.    Stress Combined Conclusion: Left ventricular perfusion is normal. Ef 75 %.    Stress ECG: No ST deviation is noted. There were no arrhythmias during recovery. . The ECG was negative for ischemia. The stress ECG is negative for ischemia after pharmacologic vasodilation, without reproduction of symptoms.    Echo complete   Result date: 24     Left Ventricle Left ventricular cavity size is normal. Wall thickness is mildly increased. There is mild concentric hypertrophy. The left ventricular ejection fraction is 75%. Systolic function is hyperdynamic. Wall motion is normal. Diastolic function is normal.   Right Ventricle Right ventricular cavity size is normal. Systolic function is low normal. Wall thickness is normal.   Left Atrium The atrium is normal in size.  "  Right Atrium The atrium is normal in size.   Aortic Valve The aortic valve is trileaflet. The leaflets are mildly thickened. The leaflets are not calcified. The leaflets exhibit normal mobility. There is no evidence of regurgitation. The aortic valve has no significant stenosis.   Mitral Valve Mitral valve structure is normal.  There is trace regurgitation. There is no evidence of stenosis.   Tricuspid Valve Tricuspid valve structure is normal. There is trace regurgitation. There is no evidence of stenosis.   Pulmonic Valve Pulmonic valve structure is normal. There is no evidence of regurgitation. There is no evidence of stenosis.   Ascending Aorta The aortic root is normal in size.   IVC/SVC The inferior vena cava is normal in size.   Pericardium There is no pericardial effusion. The pericardium is normal in appearance.       Lab Review   Lab Results   Component Value Date    WBC 11.58 (H) 01/29/2024    HGB 14.8 01/29/2024    HCT 44.6 01/29/2024    MCV 89 01/29/2024    RDW 14.8 01/29/2024     01/29/2024     BMP:  Lab Results   Component Value Date    SODIUM 137 03/30/2024    K 3.7 03/30/2024     03/30/2024    CO2 26 03/30/2024    BUN 17 03/30/2024    CREATININE 0.59 (L) 03/30/2024    GLUC 97 01/29/2024    GLUF 101 (H) 03/30/2024    CALCIUM 9.3 03/30/2024    CORRECTEDCA 8.9 01/27/2024    EGFR 94 03/30/2024    MG 2.2 01/29/2024       LFT:  Lab Results   Component Value Date    AST 17 03/30/2024    ALT 13 03/30/2024    ALKPHOS 88 03/30/2024    TP 7.4 03/30/2024    ALB 4.3 03/30/2024      No components found for: \"TSH3\"  Lab Results   Component Value Date    TGQ4CCDVASFJ 5.056 (H) 01/26/2024     Lab Results   Component Value Date    HGBA1C 6.3 (H) 01/26/2024     Lipid Profile:   Lab Results   Component Value Date    CHOLESTEROL 128 03/30/2024    CHOLESTEROL 119 03/30/2024    HDL 56 03/30/2024    HDL 47 (L) 03/30/2024    LDLCALC 56 03/30/2024    LDLCALC 55 03/30/2024    TRIG 83 03/30/2024       Aracely" MAXX Jimenez

## 2024-06-06 NOTE — TELEPHONE ENCOUNTER
"Called patient to inform form ready for .     Made copies of completed form and placed in \"to be scanned\" bin. Original placed in envelope and placed in  bin for .   "

## 2024-06-07 ENCOUNTER — TELEPHONE (OUTPATIENT)
Age: 69
End: 2024-06-07

## 2024-06-07 NOTE — TELEPHONE ENCOUNTER
"Received call from patient's daughter.  Patient has recent CTA Head and Neck that showed > 90% stenosis in right carotid artery.    Spoke with pt's daughter in regards to pt's symptoms.  She states since patient had a mini-stroke 2 years ago, patient has episodes where her right eye will go blind temporarily.  She states it has happened 3 times this year since February.  She also has weakness.  She denies facial drooping. Patient has frequent falls, daughter states that is not new.  Pt has intermittent numbness in her legs. Daughter denies slurred speech, but says occasional pt will stutter when speaking.      Message from Marie Saeed PA-C says \"Please arrange for OV with surgeon ASAP - recommend this week - to see Dr. Malcolm.     There may be insurance issue that she can only be seen in [place].\"    Attempted to schedule visit, did not see availability. Please contact daughter Monday to assist with scheduling visit.    Advised daughter to monitor for stroke sx, notify if any worsening of symptoms.  Recommended ED if any symptoms worsen.  "

## 2024-06-08 ENCOUNTER — HOSPITAL ENCOUNTER (OUTPATIENT)
Dept: RADIOLOGY | Facility: HOSPITAL | Age: 69
Discharge: HOME | End: 2024-06-08
Payer: COMMERCIAL

## 2024-06-08 DIAGNOSIS — R91.1 LUNG NODULE: ICD-10-CM

## 2024-06-08 PROCEDURE — 71250 CT THORAX DX C-: CPT

## 2024-06-10 ENCOUNTER — OFFICE VISIT (OUTPATIENT)
Age: 69
End: 2024-06-10

## 2024-06-10 VITALS
DIASTOLIC BLOOD PRESSURE: 70 MMHG | OXYGEN SATURATION: 93 % | RESPIRATION RATE: 17 BRPM | HEIGHT: 60 IN | WEIGHT: 126 LBS | HEART RATE: 94 BPM | SYSTOLIC BLOOD PRESSURE: 146 MMHG | BODY MASS INDEX: 24.74 KG/M2

## 2024-06-10 DIAGNOSIS — Z59.9 FINANCIAL DIFFICULTIES: Primary | ICD-10-CM

## 2024-06-10 DIAGNOSIS — Z59.82 INABILITY TO ACQUIRE TRANSPORTATION: ICD-10-CM

## 2024-06-10 DIAGNOSIS — E11.9 TYPE 2 DIABETES MELLITUS WITHOUT COMPLICATION, WITHOUT LONG-TERM CURRENT USE OF INSULIN (HCC): ICD-10-CM

## 2024-06-10 DIAGNOSIS — I10 ESSENTIAL HYPERTENSION: ICD-10-CM

## 2024-06-10 DIAGNOSIS — I21.4 NSTEMI (NON-ST ELEVATED MYOCARDIAL INFARCTION) (HCC): ICD-10-CM

## 2024-06-10 PROCEDURE — 99213 OFFICE O/P EST LOW 20 MIN: CPT | Performed by: FAMILY MEDICINE

## 2024-06-10 RX ORDER — CARVEDILOL 12.5 MG/1
12.5 TABLET ORAL EVERY 12 HOURS
Qty: 180 TABLET | Refills: 0 | Status: SHIPPED | OUTPATIENT
Start: 2024-06-10 | End: 2024-09-08

## 2024-06-10 SDOH — ECONOMIC STABILITY - INCOME SECURITY: PROBLEM RELATED TO HOUSING AND ECONOMIC CIRCUMSTANCES, UNSPECIFIED: Z59.9

## 2024-06-10 SDOH — ECONOMIC STABILITY - TRANSPORTATION SECURITY: TRANSPORTATION INSECURITY: Z59.82

## 2024-06-10 NOTE — PROGRESS NOTES
[facility] Office visit    Assessment/Plan:     1. Financial difficulties  -     Ambulatory referral to social work care management program; Future; Expected date: 06/10/2024  2. Inability to acquire transportation  -     Ambulatory referral to social work care management program; Future; Expected date: 06/10/2024  3. Essential hypertension  /70, above target, non-adherent to coreg.  Advised to restart Coreg, refill placed  -     Refill/Restarted carvedilol (COREG) 12.5 mg tablet; Take 1 tablet (12.5 mg total) by mouth every 12 (twelve) hours  4. NSTEMI (non-ST elevated myocardial infarction) (HCC)  -     carvedilol (COREG) 12.5 mg tablet; Take 1 tablet (12.5 mg total) by mouth every 12 (twelve) hours  5.  Type 2 diabetes mellitus without complication, without long-term current use of insulin (HCC)   Prior A1c 6.3, controlled without medication, counseled on diet and exercise, highest A1c value of 6.6 in 2016 and only value above diabetic threshold.  Repeat A1c pending, lab slip reprinted.  Normal DM foot exam today           Return in about 6 months (around 12/10/2024) for hypertension fu.     Subjective:   MEL Solre is a 68 y.o. female with a history of hypertension and diabetes who presents to follow-up on her chronic conditions.  She has no specific concerns or symptoms at this time.  She has not been taking her prescribed coreg.     Review of Systems   Constitutional:  Negative for chills and fever.   Respiratory:  Negative for shortness of breath.    Cardiovascular:  Negative for chest pain.   Gastrointestinal:  Negative for diarrhea, nausea and vomiting.   Endocrine: Negative for polydipsia and polyuria.   Neurological:  Negative for dizziness and headaches.        Objective:     Patient's shoes and socks removed.    Right Foot/Ankle   Right Foot Inspection  Skin Exam: skin normal and skin intact. No dry skin, no warmth, no callus, no erythema, no maceration, no abnormal color,  no pre-ulcer, no ulcer and no callus.     Toe Exam: ROM and strength within normal limits.     Sensory   Monofilament testing: intact    Vascular  Capillary refills: < 3 seconds  The right DP pulse is 1+.     Left Foot/Ankle  Left Foot Inspection  Skin Exam: skin normal and skin intact. No dry skin, no warmth, no erythema, no maceration, normal color, no pre-ulcer, no ulcer and no callus.     Toe Exam: ROM and strength within normal limits.     Sensory   Monofilament testing: intact    Vascular  Capillary refills: < 3 seconds  The left DP pulse is 1+.     Assign Risk Category  No deformity present  No loss of protective sensation  No weak pulses  Risk: 0            /70 (BP Location: Left arm, Patient Position: Sitting)   Pulse 94   Resp 17   Ht 5' (1.524 m)   Wt 57.2 kg (126 lb)   SpO2 93%   BMI 24.61 kg/m²      Physical Exam  Constitutional:       General: She is not in acute distress.     Appearance: Normal appearance. She is not ill-appearing, toxic-appearing or diaphoretic.   HENT:      Head: Normocephalic.   Cardiovascular:      Rate and Rhythm: Normal rate and regular rhythm.      Pulses: no weak pulses.           Dorsalis pedis pulses are 1+ on the right side and 1+ on the left side.      Heart sounds: Normal heart sounds. No murmur heard.  Pulmonary:      Effort: Pulmonary effort is normal. No respiratory distress.      Breath sounds: Normal breath sounds.   Feet:      Right foot:      Skin integrity: No ulcer, skin breakdown, erythema, warmth, callus or dry skin.      Left foot:      Skin integrity: No ulcer, skin breakdown, erythema, warmth, callus or dry skin.   Neurological:      Mental Status: She is alert and oriented to person, place, and time.   Psychiatric:         Mood and Affect: Mood normal.         Behavior: Behavior normal.          ** Please Note: This note has been constructed using a voice recognition system **     Daniel Culver MD  06/23/24  8:41 AM   home

## 2024-06-21 ENCOUNTER — TELEPHONE (OUTPATIENT)
Dept: GASTROENTEROLOGY | Facility: CLINIC | Age: 69
End: 2024-06-21

## 2024-06-21 NOTE — TELEPHONE ENCOUNTER
Kev's 9/24 appt have been moved to [place], but pt has [place] insurance, so she will need to be rescheduled. I sent a Fab message requesting a reschedule. Will follow up

## 2024-07-03 ENCOUNTER — TELEPHONE (OUTPATIENT)
Dept: VASCULAR SURGERY | Facility: CLINIC | Age: 69
End: 2024-07-03

## 2024-07-03 NOTE — TELEPHONE ENCOUNTER
Lvm for pt and her daughter that Dr Malcolm can see them 7/5 in [place] at 2:00pm to review CT SCAN

## 2024-07-05 ENCOUNTER — OFFICE VISIT (OUTPATIENT)
Dept: VASCULAR SURGERY | Facility: CLINIC | Age: 69
End: 2024-07-05
Payer: COMMERCIAL

## 2024-07-05 VITALS
RESPIRATION RATE: 20 BRPM | HEIGHT: 60 IN | DIASTOLIC BLOOD PRESSURE: 72 MMHG | BODY MASS INDEX: 24.15 KG/M2 | HEART RATE: 68 BPM | SYSTOLIC BLOOD PRESSURE: 132 MMHG | OXYGEN SATURATION: 98 % | WEIGHT: 123 LBS

## 2024-07-05 DIAGNOSIS — E78.00 HYPERCHOLESTEREMIA: ICD-10-CM

## 2024-07-05 DIAGNOSIS — I65.23 CAROTID STENOSIS, ASYMPTOMATIC, BILATERAL: ICD-10-CM

## 2024-07-05 DIAGNOSIS — I73.9 PERIPHERAL ARTERIAL DISEASE (HCC): Primary | ICD-10-CM

## 2024-07-05 PROCEDURE — 99215 OFFICE O/P EST HI 40 MIN: CPT | Performed by: SURGERY

## 2024-07-05 RX ORDER — ATORVASTATIN CALCIUM 80 MG/1
80 TABLET, FILM COATED ORAL
Qty: 30 TABLET | Refills: 1 | Status: SHIPPED | OUTPATIENT
Start: 2024-07-05 | End: 2024-10-03

## 2024-07-05 RX ORDER — UREA 10 %
1 LOTION (ML) TOPICAL
COMMUNITY

## 2024-07-05 NOTE — PROGRESS NOTES
Assessment/Plan:      There are no diagnoses linked to this encounter.    Carotid stenosis, asymptomatic, bilateral  60-year-old female referred for carotid stenosis.  She saw our vascular GREGORY about a month ago for severe right carotid stenosis based on duplex she has been having over the past couple years some right eye symptoms that do not sound like classic amaurosis fugax.  She does take Plavix she is unable to take aspirin due to a history of breathing difficulty when taking aspirin.  She was sent for a CTA of the head and neck by vascular AP and is here today to discuss the findings of that testing.  She denies prior neck surgery or neck radiation.  Her CT scan demonstrates a noncircumferential calcified ICA stenosis approximately 90%.  Her lesion is well above the angle of the mandible at the proximal of the C3 level but she is very short and has a short neck.  I discussed treatment options with her and due to the degree of stenosis as well as her comorbidities and life expectancy I recommended carotid intervention.  Due to the distal nature of her lesion I recommended a TCAR.  Patient was accompanied by her daughter who works in the [facility] vascular practice in [facility], [place], operated by [facility].  She mentioned to me that her insurance would not allow her to be transferred to [place] for any procedures.  She wishes to have the patient taken to her hospital for evaluation by vascular surgery and so we will make her follow-up as needed at this time if they wish to return for surveillance duplex or office visits in the future we be happy to see her back.    Peripheral arterial disease (HCC)  Patient has known right common iliac occlusion as well as infrarenal aortic stenosis she does have nondisabling right lower extremity claudication she denies any rest pain and denies any lower extremity wounds.  She is currently working on quitting smoking and is taking Plavix in lieu of aspirin as she has an allergy to aspirin  as well as a statin.  She will be seeing vascular at [facility] and at this point I recommended only continued medical management and surveillance.      Subjective:     Patient ID: Nereyda Soler is a 68 y.o. female.    Patient had a CTA head/ neck 5/31/24. Pt denies TIA or CVA symptoms.     HPI    Review of Systems   Constitutional: Negative.    HENT: Negative.     Eyes:  Negative for visual disturbance.   Respiratory: Negative.     Cardiovascular: Negative.    Gastrointestinal: Negative.    Endocrine: Negative.    Genitourinary: Negative.    Musculoskeletal:  Positive for arthralgias (Right knee).   Skin: Negative.    Allergic/Immunologic: Negative.    Neurological:  Negative for dizziness, seizures, facial asymmetry, weakness and numbness.   Hematological: Negative.    Psychiatric/Behavioral: Negative.         I have reviewed the ROS above and made changes as needed.      Objective:     Physical Exam        General  Exam: alert, awake, oriented, no distress, consistent with stated age    Integumentary  Exam: warm, dry, no gross lesions, no bruises and normal color    Head and Neck  Exam: supple, no bruits, trachea midline, no JVD, no mass or palpable nodes      Chest and Lung  Exam: chest normal without deformity, bilaterally expansive, clear to auscultation    Cardiovascular  Exam: regular rate, regular rhythm, no murmurs, no rubs or gallops    Adbomen  Exam: soft, non-tender, non-distended, no pulsatile abdominal masses, no abdominal bruit    Peripheral Vascular  Exam: no clubbing of the digits of the upper extremity, no cyanosis, no edema, both feet are warm, radial pulses 2+ bilaterally, skin well perfused, without and no varicosities.    No widened popliteal pulse noted bilaterally    Upper Extremity:  Palpation: Radial pulse- Bilateral 2+    Neurologic  Exam:alert, non-focal, oriented x 3, cranial nerves II-XII grossly intact

## 2024-07-05 NOTE — ASSESSMENT & PLAN NOTE
Patient has known right common iliac occlusion as well as infrarenal aortic stenosis she does have nondisabling right lower extremity claudication she denies any rest pain and denies any lower extremity wounds.  She is currently working on quitting smoking and is taking Plavix in lieu of aspirin as she has an allergy to aspirin as well as a statin.  She will be seeing vascular at [facility] and at this point I recommended only continued medical management and surveillance.

## 2024-07-05 NOTE — ASSESSMENT & PLAN NOTE
60-year-old female referred for carotid stenosis.  She saw our vascular GREGORY about a month ago for severe right carotid stenosis based on duplex she has been having over the past couple years some right eye symptoms that do not sound like classic amaurosis fugax.  She does take Plavix she is unable to take aspirin due to a history of breathing difficulty when taking aspirin.  She was sent for a CTA of the head and neck by vascular AP and is here today to discuss the findings of that testing.  She denies prior neck surgery or neck radiation.  Her CT scan demonstrates a noncircumferential calcified ICA stenosis approximately 90%.  Her lesion is well above the angle of the mandible at the proximal of the C3 level but she is very short and has a short neck.  I discussed treatment options with her and due to the degree of stenosis as well as her comorbidities and life expectancy I recommended carotid intervention.  Due to the distal nature of her lesion I recommended a TCAR.  Patient was accompanied by her daughter who works in the [facility] vascular practice in [facility].  She mentioned to me that her insurance would not allow her to be transferred to [place] for any procedures.  She wishes to have the patient taken to her hospital for evaluation by vascular surgery and so we will make her follow-up as needed at this time if they wish to return for surveillance duplex or office visits in the future we be happy to see her back.

## 2024-07-09 DIAGNOSIS — R79.89 ELEVATED TROPONIN LEVEL: ICD-10-CM

## 2024-07-09 RX ORDER — CLOPIDOGREL BISULFATE 75 MG/1
75 TABLET ORAL DAILY
Qty: 90 TABLET | Refills: 1 | Status: SHIPPED | OUTPATIENT
Start: 2024-07-09

## 2024-07-23 DIAGNOSIS — I10 ESSENTIAL HYPERTENSION: ICD-10-CM

## 2024-07-23 RX ORDER — AMLODIPINE BESYLATE 5 MG/1
5 TABLET ORAL DAILY
Qty: 90 TABLET | Refills: 1 | Status: SHIPPED | OUTPATIENT
Start: 2024-07-23

## 2024-07-23 RX ORDER — VALSARTAN 160 MG/1
160 TABLET ORAL DAILY
Qty: 90 TABLET | Refills: 1 | Status: SHIPPED | OUTPATIENT
Start: 2024-07-23

## 2024-08-01 ENCOUNTER — TELEPHONE (OUTPATIENT)
Age: 69
End: 2024-08-01

## 2024-08-01 NOTE — TELEPHONE ENCOUNTER
Caller: Daughter, Tima    Doctor: Aracely Jimenez    Reason for call: Patient was seen and cleared by Aracely on 6/5/2024 to undergo vascular surgery.   Per daughter, patient's insurance will not cover the procedure if it is done in [place].  Therefore, patient has seen another vascular surgeon in [place] and is now asking for a copy of Aracely's cardiac clearance (office notes) to provide to the [place] vascular surgeon.  Please contact daughter regarding the above.    Call back#: [phone]

## 2024-08-02 NOTE — TELEPHONE ENCOUNTER
Caller: Nereyda    Doctor: Darryl    Reason for call: Contact info for Surgeon: Dr. Gabino Valadez [facility] [place]  Phone: [phone]   Fax: [phone]    Please confirm once Cardia Clearance is sent.    Call back#: [phone]

## 2024-08-05 ENCOUNTER — PATIENT MESSAGE (OUTPATIENT)
Dept: CARDIOLOGY CLINIC | Facility: CLINIC | Age: 69
End: 2024-08-05

## 2024-08-12 ENCOUNTER — PATIENT OUTREACH (OUTPATIENT)
Age: 69
End: 2024-08-12

## 2024-08-12 NOTE — PROGRESS NOTES
BUZZ had received a referral from Daniel Ansari MD r/t financial difficulties and inability to acquire transportation. DESIRAE had completed a chart review. Per chart, this SW had attempt to reach out to patient a year ago regarding transportation. Per chart, patient did not respond. SW will attempt to call again.    DESIRAECM had called the patient via phone. SWCM left a voicemail. [facility] will attempt to call again at a later date and time. DESIRAE will continue to be available.

## 2024-08-15 ENCOUNTER — PATIENT OUTREACH (OUTPATIENT)
Age: 69
End: 2024-08-15

## 2024-08-15 NOTE — PROGRESS NOTES
OP DESIRAE had called the patient via phone. OP DESIRAE left a voicemail. OP DESIRAE notes this is the second phone call attempt. OP DESIRAE sent unable to reach letter via [employer]. OP DESIRAE closed referral. Please reconsult DESIRAE for future needs.

## 2024-08-15 NOTE — LETTER
08/15/24    Dear Nereyda Soler,    I tried to reach you by phone and was unfortunately unable to reach you. I am the Outpatient Care Manager -  from [facility]. I am following up from your last office visit. Please give me a call at [phone] from 8am-4:30pm, Mon-Fri.    Sincerely,         JAMI Rose

## 2024-08-18 ENCOUNTER — TELEPHONE (OUTPATIENT)
Dept: OTHER | Facility: HOSPITAL | Age: 69
End: 2024-08-18

## 2024-08-18 ENCOUNTER — NURSE TRIAGE (OUTPATIENT)
Dept: OTHER | Facility: OTHER | Age: 69
End: 2024-08-18

## 2024-08-18 DIAGNOSIS — R56.9 SEIZURES (HCC): Primary | ICD-10-CM

## 2024-08-18 RX ORDER — DIVALPROEX SODIUM 500 MG/1
500 TABLET, DELAYED RELEASE ORAL EVERY 12 HOURS SCHEDULED
Qty: 60 TABLET | Refills: 1 | Status: SHIPPED | OUTPATIENT
Start: 2024-08-18 | End: 2024-09-17

## 2024-08-18 NOTE — TELEPHONE ENCOUNTER
Regarding: Medication refill  ----- Message from Carmencita ENGEL sent at 8/18/2024 11:32 AM EDT -----  divalproex sodium (DEPAKOTE) 250 mg DR tablet   Order Details  Dose: 500 mg Route: Oral Frequency: Every 12 hours  Dispense Quantity: 56 tablet Refills: 0

## 2024-08-18 NOTE — TELEPHONE ENCOUNTER
"Reason for Disposition   [1] Prescription refill request for ESSENTIAL medicine (i.e., likelihood of harm to patient if not taken) AND [2] triager unable to refill per department policy    Answer Assessment - Initial Assessment Questions  1. DRUG NAME: \"What medicine do you need to have refilled?\"      Depakote  2. REFILLS REMAINING: \"How many refills are remaining?\" (Note: The label on the medicine or pill bottle will show how many refills are remaining. If there are no refills remaining, then a renewal may be needed.)      0  3. EXPIRATION DATE: \"What is the expiration date?\" (Note: The label states when the prescription will , and thus can no longer be refilled.)      0  4. PRESCRIBING HCP: \"Who prescribed it?\" Reason: If prescribed by specialist, call should be referred to that group.      Dr Lira  5. SYMPTOMS: \"Do you have any symptoms?\"     Patient not currently taking depakote or keppra but asking for doctor to prescribe the depakote again    Protocols used: Medication Refill and Renewal Call-ADULT-    Patient has not taken any seizure medication since February. The patient states that she can feel a seizure coming. ESC to on call Provider. Provider stated they would call the patient directly.   "

## 2024-08-18 NOTE — TELEPHONE ENCOUNTER
Patient states that she has been on Depakote for over 4 to 5 years.  Per chart review, patient has been taking Depakote 500 mg twice daily since 2021.  Seems to be discontinued in February due to patient preference.  Followed up with neurology in June 2024, whose note states that patient refuses Depakote and Keppra was initiated.  Patient says that she trialed Keppra and she felt horrible. She states that she would never take it again. Patient states that she also did not say she refuses Depakote. Patient is requesting for her Depakote 500 mg twice daily to be refilled.  Also ordered CBC, CMP and Depakote levels to be checked in 1 month.

## 2024-08-20 ENCOUNTER — TELEPHONE (OUTPATIENT)
Age: 69
End: 2024-08-20

## 2024-08-20 NOTE — TELEPHONE ENCOUNTER
----- Message from Sadiq Pickard MD sent at 8/18/2024 12:23 PM EDT -----  Regarding: Schedule apt  Hello,     Can you please call her to set up apt in 4 weeks with PCP, after she completes blood work. If not PCP, then you can schedule with me. Thank you!    Sadiq

## 2024-10-03 DIAGNOSIS — G40.909 SEIZURE DISORDER (HCC): ICD-10-CM

## 2024-10-03 RX ORDER — LEVETIRACETAM 500 MG/1
500 TABLET ORAL EVERY 12 HOURS SCHEDULED
Qty: 60 TABLET | Refills: 5 | Status: SHIPPED | OUTPATIENT
Start: 2024-10-03

## 2024-10-15 ENCOUNTER — TELEPHONE (OUTPATIENT)
Dept: NEPHROLOGY | Facility: CLINIC | Age: 69
End: 2024-10-15

## 2024-10-15 DIAGNOSIS — I10 ESSENTIAL HYPERTENSION: ICD-10-CM

## 2024-10-15 DIAGNOSIS — I21.4 NSTEMI (NON-ST ELEVATED MYOCARDIAL INFARCTION) (HCC): ICD-10-CM

## 2024-10-15 RX ORDER — CARVEDILOL 12.5 MG/1
12.5 TABLET ORAL EVERY 12 HOURS
Qty: 180 TABLET | Refills: 1 | Status: SHIPPED | OUTPATIENT
Start: 2024-10-15 | End: 2025-01-13

## 2024-10-19 DIAGNOSIS — E78.00 HYPERCHOLESTEREMIA: ICD-10-CM

## 2024-10-21 ENCOUNTER — TELEPHONE (OUTPATIENT)
Dept: PULMONOLOGY | Facility: MEDICAL CENTER | Age: 69
End: 2024-10-21

## 2024-10-21 DIAGNOSIS — R56.9 SEIZURES (HCC): ICD-10-CM

## 2024-10-21 RX ORDER — ATORVASTATIN CALCIUM 80 MG/1
80 TABLET, FILM COATED ORAL
Qty: 30 TABLET | Refills: 0 | Status: SHIPPED | OUTPATIENT
Start: 2024-10-21 | End: 2025-01-19

## 2024-10-21 NOTE — TELEPHONE ENCOUNTER
Fax received from Pharmacy requesting:    Divalproex sodium DR 500mg [facility]

## 2024-10-22 RX ORDER — DIVALPROEX SODIUM 500 MG/1
500 TABLET, DELAYED RELEASE ORAL EVERY 12 HOURS SCHEDULED
Qty: 60 TABLET | Refills: 1 | Status: SHIPPED | OUTPATIENT
Start: 2024-10-22

## 2024-11-24 DIAGNOSIS — E78.00 HYPERCHOLESTEREMIA: ICD-10-CM

## 2024-11-25 RX ORDER — ATORVASTATIN CALCIUM 80 MG/1
80 TABLET, FILM COATED ORAL
Qty: 90 TABLET | Refills: 0 | Status: SHIPPED | OUTPATIENT
Start: 2024-11-25 | End: 2025-02-23

## 2024-12-23 DIAGNOSIS — R56.9 SEIZURES (HCC): ICD-10-CM

## 2024-12-23 RX ORDER — DIVALPROEX SODIUM 500 MG/1
500 TABLET, DELAYED RELEASE ORAL EVERY 12 HOURS SCHEDULED
Qty: 60 TABLET | Refills: 0 | Status: SHIPPED | OUTPATIENT
Start: 2024-12-23

## 2025-01-26 DIAGNOSIS — R56.9 SEIZURES (HCC): ICD-10-CM

## 2025-01-27 RX ORDER — DIVALPROEX SODIUM 500 MG/1
500 TABLET, DELAYED RELEASE ORAL EVERY 12 HOURS SCHEDULED
Qty: 60 TABLET | Refills: 0 | Status: SHIPPED | OUTPATIENT
Start: 2025-01-27

## 2025-01-27 NOTE — TELEPHONE ENCOUNTER
Patient needs a follow up appointment prior for further refills. Providing courtesy refill for 1 month supply. Please let the patient know she needs to follow up with neurology as well. Per chart review, it is unclear if she is compliant.

## 2025-01-29 DIAGNOSIS — I21.4 NSTEMI (NON-ST ELEVATED MYOCARDIAL INFARCTION) (HCC): ICD-10-CM

## 2025-01-29 DIAGNOSIS — E78.00 HYPERCHOLESTEREMIA: ICD-10-CM

## 2025-01-29 DIAGNOSIS — R79.89 ELEVATED TROPONIN LEVEL: ICD-10-CM

## 2025-01-29 DIAGNOSIS — I10 ESSENTIAL HYPERTENSION: ICD-10-CM

## 2025-01-30 RX ORDER — CLOPIDOGREL BISULFATE 75 MG/1
75 TABLET ORAL DAILY
Qty: 90 TABLET | Refills: 4 | Status: SHIPPED | OUTPATIENT
Start: 2025-01-30

## 2025-01-30 RX ORDER — ATORVASTATIN CALCIUM 80 MG/1
80 TABLET, FILM COATED ORAL
Qty: 90 TABLET | Refills: 1 | Status: SHIPPED | OUTPATIENT
Start: 2025-01-30 | End: 2025-04-30

## 2025-01-30 RX ORDER — CARVEDILOL 12.5 MG/1
12.5 TABLET ORAL EVERY 12 HOURS
Qty: 180 TABLET | Refills: 0 | Status: SHIPPED | OUTPATIENT
Start: 2025-01-30 | End: 2025-04-30

## 2025-02-01 DIAGNOSIS — I10 ESSENTIAL HYPERTENSION: ICD-10-CM

## 2025-02-03 RX ORDER — VALSARTAN 160 MG/1
160 TABLET ORAL DAILY
Qty: 90 TABLET | Refills: 4 | Status: SHIPPED | OUTPATIENT
Start: 2025-02-03

## 2025-02-03 RX ORDER — AMLODIPINE BESYLATE 5 MG/1
5 TABLET ORAL DAILY
Qty: 90 TABLET | Refills: 4 | Status: SHIPPED | OUTPATIENT
Start: 2025-02-03

## 2025-02-20 PROBLEM — K22.70 BARRETT'S ESOPHAGUS WITHOUT DYSPLASIA: Status: ACTIVE | Noted: 2025-02-20

## 2025-02-20 PROBLEM — Z86.0101 HISTORY OF ADENOMATOUS POLYP OF COLON: Status: ACTIVE | Noted: 2025-02-20

## 2025-02-20 PROBLEM — K21.00 GASTROESOPHAGEAL REFLUX DISEASE WITH ESOPHAGITIS WITHOUT HEMORRHAGE: Status: ACTIVE | Noted: 2025-02-20

## 2025-02-26 DIAGNOSIS — R56.9 SEIZURES (HCC): ICD-10-CM

## 2025-02-26 RX ORDER — DIVALPROEX SODIUM 500 MG/1
500 TABLET, DELAYED RELEASE ORAL EVERY 12 HOURS SCHEDULED
Qty: 60 TABLET | Refills: 0 | Status: SHIPPED | OUTPATIENT
Start: 2025-02-26

## 2025-02-26 NOTE — TELEPHONE ENCOUNTER
Provided courtesy refill for a month. Patient needs a follow-up appointment could you please assist in scheduling. Please let the patient know that further refills will be refused without a f/u appointment

## 2025-03-06 ENCOUNTER — TELEPHONE (OUTPATIENT)
Dept: ADMINISTRATIVE | Facility: OTHER | Age: 70
End: 2025-03-06

## 2025-03-06 ENCOUNTER — OFFICE VISIT (OUTPATIENT)
Age: 70
End: 2025-03-06

## 2025-03-06 VITALS
BODY MASS INDEX: 24.15 KG/M2 | SYSTOLIC BLOOD PRESSURE: 170 MMHG | OXYGEN SATURATION: 96 % | DIASTOLIC BLOOD PRESSURE: 80 MMHG | HEART RATE: 68 BPM | WEIGHT: 123 LBS | HEIGHT: 60 IN | RESPIRATION RATE: 17 BRPM

## 2025-03-06 DIAGNOSIS — I10 ESSENTIAL HYPERTENSION: ICD-10-CM

## 2025-03-06 DIAGNOSIS — I65.23 CAROTID STENOSIS, ASYMPTOMATIC, BILATERAL: ICD-10-CM

## 2025-03-06 DIAGNOSIS — E78.00 HYPERCHOLESTEREMIA: ICD-10-CM

## 2025-03-06 DIAGNOSIS — E11.9 TYPE 2 DIABETES MELLITUS WITHOUT COMPLICATION, WITHOUT LONG-TERM CURRENT USE OF INSULIN (HCC): ICD-10-CM

## 2025-03-06 DIAGNOSIS — J44.9 COPD, MILD (HCC): ICD-10-CM

## 2025-03-06 DIAGNOSIS — Z23 ENCOUNTER FOR IMMUNIZATION: Primary | ICD-10-CM

## 2025-03-06 DIAGNOSIS — Z12.31 ENCOUNTER FOR SCREENING MAMMOGRAM FOR BREAST CANCER: ICD-10-CM

## 2025-03-06 LAB — SL AMB POCT HEMOGLOBIN AIC: 5.5 (ref ?–6.5)

## 2025-03-06 PROCEDURE — 99214 OFFICE O/P EST MOD 30 MIN: CPT | Performed by: FAMILY MEDICINE

## 2025-03-06 PROCEDURE — 83036 HEMOGLOBIN GLYCOSYLATED A1C: CPT | Performed by: FAMILY MEDICINE

## 2025-03-06 RX ORDER — ALBUTEROL SULFATE 90 UG/1
2 INHALANT RESPIRATORY (INHALATION) EVERY 6 HOURS PRN
Qty: 18 G | Refills: 3 | Status: SHIPPED | OUTPATIENT
Start: 2025-03-06

## 2025-03-06 NOTE — PROGRESS NOTES
Name: Nereyda Soler      : 1955      MRN: [id number]  Encounter Provider: See Aldridge DO  Encounter Date: 3/6/2025   Encounter department: [facility]  :  Assessment & Plan  Encounter for immunization  No Immunizations today       Encounter for screening mammogram for breast cancer  Ordered placed       Type 2 diabetes mellitus without complication, without long-term current use of insulin (HCC)  POC A1c was 5.5.  No meds Diet seems to be keeping her good.  Discussed diet and nutrition.  Re check labs.   Lab Results   Component Value Date    HGBA1C 5.5 2025       Orders:    POCT hemoglobin A1c    Basic metabolic panel; Future    Albumin / creatinine urine ratio; Future    CBC and differential; Future    Comprehensive metabolic panel; Future    Lipid Panel with Direct LDL reflex; Future    TSH, 3rd generation with Free T4 reflex; Future    Hemoglobin A1C; Future    COPD, mild (HCC)    Orders:    albuterol (Ventolin HFA) 90 mcg/act inhaler; Inhale 2 puffs every 6 (six) hours as needed for wheezing    Essential hypertension  B/P up today.  On multiple medications.  No changes today pt to monitor       Carotid stenosis, asymptomatic, bilateral  S/P cartoid endarterectomy.  Continue meds.       Hypercholesteremia  Re check labs.  Continue lipitor               History of Present Illness   HPI  Review of Systems    Objective   /80 (BP Location: Right arm, Patient Position: Sitting)   Pulse 68   Resp 17   Ht 5' (1.524 m)   Wt 55.8 kg (123 lb)   SpO2 96%   BMI 24.02 kg/m²      Physical Exam

## 2025-03-06 NOTE — ASSESSMENT & PLAN NOTE
POC A1c was 5.5.  No meds Diet seems to be keeping her good.  Discussed diet and nutrition.  Re check labs.   Lab Results   Component Value Date    HGBA1C 5.5 03/06/2025       Orders:    POCT hemoglobin A1c    Basic metabolic panel; Future    Albumin / creatinine urine ratio; Future    CBC and differential; Future    Comprehensive metabolic panel; Future    Lipid Panel with Direct LDL reflex; Future    TSH, 3rd generation with Free T4 reflex; Future    Hemoglobin A1C; Future

## 2025-03-06 NOTE — TELEPHONE ENCOUNTER
Upon review of the In Basket request and the patient's chart, initial outreach has been made via fax to facility. Please see Contacts section for details.     Thank you  Clarita Lindsey MA

## 2025-03-06 NOTE — PATIENT INSTRUCTIONS
"  Patient Education     Foot care for people with diabetes   The Basics   Written by the doctors and editors at [facility]   Why is foot care important if I have diabetes? -- Diabetes can cause nerve damage if your blood sugar is high for a long time. The medical term for this is \"diabetic neuropathy.\"  If you have problems with the nerves in your feet, you might not be able to feel pain in your foot. Normally, people feel pain when they get a cut or a blister on their foot. The pain tells them that they need to treat their cut so it can heal. But people with nerve damage might not feel any pain when their feet get hurt. They might not even know that they have a cut, so they might not treat it. Problems that aren't treated right away can get much worse. For example, an untreated cut can get infected and turn into an open sore.  High blood sugar can also damage blood vessels and decrease blood flow to your feet. This can weaken your skin and make wounds take longer to heal. You are also more likely to get an infection if you have high blood sugar.  How do I take care of my feet? -- Taking good care of your feet can help prevent foot problems. You should:   Wash your feet every day with soap and warm water. Pat your feet dry, and be sure to dry the skin between your toes.   Keep your feet moisturized. Put lotion on the tops and bottoms of your feet, but not between your toes.   Check your feet every day (figure 1). Look for cuts, blisters, redness, or swelling. Use a mirror, or ask someone to help you check the bottoms of your feet. Check all parts of the foot, especially between the toes. Look for broken skin, ulcers, blisters, or redness.   Trim your toenails straight across when needed (figure 2). Do not cut the corners of your toenails. File rough edges. Do not cut your cuticles. Ask for help if you cannot see well or have problems reaching your feet.   Ask your doctor or nurse to check your feet at each visit. Take " your shoes and socks off for these checks.   See a foot care provider (such as a podiatrist) if you have an ingrown toenail, corn, or callus. Do not try to remove corns and calluses yourself.  How do I protect my feet from injury? -- There are several ways to protect your feet. You can:   Wear shoes and socks at all times, even at home. Do not walk barefoot. Wear swim shoes if you go to the beach or a swimming pool.   Choose shoes that fit well. They should not be not too tight or too loose. Your shoes should have plenty of room for your toes (figure 3). Your doctor might give you a prescription for special shoes. Check to see if they are covered by your insurance.   Check your shoes each time before you put them on to make sure that the lining is smooth. Also check to make sure that there is nothing inside the shoes before putting them on.   Do not wear shoes that expose any part of the foot, like sandals, thongs, or clogs.   Wear cotton socks that fit loosely. Do not wear shoes without socks.   Protect your feet from heat and cold. Test bath water before putting your feet in it to make sure that it is not too hot. Do not walk barefoot on hot ground. Take extra care when going outside in the cold and wear warm socks.  What else should I know? -- You can lower your risk for foot problems by keeping your blood sugar levels as close to your goal as possible. Other things you can do include:   Move your ankles and toes often to help with blood flow. You can wear a support stocking to help with swelling.   Walk often. Regular walking helps blood flow.   If you smoke, try to quit. Your doctor or nurse can help. Smoking causes poor blood flow to your feet and can damage your nerves.  When should I call the doctor? -- Call your doctor or nurse for advice if you have:   A fever of 100.4°F (38°C) or higher, chills, or a wound that will not heal   Swelling, redness, warmth around a wound, a foul smell coming from a wound, or  yellowish, greenish, or bloody discharge   Sores or blisters on your feet that hurt more or less than you would expect   Numbness or tingling in your foot or leg   Corns, calluses, blisters, or new sores on your foot   Very dry, scaly, or cracked skin on your feet   Changes in the way your foot joints or arch look  All topics are updated as new evidence becomes available and our peer review process is complete.  This topic retrieved from [employer] on: Mar 13, 2024.  Topic 290505 Version 2.0  Release: 32.2.4 - C32.71  © 2024 UpToDate, Inc. and/or its affiliates. All rights reserved.  figure 1: Foot check for people with diabetes     People with diabetes should check both of their feet every day. It is important to check your feet all over, including in between your toes. If you can't see the bottom of your foot, use a mirror or ask another person to check for you. Let your doctor or nurse know if you find any:  Redness   Cuts or cracks in the skin   Blisters   Swelling   Graphic 01896 Version 3.0  figure 2: Trim your toenails     Trim your toenails straight across and smooth them with a nail file.  Graphic 11827 Version 2.0  figure 3: Correct shoe shape     Choose shoes that fit the right way and are not too tight or too loose. Your shoes should have plenty of room for your toes.  Graphic 51628 Version 2.0  Consumer Information Use and Disclaimer   Disclaimer: This generalized information is a limited summary of diagnosis, treatment, and/or medication information. It is not meant to be comprehensive and should be used as a tool to help the user understand and/or assess potential diagnostic and treatment options. It does NOT include all information about conditions, treatments, medications, side effects, or risks that may apply to a specific patient. It is not intended to be medical advice or a substitute for the medical advice, diagnosis, or treatment of a health care provider based on the health care provider's  examination and assessment of a patient's specific and unique circumstances. Patients must speak with a health care provider for complete information about their health, medical questions, and treatment options, including any risks or benefits regarding use of medications. This information does not endorse any treatments or medications as safe, effective, or approved for treating a specific patient. UpToDate, Inc. and its affiliates disclaim any warranty or liability relating to this information or the use thereof.The use of this information is governed by the Terms of Use, available at https://www.woltersMediaHounduwer.com/en/know/clinical-effectiveness-terms. 2024© UpToDate, Inc. and its affiliates and/or licensors. All rights reserved.  Copyright   © 2024 UpToDate, Inc. and/or its affiliates. All rights reserved.

## 2025-03-06 NOTE — TELEPHONE ENCOUNTER
----- Message from Minoo REYNOLDS sent at 3/6/2025  9:38 AM EST -----  Regarding: care gap request  03/06/25 9:38 AM    Hello, our patient attached above has had Diabetic Eye Exam completed/performed. Please assist in updating the patient chart by making an External outreach to [facility] located in [place]. The date of service is approximately 09/2024.    Thank you,  Minoo BOWERS

## 2025-03-06 NOTE — LETTER
Diabetic Eye Exam Form    Date Requested: 25  Patient: Nereyda Soler  Patient : 1955   Referring Provider: Darryl Lira MD      DIABETIC Eye Exam Date _______________________________      Type of Exam MUST be documented for Diabetic Eye Exams. Please CHECK ONE.     Retinal Exam       Dilated Retinal Exam       OCT       Optomap-Iris Exam      Fundus Photography       Left Eye - Please check Retinopathy or No Retinopathy        Exam did show retinopathy    Exam did not show retinopathy       Right Eye - Please check Retinopathy or No Retinopathy       Exam did show retinopathy    Exam did not show retinopathy       Comments __________________________________________________________    Practice Providing Exam ______________________________________________    Exam Performed By (print name) _______________________________________      Provider Signature ___________________________________________________      These reports are needed for  compliance.  Please fax this completed form and a copy of the Diabetic Eye Exam report to our office located at [address] [place] [address] as soon as possible via Fax [phone] attention Clarita: Phone [phone]  We thank you for your assistance in treating our mutual patient.

## 2025-03-11 NOTE — TELEPHONE ENCOUNTER
Upon review of the In Basket request we have found as a result of outreach that patient did not have the requested item(s) completed.     Per fax back from facility     Any additional questions or concerns should be emailed to the Practice Liaisons via the appropriate education email address, please do not reply via In Basket.    Thank you  Clarita Lindsey MA   [facility]

## 2025-03-25 DIAGNOSIS — R56.9 SEIZURES (HCC): ICD-10-CM

## 2025-03-25 RX ORDER — DIVALPROEX SODIUM 500 MG/1
500 TABLET, DELAYED RELEASE ORAL 2 TIMES DAILY
Qty: 60 TABLET | Refills: 0 | Status: SHIPPED | OUTPATIENT
Start: 2025-03-25

## 2025-03-25 NOTE — TELEPHONE ENCOUNTER
Providing courtesy refill for 1 month supply. Please let the patient know she needs to follow up with neurology. She may need assistance in scheduling appointment with neurology. Per chart review, it is unclear if she is compliant with this medication.

## 2025-04-02 NOTE — TELEPHONE ENCOUNTER
LMOM requesting the patient contact the office to schedule an appointment  in order to continue her medications.

## 2025-04-30 DIAGNOSIS — R56.9 SEIZURES (HCC): ICD-10-CM

## 2025-04-30 RX ORDER — DIVALPROEX SODIUM 500 MG/1
500 TABLET, DELAYED RELEASE ORAL 2 TIMES DAILY
Qty: 60 TABLET | Refills: 0 | Status: SHIPPED | OUTPATIENT
Start: 2025-04-30

## 2025-06-02 DIAGNOSIS — R56.9 SEIZURES (HCC): ICD-10-CM

## 2025-06-02 RX ORDER — DIVALPROEX SODIUM 500 MG/1
500 TABLET, DELAYED RELEASE ORAL 2 TIMES DAILY
Qty: 60 TABLET | Refills: 0 | Status: SHIPPED | OUTPATIENT
Start: 2025-06-02

## 2025-06-06 ENCOUNTER — TELEPHONE (OUTPATIENT)
Age: 70
End: 2025-06-06

## 2025-06-23 ENCOUNTER — OFFICE VISIT (OUTPATIENT)
Age: 70
End: 2025-06-23

## 2025-06-23 VITALS
HEART RATE: 65 BPM | SYSTOLIC BLOOD PRESSURE: 123 MMHG | HEIGHT: 60 IN | WEIGHT: 122 LBS | BODY MASS INDEX: 23.95 KG/M2 | OXYGEN SATURATION: 95 % | TEMPERATURE: 98.2 F | DIASTOLIC BLOOD PRESSURE: 71 MMHG

## 2025-06-23 DIAGNOSIS — M51.17 INTERVERTEBRAL DISC DISORDER WITH RADICULOPATHY OF LUMBOSACRAL REGION: ICD-10-CM

## 2025-06-23 DIAGNOSIS — Z12.31 ENCOUNTER FOR SCREENING MAMMOGRAM FOR BREAST CANCER: ICD-10-CM

## 2025-06-23 DIAGNOSIS — M54.50 MIDLINE LOW BACK PAIN WITHOUT SCIATICA, UNSPECIFIED CHRONICITY: ICD-10-CM

## 2025-06-23 DIAGNOSIS — Z00.00 ANNUAL PHYSICAL EXAM: Primary | ICD-10-CM

## 2025-06-23 DIAGNOSIS — Z23 ENCOUNTER FOR IMMUNIZATION: ICD-10-CM

## 2025-06-23 DIAGNOSIS — R56.9 SEIZURES (HCC): ICD-10-CM

## 2025-06-23 DIAGNOSIS — E11.9 TYPE 2 DIABETES MELLITUS WITHOUT COMPLICATION, WITHOUT LONG-TERM CURRENT USE OF INSULIN (HCC): ICD-10-CM

## 2025-06-23 DIAGNOSIS — F17.210 SMOKING GREATER THAN 20 PACK YEARS: ICD-10-CM

## 2025-06-23 LAB
LEFT EYE DIABETIC RETINOPATHY: NORMAL
LEFT EYE IMAGE QUALITY: NORMAL
LEFT EYE MACULAR EDEMA: NORMAL
LEFT EYE OTHER RETINOPATHY: NORMAL
RIGHT EYE DIABETIC RETINOPATHY: NORMAL
RIGHT EYE IMAGE QUALITY: NORMAL
RIGHT EYE MACULAR EDEMA: NORMAL
RIGHT EYE OTHER RETINOPATHY: NORMAL
SEVERITY (EYE EXAM): NORMAL

## 2025-06-23 PROCEDURE — 90750 HZV VACC RECOMBINANT IM: CPT | Performed by: FAMILY MEDICINE

## 2025-06-23 PROCEDURE — 99397 PER PM REEVAL EST PAT 65+ YR: CPT | Performed by: FAMILY MEDICINE

## 2025-06-23 PROCEDURE — 90471 IMMUNIZATION ADMIN: CPT | Performed by: FAMILY MEDICINE

## 2025-06-23 RX ORDER — NICOTINE 21 MG/24HR
1 PATCH, TRANSDERMAL 24 HOURS TRANSDERMAL EVERY 24 HOURS
Qty: 28 PATCH | Refills: 0 | Status: SHIPPED | OUTPATIENT
Start: 2025-06-23

## 2025-06-23 NOTE — PATIENT INSTRUCTIONS
"  Follow up with neurologist   Get your blood work completed  Get your spine xray, lung CT scan, Dexa scan completed                                                       Patient Education     Routine physical for adults   The Basics   Written by the doctors and editors at [facility]   What is a physical? -- A physical is a routine visit, or \"check-up,\" with your doctor. You might also hear it called a \"wellness visit\" or \"preventive visit.\"  During each visit, the doctor will:   Ask about your physical and mental health   Ask about your habits, behaviors, and lifestyle   Do an exam   Give you vaccines if needed   Talk to you about any medicines you take   Give advice about your health   Answer your questions  Getting regular check-ups is an important part of taking care of your health. It can help your doctor find and treat any problems you have. But it's also important for preventing health problems.  A routine physical is different from a \"sick visit.\" A sick visit is when you see a doctor because of a health concern or problem. Since physicals are scheduled ahead of time, you can think about what you want to ask the doctor.  How often should I get a physical? -- It depends on your age and health. In general, for people age 21 years and older:   If you are younger than 50 years, you might be able to get a physical every 3 years.   If you are 50 years or older, your doctor might recommend a physical every year.  If you have an ongoing health condition, like diabetes or high blood pressure, your doctor will probably want to see you more often.  What happens during a physical? -- In general, each visit will include:   Physical exam - The doctor or nurse will check your height, weight, heart rate, and blood pressure. They will also look at your eyes and ears. They will ask about how you are feeling and whether you have any symptoms that bother you.   Medicines - It's a good idea to bring a list of all the medicines you " "take to each doctor visit. Your doctor will talk to you about your medicines and answer any questions. Tell them if you are having any side effects that bother you. You should also tell them if you are having trouble paying for any of your medicines.   Habits and behaviors - This includes:   Your diet   Your exercise habits   Whether you smoke, drink alcohol, or use drugs   Whether you are sexually active   Whether you feel safe at home  Your doctor will talk to you about things you can do to improve your health and lower your risk of health problems. They will also offer help and support. For example, if you want to quit smoking, they can give you advice and might prescribe medicines. If you want to improve your diet or get more physical activity, they can help you with this, too.   Lab tests, if needed - The tests you get will depend on your age and situation. For example, your doctor might want to check your:   Cholesterol   Blood sugar   Iron level   Vaccines - The recommended vaccines will depend on your age, health, and what vaccines you already had. Vaccines are very important because they can prevent certain serious or deadly infections.   Discussion of screening - \"Screening\" means checking for diseases or other health problems before they cause symptoms. Your doctor can recommend screening based on your age, risk, and preferences. This might include tests to check for:   Cancer, such as breast, prostate, cervical, ovarian, colorectal, prostate, lung, or skin cancer   Sexually transmitted infections, such as chlamydia and gonorrhea   Mental health conditions like depression and anxiety  Your doctor will talk to you about the different types of screening tests. They can help you decide which screenings to have. They can also explain what the results might mean.   Answering questions - The physical is a good time to ask the doctor or nurse questions about your health. If needed, they can refer you to other " doctors or specialists, too.  Adults older than 65 years often need other care, too. As you get older, your doctor will talk to you about:   How to prevent falling at home   Hearing or vision tests   Memory testing   How to take your medicines safely   Making sure that you have the help and support you need at home  All topics are updated as new evidence becomes available and our peer review process is complete.  This topic retrieved from [employer] on: May 02, 2024.  Topic 701708 Version 1.0  Release: 32.4.3 - C32.122  © 2024 UpToDate, Inc. and/or its affiliates. All rights reserved.  Consumer Information Use and Disclaimer   Disclaimer: This generalized information is a limited summary of diagnosis, treatment, and/or medication information. It is not meant to be comprehensive and should be used as a tool to help the user understand and/or assess potential diagnostic and treatment options. It does NOT include all information about conditions, treatments, medications, side effects, or risks that may apply to a specific patient. It is not intended to be medical advice or a substitute for the medical advice, diagnosis, or treatment of a health care provider based on the health care provider's examination and assessment of a patient's specific and unique circumstances. Patients must speak with a health care provider for complete information about their health, medical questions, and treatment options, including any risks or benefits regarding use of medications. This information does not endorse any treatments or medications as safe, effective, or approved for treating a specific patient. UpToDate, Inc. and its affiliates disclaim any warranty or liability relating to this information or the use thereof.The use of this information is governed by the Terms of Use, available at https://www.wolterskluwer.com/en/know/clinical-effectiveness-terms. 2024© UpToDate, Inc. and its affiliates and/or licensors. All rights  reserved.  Copyright   © 2024 [employer] and/or its affiliates. All rights reserved.

## 2025-06-23 NOTE — ASSESSMENT & PLAN NOTE
Follows with neurology at [facility] who refills her Depakote   Notes she did not tolerate Keppra   Denies seizure activity in a long time   Patient interested to transition care to [facility] neurology, referral placed  Orders:  •  Ambulatory Referral to Neurology; Future

## 2025-06-23 NOTE — ASSESSMENT & PLAN NOTE
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 S/p steroid injection in the region. Patient notes it didn't help much.   Notes she was recommended for surgery, but patient refused     Repeat imaging ordered   Referral to PT   Plan to refer to pain management   Orders:  •  DXA bone density spine hip and pelvis; Future  •  Ambulatory Referral to Physical Therapy; Future  •  XR spine lumbar 2 or 3 views injury; Future

## 2025-06-23 NOTE — ASSESSMENT & PLAN NOTE
Lab Results   Component Value Date    HGBA1C 5.5 03/06/2025       Orders:  •  IRIS Diabetic eye exam  •  Albumin / creatinine urine ratio

## 2025-06-23 NOTE — PROGRESS NOTES
Adult Annual Physical  Name: Nereyda Soler      : 1955      MRN: [id number]  Encounter Provider: Darryl Lira MD  Encounter Date: 2025   Encounter department: [facility]    :  Assessment & Plan  Annual physical exam  Presenting for AWV, concerns about back pain   F/u in 1 month with blood work.     Screenings:  Mammogram: last done in  ; with normal findings. Overdue, orders placed   Colonoscopy: Last done in 2024, removal of 2 sessile polyps, internal hemorrhoids and sigmoid colon diverticula noted, recommendation for repeat colonoscopy in 1 year which patient is overdue.  Referral placed. Patient notes she follows with GI at [facility].    Papsmear: Never completed.   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 S/p steroid injection in the region. Patient notes it didn't help much.   Notes she was recommended for surgery, but patient refused     Repeat imaging ordered   Referral to PT   Plan to refer to pain management   Orders:  •  DXA bone density spine hip and pelvis; Future  •  Ambulatory Referral to Physical Therapy; Future  •  XR spine lumbar 2 or 3 views injury; Future    Encounter for screening mammogram for breast cancer    Orders:  •  Mammo screening bilateral w 3d and cad; Future    Smoking greater than 20 pack years  Currently smokes half pack a day  Counseled on smoking cessation   Low dose CT scan lung ordered   Orders:  •  CT lung screening program; Future  •  nicotine (NICODERM CQ) 21 mg/24 hr TD 24 hr patch; Place 1 patch on the skin every 24 hours over 24 hours    Encounter for immunization    Orders:  •  Zoster Vaccine Recombinant IM    Type 2 diabetes mellitus without complication, without long-term current  use of insulin (HCC)    Lab Results   Component Value Date    HGBA1C 5.5 03/06/2025       Orders:  •  IRIS Diabetic eye exam  •  Albumin / creatinine urine ratio    Seizures (HCC)  Follows with neurology at [facility] who refills her Depakote   Notes she did not tolerate Keppra   Denies seizure activity in a long time   Patient interested to transition care to [facility] neurology, referral placed  Orders:  •  Ambulatory Referral to Neurology; Future    Midline low back pain without sciatica, unspecified chronicity    Orders:  •  XR spine thoracic 3 vw; Future        Preventive Screenings:    - Cervical cancer screening: screening not indicated          History of Present Illness   {?Quick Links Encounters * My Last Note * Last Note in Specialty * Snapshot * Since Last Visit * History :76090}    Patient presenting for AWV with a family member. She notes she misses appointments frequently as she has transportation difficulty. Patient is now aware that transportation can be arranged for her office visits. She follows with neurology, GI and vascular surgery at [facility].     Adult Annual Physical:  Patient presents for annual physical.     Diet and Physical Activity:  - Diet/Nutrition: no special diet, well balanced diet, adequate fiber intake, adequate whole grain intake, limited junk food, heart healthy (low sodium) diet and consuming 3-5 servings of fruits/vegetables daily.  - Exercise: walking, 30-60 minutes on average and 5-7 times a week on average.    General Health:  - Sleep: sleeps poorly, 1-3 hours of sleep on average and unrefreshing sleep.  - Hearing: normal hearing bilateral ears.  - Vision: vision problems, most recent eye exam > 1 year ago and wears glasses.  - Dental:. Dentures    /GYN Health:    - History of STDs: no     Health:  - History of STDs: no.     Review of Systems   Constitutional:  Negative for chills and fever.   HENT:  Negative for congestion, ear pain and sore throat.    Eyes:   Negative for pain and visual disturbance.   Respiratory:  Negative for cough and shortness of breath.    Cardiovascular:  Negative for chest pain and palpitations.   Gastrointestinal:  Negative for abdominal pain, constipation, diarrhea, nausea and vomiting.   Genitourinary:  Negative for dysuria and hematuria.   Musculoskeletal:  Positive for back pain. Negative for arthralgias and myalgias.   Skin:  Negative for color change and rash.   Neurological:  Negative for dizziness, seizures, syncope, light-headedness and headaches.   Psychiatric/Behavioral:  Negative for sleep disturbance.    All other systems reviewed and are negative.        Objective {?Quick Links Trend Vitals * Enter New Vitals * Results Review * Timeline (Adult) * Labs * Imaging * Cardiology * Procedures * Lung Cancer Screening * Surgical eConsent :43009}  /71 (BP Location: Left arm, Patient Position: Sitting, Cuff Size: Standard)   Pulse 65   Temp 98.2 °F (36.8 °C) (Tympanic)   Ht 5' (1.524 m)   Wt 55.3 kg (122 lb)   SpO2 95%   BMI 23.83 kg/m²     Physical Exam  Vitals and nursing note reviewed.   Constitutional:       General: She is not in acute distress.     Appearance: She is well-developed.   HENT:      Head: Normocephalic and atraumatic.      Right Ear: Tympanic membrane, ear canal and external ear normal. There is no impacted cerumen.      Left Ear: Tympanic membrane, ear canal and external ear normal. There is no impacted cerumen.      Nose: Nose normal. No congestion.     Eyes:      Conjunctiva/sclera: Conjunctivae normal.       Cardiovascular:      Rate and Rhythm: Normal rate and regular rhythm.      Pulses: Normal pulses.      Heart sounds: Normal heart sounds. No murmur heard.  Pulmonary:      Effort: Pulmonary effort is normal. No respiratory distress.      Breath sounds: Normal breath sounds.   Abdominal:      Palpations: Abdomen is soft.      Tenderness: There is no abdominal tenderness.     Musculoskeletal:          General: Tenderness (thoraic and lumbar spinal tenderness) present. No swelling or deformity. Normal range of motion.      Cervical back: Neck supple. No rigidity or tenderness.      Right lower leg: No edema.      Left lower leg: No edema.     Skin:     General: Skin is warm.      Capillary Refill: Capillary refill takes less than 2 seconds.     Neurological:      Mental Status: She is alert.     Psychiatric:         Mood and Affect: Mood normal.

## 2025-06-24 LAB
ALBUMIN/CREAT UR: 457 MG/G CREAT (ref 0–29)
CREAT UR-MCNC: 146.4 MG/DL
MICROALBUMIN UR-MCNC: 669 UG/ML

## 2025-06-26 NOTE — TELEPHONE ENCOUNTER
Patient has not been seen since 7/18/2019 and at that time her medications were adjusted  Last month I notified that patient that she would not get any more refills until she has been seen  00:00

## 2025-06-27 ENCOUNTER — TELEPHONE (OUTPATIENT)
Age: 70
End: 2025-06-27

## 2025-06-27 DIAGNOSIS — M51.17 INTERVERTEBRAL DISC DISORDER WITH RADICULOPATHY OF LUMBOSACRAL REGION: Primary | ICD-10-CM

## 2025-06-27 RX ORDER — LIDOCAINE 4 G/G
PATCH TOPICAL
Qty: 20 PATCH | Refills: 1 | Status: SHIPPED | OUTPATIENT
Start: 2025-06-27

## 2025-06-27 NOTE — TELEPHONE ENCOUNTER
Vm on rx line:    hi my name is anna su uh i'm calling to get a refill and it's been a while on the lidocaine patches for my back it's killing me the pharmacy is [facility] in [facility] the number is [phone] uh my name is my phone number is [phone] anna mercado i'll be here for the rest of the day thank you  You received a voice mail from DENNY CUELLAR.      Dr. Lira- please review, patient requesting lidocaine patches.

## 2025-07-15 DIAGNOSIS — R56.9 SEIZURES (HCC): ICD-10-CM

## 2025-07-15 RX ORDER — DIVALPROEX SODIUM 500 MG/1
500 TABLET, DELAYED RELEASE ORAL 2 TIMES DAILY
Qty: 60 TABLET | Refills: 0 | Status: SHIPPED | OUTPATIENT
Start: 2025-07-15

## 2025-07-21 DIAGNOSIS — I10 ESSENTIAL HYPERTENSION: ICD-10-CM

## 2025-07-21 DIAGNOSIS — I21.4 NSTEMI (NON-ST ELEVATED MYOCARDIAL INFARCTION) (HCC): ICD-10-CM

## 2025-07-21 RX ORDER — CARVEDILOL 12.5 MG/1
12.5 TABLET ORAL 2 TIMES DAILY
Qty: 180 TABLET | Refills: 0 | Status: SHIPPED | OUTPATIENT
Start: 2025-07-21

## 2025-08-20 DIAGNOSIS — R56.9 SEIZURES (HCC): ICD-10-CM

## 2025-08-20 RX ORDER — DIVALPROEX SODIUM 500 MG/1
500 TABLET, DELAYED RELEASE ORAL 2 TIMES DAILY
Qty: 60 TABLET | Refills: 0 | Status: SHIPPED | OUTPATIENT
Start: 2025-08-20

## (undated) DEVICE — CHLORAPREP APPLICATOR TINTED 10.5ML ONE-STEP

## (undated) DEVICE — RADIOLOGY STERILE LABELS: Brand: CENTURION

## (undated) DEVICE — NEEDLE BLUNT 18 G X 1 1/2 W FILTER

## (undated) DEVICE — WIPES BABY PAMPERS SENSITIVE 36/PK

## (undated) DEVICE — TRAY EPID CONT PERIFIX 18G X 3.5IN 5ML CLSD TIP DRAPE

## (undated) DEVICE — SKIN MARKER DUAL TIP WITH RULER CAP, FLEXIBLE RULER AND LABELS: Brand: DEVON

## (undated) DEVICE — PLASTIC ADHESIVE BANDAGE: Brand: CURITY

## (undated) DEVICE — TOWEL SET X-RAY

## (undated) DEVICE — SYRINGE 5ML LL

## (undated) DEVICE — NEEDLE SPINAL 22G X 5IN QUINCKE

## (undated) DEVICE — GLOVE SRG BIOGEL 7.5

## (undated) DEVICE — SMALL NEEDLE COUNTER NEST